# Patient Record
Sex: FEMALE | Race: WHITE | NOT HISPANIC OR LATINO
[De-identification: names, ages, dates, MRNs, and addresses within clinical notes are randomized per-mention and may not be internally consistent; named-entity substitution may affect disease eponyms.]

---

## 2017-01-11 ENCOUNTER — APPOINTMENT (OUTPATIENT)
Dept: CARDIOLOGY | Facility: CLINIC | Age: 67
End: 2017-01-11

## 2017-01-11 VITALS — BODY MASS INDEX: 47.12 KG/M2 | WEIGHT: 240 LBS | HEIGHT: 60 IN

## 2017-01-11 VITALS — HEART RATE: 68 BPM | SYSTOLIC BLOOD PRESSURE: 130 MMHG | RESPIRATION RATE: 18 BRPM | DIASTOLIC BLOOD PRESSURE: 80 MMHG

## 2017-06-09 ENCOUNTER — APPOINTMENT (OUTPATIENT)
Dept: CARDIOLOGY | Facility: CLINIC | Age: 67
End: 2017-06-09

## 2017-06-09 VITALS — BODY MASS INDEX: 49.28 KG/M2 | HEIGHT: 60 IN | WEIGHT: 251 LBS

## 2017-06-09 VITALS — SYSTOLIC BLOOD PRESSURE: 130 MMHG | HEART RATE: 68 BPM | RESPIRATION RATE: 18 BRPM | DIASTOLIC BLOOD PRESSURE: 80 MMHG

## 2017-10-06 ENCOUNTER — APPOINTMENT (OUTPATIENT)
Dept: CARDIOLOGY | Facility: CLINIC | Age: 67
End: 2017-10-06

## 2017-10-06 VITALS — BODY MASS INDEX: 48.29 KG/M2 | WEIGHT: 246 LBS | HEIGHT: 60 IN

## 2017-10-06 VITALS — DIASTOLIC BLOOD PRESSURE: 80 MMHG | RESPIRATION RATE: 18 BRPM | SYSTOLIC BLOOD PRESSURE: 120 MMHG | HEART RATE: 68 BPM

## 2018-03-06 ENCOUNTER — APPOINTMENT (OUTPATIENT)
Dept: CARDIOLOGY | Facility: CLINIC | Age: 68
End: 2018-03-06

## 2018-03-06 VITALS — WEIGHT: 237 LBS | HEIGHT: 60 IN | BODY MASS INDEX: 46.53 KG/M2

## 2018-03-06 VITALS — HEART RATE: 60 BPM | DIASTOLIC BLOOD PRESSURE: 70 MMHG | SYSTOLIC BLOOD PRESSURE: 130 MMHG | RESPIRATION RATE: 18 BRPM

## 2018-03-06 DIAGNOSIS — G47.33 OBSTRUCTIVE SLEEP APNEA (ADULT) (PEDIATRIC): ICD-10-CM

## 2018-03-18 ENCOUNTER — EMERGENCY (EMERGENCY)
Facility: HOSPITAL | Age: 68
LOS: 0 days | Discharge: HOME | End: 2018-03-18
Attending: EMERGENCY MEDICINE | Admitting: FAMILY MEDICINE

## 2018-03-18 VITALS
OXYGEN SATURATION: 98 % | TEMPERATURE: 98 F | HEART RATE: 65 BPM | SYSTOLIC BLOOD PRESSURE: 112 MMHG | RESPIRATION RATE: 18 BRPM | DIASTOLIC BLOOD PRESSURE: 67 MMHG

## 2018-03-18 DIAGNOSIS — Z87.891 PERSONAL HISTORY OF NICOTINE DEPENDENCE: ICD-10-CM

## 2018-03-18 DIAGNOSIS — Z79.84 LONG TERM (CURRENT) USE OF ORAL HYPOGLYCEMIC DRUGS: ICD-10-CM

## 2018-03-18 DIAGNOSIS — I10 ESSENTIAL (PRIMARY) HYPERTENSION: ICD-10-CM

## 2018-03-18 DIAGNOSIS — E11.9 TYPE 2 DIABETES MELLITUS WITHOUT COMPLICATIONS: ICD-10-CM

## 2018-03-18 DIAGNOSIS — E78.5 HYPERLIPIDEMIA, UNSPECIFIED: ICD-10-CM

## 2018-03-18 DIAGNOSIS — Z79.899 OTHER LONG TERM (CURRENT) DRUG THERAPY: ICD-10-CM

## 2018-03-18 DIAGNOSIS — L02.216 CUTANEOUS ABSCESS OF UMBILICUS: ICD-10-CM

## 2018-03-18 DIAGNOSIS — L03.316 CELLULITIS OF UMBILICUS: ICD-10-CM

## 2018-03-18 RX ORDER — CEPHALEXIN 500 MG
1 CAPSULE ORAL
Qty: 28 | Refills: 0
Start: 2018-03-18 | End: 2018-03-24

## 2018-03-18 NOTE — ED PROVIDER NOTE - MEDICAL DECISION MAKING DETAILS
. Patient to be discharged from ED. Verbal instructions given, including instructions to return to ED immediately for any new, worsening, or concerning symptoms. Patient endorsed understanding. Written discharge instructions additionally given, including follow-up plan.

## 2018-03-18 NOTE — ED PROVIDER NOTE - PHYSICAL EXAMINATION
Vital Signs: I have reviewed the initial vital signs.  Constitutional: NAD, well-nourished, appears stated age, no acute distress.  HEENT: Airway patent, moist MM EOMI.  CV: regular rate, regular rhythm, well-perfused extremities.  Lungs: BCTA, no focal areas of decreased breath sounds.   ABD: Umbilicus with erythema, some skin break around rim, yellow-grey watery discharge on probing the umbilicus with cotton swab. No bleeding, no fluctuance, area of spreading erythema, mild induration 6cmx 6cm to left inferior area from umbilicus. NTND, no guarding or rebound, no pulsatile mass, no hernias.   MSK: Neck supple, nontender, nl ROM, no stepoff. Chest nontender. Ext nl rom, no deformity.   INTEG: Skin warm, dry. rash as noted.  NEURO: A&O, normal strength 5/5 all 4 ext, nl sensation throughout.  PSYCH: Calm, cooperative, normal affect and interaction.

## 2018-03-18 NOTE — ED PROVIDER NOTE - OBJECTIVE STATEMENT
67 y F PMH HTN, HLD, DM with normal A1c on metformin, pw increasing redness, firmness, pain, x4 days, then + thin smelly discharge x 2 days from her umbilicus. Never had this before. tried witch hazel without relief, went to  where they told her to come to the ED for concern of the discharge, concern for infection.

## 2018-03-18 NOTE — ED PROVIDER NOTE - PLAN OF CARE
Likely cellulitis of umblicus, slowly progressive, no sign of abscess. Will give abx prescription. Given strict return precautions and care instructions.

## 2018-03-18 NOTE — ED PROVIDER NOTE - CARE PLAN
Principal Discharge DX:	Cellulitis of umbilicus  Assessment and plan of treatment:	Likely cellulitis of umblicus, slowly progressive, no sign of abscess. Will give abx prescription. Given strict return precautions and care instructions.

## 2018-03-18 NOTE — ED PROVIDER NOTE - NS ED ROS FT
Constitutional: (-) fever, (-) chills  Eyes/ENT: (-) blurry vision, (-) epistaxis, (-) sore throat  Cardiovascular: (-) chest pain, (-) syncope  Respiratory: (-) cough, (-) shortness of breath  Gastrointestinal: (-) abdominal pain, (-) vomiting, (-) diarrhea  Musculoskeletal: (-) neck pain, (-) back pain, (-) joint pain  Integumentary: (+) rash, (-) edema  Neurological: (-) headache, (-) altered mental status  Psychiatric: (-) hallucinations,   Allergic/Immunologic: (-) pruritus

## 2018-03-18 NOTE — ED ADULT NURSE NOTE - GASTROINTESTINAL WDL
Abdomen soft, nontender, nondistended, bowel sounds present in all 4 quadrants. Pt states " I have an ear infection in my left ear x 3 days. I have been taking extra strength tylenol but it is not helping". Denies trauma, drainage from ear

## 2018-03-18 NOTE — ED PROVIDER NOTE - PMH
Hyperlipidemia, unspecified hyperlipidemia type    Hypertension, unspecified type    Type 2 diabetes mellitus without complication, without long-term current use of insulin

## 2018-09-07 ENCOUNTER — APPOINTMENT (OUTPATIENT)
Dept: CARDIOLOGY | Facility: CLINIC | Age: 68
End: 2018-09-07

## 2018-09-07 VITALS — HEART RATE: 76 BPM | SYSTOLIC BLOOD PRESSURE: 110 MMHG | RESPIRATION RATE: 18 BRPM | DIASTOLIC BLOOD PRESSURE: 70 MMHG

## 2018-09-07 VITALS — HEIGHT: 60 IN | WEIGHT: 228 LBS | BODY MASS INDEX: 44.76 KG/M2

## 2018-09-07 PROBLEM — E78.5 HYPERLIPIDEMIA, UNSPECIFIED: Chronic | Status: ACTIVE | Noted: 2018-03-18

## 2018-09-07 PROBLEM — I10 ESSENTIAL (PRIMARY) HYPERTENSION: Chronic | Status: ACTIVE | Noted: 2018-03-18

## 2019-03-08 ENCOUNTER — APPOINTMENT (OUTPATIENT)
Dept: CARDIOLOGY | Facility: CLINIC | Age: 69
End: 2019-03-08

## 2019-03-08 VITALS — BODY MASS INDEX: 45.16 KG/M2 | WEIGHT: 230 LBS | HEIGHT: 60 IN

## 2019-03-08 VITALS — SYSTOLIC BLOOD PRESSURE: 110 MMHG | HEART RATE: 54 BPM | RESPIRATION RATE: 18 BRPM | DIASTOLIC BLOOD PRESSURE: 80 MMHG

## 2019-03-08 DIAGNOSIS — Z86.69 PERSONAL HISTORY OF OTHER DISEASES OF THE NERVOUS SYSTEM AND SENSE ORGANS: ICD-10-CM

## 2019-03-08 NOTE — PHYSICAL EXAM
[General Appearance - Well Developed] : well developed [Normal Appearance] : normal appearance [Well Groomed] : well groomed [General Appearance - Well Nourished] : well nourished [No Deformities] : no deformities [General Appearance - In No Acute Distress] : no acute distress [Normal Conjunctiva] : the conjunctiva exhibited no abnormalities [Eyelids - No Xanthelasma] : the eyelids demonstrated no xanthelasmas [Normal Oral Mucosa] : normal oral mucosa [No Oral Pallor] : no oral pallor [No Oral Cyanosis] : no oral cyanosis [Normal Jugular Venous A Waves Present] : normal jugular venous A waves present [Normal Jugular Venous V Waves Present] : normal jugular venous V waves present [No Jugular Venous Guaman A Waves] : no jugular venous guaman A waves [Respiration, Rhythm And Depth] : normal respiratory rhythm and effort [Exaggerated Use Of Accessory Muscles For Inspiration] : no accessory muscle use [Auscultation Breath Sounds / Voice Sounds] : lungs were clear to auscultation bilaterally [Heart Rate And Rhythm] : heart rate and rhythm were normal [Heart Sounds] : normal S1 and S2 [Murmurs] : no murmurs present [Bowel Sounds] : normal bowel sounds [Abdomen Soft] : soft [Abdomen Tenderness] : non-tender [Abdomen Mass (___ Cm)] : no abdominal mass palpated [Abnormal Walk] : normal gait [Nail Clubbing] : no clubbing of the fingernails [Cyanosis, Localized] : no localized cyanosis [Petechial Hemorrhages (___cm)] : no petechial hemorrhages [Skin Color & Pigmentation] : normal skin color and pigmentation [] : no rash [No Venous Stasis] : no venous stasis [Skin Lesions] : no skin lesions [No Skin Ulcers] : no skin ulcer [No Xanthoma] : no  xanthoma was observed [Oriented To Time, Place, And Person] : oriented to person, place, and time

## 2019-09-13 ENCOUNTER — APPOINTMENT (OUTPATIENT)
Dept: CARDIOLOGY | Facility: CLINIC | Age: 69
End: 2019-09-13
Payer: MEDICARE

## 2019-09-13 ENCOUNTER — TRANSCRIPTION ENCOUNTER (OUTPATIENT)
Age: 69
End: 2019-09-13

## 2019-09-13 VITALS — WEIGHT: 235 LBS | BODY MASS INDEX: 46.13 KG/M2 | HEIGHT: 60 IN

## 2019-09-13 VITALS — DIASTOLIC BLOOD PRESSURE: 80 MMHG | HEART RATE: 60 BPM | RESPIRATION RATE: 18 BRPM | SYSTOLIC BLOOD PRESSURE: 130 MMHG

## 2019-09-13 PROCEDURE — 93000 ELECTROCARDIOGRAM COMPLETE: CPT

## 2019-09-13 PROCEDURE — 99214 OFFICE O/P EST MOD 30 MIN: CPT

## 2019-09-13 NOTE — PHYSICAL EXAM
[General Appearance - Well Developed] : well developed [Normal Appearance] : normal appearance [Well Groomed] : well groomed [General Appearance - Well Nourished] : well nourished [No Deformities] : no deformities [General Appearance - In No Acute Distress] : no acute distress [Normal Conjunctiva] : the conjunctiva exhibited no abnormalities [Eyelids - No Xanthelasma] : the eyelids demonstrated no xanthelasmas [Normal Oral Mucosa] : normal oral mucosa [No Oral Pallor] : no oral pallor [No Oral Cyanosis] : no oral cyanosis [Normal Jugular Venous A Waves Present] : normal jugular venous A waves present [Normal Jugular Venous V Waves Present] : normal jugular venous V waves present [No Jugular Venous Guaman A Waves] : no jugular venous guaman A waves [Heart Rate And Rhythm] : heart rate and rhythm were normal [Heart Sounds] : normal S1 and S2 [Murmurs] : no murmurs present [Respiration, Rhythm And Depth] : normal respiratory rhythm and effort [Exaggerated Use Of Accessory Muscles For Inspiration] : no accessory muscle use [Auscultation Breath Sounds / Voice Sounds] : lungs were clear to auscultation bilaterally [Bowel Sounds] : normal bowel sounds [Abdomen Soft] : soft [Abdomen Tenderness] : non-tender [Abdomen Mass (___ Cm)] : no abdominal mass palpated [Abnormal Walk] : normal gait [Nail Clubbing] : no clubbing of the fingernails [Cyanosis, Localized] : no localized cyanosis [Petechial Hemorrhages (___cm)] : no petechial hemorrhages [Skin Color & Pigmentation] : normal skin color and pigmentation [] : no rash [No Venous Stasis] : no venous stasis [Skin Lesions] : no skin lesions [No Skin Ulcers] : no skin ulcer [No Xanthoma] : no  xanthoma was observed [Oriented To Time, Place, And Person] : oriented to person, place, and time

## 2019-09-19 ENCOUNTER — OTHER (OUTPATIENT)
Age: 69
End: 2019-09-19

## 2019-09-27 ENCOUNTER — APPOINTMENT (OUTPATIENT)
Dept: CARDIOLOGY | Facility: CLINIC | Age: 69
End: 2019-09-27
Payer: MEDICARE

## 2019-09-27 PROCEDURE — 93306 TTE W/DOPPLER COMPLETE: CPT

## 2020-06-30 ENCOUNTER — APPOINTMENT (OUTPATIENT)
Dept: CARDIOLOGY | Facility: CLINIC | Age: 70
End: 2020-06-30

## 2020-07-23 ENCOUNTER — APPOINTMENT (OUTPATIENT)
Dept: CARDIOLOGY | Facility: CLINIC | Age: 70
End: 2020-07-23

## 2020-09-23 ENCOUNTER — APPOINTMENT (OUTPATIENT)
Dept: CARDIOLOGY | Facility: CLINIC | Age: 70
End: 2020-09-23

## 2020-10-23 ENCOUNTER — APPOINTMENT (OUTPATIENT)
Dept: CARDIOLOGY | Facility: CLINIC | Age: 70
End: 2020-10-23
Payer: MEDICARE

## 2020-10-23 VITALS — TEMPERATURE: 97.2 F | WEIGHT: 234 LBS | HEIGHT: 60 IN | BODY MASS INDEX: 45.94 KG/M2

## 2020-10-23 VITALS — SYSTOLIC BLOOD PRESSURE: 130 MMHG | DIASTOLIC BLOOD PRESSURE: 80 MMHG | RESPIRATION RATE: 18 BRPM | HEART RATE: 60 BPM

## 2020-10-23 DIAGNOSIS — R73.03 PREDIABETES.: ICD-10-CM

## 2020-10-23 PROCEDURE — 99072 ADDL SUPL MATRL&STAF TM PHE: CPT

## 2020-10-23 PROCEDURE — 93000 ELECTROCARDIOGRAM COMPLETE: CPT

## 2020-10-23 PROCEDURE — 99214 OFFICE O/P EST MOD 30 MIN: CPT

## 2020-10-23 NOTE — PHYSICAL EXAM
[General Appearance - Well Developed] : well developed [Normal Appearance] : normal appearance [Well Groomed] : well groomed [General Appearance - Well Nourished] : well nourished [No Deformities] : no deformities [General Appearance - In No Acute Distress] : no acute distress [Normal Conjunctiva] : the conjunctiva exhibited no abnormalities [Eyelids - No Xanthelasma] : the eyelids demonstrated no xanthelasmas [Normal Oral Mucosa] : normal oral mucosa [No Oral Pallor] : no oral pallor [No Oral Cyanosis] : no oral cyanosis [Normal Jugular Venous A Waves Present] : normal jugular venous A waves present [Normal Jugular Venous V Waves Present] : normal jugular venous V waves present [No Jugular Venous Guaamn A Waves] : no jugular venous guaman A waves [Heart Rate And Rhythm] : heart rate and rhythm were normal [Heart Sounds] : normal S1 and S2 [Murmurs] : no murmurs present [Respiration, Rhythm And Depth] : normal respiratory rhythm and effort [Exaggerated Use Of Accessory Muscles For Inspiration] : no accessory muscle use [Auscultation Breath Sounds / Voice Sounds] : lungs were clear to auscultation bilaterally [Bowel Sounds] : normal bowel sounds [Abdomen Soft] : soft [Abdomen Tenderness] : non-tender [Abdomen Mass (___ Cm)] : no abdominal mass palpated [Abnormal Walk] : normal gait [Nail Clubbing] : no clubbing of the fingernails [Cyanosis, Localized] : no localized cyanosis [Petechial Hemorrhages (___cm)] : no petechial hemorrhages [FreeTextEntry1] : CALVES SOFT [Skin Color & Pigmentation] : normal skin color and pigmentation [] : no rash [No Venous Stasis] : no venous stasis [Skin Lesions] : no skin lesions [No Skin Ulcers] : no skin ulcer [No Xanthoma] : no  xanthoma was observed [Oriented To Time, Place, And Person] : oriented to person, place, and time

## 2020-11-24 ENCOUNTER — APPOINTMENT (OUTPATIENT)
Dept: CARDIOLOGY | Facility: CLINIC | Age: 70
End: 2020-11-24
Payer: MEDICARE

## 2020-11-24 LAB
BASOPHILS # BLD AUTO: 0.08 K/UL
BASOPHILS NFR BLD AUTO: 1 %
EOSINOPHIL # BLD AUTO: 0.26 K/UL
EOSINOPHIL NFR BLD AUTO: 3.2 %
HCT VFR BLD CALC: 31.6 %
HGB BLD-MCNC: 9.7 G/DL
IMM GRANULOCYTES NFR BLD AUTO: 0.2 %
LYMPHOCYTES # BLD AUTO: 1.77 K/UL
LYMPHOCYTES NFR BLD AUTO: 22 %
MAN DIFF?: NORMAL
MCHC RBC-ENTMCNC: 28 PG
MCHC RBC-ENTMCNC: 30.7 G/DL
MCV RBC AUTO: 91.3 FL
MONOCYTES # BLD AUTO: 0.59 K/UL
MONOCYTES NFR BLD AUTO: 7.3 %
NEUTROPHILS # BLD AUTO: 5.32 K/UL
NEUTROPHILS NFR BLD AUTO: 66.3 %
PLATELET # BLD AUTO: 273 K/UL
RBC # BLD: 3.46 M/UL
RBC # FLD: 14.9 %
WBC # FLD AUTO: 8.04 K/UL

## 2020-11-24 PROCEDURE — 93306 TTE W/DOPPLER COMPLETE: CPT

## 2020-11-25 LAB
ALBUMIN SERPL ELPH-MCNC: 4.3 G/DL
ALP BLD-CCNC: 70 U/L
ALT SERPL-CCNC: 13 U/L
ANION GAP SERPL CALC-SCNC: 10 MMOL/L
AST SERPL-CCNC: 20 U/L
BILIRUB SERPL-MCNC: 0.4 MG/DL
BUN SERPL-MCNC: 30 MG/DL
CALCIUM SERPL-MCNC: 9.6 MG/DL
CHLORIDE SERPL-SCNC: 105 MMOL/L
CHOLEST SERPL-MCNC: 167 MG/DL
CO2 SERPL-SCNC: 26 MMOL/L
CREAT SERPL-MCNC: 1.1 MG/DL
GLUCOSE SERPL-MCNC: 96 MG/DL
HDLC SERPL-MCNC: 50 MG/DL
LDLC SERPL CALC-MCNC: 96 MG/DL
NONHDLC SERPL-MCNC: 117 MG/DL
POTASSIUM SERPL-SCNC: 4.7 MMOL/L
PROT SERPL-MCNC: 6.1 G/DL
SODIUM SERPL-SCNC: 141 MMOL/L
TRIGL SERPL-MCNC: 129 MG/DL

## 2021-01-07 ENCOUNTER — APPOINTMENT (OUTPATIENT)
Dept: CARDIOLOGY | Facility: CLINIC | Age: 71
End: 2021-01-07
Payer: MEDICARE

## 2021-01-07 VITALS — HEART RATE: 64 BPM | RESPIRATION RATE: 18 BRPM | DIASTOLIC BLOOD PRESSURE: 80 MMHG | SYSTOLIC BLOOD PRESSURE: 140 MMHG

## 2021-01-07 VITALS — WEIGHT: 230 LBS | BODY MASS INDEX: 45.16 KG/M2 | TEMPERATURE: 95.4 F | HEIGHT: 60 IN

## 2021-01-07 PROCEDURE — 93000 ELECTROCARDIOGRAM COMPLETE: CPT

## 2021-01-07 PROCEDURE — 99072 ADDL SUPL MATRL&STAF TM PHE: CPT

## 2021-01-07 PROCEDURE — 99213 OFFICE O/P EST LOW 20 MIN: CPT

## 2021-01-07 NOTE — PHYSICAL EXAM
[General Appearance - Well Developed] : well developed [Normal Appearance] : normal appearance [Well Groomed] : well groomed [General Appearance - Well Nourished] : well nourished [No Deformities] : no deformities [General Appearance - In No Acute Distress] : no acute distress [Normal Conjunctiva] : the conjunctiva exhibited no abnormalities [Eyelids - No Xanthelasma] : the eyelids demonstrated no xanthelasmas [Normal Oral Mucosa] : normal oral mucosa [No Oral Pallor] : no oral pallor [No Oral Cyanosis] : no oral cyanosis [Normal Jugular Venous A Waves Present] : normal jugular venous A waves present [Normal Jugular Venous V Waves Present] : normal jugular venous V waves present [No Jugular Venous Guaman A Waves] : no jugular venous guaman A waves [Heart Rate And Rhythm] : heart rate and rhythm were normal [Heart Sounds] : normal S1 and S2 [Murmurs] : no murmurs present [Respiration, Rhythm And Depth] : normal respiratory rhythm and effort [Exaggerated Use Of Accessory Muscles For Inspiration] : no accessory muscle use [Auscultation Breath Sounds / Voice Sounds] : lungs were clear to auscultation bilaterally [Bowel Sounds] : normal bowel sounds [Abdomen Soft] : soft [Abdomen Tenderness] : non-tender [Abdomen Mass (___ Cm)] : no abdominal mass palpated [Abnormal Walk] : normal gait [Nail Clubbing] : no clubbing of the fingernails [Cyanosis, Localized] : no localized cyanosis [Petechial Hemorrhages (___cm)] : no petechial hemorrhages [FreeTextEntry1] : CALVES SOFT [Skin Color & Pigmentation] : normal skin color and pigmentation [] : no rash [No Venous Stasis] : no venous stasis [Skin Lesions] : no skin lesions [No Skin Ulcers] : no skin ulcer [No Xanthoma] : no  xanthoma was observed [Oriented To Time, Place, And Person] : oriented to person, place, and time

## 2021-01-21 ENCOUNTER — APPOINTMENT (OUTPATIENT)
Dept: CARDIOTHORACIC SURGERY | Facility: CLINIC | Age: 71
End: 2021-01-21
Payer: MEDICARE

## 2021-01-21 ENCOUNTER — APPOINTMENT (OUTPATIENT)
Dept: CARDIOLOGY | Facility: CLINIC | Age: 71
End: 2021-01-21
Payer: MEDICARE

## 2021-01-21 VITALS
HEIGHT: 59 IN | DIASTOLIC BLOOD PRESSURE: 95 MMHG | WEIGHT: 220 LBS | TEMPERATURE: 98.6 F | BODY MASS INDEX: 44.35 KG/M2 | RESPIRATION RATE: 18 BRPM | SYSTOLIC BLOOD PRESSURE: 156 MMHG | OXYGEN SATURATION: 98 % | HEART RATE: 73 BPM

## 2021-01-21 VITALS
BODY MASS INDEX: 44.35 KG/M2 | WEIGHT: 220 LBS | RESPIRATION RATE: 18 BRPM | TEMPERATURE: 98.6 F | OXYGEN SATURATION: 95 % | HEIGHT: 59 IN | HEART RATE: 73 BPM

## 2021-01-21 PROCEDURE — 99072 ADDL SUPL MATRL&STAF TM PHE: CPT

## 2021-01-21 PROCEDURE — 99214 OFFICE O/P EST MOD 30 MIN: CPT

## 2021-01-21 PROCEDURE — 99203 OFFICE O/P NEW LOW 30 MIN: CPT

## 2021-01-21 RX ORDER — LISINOPRIL AND HYDROCHLOROTHIAZIDE TABLETS 20; 12.5 MG/1; MG/1
20-12.5 TABLET ORAL DAILY
Refills: 0 | Status: DISCONTINUED | COMMUNITY
End: 2021-01-21

## 2021-01-21 NOTE — HISTORY OF PRESENT ILLNESS
[FreeTextEntry1] : Ms. ADRIAN SAMUELS 70 year F, former smoker,  arrives  today for evaluation of their Aortic Stenosis.  Patient PMH include HTN, HLD, Lymphedema, Sciatica, Spinal Stenosis,JUAN PABLO on CPAP at night, glaucoma.  Symptoms include SOB which has recently improved.  Recent Echocardiogram showed peak/mean gradient of 20.2/9.68 with ELIAS 0.9 and moderate MR.  NYHA class II. Here for consultation of her AS.\par \par Has lost a total of 60 lb prior to her 2 recent hip surgery July 2020 and October 2020\par \par Her healthcare teams is as follows:\par PMD: Dr. Gardiner \par Cardio: Dr. David\par Pulm: Dr. Mcelroy

## 2021-01-21 NOTE — PHYSICAL EXAM
[General Appearance - Alert] : alert [General Appearance - In No Acute Distress] : in no acute distress [Neck Appearance] : the appearance of the neck was normal [] : no respiratory distress [Respiration, Rhythm And Depth] : normal respiratory rhythm and effort [Exaggerated Use Of Accessory Muscles For Inspiration] : no accessory muscle use [Auscultation Breath Sounds / Voice Sounds] : lungs were clear to auscultation bilaterally [Bowel Sounds] : normal bowel sounds [Abnormal Walk] : normal gait [Skin Color & Pigmentation] : normal skin color and pigmentation [Skin Turgor] : normal skin turgor [Cranial Nerves] : cranial nerves 2-12 were intact [Oriented To Time, Place, And Person] : oriented to person, place, and time [Normal Rate] : normal [Rhythm Regular] : regular [Normal S1] : normal S1 [Normal S2] : normal S2 [I] : a grade 1 [FreeTextEntry1] : Left Leg Lymphedema

## 2021-02-02 LAB
ANION GAP SERPL CALC-SCNC: 14 MMOL/L
BASOPHILS # BLD AUTO: 0.07 K/UL
BASOPHILS NFR BLD AUTO: 0.8 %
BUN SERPL-MCNC: 31 MG/DL
CALCIUM SERPL-MCNC: 9.5 MG/DL
CHLORIDE SERPL-SCNC: 104 MMOL/L
CO2 SERPL-SCNC: 22 MMOL/L
CREAT SERPL-MCNC: 1.1 MG/DL
EOSINOPHIL # BLD AUTO: 0.23 K/UL
EOSINOPHIL NFR BLD AUTO: 2.5 %
GLUCOSE SERPL-MCNC: 118 MG/DL
HCT VFR BLD CALC: 33.1 %
HGB BLD-MCNC: 10.3 G/DL
IMM GRANULOCYTES NFR BLD AUTO: 0.3 %
LYMPHOCYTES # BLD AUTO: 1.51 K/UL
LYMPHOCYTES NFR BLD AUTO: 16.5 %
MAN DIFF?: NORMAL
MCHC RBC-ENTMCNC: 26.8 PG
MCHC RBC-ENTMCNC: 31.1 G/DL
MCV RBC AUTO: 86 FL
MONOCYTES # BLD AUTO: 0.57 K/UL
MONOCYTES NFR BLD AUTO: 6.2 %
NEUTROPHILS # BLD AUTO: 6.75 K/UL
NEUTROPHILS NFR BLD AUTO: 73.7 %
PLATELET # BLD AUTO: 230 K/UL
POTASSIUM SERPL-SCNC: 4.4 MMOL/L
RBC # BLD: 3.85 M/UL
RBC # FLD: 16.7 %
SODIUM SERPL-SCNC: 140 MMOL/L
WBC # FLD AUTO: 9.16 K/UL

## 2021-02-06 ENCOUNTER — OUTPATIENT (OUTPATIENT)
Dept: OUTPATIENT SERVICES | Facility: HOSPITAL | Age: 71
LOS: 1 days | Discharge: HOME | End: 2021-02-06

## 2021-02-06 ENCOUNTER — LABORATORY RESULT (OUTPATIENT)
Age: 71
End: 2021-02-06

## 2021-02-06 DIAGNOSIS — Z11.59 ENCOUNTER FOR SCREENING FOR OTHER VIRAL DISEASES: ICD-10-CM

## 2021-02-09 ENCOUNTER — INPATIENT (INPATIENT)
Facility: HOSPITAL | Age: 71
LOS: 19 days | Discharge: NURSING, REHAB OR LONG-TERM CARE | End: 2021-03-01
Attending: THORACIC SURGERY (CARDIOTHORACIC VASCULAR SURGERY) | Admitting: THORACIC SURGERY (CARDIOTHORACIC VASCULAR SURGERY)
Payer: MEDICARE

## 2021-02-09 VITALS — HEIGHT: 58.66 IN | WEIGHT: 213.85 LBS

## 2021-02-09 DIAGNOSIS — Z98.890 OTHER SPECIFIED POSTPROCEDURAL STATES: Chronic | ICD-10-CM

## 2021-02-09 LAB
ANION GAP SERPL CALC-SCNC: 9 MMOL/L — SIGNIFICANT CHANGE UP (ref 7–14)
BUN SERPL-MCNC: 30 MG/DL — HIGH (ref 10–20)
CALCIUM SERPL-MCNC: 9.3 MG/DL — SIGNIFICANT CHANGE UP (ref 8.5–10.1)
CHLORIDE SERPL-SCNC: 104 MMOL/L — SIGNIFICANT CHANGE UP (ref 98–110)
CO2 SERPL-SCNC: 24 MMOL/L — SIGNIFICANT CHANGE UP (ref 17–32)
CREAT SERPL-MCNC: 0.9 MG/DL — SIGNIFICANT CHANGE UP (ref 0.7–1.5)
GLUCOSE BLDC GLUCOMTR-MCNC: 134 MG/DL — HIGH (ref 70–99)
GLUCOSE BLDC GLUCOMTR-MCNC: 81 MG/DL — SIGNIFICANT CHANGE UP (ref 70–99)
GLUCOSE SERPL-MCNC: 96 MG/DL — SIGNIFICANT CHANGE UP (ref 70–99)
HCT VFR BLD CALC: 35.5 % — LOW (ref 37–47)
HGB BLD-MCNC: 11.2 G/DL — LOW (ref 12–16)
MCHC RBC-ENTMCNC: 26.9 PG — LOW (ref 27–31)
MCHC RBC-ENTMCNC: 31.5 G/DL — LOW (ref 32–37)
MCV RBC AUTO: 85.3 FL — SIGNIFICANT CHANGE UP (ref 81–99)
NRBC # BLD: 0 /100 WBCS — SIGNIFICANT CHANGE UP (ref 0–0)
PLATELET # BLD AUTO: 234 K/UL — SIGNIFICANT CHANGE UP (ref 130–400)
POTASSIUM SERPL-MCNC: 4.1 MMOL/L — SIGNIFICANT CHANGE UP (ref 3.5–5)
POTASSIUM SERPL-SCNC: 4.1 MMOL/L — SIGNIFICANT CHANGE UP (ref 3.5–5)
RBC # BLD: 4.16 M/UL — LOW (ref 4.2–5.4)
RBC # FLD: 16.9 % — HIGH (ref 11.5–14.5)
SODIUM SERPL-SCNC: 137 MMOL/L — SIGNIFICANT CHANGE UP (ref 135–146)
WBC # BLD: 10.17 K/UL — SIGNIFICANT CHANGE UP (ref 4.8–10.8)
WBC # FLD AUTO: 10.17 K/UL — SIGNIFICANT CHANGE UP (ref 4.8–10.8)

## 2021-02-09 PROCEDURE — 93320 DOPPLER ECHO COMPLETE: CPT | Mod: 26

## 2021-02-09 PROCEDURE — 93325 DOPPLER ECHO COLOR FLOW MAPG: CPT | Mod: 26

## 2021-02-09 PROCEDURE — 93312 ECHO TRANSESOPHAGEAL: CPT | Mod: 26,XU

## 2021-02-09 PROCEDURE — 93460 R&L HRT ART/VENTRICLE ANGIO: CPT | Mod: 26

## 2021-02-09 RX ORDER — ATORVASTATIN CALCIUM 80 MG/1
40 TABLET, FILM COATED ORAL AT BEDTIME
Refills: 0 | Status: DISCONTINUED | OUTPATIENT
Start: 2021-02-09 | End: 2021-02-12

## 2021-02-09 RX ORDER — DEXTROSE 50 % IN WATER 50 %
25 SYRINGE (ML) INTRAVENOUS ONCE
Refills: 0 | Status: DISCONTINUED | OUTPATIENT
Start: 2021-02-09 | End: 2021-02-11

## 2021-02-09 RX ORDER — SODIUM CHLORIDE 9 MG/ML
1000 INJECTION, SOLUTION INTRAVENOUS
Refills: 0 | Status: DISCONTINUED | OUTPATIENT
Start: 2021-02-09 | End: 2021-02-11

## 2021-02-09 RX ORDER — INSULIN LISPRO 100/ML
VIAL (ML) SUBCUTANEOUS
Refills: 0 | Status: DISCONTINUED | OUTPATIENT
Start: 2021-02-09 | End: 2021-02-11

## 2021-02-09 RX ORDER — SODIUM CHLORIDE 9 MG/ML
1000 INJECTION INTRAMUSCULAR; INTRAVENOUS; SUBCUTANEOUS
Refills: 0 | Status: DISCONTINUED | OUTPATIENT
Start: 2021-02-09 | End: 2021-02-13

## 2021-02-09 RX ORDER — GLUCAGON INJECTION, SOLUTION 0.5 MG/.1ML
1 INJECTION, SOLUTION SUBCUTANEOUS ONCE
Refills: 0 | Status: DISCONTINUED | OUTPATIENT
Start: 2021-02-09 | End: 2021-02-11

## 2021-02-09 RX ORDER — PANTOPRAZOLE SODIUM 20 MG/1
40 TABLET, DELAYED RELEASE ORAL
Refills: 0 | Status: DISCONTINUED | OUTPATIENT
Start: 2021-02-09 | End: 2021-02-12

## 2021-02-09 RX ORDER — ASPIRIN/CALCIUM CARB/MAGNESIUM 324 MG
81 TABLET ORAL DAILY
Refills: 0 | Status: DISCONTINUED | OUTPATIENT
Start: 2021-02-10 | End: 2021-02-12

## 2021-02-09 RX ORDER — METOPROLOL TARTRATE 50 MG
25 TABLET ORAL
Refills: 0 | Status: DISCONTINUED | OUTPATIENT
Start: 2021-02-09 | End: 2021-02-12

## 2021-02-09 RX ORDER — DEXTROSE 50 % IN WATER 50 %
12.5 SYRINGE (ML) INTRAVENOUS ONCE
Refills: 0 | Status: DISCONTINUED | OUTPATIENT
Start: 2021-02-09 | End: 2021-02-11

## 2021-02-09 RX ORDER — LISINOPRIL 2.5 MG/1
20 TABLET ORAL DAILY
Refills: 0 | Status: DISCONTINUED | OUTPATIENT
Start: 2021-02-09 | End: 2021-02-10

## 2021-02-09 RX ORDER — DEXTROSE 50 % IN WATER 50 %
15 SYRINGE (ML) INTRAVENOUS ONCE
Refills: 0 | Status: DISCONTINUED | OUTPATIENT
Start: 2021-02-09 | End: 2021-02-11

## 2021-02-09 RX ADMIN — ATORVASTATIN CALCIUM 40 MILLIGRAM(S): 80 TABLET, FILM COATED ORAL at 22:01

## 2021-02-09 RX ADMIN — Medication 25 MILLIGRAM(S): at 20:39

## 2021-02-09 NOTE — PROGRESS NOTE ADULT - SUBJECTIVE AND OBJECTIVE BOX
Surgeon: /Edgar    Consult requesting by: Dr. Maciel    HISTORY OF PRESENT ILLNESS:  71 yo F h/o HTN, DLD, has JACOME on TTE Mod AS by gradient, and mod to severe MR here for DERRICK and RHC/LHC  DERRICK revealed moderate to severe mitral insufficiency and cardiac catheterization revealed severe 2vCAD.      NYHA functional class    [ ] Class I (no limitation) [ ] Class II (slight limitation) [X ] Class III (marked limitation) [ ] Class IV (symptoms at rest)    PAST MEDICAL & SURGICAL HISTORY:  Glaucoma    Chronic sciatica    H/O sleep apnea  on CPAP    Aortic stenosis    Type 2 diabetes mellitus without complication, without long-term current use of insulin    Hyperlipidemia, unspecified hyperlipidemia type    Hypertension, unspecified type    History of carpal tunnel surgery    History of hip surgery  bilateral hip        MEDICATIONS  (STANDING):  aspirin enteric coated 81 milliGRAM(s) Oral daily  atorvastatin 40 milliGRAM(s) Oral at bedtime  dextrose 40% Gel 15 Gram(s) Oral once  dextrose 5%. 1000 milliLiter(s) (50 mL/Hr) IV Continuous <Continuous>  dextrose 5%. 1000 milliLiter(s) (100 mL/Hr) IV Continuous <Continuous>  dextrose 50% Injectable 25 Gram(s) IV Push once  dextrose 50% Injectable 12.5 Gram(s) IV Push once  dextrose 50% Injectable 25 Gram(s) IV Push once  glucagon  Injectable 1 milliGRAM(s) IntraMuscular once  heparin   Injectable 5000 Unit(s) SubCutaneous every 8 hours  insulin lispro (ADMELOG) corrective regimen sliding scale   SubCutaneous three times a day before meals  metoprolol tartrate 25 milliGRAM(s) Oral two times a day  pantoprazole    Tablet 40 milliGRAM(s) Oral before breakfast  sodium chloride 0.9%. 1000 milliLiter(s) (75 mL/Hr) IV Continuous <Continuous>    MEDICATIONS  (PRN):  acetaminophen   Tablet .. 650 milliGRAM(s) Oral every 8 hours PRN Temp greater or equal to 38C (100.4F), Mild Pain (1 - 3)    Antiplatelet therapy:                           Last dose/amt:    Allergies    No Known Allergies    Intolerances        SOCIAL HISTORY:  Smoker: [ ] Yes  [x ] No        PACK YEARS:                         WHEN QUIT?  ETOH use: [ ] Yes  [ x] No              FREQUENCY / QUANTITY:  Ilicit Drug use:  [ ] Yes  [x ] No  Occupation: Retired  Lives with: Alone  Assisted device use: Walker  5 meter walk test: 1____sec, 2____sec, 3___sec - unable to assess due to recent catheterization  FAMILY HISTORY:      Review of Systems  CONSTITUTIONAL:  Fevers[ ] chills[ ] sweats[ ] fatigue[x ] weight loss[ ] weight gain [ ]            NEGATIVE [ ]   NEURO:  parathesias[ ] seizures [ ]  syncope [ ]  confusion [ ]                                                       NEGATIVE[ X]   EYES: glasses[ ]  blurry vision[ ]  discharge[ ] pain[ ] glaucoma [ ]                                                 NEGATIVE[X ]   ENMT:  difficulty hearing [ ]  vertigo[ ]  dysphagia[ ] epistaxis[ ] recent dental work [ ]           NEGATIVE[ X]   CV:  chest pain[ x] palpitations[ ] JACOME [x ] diaphoresis [ ]                                                                  NEGATIVE[ ]   RESPIRATORY:  wheezing[ ] SOB[ x] cough [ ] sputum[ ] hemoptysis[ ]                                          NEGATIVE[ ]   GI:  nausea[ ]  vomiting [ ]  diarrhea[ ] constipation [ ] melena [ ]                                             NEGATIVE[ X]   : hematuria[ ]  dysuria[ ] urgency[ ] incontinence[ ]                                                                   NEGATIVE[ X]   MUSCULOSKELETAL:  arthritis[ x]  joint swelling [ ] muscle weakness [ ] Hx vein stripping [ ]   NEGATIVE[ ]   SKIN/BREAST:  rash[ ] itching [ ]  hair loss[ ] masses[ ]                                                                    NEGATIVE[ X]   PSYCH:  dementia [ ] depression [ ] anxiety[ ]                                                                                     NEGATIVE[X ]   HEME/LYMPH:  bruises easily[ ] enlarged lymph nodes[ ] tender lymph nodes[ ]                     NEGATIVE[ X]   ENDOCRINE:  cold intolerance[ ] heat intolerance[ ] polydipsia[ ]                                                NEGATIVE[ X]     PHYSICAL EXAM  Vital Signs Last 24 Hrs  T(C): 36.4 (10 Feb 2021 05:57), Max: 36.9 (09 Feb 2021 21:53)  T(F): 97.6 (10 Feb 2021 05:57), Max: 98.5 (09 Feb 2021 21:53)  HR: 65 (10 Feb 2021 05:57) (65 - 80)  BP: 174/83 (10 Feb 2021 05:57) (143/80 - 175/92)  RR: 16 (10 Feb 2021 05:57) (16 - 18)  SpO2: 97% (09 Feb 2021 20:29) (97% - 97%)    CONSTITUTIONAL:  WNL[ x]   Neuro: WNL[x ] Normal exam oriented to person/place & time with no focal motor or sensory  deficits. Other                     Eyes: WNL[ x]   Normal exam of conjunctiva & lids, pupils equally reactive. Other     ENT: WNL[x ]    Normal exam of nasal/oral mucosa with absence of cyanosis. Other  Neck: WNL[x ]  Normal exam of jugular veins, trachea & thyroid. Other  Chest: WNL[ x] Normal lung exam with good air movement absence of wheezes, rales or Ronchi                                                                             CV:  Auscultation: normal [x ] S3[ ] S4[ ] Irregular [ ] Rub[ ] Clicks[ ]    Murmurs none:[ ]systolic [x ]  diastolic [ ] holosystolic [ ]  Carotids: No Bruits[ ] Other                 Abdominal Aorta: normal [ ] nonpalpable[ ]Other                                                                                      GI: WNL[x ] Normal exam of abdomen, liver & spleen with no noted masses or tenderness. Other                                                                                                        Extremities: WNL[ x] Normal no evidence of cyanosis or deformity Edema: none[ ]trace[ ]1+[ ]2+[ ]3+[ ]4+[ ]  Lower Extremity Pulses: Right[ x] Left[x ]Varicosities[ ]   SKIN :WNL[ x] Normal exam to inspection & palpation. Other:                                                          LABS:                        10.4   8.32  )-----------( 198      ( 10 Feb 2021 05:50 )             34.3     02-10    142  |  108  |  22<H>  ----------------------------<  105<H>  4.3   |  24  |  1.0    Ca    8.8      10 Feb 2021 05:50      Cardiac Cath:  LEFT HEART CATHETERIZATION                                    Left main normal     LAD: Ostial 90% calcified lesion, Prox heavy calcified 60- 70% diffuse disease                       Diag: samll    Left Circumflex: large mild disease   OM: patent     Right Coronary Artery: large dominant minor irregularities  RPDA normal       DOMINANCE: Right    ACCESS: right femoral vein --> Per close               right radial artery --> D stat       INTERVENTION  none :    RIGHT HEART CATHETERIZATION  PA: mild pulm HTN  PCW: normal   CO/CI: normal       POST-OP DIAGNOSIS  Significant ostial/Prox LAD lesion       DERRICK: Report Pending      Recommendation: (Procedures/Evaluations)  CT HEAD Non-Contrast:[  ]  CT Chest without contrast [X ]  Echocadiography :[ ]  Carotid Duplex :[X ]  LUKAS/PVR: [ ]  PFT : Simple PFT [ X]  Full [ ]  Renal Consult [ ]  Pulmonary Consult: [ ]   Vascular Consult [ ]    Dental Consult [X ]   Hem-Onc Consult [ ]   GI Consult [ ]   Other Consultations :    STS Score:   Risk of Mortality - 7.583%    Impression:    CAD [ X]  Valvular  disease [X ]   Aortic Disease [ ]   JUSTEN: Yes[ ] No [x ]    Anemia: Yes [ ], No [x ]  Diabetes :Yes [ ], No [x ]  Acute MI: Yes [ ], No [x ]   Heart Failure: Yes [x ] , No [ ] HFpEF [ x], HFrEF [ ]        Assessment/ Plan:    70F here with severe double vessel CAD and Moderate to severe MR.  The patient will be admitted to [facility] for further evaluation and workup for possible MVR/CABG x 2.  The patient was seen and examined by myself ayesha Dr. Rojas and the case was discussed with the CTS team as well as the patient's cardiologist.  The following workup is needed:  1.  CT chest non-contrast  2. Carotid duplex  3. Simple PFT's  4. Dental clearance

## 2021-02-09 NOTE — H&P CARDIOLOGY - HISTORY OF PRESENT ILLNESS
69 yo F h/o HTN, DLD, has JACOME on TTE Mod AS by gradient, and mod to severe MR here for DERRICK and RHC/LHC

## 2021-02-09 NOTE — CHART NOTE - NSCHARTNOTEFT_GEN_A_CORE
PRE-OP DIAGNOSIS: HTN, DL, DM, Moderate MI, obesity ,ex smoker, dyspnea and chest discomfort     PROCEDURE: RHC/LHC with coronary angiography    Physician: Dr Maciel   Assistant: Justin    ANESTHESIA TYPE:  [  ]General Anesthesia  [  ] Sedation  [ x ] Local/Regional    ESTIMATED BLOOD LOSS:    10   mL    CONDITION  [  ] Critical  [  ] Serious  [  ]Fair  [ x ]Good      SPECIMENS REMOVED (IF APPLICABLE): N/A      IV CONTRAST:    50        mL      IMPLANTS (IF APPLICABLE)      FINDINGS    Left Heart Catheterization:  LVEDP: mild elevation         LEFT HEART CATHETERIZATION                                    Left main normal     LAD: Ostial 90% calcified lesion, Prox heavy calcified 60- 70% diffuse disease                       Diag: samll    Left Circumflex: large mild disease   OM: patent     Right Coronary Artery: large dominant minor irregularities  RPDA normal       DOMINANCE: Right    ACCESS: right femoral vein --> Per close               right radial artery --> D stat       INTERVENTION  none :    RIGHT HEART CATHETERIZATION  PA: mild pulm HTN  PCW: normal   CO/CI: normal       POST-OP DIAGNOSIS  Significant ostial/Prox LAD lesion         PLAN OF CARE    [ x] Admit for observation    ASA , B-blocker & Statin therapy  continue ACE  IV hydration   Monitor kidney function   will discuss PCI option .     Results of procedure/ plan of care discussed with patient/  in detail. PRE-OP DIAGNOSIS:  Mitral Regurgitation    POST-OP DIAGNOSIS:  Mitral Regurgitation, CAD    PROCEDURE: RHC/LHC, Coronary Angiography    Physician: Dandy Maciel  Assistant: Fellow    ANESTHESIA TYPE:  [  ] General Anesthesia  [  ] Sedation  [ x ] Local/Regional    ESTIMATED BLOOD LOSS: 10 mL    CONDITION:  [  ] Critical  [  ] Serious  [  ] Fair  [ x ] Good    IV CONTRAST: 50 mL    ACCESS:  7F Right FV --> Manual  6F Right RA --> D-Stat    FINDINGS:  Dominance: Right    90% ostial LAD stenosis    INTERVENTION: None    IMPLANTS (IF APPLICABLE): N/A    SPECIMENS REMOVED (IF APPLICABLE): N/A    PLAN OF CARE:  Medical therapy / risk factors modification.  Evaluate for CABG v. PCI +/- MVR v. MitraClip

## 2021-02-09 NOTE — CHART NOTE - NSCHARTNOTEFT_GEN_A_CORE
PRE-OP DIAGNOSIS: angina, HTN, DM DL, + FHx of CAD     PROCEDURE: [facility] with coronary angiography    Physician: Dr Palmer   Assistant: Justin    ANESTHESIA TYPE:  [  ]General Anesthesia  [  ] Sedation  [  x] Local/Regional    ESTIMATED BLOOD LOSS:    10   mL    CONDITION  [  ] Critical  [  ] Serious  [  ]Fair  [ x ]Good      SPECIMENS REMOVED (IF APPLICABLE): N/A      IV CONTRAST:       50      mL      IMPLANTS (IF APPLICABLE)      FINDINGS    Left Heart Catheterization:  LVEF%: 60  LVEDP: normal       LEFT HEART CATHETERIZATION                                    Left main short normal     LAD:   Prox mild disease. Mid 60-70% lesion , Distal tortuous, mild disease                      Diag: ostial 50% lesion     Left Circumflex: normal   OM: patent     Right Coronary Artery: tortuous , normal   RPDA patent       DOMINANCE: Right    ACCESS: right radial   CLOSURE: Dstat     INTERVENTION  Mid LAD: iFR = 0.92                FFR = 0.84                IVUS --> eccentric ~ 75% lesion MLA 3.8 mm%         POST-OP DIAGNOSIS  Mid LAD eccentric lesion non significant hemodynamically by both iFR and FFR         PLAN OF CARE  [ x] D/C Home today  [ x]  Continue ASA B-blocker & Statin therapy  IV hydration   follow up outpatient     Results of procedure/ plan of care discussed with patient/  in detail.

## 2021-02-09 NOTE — ASU PATIENT PROFILE, ADULT - PMH
Aortic stenosis    Chronic sciatica    Glaucoma    H/O sleep apnea  on CPAP  Hyperlipidemia, unspecified hyperlipidemia type    Hypertension, unspecified type    Type 2 diabetes mellitus without complication, without long-term current use of insulin

## 2021-02-09 NOTE — PATIENT PROFILE ADULT - DEAF OR HARD OF HEARING?
no

## 2021-02-09 NOTE — PACU DISCHARGE NOTE - COMMENTS
71 y/o s/p DERRICK under MAC. No anesthesia related complications.     HR: 83  BP: 117/52   RR:20   SpO2: 99%

## 2021-02-10 ENCOUNTER — TRANSCRIPTION ENCOUNTER (OUTPATIENT)
Age: 71
End: 2021-02-10

## 2021-02-10 LAB
A1C WITH ESTIMATED AVERAGE GLUCOSE RESULT: 6.1 % — HIGH (ref 4–5.6)
ANION GAP SERPL CALC-SCNC: 10 MMOL/L — SIGNIFICANT CHANGE UP (ref 7–14)
APPEARANCE UR: CLEAR — SIGNIFICANT CHANGE UP
BACTERIA # UR AUTO: ABNORMAL
BASOPHILS # BLD AUTO: 0.04 K/UL — SIGNIFICANT CHANGE UP (ref 0–0.2)
BASOPHILS NFR BLD AUTO: 0.5 % — SIGNIFICANT CHANGE UP (ref 0–1)
BILIRUB UR-MCNC: NEGATIVE — SIGNIFICANT CHANGE UP
BUN SERPL-MCNC: 22 MG/DL — HIGH (ref 10–20)
CALCIUM SERPL-MCNC: 8.8 MG/DL — SIGNIFICANT CHANGE UP (ref 8.5–10.1)
CHLORIDE SERPL-SCNC: 108 MMOL/L — SIGNIFICANT CHANGE UP (ref 98–110)
CO2 SERPL-SCNC: 24 MMOL/L — SIGNIFICANT CHANGE UP (ref 17–32)
COLOR SPEC: SIGNIFICANT CHANGE UP
CREAT SERPL-MCNC: 1 MG/DL — SIGNIFICANT CHANGE UP (ref 0.7–1.5)
DIFF PNL FLD: NEGATIVE — SIGNIFICANT CHANGE UP
EOSINOPHIL # BLD AUTO: 0.24 K/UL — SIGNIFICANT CHANGE UP (ref 0–0.7)
EOSINOPHIL NFR BLD AUTO: 2.9 % — SIGNIFICANT CHANGE UP (ref 0–8)
EPI CELLS # UR: 0 /HPF — SIGNIFICANT CHANGE UP (ref 0–5)
ESTIMATED AVERAGE GLUCOSE: 128 MG/DL — HIGH (ref 68–114)
GLUCOSE BLDC GLUCOMTR-MCNC: 101 MG/DL — HIGH (ref 70–99)
GLUCOSE BLDC GLUCOMTR-MCNC: 106 MG/DL — HIGH (ref 70–99)
GLUCOSE BLDC GLUCOMTR-MCNC: 83 MG/DL — SIGNIFICANT CHANGE UP (ref 70–99)
GLUCOSE BLDC GLUCOMTR-MCNC: 84 MG/DL — SIGNIFICANT CHANGE UP (ref 70–99)
GLUCOSE SERPL-MCNC: 105 MG/DL — HIGH (ref 70–99)
GLUCOSE UR QL: NEGATIVE — SIGNIFICANT CHANGE UP
HCT VFR BLD CALC: 34.3 % — LOW (ref 37–47)
HGB BLD-MCNC: 10.4 G/DL — LOW (ref 12–16)
HYALINE CASTS # UR AUTO: 0 /LPF — SIGNIFICANT CHANGE UP (ref 0–7)
IMM GRANULOCYTES NFR BLD AUTO: 0.2 % — SIGNIFICANT CHANGE UP (ref 0.1–0.3)
KETONES UR-MCNC: NEGATIVE — SIGNIFICANT CHANGE UP
LEUKOCYTE ESTERASE UR-ACNC: ABNORMAL
LYMPHOCYTES # BLD AUTO: 1.97 K/UL — SIGNIFICANT CHANGE UP (ref 1.2–3.4)
LYMPHOCYTES # BLD AUTO: 23.7 % — SIGNIFICANT CHANGE UP (ref 20.5–51.1)
MCHC RBC-ENTMCNC: 26.7 PG — LOW (ref 27–31)
MCHC RBC-ENTMCNC: 30.3 G/DL — LOW (ref 32–37)
MCV RBC AUTO: 88.2 FL — SIGNIFICANT CHANGE UP (ref 81–99)
MONOCYTES # BLD AUTO: 0.67 K/UL — HIGH (ref 0.1–0.6)
MONOCYTES NFR BLD AUTO: 8.1 % — SIGNIFICANT CHANGE UP (ref 1.7–9.3)
MRSA PCR RESULT.: NEGATIVE — SIGNIFICANT CHANGE UP
NEUTROPHILS # BLD AUTO: 5.38 K/UL — SIGNIFICANT CHANGE UP (ref 1.4–6.5)
NEUTROPHILS NFR BLD AUTO: 64.6 % — SIGNIFICANT CHANGE UP (ref 42.2–75.2)
NITRITE UR-MCNC: POSITIVE
NRBC # BLD: 0 /100 WBCS — SIGNIFICANT CHANGE UP (ref 0–0)
PH UR: 6.5 — SIGNIFICANT CHANGE UP (ref 5–8)
PLATELET # BLD AUTO: 198 K/UL — SIGNIFICANT CHANGE UP (ref 130–400)
POTASSIUM SERPL-MCNC: 4.3 MMOL/L — SIGNIFICANT CHANGE UP (ref 3.5–5)
POTASSIUM SERPL-SCNC: 4.3 MMOL/L — SIGNIFICANT CHANGE UP (ref 3.5–5)
PROT UR-MCNC: NEGATIVE — SIGNIFICANT CHANGE UP
RBC # BLD: 3.89 M/UL — LOW (ref 4.2–5.4)
RBC # FLD: 17.2 % — HIGH (ref 11.5–14.5)
RBC CASTS # UR COMP ASSIST: 1 /HPF — SIGNIFICANT CHANGE UP (ref 0–4)
SODIUM SERPL-SCNC: 142 MMOL/L — SIGNIFICANT CHANGE UP (ref 135–146)
SP GR SPEC: 1.01 — SIGNIFICANT CHANGE UP (ref 1.01–1.03)
UROBILINOGEN FLD QL: SIGNIFICANT CHANGE UP
WBC # BLD: 8.32 K/UL — SIGNIFICANT CHANGE UP (ref 4.8–10.8)
WBC # FLD AUTO: 8.32 K/UL — SIGNIFICANT CHANGE UP (ref 4.8–10.8)
WBC UR QL: 40 /HPF — HIGH (ref 0–5)

## 2021-02-10 PROCEDURE — 71046 X-RAY EXAM CHEST 2 VIEWS: CPT | Mod: 26

## 2021-02-10 PROCEDURE — 99232 SBSQ HOSP IP/OBS MODERATE 35: CPT

## 2021-02-10 PROCEDURE — 93880 EXTRACRANIAL BILAT STUDY: CPT | Mod: 26

## 2021-02-10 PROCEDURE — 71250 CT THORAX DX C-: CPT | Mod: 26

## 2021-02-10 RX ORDER — ACETAMINOPHEN 500 MG
650 TABLET ORAL EVERY 8 HOURS
Refills: 0 | Status: DISCONTINUED | OUTPATIENT
Start: 2021-02-10 | End: 2021-02-12

## 2021-02-10 RX ORDER — AMOXICILLIN 250 MG/5ML
500 SUSPENSION, RECONSTITUTED, ORAL (ML) ORAL EVERY 8 HOURS
Refills: 0 | Status: DISCONTINUED | OUTPATIENT
Start: 2021-02-10 | End: 2021-02-11

## 2021-02-10 RX ORDER — HEPARIN SODIUM 5000 [USP'U]/ML
5000 INJECTION INTRAVENOUS; SUBCUTANEOUS EVERY 8 HOURS
Refills: 0 | Status: DISCONTINUED | OUTPATIENT
Start: 2021-02-10 | End: 2021-02-11

## 2021-02-10 RX ADMIN — PANTOPRAZOLE SODIUM 40 MILLIGRAM(S): 20 TABLET, DELAYED RELEASE ORAL at 06:21

## 2021-02-10 RX ADMIN — LISINOPRIL 20 MILLIGRAM(S): 2.5 TABLET ORAL at 06:21

## 2021-02-10 RX ADMIN — Medication 81 MILLIGRAM(S): at 12:48

## 2021-02-10 RX ADMIN — Medication 650 MILLIGRAM(S): at 01:22

## 2021-02-10 RX ADMIN — Medication 500 MILLIGRAM(S): at 21:56

## 2021-02-10 RX ADMIN — ATORVASTATIN CALCIUM 40 MILLIGRAM(S): 80 TABLET, FILM COATED ORAL at 21:34

## 2021-02-10 RX ADMIN — Medication 25 MILLIGRAM(S): at 17:49

## 2021-02-10 RX ADMIN — Medication 25 MILLIGRAM(S): at 06:21

## 2021-02-10 RX ADMIN — HEPARIN SODIUM 5000 UNIT(S): 5000 INJECTION INTRAVENOUS; SUBCUTANEOUS at 21:34

## 2021-02-10 RX ADMIN — HEPARIN SODIUM 5000 UNIT(S): 5000 INJECTION INTRAVENOUS; SUBCUTANEOUS at 12:50

## 2021-02-10 NOTE — DISCHARGE NOTE NURSING/CASE MANAGEMENT/SOCIAL WORK - PATIENT PORTAL LINK FT
You can access the FollowMyHealth Patient Portal offered by [facility] by registering at the following website: [email or web address]. By joining [facility]’s FollowMyHealth portal, you will also be able to view your health information using other applications (apps) compatible with our system.

## 2021-02-10 NOTE — PROGRESS NOTE ADULT - SUBJECTIVE AND OBJECTIVE BOX
OPERATIVE PROCEDURE(s):                Pre-op MVR CABG               70yFemale  SURGEON(s): LEANNE Victoria  SUBJECTIVE ASSESSMENT:  patient resting comfortablely  Vital Signs Last 24 Hrs  T(F): 97.6 (10 Feb 2021 05:57), Max: 98.5 (09 Feb 2021 21:53)  HR: 65 (10 Feb 2021 05:57) (65 - 80)  BP: 174/83 (10 Feb 2021 05:57) (143/80 - 175/92)  RR: 16 (10 Feb 2021 05:57) (16 - 18)  SpO2: 97% (09 Feb 2021 20:29) (97% - 97%)    I&O's Detail    09 Feb 2021 07:01  -  10 Feb 2021 07:00  --------------------------------------------------------  IN:  Total IN: 0 mL    OUT:    Estimated Blood Loss (mL): 1 mL    Voided (mL): 600 mL  Total OUT: 601 mL    Net:   I&O's Detail    09 Feb 2021 07:01  -  10 Feb 2021 07:00  --------------------------------------------------------  Total NET: -601 mL    CAPILLARY BLOOD GLUCOSE    POCT Blood Glucose.: 101 mg/dL (10 Feb 2021 07:24)  POCT Blood Glucose.: 134 mg/dL (09 Feb 2021 21:36)  POCT Blood Glucose.: 81 mg/dL (09 Feb 2021 12:28)    Physical Exam:  General: NAD; A&Ox3  Cardiac: S1/S2, RRR, no significant  murmur appreciated, no rubs  Lungs: unlabored respirations, CTA b/l, no wheeze, no rales, no crackles  Abdomen: Soft/NT/ND; positive bowel sounds x 4  Extremities: No edema b/l lower extremities; good capillary refill;    + BM yesterday    LABS:                        10.4<L>  8.32  )-----------( 198      ( 10 Feb 2021 05:50 )             34.3<L>                        11.2<L>  10.17 )-----------( 234      ( 09 Feb 2021 09:40 )             35.5<L>    02-10    142  |  108  |  22<H>  ----------------------------<  105<H>  4.3   |  24  |  1.0  02-09    137  |  104  |  30<H>  ----------------------------<  96  4.1   |  24  |  0.9    Ca    8.8      10 Feb 2021 05:50    LEFT HEART CATHETERIZATION                                    Left main normal     LAD: Ostial 90% calcified lesion, Prox heavy calcified 60- 70% diffuse disease                       Diag: samll    Left Circumflex: large mild disease   OM: patent     Right Coronary Artery: large dominant minor irregularities  RPDA normal       MEDICATIONS  (STANDING):  aspirin enteric coated 81 milliGRAM(s) Oral daily  atorvastatin 40 milliGRAM(s) Oral at bedtime  dextrose 40% Gel 15 Gram(s) Oral once  dextrose 5%. 1000 milliLiter(s) (50 mL/Hr) IV Continuous <Continuous>  dextrose 5%. 1000 milliLiter(s) (100 mL/Hr) IV Continuous <Continuous>  dextrose 50% Injectable 25 Gram(s) IV Push once  dextrose 50% Injectable 12.5 Gram(s) IV Push once  dextrose 50% Injectable 25 Gram(s) IV Push once  glucagon  Injectable 1 milliGRAM(s) IntraMuscular once  heparin   Injectable 5000 Unit(s) SubCutaneous every 8 hours  insulin lispro (ADMELOG) corrective regimen sliding scale   SubCutaneous three times a day before meals  metoprolol tartrate 25 milliGRAM(s) Oral two times a day  pantoprazole    Tablet 40 milliGRAM(s) Oral before breakfast  sodium chloride 0.9%. 1000 milliLiter(s) (75 mL/Hr) IV Continuous <Continuous>    MEDICATIONS  (PRN):  acetaminophen   Tablet .. 650 milliGRAM(s) Oral every 8 hours PRN Temp greater or equal to 38C (100.4F), Mild Pain (1 - 3)    Allergies    No Known Allergies    Intolerances      Ambulation/Activity Status:    Assessment/Plan:  70y Female being pre-op'ed for MV and CABG on Friday  - Case and plan discussed with CTU Intensivist and CT Surgeon - Dr. Rojas/Edgar/Patricia  - Continue CTU supportive care    - Continue DVT/GI prophylaxis  - Incentive Spirometry 10 times an hour  - Continue to advance physical activity as tolerated   - CAD: Continue ASA, statin, BB  - COPD/Hypoxia: - incentive  -. DM/Glucose Control: hold metformin start sliding scale  - Nedds CT chest, carotid's, dental and simple spiromtry    Social Service Disposition:  Transfer to CTU

## 2021-02-10 NOTE — CONSULT NOTE ADULT - SUBJECTIVE AND OBJECTIVE BOX
Patient is a 70y old  Female who presents with no chief dental complaint, but presents to dental clinic for presurgical dental evaluation.     HPI:  71 yo F h/o HTN, DLD, has JACOME on TTE Mod AS by gradient, and mod to severe MR here for DERRICK and RHC/LHC   (2021 09:38)      PAST MEDICAL & SURGICAL HISTORY:  Glaucoma    Chronic sciatica    H/O sleep apnea  on CPAP    Aortic stenosis    Type 2 diabetes mellitus without complication, without long-term current use of insulin    Hyperlipidemia, unspecified hyperlipidemia type    Hypertension, unspecified type    History of carpal tunnel surgery    History of hip surgery  bilateral hip    MEDICATIONS  (STANDING):  aspirin enteric coated 81 milliGRAM(s) Oral daily  atorvastatin 40 milliGRAM(s) Oral at bedtime  dextrose 40% Gel 15 Gram(s) Oral once  dextrose 5%. 1000 milliLiter(s) (50 mL/Hr) IV Continuous <Continuous>  dextrose 5%. 1000 milliLiter(s) (100 mL/Hr) IV Continuous <Continuous>  dextrose 50% Injectable 25 Gram(s) IV Push once  dextrose 50% Injectable 12.5 Gram(s) IV Push once  dextrose 50% Injectable 25 Gram(s) IV Push once  glucagon  Injectable 1 milliGRAM(s) IntraMuscular once  heparin   Injectable 5000 Unit(s) SubCutaneous every 8 hours  insulin lispro (ADMELOG) corrective regimen sliding scale   SubCutaneous three times a day before meals  metoprolol tartrate 25 milliGRAM(s) Oral two times a day  pantoprazole    Tablet 40 milliGRAM(s) Oral before breakfast  sodium chloride 0.9%. 1000 milliLiter(s) (75 mL/Hr) IV Continuous <Continuous>    MEDICATIONS  (PRN):  acetaminophen   Tablet .. 650 milliGRAM(s) Oral every 8 hours PRN Temp greater or equal to 38C (100.4F), Mild Pain (1 - 3)      Allergies    No Known Allergies    Intolerances      *Last Dental Visit: before covid.     Vital Signs Last 24 Hrs  T(C): 36.4 (10 Feb 2021 05:57), Max: 36.9 (2021 21:53)  T(F): 97.6 (10 Feb 2021 05:57), Max: 98.5 (2021 21:53)  HR: 65 (10 Feb 2021 05:57) (65 - 80)  BP: 174/83 (10 Feb 2021 05:57) (143/80 - 175/92)  BP(mean): --  RR: 16 (10 Feb 2021 05:57) (16 - 18)  SpO2: 97% (2021 20:29) (97% - 97%)    LABS:                        10.4   8.32  )-----------( 198      ( 10 Feb 2021 05:50 )             34.3     02-10    142  |  108  |  22<H>  ----------------------------<  105<H>  4.3   |  24  |  1.0    Ca    8.8      10 Feb 2021 05:50      WBC Count: 8.32 K/uL [4.80 - 10.80] (02-10 @ 05:50)  Platelet Count - Automated: 198 K/uL [130 - 400] (02-10 @ 05:50)  WBC Count: 10.17 K/uL [4.80 - 10.80] ( @ 09:40)  Platelet Count - Automated: 234 K/uL [130 - 400] ( @ 09:40)    Urinalysis Basic - ( 10 Feb 2021 13:02 )    Color: Light Yellow / Appearance: Clear / S.013 / pH: x  Gluc: x / Ketone: Negative  / Bili: Negative / Urobili: <2 mg/dL   Blood: x / Protein: Negative / Nitrite: Positive   Leuk Esterase: Large / RBC: 1 /HPF / WBC 40 /HPF   Sq Epi: x / Non Sq Epi: 0 /HPF / Bacteria: Many          EOE:  TMJ ( - ) clicks                     ( - ) pops                     ( - ) crepitus             Mandible <<FROM>>             Facial bones and MOM <<grossly intact>>             ( - ) trismus             ( - ) lymphadenopathy             ( - ) swelling             ( - ) asymmetry             ( - ) palpation             ( - ) dyspnea             ( - ) dysphagia             ( - ) loss of consciousness    IOE: permanent dentition: grossly intact with multiple carious teeth           hard/soft palate:  ( - ) palatal torus, <<No pathology noted>>           tongue/FOM <<No pathology noted>>           labial/buccal mucosa <<No pathology noted>>           ( - ) percussion           ( - ) palpation           ( - ) swelling            ( - ) abscess           ( - ) sinus tract    Dentition present: Permanent dentition.   Mobility: No mobility.   Caries: Caries non restorable #14, 15 and 29.         *DENTAL RADIOGRAPHS: Full mouth Periapicals.     *ASSESSMENT: Patient presents to dental for pre surgical dental evaluation. Patient does not report pain. Evaluation of x rays show #14 caries #15 caries and both unrestorable. #29 perapical pathology.       *PLAN: extraction of #14, 15, and 29    PROCEDURE:   Verbal and written consent given.  Anesthesia: 4 carpules 2% lidocaine with 1:[id number] epinephrine and 1 carpule 4% septocaine with 1:322914 epinephrine.    Treatment: Premedication with 2000 mg of Amoxicillin done 30 minutes prior to beginning of procedure. All risks and benefits discussed as per OS sheet dated 00. consent obtained. Side site marked. Anesthetized using 4 carpules 2% lidocaine with 1:117736 epinephrine and 1 carpule 4% septocaine with 1:104345 epinephrine. Sectioned #29/28 bridge. Trough made around 29 and elevated tooth from socket. #15 elevated and extracted from socket. #14 elevated from socket in pieces. Trough made on DB root of #14 and elevated from site. Curettage and irrigation of all sites completed. Plain gut sutures placed in all sites. Hemostasis achieved. Patient dismissed.     RECOMMENDATIONS:  1) Antibiotics and pain medication as per medicine team. softer food diet. No straws or spitting.   2) Dental F/U with outpatient dentist for comprehensive dental care.   3) If any difficulty swallowing/breathing, fever occur, return to ER.       Gina De La Torre [id number]

## 2021-02-11 LAB
ALBUMIN SERPL ELPH-MCNC: 3.8 G/DL — SIGNIFICANT CHANGE UP (ref 3.5–5.2)
ALP SERPL-CCNC: 56 U/L — SIGNIFICANT CHANGE UP (ref 30–115)
ALT FLD-CCNC: 12 U/L — SIGNIFICANT CHANGE UP (ref 0–41)
ANION GAP SERPL CALC-SCNC: 8 MMOL/L — SIGNIFICANT CHANGE UP (ref 7–14)
APTT BLD: 31.7 SEC — SIGNIFICANT CHANGE UP (ref 27–39.2)
AST SERPL-CCNC: 19 U/L — SIGNIFICANT CHANGE UP (ref 0–41)
BILIRUB SERPL-MCNC: 0.4 MG/DL — SIGNIFICANT CHANGE UP (ref 0.2–1.2)
BLD GP AB SCN SERPL QL: SIGNIFICANT CHANGE UP
BUN SERPL-MCNC: 20 MG/DL — SIGNIFICANT CHANGE UP (ref 10–20)
CALCIUM SERPL-MCNC: 9.2 MG/DL — SIGNIFICANT CHANGE UP (ref 8.5–10.1)
CHLORIDE SERPL-SCNC: 107 MMOL/L — SIGNIFICANT CHANGE UP (ref 98–110)
CO2 SERPL-SCNC: 25 MMOL/L — SIGNIFICANT CHANGE UP (ref 17–32)
CREAT SERPL-MCNC: 0.9 MG/DL — SIGNIFICANT CHANGE UP (ref 0.7–1.5)
GLUCOSE BLDC GLUCOMTR-MCNC: 102 MG/DL — HIGH (ref 70–99)
GLUCOSE BLDC GLUCOMTR-MCNC: 107 MG/DL — HIGH (ref 70–99)
GLUCOSE BLDC GLUCOMTR-MCNC: 108 MG/DL — HIGH (ref 70–99)
GLUCOSE BLDC GLUCOMTR-MCNC: 163 MG/DL — HIGH (ref 70–99)
GLUCOSE SERPL-MCNC: 103 MG/DL — HIGH (ref 70–99)
HCT VFR BLD CALC: 33.7 % — LOW (ref 37–47)
HGB BLD-MCNC: 10.4 G/DL — LOW (ref 12–16)
INR BLD: 1.1 RATIO — SIGNIFICANT CHANGE UP (ref 0.65–1.3)
MCHC RBC-ENTMCNC: 26.7 PG — LOW (ref 27–31)
MCHC RBC-ENTMCNC: 30.9 G/DL — LOW (ref 32–37)
MCV RBC AUTO: 86.4 FL — SIGNIFICANT CHANGE UP (ref 81–99)
NRBC # BLD: 0 /100 WBCS — SIGNIFICANT CHANGE UP (ref 0–0)
PLATELET # BLD AUTO: 168 K/UL — SIGNIFICANT CHANGE UP (ref 130–400)
POTASSIUM SERPL-MCNC: 4.4 MMOL/L — SIGNIFICANT CHANGE UP (ref 3.5–5)
POTASSIUM SERPL-SCNC: 4.4 MMOL/L — SIGNIFICANT CHANGE UP (ref 3.5–5)
PROT SERPL-MCNC: 5.7 G/DL — LOW (ref 6–8)
PROTHROM AB SERPL-ACNC: 12.7 SEC — SIGNIFICANT CHANGE UP (ref 9.95–12.87)
RBC # BLD: 3.9 M/UL — LOW (ref 4.2–5.4)
RBC # FLD: 17 % — HIGH (ref 11.5–14.5)
SARS-COV-2 RNA SPEC QL NAA+PROBE: SIGNIFICANT CHANGE UP
SODIUM SERPL-SCNC: 140 MMOL/L — SIGNIFICANT CHANGE UP (ref 135–146)
WBC # BLD: 7.97 K/UL — SIGNIFICANT CHANGE UP (ref 4.8–10.8)
WBC # FLD AUTO: 7.97 K/UL — SIGNIFICANT CHANGE UP (ref 4.8–10.8)

## 2021-02-11 PROCEDURE — 99232 SBSQ HOSP IP/OBS MODERATE 35: CPT

## 2021-02-11 PROCEDURE — 93010 ELECTROCARDIOGRAM REPORT: CPT

## 2021-02-11 RX ORDER — CHLORHEXIDINE GLUCONATE 213 G/1000ML
1 SOLUTION TOPICAL ONCE
Refills: 0 | Status: COMPLETED | OUTPATIENT
Start: 2021-02-11 | End: 2021-02-11

## 2021-02-11 RX ORDER — CEFEPIME 1 G/1
1000 INJECTION, POWDER, FOR SOLUTION INTRAMUSCULAR; INTRAVENOUS ONCE
Refills: 0 | Status: COMPLETED | OUTPATIENT
Start: 2021-02-11 | End: 2021-02-11

## 2021-02-11 RX ORDER — NITROGLYCERIN 6.5 MG
33.33 CAPSULE, EXTENDED RELEASE ORAL
Qty: 50 | Refills: 0 | Status: DISCONTINUED | OUTPATIENT
Start: 2021-02-11 | End: 2021-02-12

## 2021-02-11 RX ORDER — CEFEPIME 1 G/1
1000 INJECTION, POWDER, FOR SOLUTION INTRAMUSCULAR; INTRAVENOUS EVERY 8 HOURS
Refills: 0 | Status: DISCONTINUED | OUTPATIENT
Start: 2021-02-11 | End: 2021-02-12

## 2021-02-11 RX ORDER — CHLORHEXIDINE GLUCONATE 213 G/1000ML
15 SOLUTION TOPICAL ONCE
Refills: 0 | Status: COMPLETED | OUTPATIENT
Start: 2021-02-11 | End: 2021-02-12

## 2021-02-11 RX ORDER — ALBUMIN HUMAN 25 %
3000 VIAL (ML) INTRAVENOUS ONCE
Refills: 0 | Status: DISCONTINUED | OUTPATIENT
Start: 2021-02-12 | End: 2021-02-12

## 2021-02-11 RX ORDER — CEFEPIME 1 G/1
INJECTION, POWDER, FOR SOLUTION INTRAMUSCULAR; INTRAVENOUS
Refills: 0 | Status: DISCONTINUED | OUTPATIENT
Start: 2021-02-11 | End: 2021-02-12

## 2021-02-11 RX ADMIN — Medication 650 MILLIGRAM(S): at 03:17

## 2021-02-11 RX ADMIN — CHLORHEXIDINE GLUCONATE 1 APPLICATION(S): 213 SOLUTION TOPICAL at 20:29

## 2021-02-11 RX ADMIN — CEFEPIME 100 MILLIGRAM(S): 1 INJECTION, POWDER, FOR SOLUTION INTRAMUSCULAR; INTRAVENOUS at 23:10

## 2021-02-11 RX ADMIN — CEFEPIME 100 MILLIGRAM(S): 1 INJECTION, POWDER, FOR SOLUTION INTRAMUSCULAR; INTRAVENOUS at 11:36

## 2021-02-11 RX ADMIN — Medication 81 MILLIGRAM(S): at 11:37

## 2021-02-11 RX ADMIN — ATORVASTATIN CALCIUM 40 MILLIGRAM(S): 80 TABLET, FILM COATED ORAL at 23:10

## 2021-02-11 RX ADMIN — Medication 1: at 11:36

## 2021-02-11 RX ADMIN — Medication 25 MILLIGRAM(S): at 16:36

## 2021-02-11 RX ADMIN — Medication 25 MILLIGRAM(S): at 05:25

## 2021-02-11 RX ADMIN — Medication 500 MILLIGRAM(S): at 05:25

## 2021-02-11 RX ADMIN — PANTOPRAZOLE SODIUM 40 MILLIGRAM(S): 20 TABLET, DELAYED RELEASE ORAL at 05:25

## 2021-02-11 RX ADMIN — Medication 650 MILLIGRAM(S): at 23:15

## 2021-02-11 RX ADMIN — HEPARIN SODIUM 5000 UNIT(S): 5000 INJECTION INTRAVENOUS; SUBCUTANEOUS at 05:25

## 2021-02-11 RX ADMIN — Medication 650 MILLIGRAM(S): at 11:59

## 2021-02-11 NOTE — PROGRESS NOTE ADULT - SUBJECTIVE AND OBJECTIVE BOX
PLANNED OPERATIVE PROCEDURE(s):                HD #                       SURGEON(s): LEANNE Victoria  SUBJECTIVE ASSESSMENT:70y Female patient seen and examined at bedside.    Vital Signs Last 24 Hrs  T(F): 97.8 (2021 05:18), Max: 97.8 (2021 05:18)  HR: 60 (2021 05:18) (60 - 69)  BP: 148/97 (2021 05:18) (143/82 - 148/97)  BP(mean): --  ABP: --  ABP(mean): --  RR: 18 (2021 05:18) (18 - 18)  SpO2: 97% (10 Feb 2021 19:47) (97% - 97%)  CVP(mm Hg): --  CVP(cm H2O): --  CO: --  CI: --  PA: --  SVR: --    I&O's Detail    10 Feb 2021 07:01  -  2021 07:00  --------------------------------------------------------  IN:    Oral Fluid: 800 mL  Total IN: 800 mL    OUT:    Voided (mL): 3250 mL  Total OUT: 3250 mL        Net: I&O's Detail    2021 07:01  -  10 Feb 2021 07:00  --------------------------------------------------------  Total NET: -601 mL      10 Feb 2021 07:01  -  2021 07:00  --------------------------------------------------------  Total NET: -2450 mL        CAPILLARY BLOOD GLUCOSE      POCT Blood Glucose.: 102 mg/dL (2021 07:34)  POCT Blood Glucose.: 106 mg/dL (10 Feb 2021 21:45)  POCT Blood Glucose.: 84 mg/dL (10 Feb 2021 17:41)  POCT Blood Glucose.: 83 mg/dL (10 Feb 2021 11:37)      Physical Exam:  General: NAD; A&Ox3  Cardiac: S1/S2, RRR, no murmur, no rubs  Lungs: unlabored respirations, CTA b/l, no wheeze, no rales, no crackles  Abdomen: Soft/NT/ND; positive bowel sounds x 4  Extremities: No edema b/l lower extremities; good capillary refill; no cyanosis; palpable 1+ pedal pulses b/l    Central Venous Catheter: Yes[]  No[] , If Yes indication:                    Day #  Spencer Catheter: Yes  [] , No  [] , If yes indication:                                 Day #  BOWEL MOVEMENT:  [] YES [] NO, If No, Timing since last BM Day #        LABS:                        10.4<L>  7.97  )-----------( 168      ( 2021 06:15 )             33.7<L>                        10.4<L>  8.32  )-----------( 198      ( 10 Feb 2021 05:50 )             34.3<L>    02-10    142  |  108  |  22<H>  ----------------------------<  105<H>  4.3   |  24  |  1.0  02-09    137  |  104  |  30<H>  ----------------------------<  96  4.1   |  24  |  0.9    Ca    8.8      10 Feb 2021 05:50      PT/INR - ( 2021 06:15 )   PT: ;   INR: 1.10 ratio         PTT - ( 2021 06:15 )  PTT:31.7 sec  Urinalysis Basic - ( 10 Feb 2021 13:02 )    Color: Light Yellow / Appearance: Clear / S.013 / pH: x  Gluc: x / Ketone: Negative  / Bili: Negative / Urobili: <2 mg/dL   Blood: x / Protein: Negative / Nitrite: Positive   Leuk Esterase: Large / RBC: 1 /HPF / WBC 40 /HPF   Sq Epi: x / Non Sq Epi: 0 /HPF / Bacteria: Many        A1C with Estimated Average Glucose Result: 6.1 % (02-10-21 @ 05:50)      RADIOLOGY & ADDITIONAL TESTS:  CXR:   EKG:  Allergies    No Known Allergies    Intolerances      MEDICATIONS  (STANDING):  amoxicillin 500 milliGRAM(s) Oral every 8 hours  aspirin enteric coated 81 milliGRAM(s) Oral daily  atorvastatin 40 milliGRAM(s) Oral at bedtime  dextrose 40% Gel 15 Gram(s) Oral once  dextrose 5%. 1000 milliLiter(s) (50 mL/Hr) IV Continuous <Continuous>  dextrose 5%. 1000 milliLiter(s) (100 mL/Hr) IV Continuous <Continuous>  dextrose 50% Injectable 25 Gram(s) IV Push once  dextrose 50% Injectable 12.5 Gram(s) IV Push once  dextrose 50% Injectable 25 Gram(s) IV Push once  glucagon  Injectable 1 milliGRAM(s) IntraMuscular once  heparin   Injectable 5000 Unit(s) SubCutaneous every 8 hours  insulin lispro (ADMELOG) corrective regimen sliding scale   SubCutaneous three times a day before meals  metoprolol tartrate 25 milliGRAM(s) Oral two times a day  pantoprazole    Tablet 40 milliGRAM(s) Oral before breakfast  sodium chloride 0.9%. 1000 milliLiter(s) (75 mL/Hr) IV Continuous <Continuous>    MEDICATIONS  (PRN):  acetaminophen   Tablet .. 650 milliGRAM(s) Oral every 8 hours PRN Temp greater or equal to 38C (100.4F), Mild Pain (1 - 3)      Pharmacologic DVT Prophylaxis: [] YES, []NO: Contraindication:   [] HEPARIN: Dose: XX mg  Q24H    [] LOVENOX: Dose: XX mg  Q24H                 SCD's: YESb/l    GI Prophylaxis: Protonix [], Pepcid []    Pre-op ACEi/ARB/CCB held 24 hours prior to planned procedure: [] Yes, [] NO: indication:  Pre-Op Beta-Blockers: []Yes, []No: contraindication:  Pre-Op Aspirin: []Yes,  []No: contraindication: [] Held for Pre-op cardiac valve surgery with no CAD  Pre-Op Statin: []Yes, []No: contraindication:      Ambulation/Activity Status:    Assessment/Plan:  70y Female Pre-op for   - Case and plan discussed with CTU Intensivist and CT Surgeon - Dr. Rojas/Edgar/Patricia  - Continue CTU supportive care and ongoing plan of care as per continuing CTU rounds.    - Continue DVT/GI prophylaxis  - Incentive Spirometry 10 times an hour  - Continue to advance physical activity as tolerated and continue PT/OT as directed  1. CAD: Continue ASA, statin, BB  2. HTN:   3. A. Fib:   4. COPD/Hypoxia:   5. DM/Glucose Control:     Social Service Disposition:     PLANNED OPERATIVE PROCEDURE(s):   MVR/CABG             HD #   3                    SURGEON(s): FIONA Rojas    PAST MEDICAL & SURGICAL HISTORY:  Glaucoma  Chronic sciatica  H/O sleep apnea on CPAP  Aortic stenosis  Type 2 diabetes mellitus without complication, without long-term current use of insulin  Hyperlipidemia, unspecified hyperlipidemia type  Hypertension, unspecified type  History of carpal tunnel surgery  History of hip surgery bilateral hip    SUBJECTIVE ASSESSMENT:70y Female patient seen and examined at bedside.     Vital Signs Last 24 Hrs  T(F): 97.8 (2021 05:18), Max: 97.8 (2021 05:18)  HR: 60 (2021 05:18) (60 - 69)  BP: 148/97 (2021 05:18) (143/82 - 148/97)  RUE BP:                        LUE BP:  RR: 18 (2021 05:18) (18 - 18)  SpO2: 97% (10 Feb 2021 19:47) (97% - 97%)    I&O's Detail    10 Feb 2021 07:01  -  2021 07:00  --------------------------------------------------------  IN:    Oral Fluid: 800 mL  Total IN: 800 mL    OUT:    Voided (mL): 3250 mL  Total OUT: 3250 mL    Net: I&O's Detail    2021 07:01  -  10 Feb 2021 07:00  --------------------------------------------------------  Total NET: -601 mL    10 Feb 2021 07:01  -  2021 07:00  --------------------------------------------------------  Total NET: -2450 mL      CAPILLARY BLOOD GLUCOSE  POCT Blood Glucose.: 102 mg/dL (2021 07:34)  POCT Blood Glucose.: 106 mg/dL (10 Feb 2021 21:45)  POCT Blood Glucose.: 84 mg/dL (10 Feb 2021 17:41)  POCT Blood Glucose.: 83 mg/dL (10 Feb 2021 11:37)      Physical Exam:  General: NAD; A&Ox3  Cardiac: S1/S2, RRR, no murmur, no rubs  Lungs: unlabored respirations, CTA b/l, no wheeze, no rales, no crackles  Abdomen: Soft/NT/ND; positive bowel sounds x 4  Extremities: No edema b/l lower extremities; good capillary refill; no cyanosis; palpable 1+ pedal pulses b/l        LABS:                   10.4<L>  7.97  )-----------( 168      ( 2021 06:15 )             33.7<L>                        10.4<L>  8.32  )-----------( 198      ( 10 Feb 2021 05:50 )             34.3<L>    02-10    142  |  108  |  22<H>  ----------------------------<  105<H>  4.3   |  24  |  1.0  02-09    137  |  104  |  30<H>  ----------------------------<  96  4.1   |  24  |  0.9    Ca    8.8      10 Feb 2021 05:50    PT/INR - ( 2021 06:15 )   PT: ;   INR: 1.10 ratio       PTT - ( 2021 06:15 )  PTT:31.7 sec    COVID: NotDetec (21 @ 11:40 am)    Urinalysis Basic - ( 10 Feb 2021 13:02 )  Color: Light Yellow / Appearance: Clear / S.013 / pH: x  Gluc: x / Ketone: Negative  / Bili: Negative / Urobili: <2 mg/dL   Blood: x / Protein: Negative / Nitrite: Positive   Leuk Esterase: Large / RBC: 1 /HPF / WBC 40 /HPF   Sq Epi: x / Non Sq Epi: 0 /HPF / Bacteria: Many    MRSA PCR Result.: Negative: By: Real-Time PCR (Polymerase Reaction Method) (02.10.21 @ 14:30)    Serum Pro-BNP: Pending    A1C with Estimated Average Glucose Result: 6.1 % (02-10-21 @ 05:50)      RADIOLOGY & ADDITIONAL TESTS:  CXR: < from: Xray Chest 2 Views PA/Lat (02.10.21 @ 13:35) >  Findings:  Support devices: None.  Cardiac/mediastinum/hilum: Cardiomegaly.  Lung parenchyma/Pleura: Right pleural effusion/opacity. No pneumothorax.  Skeleton/soft tissues: No acute osseous abnormality.  Impression:  Right pleural effusion/opacity.  < end of copied text >    EKG: Pending    LEFT HEART CATHETERIZATION                                  Left main normal   LAD: Ostial 90% calcified lesion, Prox heavy calcified 60- 70% diffuse disease                       Diag: samll  Left Circumflex: large mild disease   OM: patent   Right Coronary Artery: large dominant minor irregularities  RPDA normal     DERRICK: < from: DERRICK w/Probe Placement (21 @ 10:52) >  Summary:   1. Left ventricular ejection fraction, by visual estimation, is 60 to 65%.   2. Normal global left ventricular systolic function.   3. The right ventricular size is normal. RV systolic function is normal.   4. Myxomatous mitral valve.   5. Moderate thickening of the anterior and posterior mitral valve leaflets.   6. Moderate mitral valve regurgitation.   7. PISA quantification: MR rad 0.67 cm. MR  cm. ERO 0.23 cm2. Regurgitant volume 42 ml.   8. No evidence of mitral stenosis.   9. No evidence of aortic stenosis.  10.Trace Tricuspid regurgitation.  11. Mildly enlarged left atrium.  12. No left atrial appendage thrombus.  13. Normal right atrial size.  14. Color flow doppler and intravenous injection of agitated saline demonstrates the presence of an intact intra atrial septum.      PROCEDURE: After discussion of the risks and benefits of the DERRICK, an informed consent was obtained by the cardiologist. Intravenous sedation was performed by anesthesia. The DERRICK probe was passed by the cardiologist without difficulty. Images were obtained with the patient in a left lateral decubitus position. The patient's vital signs; including heart rate, blood pressure, and oxygen saturation; remained stable throughout the procedure. The patient tolerated the procedure well and without complications.    PHYSICIAN INTERPRETATION:  Left Ventricle: Global LV systolic function was normal. Left ventricular ejection fraction, by visual estimation, is 60 to 65%. Normal segmental left ventricular systolic function.  Right Ventricle: The right ventricular size is normal. RV systolic function is normal. The right ventricular size is normal. RV systolic function is normal.  Left Atrium: Mildly enlarged left atrium. No left atrial appendage thrombus is seen. Intact intra-atrial septum without shunt. Color flow doppler and intravenous injection of agitated saline demonstrates the presence of an intact intra atrial septum.  Right Atrium: Normal right atrial size.  Pericardium: There is no evidence of pericardial effusion.  Mitral Valve: The mitral valve is myxomatous. Moderate thickening of the anterior and posterior mitral valve leaflets. Mitral leaflet mobility is normal. No evidence of mitral stenosis. No evidence of mitral valve stenosis. Moderate mitral valve regurgitation is seen. PISA quantification: MR rad 0.67 cm. MR  cm. ERO 0.23 cm2. Regurgitant volume 42 ml.  Tricuspid Valve: The tricuspid valve is normal in structure. Trivial tricuspid regurgitation is visualized. Trace Tricuspid regurgitation.  Aortic Valve: The aortic valve is trileaflet. No evidence of aortic stenosis. No evidence of aortic valve regurgitation is seen.  Pulmonic Valve: Structurally normal pulmonic valve, with normal leaflet excursion.  Aorta: The aortic root and ascending aorta are structurally normal, with no evidence of dilitation. The ascending aorta was not well visualized. The aortic arch was not well visualized.  Venous: The pulmonary veins appear normal. A normal flow pattern is recorded from all four pulmonary veins.  Shunts: Agitated saline contrast was given intravenously to evaluate for intracardiac shunting.  SPECTRAL DOPPLER ANALYSIS:    [id number], Electronically signed on 2/10/2021 at 2:19:58 PM    < end of copied text >    CT chest: < from: CT Chest No Cont (02.10.21 @ 14:06) >  INTERPRETATION:  AIRWAYS, LUNGS AND PLEURA: The central tracheobronchial tree is patent.  No focal consolidation.  There is no pleural effusion. There isno pneumothorax.  MEDIASTINUM: There are no enlarged mediastinal, hilar or axillary lymph nodes. The visualized portion of the thyroid gland is unremarkable.  HEART AND VESSELS: The heart is normal in size. There are aortic and coronary artery calcifications. There are aortic valvular calcifications. The thoracic aorta has a normal caliber, containing moderate calcifications at the root and mild calcifications elsewhere. There is no pericardial effusion.  UPPER ABDOMEN: Lobular contour of the kidneys bilaterally, which may be related to underlying renal cortical scarring.  BONES AND SOFT TISSUES: There are degenerative changes of the spine.  The soft tissues are unremarkable.  TUBES AND LINES: None.  IMPRESSION: Normal size of the thoracic aorta containing moderate calcifications at the aortic root and mild calcifications elsewhere.  < end of copied text >    PFTs: PENDING    Carotid duplex: < from: VA Duplex Carotid, Bilat (02.10.21 @ 13:55) >  FINDINGS:  No elevated velocities or abnormal waveforms are encountered.  Peak systolic velocities are as follows:  RIGHT:  PROX CCA = 70.3  DIST CCA = 69  PROX ICA = 45  DIST ICA = 85.4  ECA = 69    LEFT:  PROX CCA = 74.1  DIST CCA = 84.2  PROX ICA = 68.5  DIST ICA = 68.5  ECA = 74.9    Antegrade flow is noted within both vertebral arteries.  IMPRESSION: No significant hemodynamic stenosis of either carotid artery.  < end of copied text >    Allergies  No Known Allergies  Intolerances      MEDICATIONS  (STANDING):  amoxicillin 500 milliGRAM(s) Oral every 8 hours  aspirin enteric coated 81 milliGRAM(s) Oral daily  atorvastatin 40 milliGRAM(s) Oral at bedtime  dextrose 40% Gel 15 Gram(s) Oral once  dextrose 5%. 1000 milliLiter(s) (50 mL/Hr) IV Continuous <Continuous>  dextrose 5%. 1000 milliLiter(s) (100 mL/Hr) IV Continuous <Continuous>  dextrose 50% Injectable 25 Gram(s) IV Push once  dextrose 50% Injectable 12.5 Gram(s) IV Push once  dextrose 50% Injectable 25 Gram(s) IV Push once  glucagon  Injectable 1 milliGRAM(s) IntraMuscular once  heparin   Injectable 5000 Unit(s) SubCutaneous every 8 hours  insulin lispro (ADMELOG) corrective regimen sliding scale   SubCutaneous three times a day before meals  metoprolol tartrate 25 milliGRAM(s) Oral two times a day  pantoprazole    Tablet 40 milliGRAM(s) Oral before breakfast  sodium chloride 0.9%. 1000 milliLiter(s) (75 mL/Hr) IV Continuous <Continuous>    MEDICATIONS  (PRN):  acetaminophen   Tablet .. 650 milliGRAM(s) Oral every 8 hours PRN Temp greater or equal to 38C (100.4F), Mild Pain (1 - 3)      Pre-op ACEi/ARB/CCB held 24 hours prior to planned procedure: [x] Yes, [] NO: indication:  Pre-Op Beta-Blockers: [x]Yes, []No: contraindication:  Pre-Op Aspirin: [x]Yes,  []No: contraindication: [] Held for Pre-op cardiac valve surgery with no CAD  Pre-Op Statin: [x]Yes, []No: contraindication:    Ambulation/Activity Status: 5meter walk test: T1:      s/T2:     s/T3:     s    Ambulates with walker.     Cardic Surgery Risk Factors  CVA and/or carotid/cerebrovascular disease. Yes  No  Explain if Yes  Aortoiliac disease Yes  No  Explain if Yes  Previous MI Yes  No  Explain if Yes  Previos Cardiac Surgery Yes  No  Explain if Yes  Hemodynamics-Unstable or Shock Yes  No  Explain if Yes  Diabetis Yes  No  Explain if Yes  Hepatic Failure Yes  No  Explain if Yes  Renal failure and/or dialysis Yes  No  Explain if Yes  Heart failure-type-present or past Yes  No  Explain if Yes  COPD Yes  No  Explain if Yes  Immune System Deficiency Yes  No  Explain if Yes  Malignant Ventricular Arrhythmia Yes  No  Explain if Yes    STS Score:     Assessment/Plan:  70y Female Pre-op for MVR/CABG             HD #   3    - Case and plan discussed with CTU Intensivist and CT Surgeon - Dr. Rojas  - Continue CTU supportive care and ongoing plan of care as per continuing CTU rounds.    - Continue DVT/GI prophylaxis  - Incentive Spirometry 10 times an hour  - Continue to advance physical activity as tolerated and continue PT/OT as directed  1. CAD: Continue ASA, statin, BB  2. UTI: asymptomatic; start cefepime pre-op   3. Pre-op MVR: Pending repeat COVID; dental cleared s/p extraction x3 on IV ABX for UTI as well.     Pt has AICD/PPM [ ] Yes  [x ] No             Brand Name:  Pre-op Beta Blocker ordered within 24 hrs of surgery (CABG ONLY)?  [x ] Yes  [ ] No  If not, Why?  Type & Cross  [ ] Yes  [ ] No  NPO after Midnight [x ] Yes  [ ] No  Pre-op ABX ordered, to be taped on chart:  [ x] Yes  [ ] No     Hibiclens/Peridex ordered [x ] Yes  [ ] No  Intraop on Hold: PRBCs, CXR, DERRICK [x ]   Consent obtained  [x ] Yes  [ ] No   PLANNED OPERATIVE PROCEDURE(s):   MVR/CABG             HD #   3                    SURGEON(s): FIONA Rojas    HISTORY OF PRESENT ILLNESS:  71 yo F h/o HTN, DLD, DM, chronic UTI has JACOME on TTE Mod AS by gradient, and mod to severe MR here for DERRICK and RHC/LHC  DERRICK revealed moderate to severe mitral insufficiency and cardiac catheterization revealed severe 2vCAD.    PAST MEDICAL & SURGICAL HISTORY:  Glaucoma  Chronic sciatica  H/O sleep apnea on CPAP  Aortic stenosis  Type 2 diabetes mellitus without complication, without long-term current use of insulin  Hyperlipidemia, unspecified hyperlipidemia type  Hypertension, unspecified type  History of carpal tunnel surgery  History of hip surgery bilateral hip    SUBJECTIVE ASSESSMENT:70y Female patient seen and examined at bedside.     Vital Signs Last 24 Hrs  T(F): 97.8 (2021 05:18), Max: 97.8 (2021 05:18)  HR: 60 (2021 05:18) (60 - 69)  BP: 148/97 (2021 05:18) (143/82 - 148/97)  RUE BP:                        LUE BP:  RR: 18 (2021 05:18) (18 - 18)  SpO2: 97% (10 Feb 2021 19:47) (97% - 97%)    I&O's Detail    10 Feb 2021 07:01  -  2021 07:00  --------------------------------------------------------  IN:    Oral Fluid: 800 mL  Total IN: 800 mL    OUT:    Voided (mL): 3250 mL  Total OUT: 3250 mL    Net: I&O's Detail    2021 07:01  -  10 Feb 2021 07:00  --------------------------------------------------------  Total NET: -601 mL    10 Feb 2021 07:01  -  2021 07:00  --------------------------------------------------------  Total NET: -2450 mL      CAPILLARY BLOOD GLUCOSE  POCT Blood Glucose.: 102 mg/dL (2021 07:34)  POCT Blood Glucose.: 106 mg/dL (10 Feb 2021 21:45)  POCT Blood Glucose.: 84 mg/dL (10 Feb 2021 17:41)  POCT Blood Glucose.: 83 mg/dL (10 Feb 2021 11:37)      Physical Exam:  General: NAD; A&Ox3  Cardiac: S1/S2, RRR, no murmur, no rubs  Lungs: unlabored respirations, CTA b/l, no wheeze, no rales, no crackles  Abdomen: Soft/NT/ND; positive bowel sounds x 4  Extremities: No edema b/l lower extremities; good capillary refill; no cyanosis; palpable 1+ pedal pulses b/l        LABS:                   10.4<L>  7.97  )-----------( 168      ( 2021 06:15 )             33.7<L>                        10.4<L>  8.32  )-----------( 198      ( 10 Feb 2021 05:50 )             34.3<L>    02-10    142  |  108  |  22<H>  ----------------------------<  105<H>  4.3   |  24  |  1.0  02-09    137  |  104  |  30<H>  ----------------------------<  96  4.1   |  24  |  0.9    Ca    8.8      10 Feb 2021 05:50    PT/INR - ( 2021 06:15 )   PT: ;   INR: 1.10 ratio       PTT - ( 2021 06:15 )  PTT:31.7 sec    COVID: Ta (21 @ 11:40 am)    Urinalysis Basic - ( 10 Feb 2021 13:02 )  Color: Light Yellow / Appearance: Clear / S.013 / pH: x  Gluc: x / Ketone: Negative  / Bili: Negative / Urobili: <2 mg/dL   Blood: x / Protein: Negative / Nitrite: Positive   Leuk Esterase: Large / RBC: 1 /HPF / WBC 40 /HPF   Sq Epi: x / Non Sq Epi: 0 /HPF / Bacteria: Many    MRSA PCR Result.: Negative: By: Real-Time PCR (Polymerase Reaction Method) (02.10.21 @ 14:30)    Serum Pro-BNP: Pending    A1C with Estimated Average Glucose Result: 6.1 % (02-10-21 @ 05:50)      RADIOLOGY & ADDITIONAL TESTS:  CXR: < from: Xray Chest 2 Views PA/Lat (02.10.21 @ 13:35) >  Findings:  Support devices: None.  Cardiac/mediastinum/hilum: Cardiomegaly.  Lung parenchyma/Pleura: Right pleural effusion/opacity. No pneumothorax.  Skeleton/soft tissues: No acute osseous abnormality.  Impression:  Right pleural effusion/opacity.  < end of copied text >    EKG: Pending    LEFT HEART CATHETERIZATION                                  Left main normal   LAD: Ostial 90% calcified lesion, Prox heavy calcified 60- 70% diffuse disease                       Diag: samll  Left Circumflex: large mild disease   OM: patent   Right Coronary Artery: large dominant minor irregularities  RPDA normal     DERRICK: < from: DERRICK w/Probe Placement (21 @ 10:52) >  Summary:   1. Left ventricular ejection fraction, by visual estimation, is 60 to 65%.   2. Normal global left ventricular systolic function.   3. The right ventricular size is normal. RV systolic function is normal.   4. Myxomatous mitral valve.   5. Moderate thickening of the anterior and posterior mitral valve leaflets.   6. Moderate mitral valve regurgitation.   7. PISA quantification: MR rad 0.67 cm. MR  cm. ERO 0.23 cm2. Regurgitant volume 42 ml.   8. No evidence of mitral stenosis.   9. No evidence of aortic stenosis.  10.Trace Tricuspid regurgitation.  11. Mildly enlarged left atrium.  12. No left atrial appendage thrombus.  13. Normal right atrial size.  14. Color flow doppler and intravenous injection of agitated saline demonstrates the presence of an intact intra atrial septum.      PROCEDURE: After discussion of the risks and benefits of the DERRICK, an informed consent was obtained by the cardiologist. Intravenous sedation was performed by anesthesia. The DERRICK probe was passed by the cardiologist without difficulty. Images were obtained with the patient in a left lateral decubitus position. The patient's vital signs; including heart rate, blood pressure, and oxygen saturation; remained stable throughout the procedure. The patient tolerated the procedure well and without complications.    PHYSICIAN INTERPRETATION:  Left Ventricle: Global LV systolic function was normal. Left ventricular ejection fraction, by visual estimation, is 60 to 65%. Normal segmental left ventricular systolic function.  Right Ventricle: The right ventricular size is normal. RV systolic function is normal. The right ventricular size is normal. RV systolic function is normal.  Left Atrium: Mildly enlarged left atrium. No left atrial appendage thrombus is seen. Intact intra-atrial septum without shunt. Color flow doppler and intravenous injection of agitated saline demonstrates the presence of an intact intra atrial septum.  Right Atrium: Normal right atrial size.  Pericardium: There is no evidence of pericardial effusion.  Mitral Valve: The mitral valve is myxomatous. Moderate thickening of the anterior and posterior mitral valve leaflets. Mitral leaflet mobility is normal. No evidence of mitral stenosis. No evidence of mitral valve stenosis. Moderate mitral valve regurgitation is seen. PISA quantification: MR rad 0.67 cm. MR  cm. ERO 0.23 cm2. Regurgitant volume 42 ml.  Tricuspid Valve: The tricuspid valve is normal in structure. Trivial tricuspid regurgitation is visualized. Trace Tricuspid regurgitation.  Aortic Valve: The aortic valve is trileaflet. No evidence of aortic stenosis. No evidence of aortic valve regurgitation is seen.  Pulmonic Valve: Structurally normal pulmonic valve, with normal leaflet excursion.  Aorta: The aortic root and ascending aorta are structurally normal, with no evidence of dilitation. The ascending aorta was not well visualized. The aortic arch was not well visualized.  Venous: The pulmonary veins appear normal. A normal flow pattern is recorded from all four pulmonary veins.  Shunts: Agitated saline contrast was given intravenously to evaluate for intracardiac shunting.  SPECTRAL DOPPLER ANALYSIS:    [id number] Wilman Julien, Electronically signed on 2/10/2021 at 2:19:58 PM    < end of copied text >    CT chest: < from: CT Chest No Cont (02.10.21 @ 14:06) >  INTERPRETATION:  AIRWAYS, LUNGS AND PLEURA: The central tracheobronchial tree is patent.  No focal consolidation.  There is no pleural effusion. There isno pneumothorax.  MEDIASTINUM: There are no enlarged mediastinal, hilar or axillary lymph nodes. The visualized portion of the thyroid gland is unremarkable.  HEART AND VESSELS: The heart is normal in size. There are aortic and coronary artery calcifications. There are aortic valvular calcifications. The thoracic aorta has a normal caliber, containing moderate calcifications at the root and mild calcifications elsewhere. There is no pericardial effusion.  UPPER ABDOMEN: Lobular contour of the kidneys bilaterally, which may be related to underlying renal cortical scarring.  BONES AND SOFT TISSUES: There are degenerative changes of the spine.  The soft tissues are unremarkable.  TUBES AND LINES: None.  IMPRESSION: Normal size of the thoracic aorta containing moderate calcifications at the aortic root and mild calcifications elsewhere.  < end of copied text >    PFTs: FEV1 67%    Carotid duplex: < from: [facility] Duplex Carotid, Bilat (02.10.21 @ 13:55) >  FINDINGS:  No elevated velocities or abnormal waveforms are encountered.  Peak systolic velocities are as follows:  RIGHT:  PROX CCA = 70.3  DIST CCA = 69  PROX ICA = 45  DIST ICA = 85.4  ECA = 69    LEFT:  PROX CCA = 74.1  DIST CCA = 84.2  PROX ICA = 68.5  DIST ICA = 68.5  ECA = 74.9    Antegrade flow is noted within both vertebral arteries.  IMPRESSION: No significant hemodynamic stenosis of either carotid artery.  < end of copied text >    Allergies  No Known Allergies  Intolerances      MEDICATIONS  (STANDING):  amoxicillin 500 milliGRAM(s) Oral every 8 hours  aspirin enteric coated 81 milliGRAM(s) Oral daily  atorvastatin 40 milliGRAM(s) Oral at bedtime  dextrose 40% Gel 15 Gram(s) Oral once  dextrose 5%. 1000 milliLiter(s) (50 mL/Hr) IV Continuous <Continuous>  dextrose 5%. 1000 milliLiter(s) (100 mL/Hr) IV Continuous <Continuous>  dextrose 50% Injectable 25 Gram(s) IV Push once  dextrose 50% Injectable 12.5 Gram(s) IV Push once  dextrose 50% Injectable 25 Gram(s) IV Push once  glucagon  Injectable 1 milliGRAM(s) IntraMuscular once  heparin   Injectable 5000 Unit(s) SubCutaneous every 8 hours  insulin lispro (ADMELOG) corrective regimen sliding scale   SubCutaneous three times a day before meals  metoprolol tartrate 25 milliGRAM(s) Oral two times a day  pantoprazole    Tablet 40 milliGRAM(s) Oral before breakfast  sodium chloride 0.9%. 1000 milliLiter(s) (75 mL/Hr) IV Continuous <Continuous>    MEDICATIONS  (PRN):  acetaminophen   Tablet .. 650 milliGRAM(s) Oral every 8 hours PRN Temp greater or equal to 38C (100.4F), Mild Pain (1 - 3)    Home Medications:  aspirin 325 mg oral tablet: orally once a day (2021 09:55)  lisinopril-hydrochlorothiazide 20 mg-12.5 mg oral tablet: 1 tab(s) orally once a day (2021 09:55)  meloxicam: orally once a day (2021 09:55)  metFORMIN 500 mg oral tablet: orally once a day (2021 09:55)  metoprolol tartrate 25 mg oral tablet: 1 tab(s) orally 2 times a day (2021 09:55)  oxybutynin 10 mg/24 hr oral tablet, extended release: 1 tab(s) orally once a day (2021 09:55)  pantoprazole 40 mg oral delayed release tablet: 1 tab(s) orally once a day (2021 09:55)    Pre-op ACEi/ARB/CCB held 24 hours prior to planned procedure: [x] Yes, [] NO: indication:  Pre-Op Beta-Blockers: [x]Yes, []No: contraindication:  Pre-Op Aspirin: [x]Yes,  []No: contraindication: [] Held for Pre-op cardiac valve surgery with no CAD  Pre-Op Statin: [x]Yes, []No: contraindication:    Ambulation/Activity Status: 5meter walk test: T1:      s/T2:     s/T3:     s    Ambulates with walker.     Cardiac Surgery Risk Factors  CVA and/or carotid/cerebrovascular disease. Yes  No  Explain if Yes  Aortoiliac disease Yes  No  Explain if Yes  Previous MI Yes  No  Explain if Yes  Previous Cardiac Surgery Yes  No  Explain if Yes  Hemodynamics-Unstable or Shock Yes  No  Explain if Yes  Diabetes Yes on oral agents  Hepatic Failure Yes  No  Explain if Yes  Renal failure and/or dialysis Yes  No  Explain if Yes  Heart failure-type-present or past Yes  No  Explain if Yes  COPD Yes: Moderate COPD by FEV1 67%  Immune System Deficiency Yes  No  Explain if Yes  Malignant Ventricular Arrhythmia Yes  No  Explain if Yes    STS Score:     Assessment/Plan:  70y Female Pre-op for MVR/CABG             HD #   3    - Case and plan discussed with CTU Intensivist and CT Surgeon - Dr. Rojas  - Continue CTU supportive care and ongoing plan of care as per continuing CTU rounds.    - Continue DVT/GI prophylaxis  - Incentive Spirometry 10 times an hour  - Continue to advance physical activity as tolerated and continue PT/OT as directed  1. CAD: Continue ASA, statin, BB  2. Chronic UTI: asymptomatic w/no leukocytosis; start cefepime pre-op   3. Pre-op MVR: Pending repeat COVID; dental cleared s/p extraction x3 on IV ABX for UTI as well.     Pt has AICD/PPM [ ] Yes  [x ] No             Brand Name:  Pre-op Beta Blocker ordered within 24 hrs of surgery (CABG ONLY)?  [x ] Yes  [ ] No  If not, Why?  Type & Cross  [ ] Yes  [ ] No  NPO after Midnight [x ] Yes  [ ] No  Pre-op ABX ordered, to be taped on chart:  [ x] Yes  [ ] No     Hibiclens/Peridex ordered [x ] Yes  [ ] No  Intraop on Hold: PRBCs, CXR, DERRICK [x ]   Consent obtained  [x ] Yes  [ ] No   PLANNED OPERATIVE PROCEDURE(s):   MVR/CABG             HD #   3                    SURGEON(s): FIONA Rojas    HISTORY OF PRESENT ILLNESS:  71 yo F h/o HTN, DLD, DM, chronic UTI has JACOME on TTE Mod AS by gradient, and mod to severe MR here for DERRICK and RHC/LHC  DERRICK revealed moderate to severe mitral insufficiency and cardiac catheterization revealed severe 2vCAD.    PAST MEDICAL & SURGICAL HISTORY:  Glaucoma  Chronic sciatica  H/O sleep apnea on CPAP  Aortic stenosis  Type 2 diabetes mellitus without complication, without long-term current use of insulin  Hyperlipidemia, unspecified hyperlipidemia type  Hypertension, unspecified type  History of carpal tunnel surgery  History of hip surgery bilateral hip    SUBJECTIVE ASSESSMENT:70y Female patient seen and examined at bedside.     Vital Signs Last 24 Hrs  T(F): 97.8 (2021 05:18), Max: 97.8 (2021 05:18)  HR: 60 (2021 05:18) (60 - 69)  BP: 148/97 (2021 05:18) (143/82 - 148/97)  RUE BP:   153/57                     LUE BP: 144/70  RR: 18 (2021 05:18) (18 - 18)  SpO2: 97% (10 Feb 2021 19:47) (97% - 97%)    I&O's Detail    10 Feb 2021 07:01  -  2021 07:00  --------------------------------------------------------  IN:    Oral Fluid: 800 mL  Total IN: 800 mL    OUT:    Voided (mL): 3250 mL  Total OUT: 3250 mL    Net: I&O's Detail    2021 07:01  -  10 Feb 2021 07:00  --------------------------------------------------------  Total NET: -601 mL    10 Feb 2021 07:01  -  2021 07:00  --------------------------------------------------------  Total NET: -2450 mL      CAPILLARY BLOOD GLUCOSE  POCT Blood Glucose.: 102 mg/dL (2021 07:34)  POCT Blood Glucose.: 106 mg/dL (10 Feb 2021 21:45)  POCT Blood Glucose.: 84 mg/dL (10 Feb 2021 17:41)  POCT Blood Glucose.: 83 mg/dL (10 Feb 2021 11:37)      Physical Exam:  General: NAD; A&Ox3  Cardiac: S1/S2, RRR, no murmur, no rubs  Lungs: unlabored respirations, CTA b/l, no wheeze, no rales, no crackles  Abdomen: Soft/NT/ND; positive bowel sounds x 4  Extremities: No edema b/l lower extremities; good capillary refill; no cyanosis; palpable 1+ pedal pulses b/l        LABS:                   10.4<L>  7.97  )-----------( 168      ( 2021 06:15 )             33.7<L>                        10.4<L>  8.32  )-----------( 198      ( 10 Feb 2021 05:50 )             34.3<L>    02-10    142  |  108  |  22<H>  ----------------------------<  105<H>  4.3   |  24  |  1.0  -    137  |  104  |  30<H>  ----------------------------<  96  4.1   |  24  |  0.9    Ca    8.8      10 Feb 2021 05:50    PT/INR - ( 2021 06:15 )   PT: ;   INR: 1.10 ratio       PTT - ( 2021 06:15 )  PTT:31.7 sec    COVID: Ta (21 @ 11:40 am)    Urinalysis Basic - ( 10 Feb 2021 13:02 )  Color: Light Yellow / Appearance: Clear / S.013 / pH: x  Gluc: x / Ketone: Negative  / Bili: Negative / Urobili: <2 mg/dL   Blood: x / Protein: Negative / Nitrite: Positive   Leuk Esterase: Large / RBC: 1 /HPF / WBC 40 /HPF   Sq Epi: x / Non Sq Epi: 0 /HPF / Bacteria: Many    MRSA PCR Result.: Negative: By: Real-Time PCR (Polymerase Reaction Method) (02.10.21 @ 14:30)    Serum Pro-BNP: Pending    A1C with Estimated Average Glucose Result: 6.1 % (02-10-21 @ 05:50)      RADIOLOGY & ADDITIONAL TESTS:  CXR: < from: Xray Chest 2 Views PA/Lat (02.10.21 @ 13:35) >  Findings:  Support devices: None.  Cardiac/mediastinum/hilum: Cardiomegaly.  Lung parenchyma/Pleura: Right pleural effusion/opacity. No pneumothorax.  Skeleton/soft tissues: No acute osseous abnormality.  Impression:  Right pleural effusion/opacity.  < end of copied text >    EKG: < from: 12 Lead ECG (21 @ 11:11) >  Ventricular Rate 65 BPM  Atrial Rate 65 BPM  P-R Interval 156 ms  QRS Duration 92 ms  Q-T Interval 444 ms  QTC Calculation(Bazett) 461 ms  P Axis 32 degrees  R Axis -5 degrees  T Axis 22 degrees  Diagnosis Line Normal sinus rhythm  Moderate voltage criteria for LVH, may be normal variant ( R in aVL , [place]  product )  Borderline ECG  Confirmed by Panchito Miranda ([id number]) on 2021 12:55:55 PM    < end of copied text >      LEFT HEART CATHETERIZATION                                  Left main normal   LAD: Ostial 90% calcified lesion, Prox heavy calcified 60- 70% diffuse disease                       Diag: samll  Left Circumflex: large mild disease   OM: patent   Right Coronary Artery: large dominant minor irregularities  RPDA normal     DERRICK: < from: DERRICK w/Probe Placement (21 @ 10:52) >  Summary:   1. Left ventricular ejection fraction, by visual estimation, is 60 to 65%.   2. Normal global left ventricular systolic function.   3. The right ventricular size is normal. RV systolic function is normal.   4. Myxomatous mitral valve.   5. Moderate thickening of the anterior and posterior mitral valve leaflets.   6. Moderate mitral valve regurgitation.   7. PISA quantification: MR rad 0.67 cm. MR  cm. ERO 0.23 cm2. Regurgitant volume 42 ml.   8. No evidence of mitral stenosis.   9. No evidence of aortic stenosis.  10.Trace Tricuspid regurgitation.  11. Mildly enlarged left atrium.  12. No left atrial appendage thrombus.  13. Normal right atrial size.  14. Color flow doppler and intravenous injection of agitated saline demonstrates the presence of an intact intra atrial septum.      PROCEDURE: After discussion of the risks and benefits of the DERRICK, an informed consent was obtained by the cardiologist. Intravenous sedation was performed by anesthesia. The DERRICK probe was passed by the cardiologist without difficulty. Images were obtained with the patient in a left lateral decubitus position. The patient's vital signs; including heart rate, blood pressure, and oxygen saturation; remained stable throughout the procedure. The patient tolerated the procedure well and without complications.    PHYSICIAN INTERPRETATION:  Left Ventricle: Global LV systolic function was normal. Left ventricular ejection fraction, by visual estimation, is 60 to 65%. Normal segmental left ventricular systolic function.  Right Ventricle: The right ventricular size is normal. RV systolic function is normal. The right ventricular size is normal. RV systolic function is normal.  Left Atrium: Mildly enlarged left atrium. No left atrial appendage thrombus is seen. Intact intra-atrial septum without shunt. Color flow doppler and intravenous injection of agitated saline demonstrates the presence of an intact intra atrial septum.  Right Atrium: Normal right atrial size.  Pericardium: There is no evidence of pericardial effusion.  Mitral Valve: The mitral valve is myxomatous. Moderate thickening of the anterior and posterior mitral valve leaflets. Mitral leaflet mobility is normal. No evidence of mitral stenosis. No evidence of mitral valve stenosis. Moderate mitral valve regurgitation is seen. PISA quantification: MR rad 0.67 cm. MR  cm. ERO 0.23 cm2. Regurgitant volume 42 ml.  Tricuspid Valve: The tricuspid valve is normal in structure. Trivial tricuspid regurgitation is visualized. Trace Tricuspid regurgitation.  Aortic Valve: The aortic valve is trileaflet. No evidence of aortic stenosis. No evidence of aortic valve regurgitation is seen.  Pulmonic Valve: Structurally normal pulmonic valve, with normal leaflet excursion.  Aorta: The aortic root and ascending aorta are structurally normal, with no evidence of dilitation. The ascending aorta was not well visualized. The aortic arch was not well visualized.  Venous: The pulmonary veins appear normal. A normal flow pattern is recorded from all four pulmonary veins.  Shunts: Agitated saline contrast was given intravenously to evaluate for intracardiac shunting.  SPECTRAL DOPPLER ANALYSIS:    [id number] Wilman Victoria, Electronically signed on 2/10/2021 at 2:19:58 PM    < end of copied text >    CT chest: < from: CT Chest No Cont (02.10.21 @ 14:06) >  INTERPRETATION:  AIRWAYS, LUNGS AND PLEURA: The central tracheobronchial tree is patent.  No focal consolidation.  There is no pleural effusion. There isno pneumothorax.  MEDIASTINUM: There are no enlarged mediastinal, hilar or axillary lymph nodes. The visualized portion of the thyroid gland is unremarkable.  HEART AND VESSELS: The heart is normal in size. There are aortic and coronary artery calcifications. There are aortic valvular calcifications. The thoracic aorta has a normal caliber, containing moderate calcifications at the root and mild calcifications elsewhere. There is no pericardial effusion.  UPPER ABDOMEN: Lobular contour of the kidneys bilaterally, which may be related to underlying renal cortical scarring.  BONES AND SOFT TISSUES: There are degenerative changes of the spine.  The soft tissues are unremarkable.  TUBES AND LINES: None.  IMPRESSION: Normal size of the thoracic aorta containing moderate calcifications at the aortic root and mild calcifications elsewhere.  < end of copied text >    PFTs: FEV1 67%    Carotid duplex: < from: VA Duplex Carotid, Bilat (02.10.21 @ 13:55) >  FINDINGS:  No elevated velocities or abnormal waveforms are encountered.  Peak systolic velocities are as follows:  RIGHT:  PROX CCA = 70.3  DIST CCA = 69  PROX ICA = 45  DIST ICA = 85.4  ECA = 69    LEFT:  PROX CCA = 74.1  DIST CCA = 84.2  PROX ICA = 68.5  DIST ICA = 68.5  ECA = 74.9    Antegrade flow is noted within both vertebral arteries.  IMPRESSION: No significant hemodynamic stenosis of either carotid artery.  < end of copied text >    Allergies  No Known Allergies  Intolerances      MEDICATIONS  (STANDING):  amoxicillin 500 milliGRAM(s) Oral every 8 hours  aspirin enteric coated 81 milliGRAM(s) Oral daily  atorvastatin 40 milliGRAM(s) Oral at bedtime  dextrose 40% Gel 15 Gram(s) Oral once  dextrose 5%. 1000 milliLiter(s) (50 mL/Hr) IV Continuous <Continuous>  dextrose 5%. 1000 milliLiter(s) (100 mL/Hr) IV Continuous <Continuous>  dextrose 50% Injectable 25 Gram(s) IV Push once  dextrose 50% Injectable 12.5 Gram(s) IV Push once  dextrose 50% Injectable 25 Gram(s) IV Push once  glucagon  Injectable 1 milliGRAM(s) IntraMuscular once  heparin   Injectable 5000 Unit(s) SubCutaneous every 8 hours  insulin lispro (ADMELOG) corrective regimen sliding scale   SubCutaneous three times a day before meals  metoprolol tartrate 25 milliGRAM(s) Oral two times a day  pantoprazole    Tablet 40 milliGRAM(s) Oral before breakfast  sodium chloride 0.9%. 1000 milliLiter(s) (75 mL/Hr) IV Continuous <Continuous>    MEDICATIONS  (PRN):  acetaminophen   Tablet .. 650 milliGRAM(s) Oral every 8 hours PRN Temp greater or equal to 38C (100.4F), Mild Pain (1 - 3)    Home Medications:  aspirin 325 mg oral tablet: orally once a day (2021 09:55)  lisinopril-hydrochlorothiazide 20 mg-12.5 mg oral tablet: 1 tab(s) orally once a day (2021 09:55)  meloxicam: orally once a day (2021 09:55)  metFORMIN 500 mg oral tablet: orally once a day (2021 09:55)  metoprolol tartrate 25 mg oral tablet: 1 tab(s) orally 2 times a day (2021 09:55)  oxybutynin 10 mg/24 hr oral tablet, extended release: 1 tab(s) orally once a day (2021 09:55)  pantoprazole 40 mg oral delayed release tablet: 1 tab(s) orally once a day (2021 09:55)    Pre-op ACEi/ARB/CCB held 24 hours prior to planned procedure: [x] Yes, [] NO: indication:  Pre-Op Beta-Blockers: [x]Yes, []No: contraindication:  Pre-Op Aspirin: [x]Yes,  []No: contraindication: [] Held for Pre-op cardiac valve surgery with no CAD  Pre-Op Statin: [x]Yes, []No: contraindication:    Ambulation/Activity Status: 5meter walk test: T1:      s/T2:     s/T3:     s    Ambulates with walker.     Cardiac Surgery Risk Factors  CVA and/or carotid/cerebrovascular disease. Yes  No  Explain if Yes  Aortoiliac disease Yes  No  Explain if Yes  Previous MI Yes  No  Explain if Yes  Previous Cardiac Surgery Yes  No  Explain if Yes  Hemodynamics-Unstable or Shock Yes  No  Explain if Yes  Diabetes Yes on oral agents  Hepatic Failure Yes  No  Explain if Yes  Renal failure and/or dialysis Yes  No  Explain if Yes  Heart failure-type-present or past Yes  No  Explain if Yes  COPD Yes: Moderate COPD by FEV1 67%  Immune System Deficiency Yes  No  Explain if Yes  Malignant Ventricular Arrhythmia Yes  No  Explain if Yes    STS Score:     Assessment/Plan:  70y Female Pre-op for MVR/CABG             HD #   3    - Case and plan discussed with CTU Intensivist and CT Surgeon - Dr. Rojas  - Continue CTU supportive care and ongoing plan of care as per continuing CTU rounds.    - Continue DVT/GI prophylaxis  - Incentive Spirometry 10 times an hour  - Continue to advance physical activity as tolerated and continue PT/OT as directed  1. CAD: Continue ASA, statin, BB  2. Chronic UTI: asymptomatic w/no leukocytosis; start cefepime pre-op   3. Pre-op MVR: Pending repeat COVID; dental cleared s/p extraction x3 on IV ABX for UTI as well.     Pt has AICD/PPM [ ] Yes  [x ] No             Brand Name:  Pre-op Beta Blocker ordered within 24 hrs of surgery (CABG ONLY)?  [x ] Yes  [ ] No  If not, Why?  Type & Cross  [ ] Yes  [ ] No  NPO after Midnight [x ] Yes  [ ] No  Pre-op ABX ordered, to be taped on chart:  [ x] Yes  [ ] No     Hibiclens/Peridex ordered [x ] Yes  [ ] No  Intraop on Hold: PRBCs, CXR, DERRICK [x ]   Consent obtained  [x ] Yes  [ ] No   PLANNED OPERATIVE PROCEDURE(s):   MVR/CABG             HD #   3                    SURGEON(s): FIONA Rojas    HISTORY OF PRESENT ILLNESS:  69 yo F h/o HTN, DLD, DM, chronic UTI has JACOME on TTE Mod AS by gradient, and mod to severe MR here for DERRICK and RHC/LHC  DERRICK revealed moderate to severe mitral insufficiency and cardiac catheterization revealed severe 2vCAD.    PAST MEDICAL & SURGICAL HISTORY:  Glaucoma  Chronic sciatica  H/O sleep apnea on CPAP  Aortic stenosis  Type 2 diabetes mellitus without complication, without long-term current use of insulin  Hyperlipidemia, unspecified hyperlipidemia type  Hypertension, unspecified type  History of carpal tunnel surgery  History of hip surgery bilateral hip    SUBJECTIVE ASSESSMENT:70y Female patient seen and examined at bedside.     Vital Signs Last 24 Hrs  T(F): 97.8 (2021 05:18), Max: 97.8 (2021 05:18)  HR: 60 (2021 05:18) (60 - 69)  BP: 148/97 (2021 05:18) (143/82 - 148/97)  RUE BP:   153/57                     LUE BP: 144/70  RR: 18 (2021 05:18) (18 - 18)  SpO2: 97% (10 Feb 2021 19:47) (97% - 97%)    I&O's Detail    10 Feb 2021 07:01  -  2021 07:00  --------------------------------------------------------  IN:    Oral Fluid: 800 mL  Total IN: 800 mL    OUT:    Voided (mL): 3250 mL  Total OUT: 3250 mL    Net: I&O's Detail    2021 07:01  -  10 Feb 2021 07:00  --------------------------------------------------------  Total NET: -601 mL    10 Feb 2021 07:01  -  2021 07:00  --------------------------------------------------------  Total NET: -2450 mL      CAPILLARY BLOOD GLUCOSE  POCT Blood Glucose.: 102 mg/dL (2021 07:34)  POCT Blood Glucose.: 106 mg/dL (10 Feb 2021 21:45)  POCT Blood Glucose.: 84 mg/dL (10 Feb 2021 17:41)  POCT Blood Glucose.: 83 mg/dL (10 Feb 2021 11:37)      Physical Exam:  General: NAD; A&Ox3  Cardiac: S1/S2, RRR, no murmur, no rubs  Lungs: unlabored respirations, CTA b/l, no wheeze, no rales, no crackles  Abdomen: Soft/NT/ND; positive bowel sounds x 4  Extremities: No edema b/l lower extremities; good capillary refill; no cyanosis; palpable 1+ pedal pulses b/l        LABS:                   10.4<L>  7.97  )-----------( 168      ( 2021 06:15 )             33.7<L>                        10.4<L>  8.32  )-----------( 198      ( 10 Feb 2021 05:50 )             34.3<L>    02-10    142  |  108  |  22<H>  ----------------------------<  105<H>  4.3   |  24  |  1.0      137  |  104  |  30<H>  ----------------------------<  96  4.1   |  24  |  0.9    Ca    8.8      10 Feb 2021 05:50    PT/INR - ( 2021 06:15 )   PT: ;   INR: 1.10 ratio       PTT - ( 2021 06:15 )  PTT:31.7 sec    COVID: [employer] (21 @ 11:40 am)  COVID-19 PCR: NotDetec: (21 @ 11:30)    Urinalysis Basic - ( 10 Feb 2021 13:02 )  Color: Light Yellow / Appearance: Clear / S.013 / pH: x  Gluc: x / Ketone: Negative  / Bili: Negative / Urobili: <2 mg/dL   Blood: x / Protein: Negative / Nitrite: Positive   Leuk Esterase: Large / RBC: 1 /HPF / WBC 40 /HPF   Sq Epi: x / Non Sq Epi: 0 /HPF / Bacteria: Many    MRSA PCR Result.: Negative: By: Real-Time PCR (Polymerase Reaction Method) (02.10.21 @ 14:30)    Serum Pro-BNP: Pending    A1C with Estimated Average Glucose Result: 6.1 % (02-10-21 @ 05:50)      RADIOLOGY & ADDITIONAL TESTS:  CXR: < from: Xray Chest 2 Views PA/Lat (02.10.21 @ 13:35) >  Findings:  Support devices: None.  Cardiac/mediastinum/hilum: Cardiomegaly.  Lung parenchyma/Pleura: Right pleural effusion/opacity. No pneumothorax.  Skeleton/soft tissues: No acute osseous abnormality.  Impression:  Right pleural effusion/opacity.  < end of copied text >    EKG: < from: 12 Lead ECG (21 @ 11:11) >  Ventricular Rate 65 BPM  Atrial Rate 65 BPM  P-R Interval 156 ms  QRS Duration 92 ms  Q-T Interval 444 ms  QTC Calculation(Bazett) 461 ms  P Axis 32 degrees  R Axis -5 degrees  T Axis 22 degrees  Diagnosis Line Normal sinus rhythm  Moderate voltage criteria for LVH, may be normal variant ( R in aVL , [place]  product )  Borderline ECG  Confirmed by Panchito Miranda ([id number]) on 2021 12:55:55 PM    < end of copied text >      LEFT HEART CATHETERIZATION                                  Left main normal   LAD: Ostial 90% calcified lesion, Prox heavy calcified 60- 70% diffuse disease                       Diag: samll  Left Circumflex: large mild disease   OM: patent   Right Coronary Artery: large dominant minor irregularities  RPDA normal     DERRICK: < from: DERRICK w/Probe Placement (21 @ 10:52) >  Summary:   1. Left ventricular ejection fraction, by visual estimation, is 60 to 65%.   2. Normal global left ventricular systolic function.   3. The right ventricular size is normal. RV systolic function is normal.   4. Myxomatous mitral valve.   5. Moderate thickening of the anterior and posterior mitral valve leaflets.   6. Moderate mitral valve regurgitation.   7. PISA quantification: MR rad 0.67 cm. MR  cm. ERO 0.23 cm2. Regurgitant volume 42 ml.   8. No evidence of mitral stenosis.   9. No evidence of aortic stenosis.  10.Trace Tricuspid regurgitation.  11. Mildly enlarged left atrium.  12. No left atrial appendage thrombus.  13. Normal right atrial size.  14. Color flow doppler and intravenous injection of agitated saline demonstrates the presence of an intact intra atrial septum.      PROCEDURE: After discussion of the risks and benefits of the DERRICK, an informed consent was obtained by the cardiologist. Intravenous sedation was performed by anesthesia. The DERRICK probe was passed by the cardiologist without difficulty. Images were obtained with the patient in a left lateral decubitus position. The patient's vital signs; including heart rate, blood pressure, and oxygen saturation; remained stable throughout the procedure. The patient tolerated the procedure well and without complications.    PHYSICIAN INTERPRETATION:  Left Ventricle: Global LV systolic function was normal. Left ventricular ejection fraction, by visual estimation, is 60 to 65%. Normal segmental left ventricular systolic function.  Right Ventricle: The right ventricular size is normal. RV systolic function is normal. The right ventricular size is normal. RV systolic function is normal.  Left Atrium: Mildly enlarged left atrium. No left atrial appendage thrombus is seen. Intact intra-atrial septum without shunt. Color flow doppler and intravenous injection of agitated saline demonstrates the presence of an intact intra atrial septum.  Right Atrium: Normal right atrial size.  Pericardium: There is no evidence of pericardial effusion.  Mitral Valve: The mitral valve is myxomatous. Moderate thickening of the anterior and posterior mitral valve leaflets. Mitral leaflet mobility is normal. No evidence of mitral stenosis. No evidence of mitral valve stenosis. Moderate mitral valve regurgitation is seen. PISA quantification: MR rad 0.67 cm. MR  cm. ERO 0.23 cm2. Regurgitant volume 42 ml.  Tricuspid Valve: The tricuspid valve is normal in structure. Trivial tricuspid regurgitation is visualized. Trace Tricuspid regurgitation.  Aortic Valve: The aortic valve is trileaflet. No evidence of aortic stenosis. No evidence of aortic valve regurgitation is seen.  Pulmonic Valve: Structurally normal pulmonic valve, with normal leaflet excursion.  Aorta: The aortic root and ascending aorta are structurally normal, with no evidence of dilitation. The ascending aorta was not well visualized. The aortic arch was not well visualized.  Venous: The pulmonary veins appear normal. A normal flow pattern is recorded from all four pulmonary veins.  Shunts: Agitated saline contrast was given intravenously to evaluate for intracardiac shunting.  SPECTRAL DOPPLER ANALYSIS:    [id number] [facility] Julien, Electronically signed on 2/10/2021 at 2:19:58 PM    < end of copied text >    CT chest: < from: CT Chest No Cont (02.10.21 @ 14:06) >  INTERPRETATION:  AIRWAYS, LUNGS AND PLEURA: The central tracheobronchial tree is patent.  No focal consolidation.  There is no pleural effusion. There isno pneumothorax.  MEDIASTINUM: There are no enlarged mediastinal, hilar or axillary lymph nodes. The visualized portion of the thyroid gland is unremarkable.  HEART AND VESSELS: The heart is normal in size. There are aortic and coronary artery calcifications. There are aortic valvular calcifications. The thoracic aorta has a normal caliber, containing moderate calcifications at the root and mild calcifications elsewhere. There is no pericardial effusion.  UPPER ABDOMEN: Lobular contour of the kidneys bilaterally, which may be related to underlying renal cortical scarring.  BONES AND SOFT TISSUES: There are degenerative changes of the spine.  The soft tissues are unremarkable.  TUBES AND LINES: None.  IMPRESSION: Normal size of the thoracic aorta containing moderate calcifications at the aortic root and mild calcifications elsewhere.  < end of copied text >    PFTs: FEV1 67%    Carotid duplex: < from: VA Duplex Carotid, Bilat (02.10.21 @ 13:55) >  FINDINGS:  No elevated velocities or abnormal waveforms are encountered.  Peak systolic velocities are as follows:  RIGHT:  PROX CCA = 70.3  DIST CCA = 69  PROX ICA = 45  DIST ICA = 85.4  ECA = 69    LEFT:  PROX CCA = 74.1  DIST CCA = 84.2  PROX ICA = 68.5  DIST ICA = 68.5  ECA = 74.9    Antegrade flow is noted within both vertebral arteries.  IMPRESSION: No significant hemodynamic stenosis of either carotid artery.  < end of copied text >    Allergies  No Known Allergies  Intolerances      MEDICATIONS  (STANDING):  amoxicillin 500 milliGRAM(s) Oral every 8 hours  aspirin enteric coated 81 milliGRAM(s) Oral daily  atorvastatin 40 milliGRAM(s) Oral at bedtime  dextrose 40% Gel 15 Gram(s) Oral once  dextrose 5%. 1000 milliLiter(s) (50 mL/Hr) IV Continuous <Continuous>  dextrose 5%. 1000 milliLiter(s) (100 mL/Hr) IV Continuous <Continuous>  dextrose 50% Injectable 25 Gram(s) IV Push once  dextrose 50% Injectable 12.5 Gram(s) IV Push once  dextrose 50% Injectable 25 Gram(s) IV Push once  glucagon  Injectable 1 milliGRAM(s) IntraMuscular once  heparin   Injectable 5000 Unit(s) SubCutaneous every 8 hours  insulin lispro (ADMELOG) corrective regimen sliding scale   SubCutaneous three times a day before meals  metoprolol tartrate 25 milliGRAM(s) Oral two times a day  pantoprazole    Tablet 40 milliGRAM(s) Oral before breakfast  sodium chloride 0.9%. 1000 milliLiter(s) (75 mL/Hr) IV Continuous <Continuous>    MEDICATIONS  (PRN):  acetaminophen   Tablet .. 650 milliGRAM(s) Oral every 8 hours PRN Temp greater or equal to 38C (100.4F), Mild Pain (1 - 3)    Home Medications:  aspirin 325 mg oral tablet: orally once a day (2021 09:55)  lisinopril-hydrochlorothiazide 20 mg-12.5 mg oral tablet: 1 tab(s) orally once a day (2021 09:55)  meloxicam: orally once a day (2021 09:55)  metFORMIN 500 mg oral tablet: orally once a day (2021 09:55)  metoprolol tartrate 25 mg oral tablet: 1 tab(s) orally 2 times a day (2021 09:55)  oxybutynin 10 mg/24 hr oral tablet, extended release: 1 tab(s) orally once a day (2021 09:55)  pantoprazole 40 mg oral delayed release tablet: 1 tab(s) orally once a day (2021 09:55)    Pre-op ACEi/ARB/CCB held 24 hours prior to planned procedure: [x] Yes, [] NO: indication:  Pre-Op Beta-Blockers: [x]Yes, []No: contraindication:  Pre-Op Aspirin: [x]Yes,  []No: contraindication: [] Held for Pre-op cardiac valve surgery with no CAD  Pre-Op Statin: [x]Yes, []No: contraindication:    Ambulation/Activity Status: 5meter walk test: T1:      s/T2:     s/T3:     s    Ambulates with walker.     Cardiac Surgery Risk Factors  CVA and/or carotid/cerebrovascular disease. Yes  No  Explain if Yes  Aortoiliac disease Yes  No  Explain if Yes  Previous MI Yes  No  Explain if Yes  Previous Cardiac Surgery Yes  No  Explain if Yes  Hemodynamics-Unstable or Shock Yes  No  Explain if Yes  Diabetes Yes on oral agents  Hepatic Failure Yes  No  Explain if Yes  Renal failure and/or dialysis Yes  No  Explain if Yes  Heart failure-type-present or past Yes  No  Explain if Yes  COPD Yes: Moderate COPD by FEV1 67%  Immune System Deficiency Yes  No  Explain if Yes  Malignant Ventricular Arrhythmia Yes  No  Explain if Yes    STS Score:     Assessment/Plan:  70y Female Pre-op for MVR/CABG             HD #   3    - Case and plan discussed with CTU Intensivist and CT Surgeon - Dr. Rojas  - Continue CTU supportive care and ongoing plan of care as per continuing CTU rounds.    - Continue DVT/GI prophylaxis  - Incentive Spirometry 10 times an hour  - Continue to advance physical activity as tolerated and continue PT/OT as directed  1. CAD: Continue ASA, statin, BB  2. Chronic UTI: asymptomatic w/no leukocytosis; start cefepime pre-op   3. Pre-op MVR: Pending repeat COVID; dental cleared s/p extraction x3 on IV ABX for UTI as well.     Pt has AICD/PPM [ ] Yes  [x ] No             Brand Name:  Pre-op Beta Blocker ordered within 24 hrs of surgery (CABG ONLY)?  [x ] Yes  [ ] No  If not, Why?  Type & Cross  [ ] Yes  [ ] No  NPO after Midnight [x ] Yes  [ ] No  Pre-op ABX ordered, to be taped on chart:  [ x] Yes  [ ] No     Hibiclens/Peridex ordered [x ] Yes  [ ] No  Intraop on Hold: PRBCs, CXR, DERRICK [x ]   Consent obtained  [x ] Yes  [ ] No

## 2021-02-11 NOTE — PRE-ANESTHESIA EVALUATION ADULT - NSANTHPMHFT_GEN_ALL_CORE
70 F going for Procedure for MV + CABG due to Moderate to Severe MR and CAD x 2  97 Kg; 149 cm; BMI: 43.7  Hx of CAD, HTN, HLD, JACOME, NIDDM, Former Smoker, GERD, Mild Aortic Stenosis, Sleep apnea, Morbid Obesity  sHx: B/L Hip Surgery, Carpal Tunnel, DERRICK (2/9/12)    On Metformin, ASA, Metoprolol Tartrate, Lisinopril-HCTZ, Meloxicam; Pantoprazole, Oxybutynin    MP2, Normal Dentition    WBC: 7.97; hgb: 10.4; hct: 33.7;  Plt 168  Na: 140; K: 4.4; Cr: 0.9; Glu: 103; A1c: 6.1%  Pt: 12.7; INR: 1.10 Ptt: 31.7  ABO: A+; negative abs    EKG 2/11: NSR 65; Qtc: 461.     Bilateral Heart Cath: 2/9/21:Severe 2 Vessel Dz  LAD: Ostial 90% Calcified Lesion; Proximal: Heavy Calcified 60-70% disease dz  Diagonal: Small Left Circumflex: large Mild Dz OM: Patent;  RCA: Large Dominant minor irregularities; RPDA: Normal  PAP: mild Pulm HTN PCW: normal CO/CI: Normal    DERRICK 2/11.   1. Left ventricular ejection fraction, by visual estimation, is 60 to 65%.   2. Normal global left ventricular systolic function.   3. The right ventricular size is normal. RV systolic function is normal.   4. Myxomatous mitral valve.   5. Moderate thickening of the anterior and posterior mitral valve leaflets.   6. Moderate mitral valve regurgitation.   7. PISA quantification: MR rad 0.67 cm. MR  cm. ERO 0.23 cm2. Regurgitant volume 42 ml.   8. No evidence of mitral stenosis.   9. No evidence of aortic stenosis.  10. Trace Tricuspid regurgitation.  11. Mildly enlarged left atrium.  12. No left atrial appendage thrombus.  13. Normal right atrial size.  14. Color flow doppler and intravenous injection of agitated saline demonstrates the presence of an intact intra atrial septum. 70 F going for Procedure for MV + CABG due to Moderate to Severe MR and CAD x 2  97 Kg; 149 cm; BMI: 43.7  Hx of CAD, HTN, HLD, JACOME, NIDDM, Former Smoker, GERD, Mild Aortic Stenosis, Sleep apnea, Morbid Obesity  sHx: B/L Hip Surgery, Carpal Tunnel, DERRICK (2/9/12)    On Metformin, ASA, Metoprolol Tartrate, Lisinopril-HCTZ, Meloxicam; Pantoprazole, Oxybutynin    MP2, Normal Dentition    WBC: 7.97; hgb: 10.4; hct: 33.7;  Plt 168  Na: 140; K: 4.4; Cr: 0.9; Glu: 103; A1c: 6.1%  Pt: 12.7; INR: 1.10 Ptt: 31.7  ABO: A+; negative abs    EKG 2/11: NSR 65; Qtc: 461.     Bilateral Heart Cath: 2/9/21:Severe 2 Vessel Dz  LAD: Ostial 90% Calcified Lesion; Proximal: Heavy Calcified 60-70% disease dz  Diagonal: Small Left Circumflex: large Mild Dz OM: Patent;  RCA: Large Dominant minor irregularities; RPDA: Normal  PAP: mild Pulm HTN PCW: normal CO/CI: Normal    DERRICK 2/9.   1. Left ventricular ejection fraction, by visual estimation, is 60 to 65%.   2. Normal global left ventricular systolic function.   3. The right ventricular size is normal. RV systolic function is normal.   4. Myxomatous mitral valve.   5. Moderate thickening of the anterior and posterior mitral valve leaflets.   6. Moderate mitral valve regurgitation.   7. PISA quantification: MR rad 0.67 cm. MR  cm. ERO 0.23 cm2. Regurgitant volume 42 ml.   8. No evidence of mitral stenosis.   9. No evidence of aortic stenosis.  10. Trace Tricuspid regurgitation.  11. Mildly enlarged left atrium.  12. No left atrial appendage thrombus.  13. Normal right atrial size.  14. Color flow doppler and intravenous injection of agitated saline demonstrates the presence of an intact intra atrial septum.

## 2021-02-12 ENCOUNTER — TRANSCRIPTION ENCOUNTER (OUTPATIENT)
Age: 71
End: 2021-02-12

## 2021-02-12 LAB
ABO RH CONFIRMATION: SIGNIFICANT CHANGE UP
ALBUMIN SERPL ELPH-MCNC: 3.3 G/DL — LOW (ref 3.5–5.2)
ALP SERPL-CCNC: 41 U/L — SIGNIFICANT CHANGE UP (ref 30–115)
ALT FLD-CCNC: 10 U/L — SIGNIFICANT CHANGE UP (ref 0–41)
ANION GAP SERPL CALC-SCNC: 11 MMOL/L — SIGNIFICANT CHANGE UP (ref 7–14)
APTT BLD: 18.4 SEC — CRITICAL LOW (ref 27–39.2)
AST SERPL-CCNC: 27 U/L — SIGNIFICANT CHANGE UP (ref 0–41)
BASE EXCESS BLDA CALC-SCNC: -2.8 MMOL/L — LOW (ref -2–2)
BASE EXCESS BLDA CALC-SCNC: -3.5 MMOL/L — LOW (ref -2–2)
BILIRUB SERPL-MCNC: 0.5 MG/DL — SIGNIFICANT CHANGE UP (ref 0.2–1.2)
BUN SERPL-MCNC: 20 MG/DL — SIGNIFICANT CHANGE UP (ref 10–20)
CALCIUM SERPL-MCNC: 8.2 MG/DL — LOW (ref 8.5–10.1)
CHLORIDE SERPL-SCNC: 111 MMOL/L — HIGH (ref 98–110)
CO2 SERPL-SCNC: 21 MMOL/L — SIGNIFICANT CHANGE UP (ref 17–32)
CREAT SERPL-MCNC: 1 MG/DL — SIGNIFICANT CHANGE UP (ref 0.7–1.5)
GAS PNL BLDA: SIGNIFICANT CHANGE UP
GLUCOSE BLDC GLUCOMTR-MCNC: 103 MG/DL — HIGH (ref 70–99)
GLUCOSE BLDC GLUCOMTR-MCNC: 104 MG/DL — HIGH (ref 70–99)
GLUCOSE BLDC GLUCOMTR-MCNC: 123 MG/DL — HIGH (ref 70–99)
GLUCOSE BLDC GLUCOMTR-MCNC: 132 MG/DL — HIGH (ref 70–99)
GLUCOSE BLDC GLUCOMTR-MCNC: 138 MG/DL — HIGH (ref 70–99)
GLUCOSE BLDC GLUCOMTR-MCNC: 139 MG/DL — HIGH (ref 70–99)
GLUCOSE BLDC GLUCOMTR-MCNC: 141 MG/DL — HIGH (ref 70–99)
GLUCOSE BLDC GLUCOMTR-MCNC: 146 MG/DL — HIGH (ref 70–99)
GLUCOSE BLDC GLUCOMTR-MCNC: 91 MG/DL — SIGNIFICANT CHANGE UP (ref 70–99)
GLUCOSE BLDC GLUCOMTR-MCNC: 93 MG/DL — SIGNIFICANT CHANGE UP (ref 70–99)
GLUCOSE BLDC GLUCOMTR-MCNC: 95 MG/DL — SIGNIFICANT CHANGE UP (ref 70–99)
GLUCOSE SERPL-MCNC: 87 MG/DL — SIGNIFICANT CHANGE UP (ref 70–99)
HCO3 BLDA-SCNC: 22 MMOL/L — LOW (ref 23–27)
HCO3 BLDA-SCNC: 22 MMOL/L — LOW (ref 23–27)
HCT VFR BLD CALC: 22.6 % — LOW (ref 37–47)
HCT VFR BLD CALC: 30.9 % — LOW (ref 37–47)
HGB BLD-MCNC: 10.1 G/DL — LOW (ref 12–16)
HGB BLD-MCNC: 7.2 G/DL — LOW (ref 12–16)
HOROWITZ INDEX BLDA+IHG-RTO: 40 — SIGNIFICANT CHANGE UP
HOROWITZ INDEX BLDA+IHG-RTO: 40 — SIGNIFICANT CHANGE UP
INR BLD: 1.19 RATIO — SIGNIFICANT CHANGE UP (ref 0.65–1.3)
MAGNESIUM SERPL-MCNC: 2.9 MG/DL — HIGH (ref 1.8–2.4)
MCHC RBC-ENTMCNC: 27.2 PG — SIGNIFICANT CHANGE UP (ref 27–31)
MCHC RBC-ENTMCNC: 28.3 PG — SIGNIFICANT CHANGE UP (ref 27–31)
MCHC RBC-ENTMCNC: 31.9 G/DL — LOW (ref 32–37)
MCHC RBC-ENTMCNC: 32.7 G/DL — SIGNIFICANT CHANGE UP (ref 32–37)
MCV RBC AUTO: 85.3 FL — SIGNIFICANT CHANGE UP (ref 81–99)
MCV RBC AUTO: 86.6 FL — SIGNIFICANT CHANGE UP (ref 81–99)
NRBC # BLD: 0 /100 WBCS — SIGNIFICANT CHANGE UP (ref 0–0)
NRBC # BLD: 0 /100 WBCS — SIGNIFICANT CHANGE UP (ref 0–0)
PCO2 BLDA: 39 MMHG — SIGNIFICANT CHANGE UP (ref 38–42)
PCO2 BLDA: 40 MMHG — SIGNIFICANT CHANGE UP (ref 38–42)
PH BLDA: 7.36 — LOW (ref 7.38–7.42)
PH BLDA: 7.36 — LOW (ref 7.38–7.42)
PLATELET # BLD AUTO: 109 K/UL — LOW (ref 130–400)
PLATELET # BLD AUTO: 112 K/UL — LOW (ref 130–400)
PO2 BLDA: 130 MMHG — HIGH (ref 78–95)
PO2 BLDA: 138 MMHG — HIGH (ref 78–95)
POTASSIUM SERPL-MCNC: 4.2 MMOL/L — SIGNIFICANT CHANGE UP (ref 3.5–5)
POTASSIUM SERPL-SCNC: 4.2 MMOL/L — SIGNIFICANT CHANGE UP (ref 3.5–5)
PROT SERPL-MCNC: 4.4 G/DL — LOW (ref 6–8)
PROTHROM AB SERPL-ACNC: 13.7 SEC — HIGH (ref 9.95–12.87)
RBC # BLD: 2.65 M/UL — LOW (ref 4.2–5.4)
RBC # BLD: 3.57 M/UL — LOW (ref 4.2–5.4)
RBC # FLD: 16.5 % — HIGH (ref 11.5–14.5)
RBC # FLD: 17 % — HIGH (ref 11.5–14.5)
SAO2 % BLDA: 99 % — HIGH (ref 94–98)
SAO2 % BLDA: 99 % — HIGH (ref 94–98)
SODIUM SERPL-SCNC: 143 MMOL/L — SIGNIFICANT CHANGE UP (ref 135–146)
T3 SERPL-MCNC: 95 NG/DL — SIGNIFICANT CHANGE UP (ref 80–200)
T4 AB SER-ACNC: 5.9 UG/DL — SIGNIFICANT CHANGE UP (ref 4.6–12)
TSH SERPL-MCNC: 3.22 UIU/ML — SIGNIFICANT CHANGE UP (ref 0.27–4.2)
WBC # BLD: 16.81 K/UL — HIGH (ref 4.8–10.8)
WBC # BLD: 7.12 K/UL — SIGNIFICANT CHANGE UP (ref 4.8–10.8)
WBC # FLD AUTO: 16.81 K/UL — HIGH (ref 4.8–10.8)
WBC # FLD AUTO: 7.12 K/UL — SIGNIFICANT CHANGE UP (ref 4.8–10.8)

## 2021-02-12 PROCEDURE — 33426 REPAIR OF MITRAL VALVE: CPT

## 2021-02-12 PROCEDURE — 33533 CABG ARTERIAL SINGLE: CPT

## 2021-02-12 PROCEDURE — 33533 CABG ARTERIAL SINGLE: CPT | Mod: 80

## 2021-02-12 PROCEDURE — 71045 X-RAY EXAM CHEST 1 VIEW: CPT | Mod: 26

## 2021-02-12 PROCEDURE — 93010 ELECTROCARDIOGRAM REPORT: CPT

## 2021-02-12 PROCEDURE — 33426 REPAIR OF MITRAL VALVE: CPT | Mod: 80

## 2021-02-12 RX ORDER — DEXTROSE 50 % IN WATER 50 %
50 SYRINGE (ML) INTRAVENOUS
Refills: 0 | Status: DISCONTINUED | OUTPATIENT
Start: 2021-02-12 | End: 2021-02-14

## 2021-02-12 RX ORDER — CEFAZOLIN SODIUM 1 G
2000 VIAL (EA) INJECTION EVERY 8 HOURS
Refills: 0 | Status: COMPLETED | OUTPATIENT
Start: 2021-02-12 | End: 2021-02-13

## 2021-02-12 RX ORDER — ALBUTEROL 90 UG/1
2 AEROSOL, METERED ORAL EVERY 6 HOURS
Refills: 0 | Status: DISCONTINUED | OUTPATIENT
Start: 2021-02-12 | End: 2021-02-13

## 2021-02-12 RX ORDER — MEPERIDINE HYDROCHLORIDE 50 MG/ML
25 INJECTION INTRAMUSCULAR; INTRAVENOUS; SUBCUTANEOUS ONCE
Refills: 0 | Status: DISCONTINUED | OUTPATIENT
Start: 2021-02-12 | End: 2021-02-20

## 2021-02-12 RX ORDER — DEXMEDETOMIDINE HYDROCHLORIDE IN 0.9% SODIUM CHLORIDE 4 UG/ML
0.1 INJECTION INTRAVENOUS
Qty: 200 | Refills: 0 | Status: DISCONTINUED | OUTPATIENT
Start: 2021-02-12 | End: 2021-02-13

## 2021-02-12 RX ORDER — ALBUMIN HUMAN 25 %
500 VIAL (ML) INTRAVENOUS ONCE
Refills: 0 | Status: COMPLETED | OUTPATIENT
Start: 2021-02-12 | End: 2021-02-12

## 2021-02-12 RX ORDER — PROPOFOL 10 MG/ML
30 INJECTION, EMULSION INTRAVENOUS
Qty: 1000 | Refills: 0 | Status: DISCONTINUED | OUTPATIENT
Start: 2021-02-12 | End: 2021-02-13

## 2021-02-12 RX ORDER — HYDRALAZINE HCL 50 MG
10 TABLET ORAL ONCE
Refills: 0 | Status: COMPLETED | OUTPATIENT
Start: 2021-02-12 | End: 2021-02-12

## 2021-02-12 RX ORDER — NOREPINEPHRINE BITARTRATE/D5W 8 MG/250ML
0.05 PLASTIC BAG, INJECTION (ML) INTRAVENOUS
Qty: 8 | Refills: 0 | Status: DISCONTINUED | OUTPATIENT
Start: 2021-02-12 | End: 2021-02-13

## 2021-02-12 RX ORDER — NITROGLYCERIN 6.5 MG
30 CAPSULE, EXTENDED RELEASE ORAL
Qty: 50 | Refills: 0 | Status: DISCONTINUED | OUTPATIENT
Start: 2021-02-12 | End: 2021-02-13

## 2021-02-12 RX ORDER — IPRATROPIUM BROMIDE 0.2 MG/ML
2 SOLUTION, NON-ORAL INHALATION EVERY 6 HOURS
Refills: 0 | Status: DISCONTINUED | OUTPATIENT
Start: 2021-02-12 | End: 2021-02-13

## 2021-02-12 RX ORDER — ASPIRIN/CALCIUM CARB/MAGNESIUM 324 MG
300 TABLET ORAL ONCE
Refills: 0 | Status: COMPLETED | OUTPATIENT
Start: 2021-02-12 | End: 2021-02-13

## 2021-02-12 RX ORDER — KETOROLAC TROMETHAMINE 30 MG/ML
30 SYRINGE (ML) INJECTION ONCE
Refills: 0 | Status: DISCONTINUED | OUTPATIENT
Start: 2021-02-12 | End: 2021-02-12

## 2021-02-12 RX ORDER — SODIUM CHLORIDE 9 MG/ML
1000 INJECTION INTRAMUSCULAR; INTRAVENOUS; SUBCUTANEOUS
Refills: 0 | Status: DISCONTINUED | OUTPATIENT
Start: 2021-02-12 | End: 2021-02-24

## 2021-02-12 RX ORDER — AMIODARONE HYDROCHLORIDE 400 MG/1
0.5 TABLET ORAL
Qty: 900 | Refills: 0 | Status: DISCONTINUED | OUTPATIENT
Start: 2021-02-12 | End: 2021-02-13

## 2021-02-12 RX ORDER — CHLORHEXIDINE GLUCONATE 213 G/1000ML
15 SOLUTION TOPICAL EVERY 12 HOURS
Refills: 0 | Status: DISCONTINUED | OUTPATIENT
Start: 2021-02-12 | End: 2021-02-12

## 2021-02-12 RX ORDER — INSULIN HUMAN 100 [IU]/ML
10 INJECTION, SOLUTION SUBCUTANEOUS
Qty: 100 | Refills: 0 | Status: DISCONTINUED | OUTPATIENT
Start: 2021-02-12 | End: 2021-02-13

## 2021-02-12 RX ORDER — CHLORHEXIDINE GLUCONATE 213 G/1000ML
5 SOLUTION TOPICAL
Refills: 0 | Status: DISCONTINUED | OUTPATIENT
Start: 2021-02-12 | End: 2021-02-13

## 2021-02-12 RX ORDER — OXYCODONE HYDROCHLORIDE 5 MG/1
5 TABLET ORAL EVERY 6 HOURS
Refills: 0 | Status: DISCONTINUED | OUTPATIENT
Start: 2021-02-12 | End: 2021-02-14

## 2021-02-12 RX ORDER — VASOPRESSIN 20 [USP'U]/ML
0.04 INJECTION INTRAVENOUS
Qty: 50 | Refills: 0 | Status: DISCONTINUED | OUTPATIENT
Start: 2021-02-12 | End: 2021-02-13

## 2021-02-12 RX ORDER — ALBUMIN HUMAN 25 %
500 VIAL (ML) INTRAVENOUS ONCE
Refills: 0 | Status: DISCONTINUED | OUTPATIENT
Start: 2021-02-12 | End: 2021-02-27

## 2021-02-12 RX ORDER — ONDANSETRON 8 MG/1
4 TABLET, FILM COATED ORAL ONCE
Refills: 0 | Status: COMPLETED | OUTPATIENT
Start: 2021-02-12 | End: 2021-02-12

## 2021-02-12 RX ORDER — POLYETHYLENE GLYCOL 3350 17 G/17G
17 POWDER, FOR SOLUTION ORAL DAILY
Refills: 0 | Status: DISCONTINUED | OUTPATIENT
Start: 2021-02-12 | End: 2021-03-01

## 2021-02-12 RX ORDER — FAMOTIDINE 10 MG/ML
20 INJECTION INTRAVENOUS EVERY 12 HOURS
Refills: 0 | Status: DISCONTINUED | OUTPATIENT
Start: 2021-02-12 | End: 2021-02-13

## 2021-02-12 RX ORDER — MAGNESIUM SULFATE 500 MG/ML
1 VIAL (ML) INJECTION ONCE
Refills: 0 | Status: COMPLETED | OUTPATIENT
Start: 2021-02-12 | End: 2021-02-12

## 2021-02-12 RX ORDER — DEXTROSE 50 % IN WATER 50 %
25 SYRINGE (ML) INTRAVENOUS
Refills: 0 | Status: DISCONTINUED | OUTPATIENT
Start: 2021-02-12 | End: 2021-02-14

## 2021-02-12 RX ORDER — OXYCODONE HYDROCHLORIDE 5 MG/1
10 TABLET ORAL EVERY 4 HOURS
Refills: 0 | Status: DISCONTINUED | OUTPATIENT
Start: 2021-02-12 | End: 2021-02-13

## 2021-02-12 RX ADMIN — AMIODARONE HYDROCHLORIDE 16.7 MG/MIN: 400 TABLET ORAL at 21:42

## 2021-02-12 RX ADMIN — Medication 9 MICROGRAM(S)/MIN: at 13:22

## 2021-02-12 RX ADMIN — Medication 100 MILLIGRAM(S): at 21:15

## 2021-02-12 RX ADMIN — DEXMEDETOMIDINE HYDROCHLORIDE IN 0.9% SODIUM CHLORIDE 2.43 MICROGRAM(S)/KG/HR: 4 INJECTION INTRAVENOUS at 13:22

## 2021-02-12 RX ADMIN — Medication 100 MILLIGRAM(S): at 15:00

## 2021-02-12 RX ADMIN — CHLORHEXIDINE GLUCONATE 15 MILLILITER(S): 213 SOLUTION TOPICAL at 16:18

## 2021-02-12 RX ADMIN — CHLORHEXIDINE GLUCONATE 15 MILLILITER(S): 213 SOLUTION TOPICAL at 06:27

## 2021-02-12 RX ADMIN — AMIODARONE HYDROCHLORIDE 33.3 MG/MIN: 400 TABLET ORAL at 16:00

## 2021-02-12 RX ADMIN — Medication 30 MILLIGRAM(S): at 22:00

## 2021-02-12 RX ADMIN — ONDANSETRON 4 MILLIGRAM(S): 8 TABLET, FILM COATED ORAL at 01:15

## 2021-02-12 RX ADMIN — SODIUM CHLORIDE 20 MILLILITER(S): 9 INJECTION INTRAMUSCULAR; INTRAVENOUS; SUBCUTANEOUS at 13:22

## 2021-02-12 RX ADMIN — CEFEPIME 100 MILLIGRAM(S): 1 INJECTION, POWDER, FOR SOLUTION INTRAMUSCULAR; INTRAVENOUS at 06:27

## 2021-02-12 RX ADMIN — Medication 250 MILLILITER(S): at 19:10

## 2021-02-12 RX ADMIN — FAMOTIDINE 20 MILLIGRAM(S): 10 INJECTION INTRAVENOUS at 16:19

## 2021-02-12 RX ADMIN — Medication 100 GRAM(S): at 20:21

## 2021-02-12 RX ADMIN — CHLORHEXIDINE GLUCONATE 5 MILLILITER(S): 213 SOLUTION TOPICAL at 16:19

## 2021-02-12 RX ADMIN — Medication 250 MILLILITER(S): at 14:30

## 2021-02-12 RX ADMIN — Medication 250 MILLILITER(S): at 13:50

## 2021-02-12 RX ADMIN — Medication 10 MILLIGRAM(S): at 01:49

## 2021-02-12 RX ADMIN — PROPOFOL 17.5 MICROGRAM(S)/KG/MIN: 10 INJECTION, EMULSION INTRAVENOUS at 15:00

## 2021-02-12 RX ADMIN — ONDANSETRON 4 MILLIGRAM(S): 8 TABLET, FILM COATED ORAL at 21:00

## 2021-02-12 NOTE — DISCHARGE NOTE PROVIDER - DETAILS OF MALNUTRITION DIAGNOSIS/DIAGNOSES
This patient has been assessed with a concern for Malnutrition and was treated during this hospitalization for the following Nutrition diagnosis/diagnoses:     -  02/16/2021: Morbid obesity (BMI > 40)   This patient has been assessed with a concern for Malnutrition and was treated during this hospitalization for the following Nutrition diagnosis/diagnoses:     -  02/16/2021: Morbid obesity (BMI > 40)    This patient has been assessed with a concern for Malnutrition and was treated during this hospitalization for the following Nutrition diagnosis/diagnoses:     -  02/16/2021: Morbid obesity (BMI > 40)

## 2021-02-12 NOTE — PACU DISCHARGE NOTE - COMMENTS
CTU  P art 117/73  P fem 136/66  PA pressure 35/18  CVP 12  SpO2 98%  T 36.8  CO/CI 5.5/2.9  HR 74  Vent settings:  500/12/50/5

## 2021-02-12 NOTE — PROGRESS NOTE ADULT - SUBJECTIVE AND OBJECTIVE BOX
OPERATIVE PROCEDURE(s):                POD #                       70yFemale  SURGEON(s): LEANNE Victoria  SUBJECTIVE ASSESSMENT:  Patient has no complaints at this time.    Vital Signs Last 24 Hrs  T(F): 98 (2021 21:35), Max: 98 (2021 21:35)  HR: 80 (2021 03:30) (61 - 83)  BP: 160/72 (2021 03:30) (130/64 - 222/101)  BP(mean): 104 (2021 03:30) (92 - 145)  ABP: --  ABP(mean): --  RR: 20 (2021 03:30) (15 - 33)  SpO2: 99% (2021 03:30) (97% - 100%)  CVP(mm Hg): --  CVP(cm H2O): --  CO: --  CI: --  PA: --  SVR: --    I&O's Detail    10 Feb 2021 07:01  -  2021 07:00  --------------------------------------------------------  IN:    Oral Fluid: 800 mL  Total IN: 800 mL    OUT:    Voided (mL): 3250 mL  Total OUT: 3250 mL        Net:   I&O's Detail    2021 07:01  -  10 Feb 2021 07:00  --------------------------------------------------------  Total NET: -601 mL      10 Feb 2021 07:01  -  2021 07:00  --------------------------------------------------------  Total NET: -2450 mL        CAPILLARY BLOOD GLUCOSE      POCT Blood Glucose.: 108 mg/dL (2021 22:43)  POCT Blood Glucose.: 107 mg/dL (2021 16:38)  POCT Blood Glucose.: 163 mg/dL (2021 11:06)  POCT Blood Glucose.: 102 mg/dL (2021 07:34)    Physical Exam:  General: NAD; A&Ox3/Patient is intubated and sedated  Cardiac: S1/S2, RRR, no murmur, no rubs  Lungs: unlabored respirations, CTA b/l, no wheeze, no rales, no crackles  Abdomen: Soft/NT/ND; positive bowel sounds x 4  Sternum: Intact, no click, incision healing well with no drainage  Incisions: Incisions clean/dry/intact  Extremities: No edema b/l lower extremities; good capillary refill; no cyanosis; palpable 1+ pedal pulses b/l    Central Venous Catheter: Yes[]  No[] , If Yes indication:           Day #  Spencer Catheter: Yes  [] , No  [] , If yes indication:                      Day #  NGT: Yes [] No [] ,    If Yes Placement:                                     Day #  EPICARDIAL WIRES:  [] YES [] NO                                              Day #  BOWEL MOVEMENT:  [] YES [] NO, If No, Timing since last BM:      Day #  CHEST TUBE(Left/Right):  [] YES [] NO, If yes -  AIR LEAKS:  [] YES [] NO        LABS:                        10.4<L>  7.97  )-----------( 168      ( 2021 06:15 )             33.7<L>                        10.4<L>  8.32  )-----------( 198      ( 10 Feb 2021 05:50 )             34.3<L>    0211    140  |  107  |  20  ----------------------------<  103<H>  4.4   |  25  |  0.9  10    142  |  108  |  22<H>  ----------------------------<  105<H>  4.3   |  24  |  1.0    Ca    9.2      2021 06:15    TPro  5.7<L> [6.0 - 8.0]  /  Alb  3.8 [3.5 - 5.2]  /  TBili  0.4 [0.2 - 1.2]  /  DBili  x   /  AST  19 [0 - 41]  /  ALT  12 [0 - 41]  /  AlkPhos  56 [30 - 115]  02-11    PT/INR - ( 2021 06:15 )   PT: ;   INR: 1.10 ratio         PTT - ( 2021 06:15 )  PTT:31.7 sec  Urinalysis Basic - ( 10 Feb 2021 13:02 )    Color: Light Yellow / Appearance: Clear / S.013 / pH: x  Gluc: x / Ketone: Negative  / Bili: Negative / Urobili: <2 mg/dL   Blood: x / Protein: Negative / Nitrite: Positive   Leuk Esterase: Large / RBC: 1 /HPF / WBC 40 /HPF   Sq Epi: x / Non Sq Epi: 0 /HPF / Bacteria: Many        RADIOLOGY & ADDITIONAL TESTS:  CXR:   EK Lead ECG:   Ventricular Rate 65 BPM    Atrial Rate 65 BPM    P-R Interval 156 ms    QRS Duration 92 ms    Q-T Interval 444 ms    QTC Calculation(Bazett) 461 ms    P Axis 32 degrees    R Axis -5 degrees    T Axis 22 degrees    Diagnosis Line Normal sinus rhythm  Moderate voltage criteria for LVH, may be normal variant ( R in aVL , Tra  product )  Borderline ECG    Confirmed by Panchito Miranda ([id number]) on 2021 12:55:55 PM (21 @ 11:11)    MEDICATIONS  (STANDING):  albumin human  5% IVPB 3000 milliLiter(s) IV Intermittent once  aspirin enteric coated 81 milliGRAM(s) Oral daily  atorvastatin 40 milliGRAM(s) Oral at bedtime  cefepime   IVPB      cefepime   IVPB 1000 milliGRAM(s) IV Intermittent every 8 hours  chlorhexidine 0.12% Liquid 15 milliLiter(s) Swish and Spit once  metoprolol tartrate 25 milliGRAM(s) Oral two times a day  nitroglycerin  Infusion 33.333 MICROgram(s)/Min (10 mL/Hr) IV Continuous <Continuous>  ondansetron Injectable 4 milliGRAM(s) IV Push once  pantoprazole    Tablet 40 milliGRAM(s) Oral before breakfast  sodium chloride 0.9%. 1000 milliLiter(s) (75 mL/Hr) IV Continuous <Continuous>    MEDICATIONS  (PRN):  acetaminophen   Tablet .. 650 milliGRAM(s) Oral every 8 hours PRN Temp greater or equal to 38C (100.4F), Mild Pain (1 - 3)    HEPARIN:  [] YES [] NO  Dose: XX UNITS/HR UNITS Q8H  LOVENOX:[] YES [] NO  Dose: XX mg Q24H  COUMADIN: []  YES [] NO  Dose: XX mg  Q24H  SCD's: YES b/l  GI Prophylaxis: Protonix [], Pepcid [], None [], (Contra-indication:.....)    Post-Op Aspirin: Yes [],  No [], If No, then contra-indication:  Post-Op Statin: Yes [], No[], If No, then contra-indication:  Post-Op Beta-Blockers: Yes [], No [], If No, then contra-indication:    Allergies:  No Known Allergies      Ambulation/Activity Status: Ambulates several times daily without assistance.    Assessment/Plan:  70y Female status-post .....  - Case and plan discussed with CTU Intensivist and CT Surgeon - Dr. Rojas/Edgar  - Continue CTU supportive care    - Continue DVT/GI prophylaxis  - Incentive Spirometry 10 times an hour  - Continue to advance physical activity as tolerated and continue PT/OT as directed  1. CAD: Continue ASA, statin, BB  2. HTN:   3. A. Fib:   4. COPD/Hypoxia:   5. DM/Glucose Control:     Social Service Disposition:

## 2021-02-12 NOTE — DISCHARGE NOTE PROVIDER - CARE PROVIDER_API CALL
Ted Rojas (MD)  Thoracic and Cardiac Surgery  [address], [address]  [place] [address]  Phone: [phone]  Fax: [phone]  Follow Up Time:     Jesse Avila  FAMILY MEDICINE  [facility] [place] [address]  Phone: [phone]  Fax: [phone]  Follow Up Time:     Hossein David)  Cardiovascular Disease; Internal Medicine  [address] [place] [address], [address]  [place] [address]  Phone: [phone]  Fax: [phone]  Follow Up Time:     Dao Zepeda (DO)  Critical Care Medicine; Internal Medicine; Pulmonary Disease  [facility] [place] [address]  Phone: [phone]  Fax: [phone]  Follow Up Time:    Ted Rojas (MD)  Thoracic and Cardiac Surgery  [address] [place] [address], [address]  [place] [address]  Phone: [phone]  Fax: [phone]  Scheduled Appointment: 03/04/2021 01:15 PM    Jesse Avila  FAMILY MEDICINE  [address] [place] [address]  Phone: [phone]  Fax: [phone]  Follow Up Time: 2 weeks    Hossein David)  Cardiovascular Disease; Internal Medicine  [address] [place] [address], [address]  [place] [address]  Phone: [phone]  Fax: [phone]  Follow Up Time: 2 weeks    Dao Zepeda (DO)  Critical Care Medicine; Internal Medicine; Pulmonary Disease  [address] [place] [address]  Phone: [phone]  Fax: [phone]  Follow Up Time: 1 month

## 2021-02-12 NOTE — DISCHARGE NOTE PROVIDER - NSDCFUSCHEDAPPT_GEN_ALL_CORE_FT
The patient is a 41y Female complaining of chest pain. ADRIAN SAMUELS ; 05/10/2021 ; [facility] [address]

## 2021-02-12 NOTE — DISCHARGE NOTE PROVIDER - PROVIDER TOKENS
PROVIDER:[TOKEN:[[id number]]],PROVIDER:[TOKEN:[[id number]]],PROVIDER:[TOKEN:[[id number]]],PROVIDER:[TOKEN:[[id number]]] PROVIDER:[TOKEN:[[id number]],SCHEDULEDAPPT:[03/04/2021],SCHEDULEDAPPTTIME:[01:15 PM]],PROVIDER:[TOKEN:[[id number]],FOLLOWUP:[2 weeks]],PROVIDER:[TOKEN:[[id number]],FOLLOWUP:[2 weeks]],PROVIDER:[TOKEN:[[id number]],FOLLOWUP:[1 month]]

## 2021-02-12 NOTE — DISCHARGE NOTE PROVIDER - NSDCACTIVITY_GEN_ALL_CORE
Bathing allowed/Sex allowed/Do not drive or operate machinery/Showering allowed/Do not make important decisions/Stairs allowed/Walking - Indoors allowed/No heavy lifting/straining/Walking - Outdoors allowed Bathing allowed/Do not drive or operate machinery/Showering allowed/Do not make important decisions/Stairs allowed/Walking - Indoors allowed/No heavy lifting/straining/Walking - Outdoors allowed

## 2021-02-12 NOTE — BRIEF OPERATIVE NOTE - COMMENTS
I, Dr. Gomez was present as the first assistant for the critical part of the case, including the entire duration of cardiopulmonary bypass and cross clamp, including the construction of the distal and proximal  anastamoses and repair of the mitral valve

## 2021-02-12 NOTE — BRIEF OPERATIVE NOTE - NSICDXBRIEFPROCEDURE_GEN_ALL_CORE_FT
PROCEDURES:  CABG, with DERRICK 12-Feb-2021 13:07:19 MOFFETT to LAD Flash Moody  Repair, mitral valve, with DERRICK 12-Feb-2021 13:07:04  Flash Moody

## 2021-02-12 NOTE — BRIEF OPERATIVE NOTE - NSICDXBRIEFPOSTOP_GEN_ALL_CORE_FT
POST-OP DIAGNOSIS:  Mitral insufficiency 12-Feb-2021 13:08:23  Flash Moody  CAD in native artery 12-Feb-2021 13:08:11  Flash Moody

## 2021-02-12 NOTE — DISCHARGE NOTE PROVIDER - NSDCFUADDINST_GEN_ALL_CORE_FT
Activities/Restrictions  1.	Do not – drive, lift, pull or push anything over 10 pounds for 8 weeks.  2.	Shower every night and carefully wash wound, pat dry.  Cover is wound is draining with dry sterile dressing. No baths or swimming for two months.   3.	Apply support stockings /ace wraps to legs as soon as you get out of bed in the morning, remove in evening.   4.	Do progressive walking exercises every day, gradually increasing to 30 to 40 minutes/day, five days a week and incentive spirometer 10 times every hour while awake  5.	DO NOT DRIVE OR CONSUME ALCOHOL WHILE TAKING PAIN MEDICATION.  Contact your Physician promptly if:  1.	 Signs of wound infection, such as increasing redness, swelling, pain or drainage from incision.  2.	Progressive shortness of breath or increasing difficulty with breathing when lying down.  3.	Excessive nausea, vomiting, diarrhea or coughing.  4.	Increase swelling of legs that does not resolve with leg elevation.  5.	Chest pain that spreads to arms, jaw or back or sudden development of numbness, weakness, difficulty speaking or facial droop – Call 911.  6.	Diabetics who are unable to keep finger stick glucose under 150 for three consecutive readings.  Instructions:  1.	 Keep a daily log for Temperature, pulse, blood pressure, and weight twice a day and Glucose if diabetic with each meal. Call office for Temp > 101, pulse greater than 110 or less than 55, BP first # greater than 160 or less than 100, 3 pound weight gain in 1 day or 5 pounds in 3 days  2.	Hold pillow to chest and grab elbows when you need to cough.

## 2021-02-12 NOTE — DISCHARGE NOTE PROVIDER - CARE PROVIDERS DIRECT ADDRESSES
[email or web address]

## 2021-02-12 NOTE — PHYSICAL THERAPY INITIAL EVALUATION ADULT - GENERAL OBSERVATIONS, REHAB EVAL
Pt rec from OR, currently fully lined and intubated. PT to f/u when pt is medically cleared and appropriate for b/s PT.

## 2021-02-12 NOTE — BRIEF OPERATIVE NOTE - NSICDXBRIEFPREOP_GEN_ALL_CORE_FT
PRE-OP DIAGNOSIS:  Mitral valve insufficiency 12-Feb-2021 13:08:00  Flash Moody  CAD in native artery 12-Feb-2021 13:07:34  Flash Moody

## 2021-02-12 NOTE — DISCHARGE NOTE PROVIDER - NSDCMRMEDTOKEN_GEN_ALL_CORE_FT
aspirin 325 mg oral tablet: orally once a day  lisinopril-hydrochlorothiazide 20 mg-12.5 mg oral tablet: 1 tab(s) orally once a day  meloxicam: orally once a day  metFORMIN 500 mg oral tablet: orally once a day  metoprolol tartrate 25 mg oral tablet: 1 tab(s) orally 2 times a day  oxybutynin 10 mg/24 hr oral tablet, extended release: 1 tab(s) orally once a day  pantoprazole 40 mg oral delayed release tablet: 1 tab(s) orally once a day   acetaZOLAMIDE 250 mg oral tablet: 1 tab(s) orally once a day  amiodarone 200 mg oral tablet: 200 milligram(s) orally once a day  amLODIPine 5 mg oral tablet: 1 tab(s) orally once a day  aspirin 325 mg oral tablet: 1 tab(s) orally once a day  atorvastatin 40 mg oral tablet: 1 tab(s) orally once a day (at bedtime)  bumetanide 1 mg oral tablet: 1 tab(s) orally once a day  escitalopram 20 mg oral tablet: 1 tab(s) orally once a day  guaiFENesin 600 mg oral tablet, extended release: 1 tab(s) orally every 12 hours  heparin: 5000 unit(s) subcutaneous every 8 hours  ipratropium 500 mcg/2.5 mL inhalation solution: 2.5 milliliter(s) inhaled every 6 hours  lisinopril 5 mg oral tablet: 1 tab(s) orally once a day  metFORMIN 500 mg oral tablet: 1 tab(s) orally 2 times a day (with meals)  metoprolol tartrate 50 mg oral tablet: 1 tab(s) orally every 12 hours  oxybutynin 10 mg/24 hr oral tablet, extended release: 1 tab(s) orally once a day  pantoprazole 40 mg oral delayed release tablet: 1 tab(s) orally once a day (before a meal)  polyethylene glycol 3350 oral powder for reconstitution: 17 gram(s) orally once a day  potassium chloride 20 mEq oral tablet, extended release: 1 tab(s) orally once a day

## 2021-02-12 NOTE — DISCHARGE NOTE PROVIDER - HOSPITAL COURSE
Ms. ADRIAN SAMUELS 70 year F, former smoker, with PMH include HTN, HLD, Lymphedema, Sciatica, Spinal Stenosis,JUAN PABLO on CPAP at night, glaucoma. Symptoms include SOB which has recently improved. Recent Echocardiogram showed peak/mean gradient of 20.2/9.68 with ELIAS 0.9 and moderate MR. NYHA class II. Her cardiac w/u revealed single vessel CAD and moderate to MR. On, 02/12/21, she underwent myocardial revascularization and Mitral valve repair. Post-operatively,        Ms. ADRIAN SAMUELS 70 year F, former smoker, with PMH include HTN, HLD, Lymphedema, Sciatica, Spinal Stenosis,JUAN PABLO on CPAP at night, glaucoma. Symptoms include SOB which has recently improved. Recent Echocardiogram showed peak/mean gradient of 20.2/9.68 with ELIAS 0.9 and moderate MR. NYHA class II. Her cardiac w/u revealed single vessel CAD and moderate to MR. On, 02/12/21, she underwent myocardial revascularization and Mitral valve repair. Post-operatively, hospital course was significant for post-op hemothorax progressing to hypoxic respiratory failure requiring ventilator support. Patient was extubated and underwent synchronized cardioversion for atrial fibrillation and maintained in NSR with amiodarone. Patient remained hemodynamically there-after. Patient became physically deconditioned during her prolonged hospital course and was discharged to [facility] for further physical therapy. Ms. ADRIAN SAMUELS 70 year F, former smoker, with PMH include HTN, HLD, Lymphedema, Sciatica, Spinal Stenosis,JUAN PABLO on CPAP at night, glaucoma. Symptoms include SOB which has recently improved. Recent Echocardiogram showed peak/mean gradient of 20.2/9.68 with ELIAS 0.9 and moderate MR. NYHA class II. Her cardiac w/u revealed single vessel CAD and moderate to MR. On, 02/12/21, she underwent myocardial revascularization and Mitral valve repair. Post-operatively, hospital course was significant for post-op hemothorax progressing to hypoxic respiratory failure requiring ventilator support. Patient was extubated and underwent synchronized cardioversion for atrial fibrillation and maintained in NSR with amiodarone. Patient continued to have leukocytosis with elevated amylase/lipase now downtrending and remained stable off antibiotics as per ID. Patient remained hemodynamically stable there-after. Patient became physically deconditioned during her prolonged hospital course and was discharged to [facility] for further physical therapy.

## 2021-02-12 NOTE — DISCHARGE NOTE PROVIDER - NSDCCPCAREPLAN_GEN_ALL_CORE_FT
PRINCIPAL DISCHARGE DIAGNOSIS  Diagnosis: S/P mitral valve repair  Assessment and Plan of Treatment:

## 2021-02-13 LAB
ALBUMIN SERPL ELPH-MCNC: 4.1 G/DL — SIGNIFICANT CHANGE UP (ref 3.5–5.2)
ALP SERPL-CCNC: 32 U/L — SIGNIFICANT CHANGE UP (ref 30–115)
ALT FLD-CCNC: 8 U/L — SIGNIFICANT CHANGE UP (ref 0–41)
ANION GAP SERPL CALC-SCNC: 13 MMOL/L — SIGNIFICANT CHANGE UP (ref 7–14)
APTT BLD: 28.2 SEC — SIGNIFICANT CHANGE UP (ref 27–39.2)
AST SERPL-CCNC: 28 U/L — SIGNIFICANT CHANGE UP (ref 0–41)
BASE EXCESS BLDV CALC-SCNC: -4 MMOL/L — LOW (ref -2–2)
BASOPHILS # BLD AUTO: 0.01 K/UL — SIGNIFICANT CHANGE UP (ref 0–0.2)
BASOPHILS NFR BLD AUTO: 0.1 % — SIGNIFICANT CHANGE UP (ref 0–1)
BILIRUB SERPL-MCNC: 0.8 MG/DL — SIGNIFICANT CHANGE UP (ref 0.2–1.2)
BUN SERPL-MCNC: 23 MG/DL — HIGH (ref 10–20)
CA-I SERPL-SCNC: 1.24 MMOL/L — SIGNIFICANT CHANGE UP (ref 1.12–1.3)
CALCIUM SERPL-MCNC: 8.4 MG/DL — LOW (ref 8.5–10.1)
CHLORIDE SERPL-SCNC: 111 MMOL/L — HIGH (ref 98–110)
CO2 SERPL-SCNC: 21 MMOL/L — SIGNIFICANT CHANGE UP (ref 17–32)
CREAT SERPL-MCNC: 1 MG/DL — SIGNIFICANT CHANGE UP (ref 0.7–1.5)
EOSINOPHIL # BLD AUTO: 0 K/UL — SIGNIFICANT CHANGE UP (ref 0–0.7)
EOSINOPHIL NFR BLD AUTO: 0 % — SIGNIFICANT CHANGE UP (ref 0–8)
GAS PNL BLDA: SIGNIFICANT CHANGE UP
GAS PNL BLDV: 141 MMOL/L — SIGNIFICANT CHANGE UP (ref 136–145)
GAS PNL BLDV: SIGNIFICANT CHANGE UP
GAS PNL BLDV: SIGNIFICANT CHANGE UP
GLUCOSE BLDC GLUCOMTR-MCNC: 100 MG/DL — HIGH (ref 70–99)
GLUCOSE BLDC GLUCOMTR-MCNC: 100 MG/DL — HIGH (ref 70–99)
GLUCOSE BLDC GLUCOMTR-MCNC: 103 MG/DL — HIGH (ref 70–99)
GLUCOSE BLDC GLUCOMTR-MCNC: 115 MG/DL — HIGH (ref 70–99)
GLUCOSE BLDC GLUCOMTR-MCNC: 115 MG/DL — HIGH (ref 70–99)
GLUCOSE BLDC GLUCOMTR-MCNC: 116 MG/DL — HIGH (ref 70–99)
GLUCOSE BLDC GLUCOMTR-MCNC: 122 MG/DL — HIGH (ref 70–99)
GLUCOSE BLDC GLUCOMTR-MCNC: 129 MG/DL — HIGH (ref 70–99)
GLUCOSE BLDC GLUCOMTR-MCNC: 134 MG/DL — HIGH (ref 70–99)
GLUCOSE BLDC GLUCOMTR-MCNC: 141 MG/DL — HIGH (ref 70–99)
GLUCOSE BLDC GLUCOMTR-MCNC: 88 MG/DL — SIGNIFICANT CHANGE UP (ref 70–99)
GLUCOSE SERPL-MCNC: 94 MG/DL — SIGNIFICANT CHANGE UP (ref 70–99)
HCO3 BLDV-SCNC: 22 MMOL/L — SIGNIFICANT CHANGE UP (ref 22–29)
HCT VFR BLD CALC: 27.1 % — LOW (ref 37–47)
HCT VFR BLDA CALC: 28.1 % — LOW (ref 34–44)
HGB BLD CALC-MCNC: 9.2 G/DL — LOW (ref 14–18)
HGB BLD-MCNC: 8.6 G/DL — LOW (ref 12–16)
HOROWITZ INDEX BLDV+IHG-RTO: 36 — SIGNIFICANT CHANGE UP
IMM GRANULOCYTES NFR BLD AUTO: 0.3 % — SIGNIFICANT CHANGE UP (ref 0.1–0.3)
INR BLD: 1.13 RATIO — SIGNIFICANT CHANGE UP (ref 0.65–1.3)
LACTATE BLDV-MCNC: 1.4 MMOL/L — SIGNIFICANT CHANGE UP (ref 0.5–1.6)
LYMPHOCYTES # BLD AUTO: 0.63 K/UL — LOW (ref 1.2–3.4)
LYMPHOCYTES # BLD AUTO: 5.2 % — LOW (ref 20.5–51.1)
MAGNESIUM SERPL-MCNC: 2.7 MG/DL — HIGH (ref 1.8–2.4)
MCHC RBC-ENTMCNC: 27.9 PG — SIGNIFICANT CHANGE UP (ref 27–31)
MCHC RBC-ENTMCNC: 31.7 G/DL — LOW (ref 32–37)
MCV RBC AUTO: 88 FL — SIGNIFICANT CHANGE UP (ref 81–99)
MONOCYTES # BLD AUTO: 0.81 K/UL — HIGH (ref 0.1–0.6)
MONOCYTES NFR BLD AUTO: 6.7 % — SIGNIFICANT CHANGE UP (ref 1.7–9.3)
NEUTROPHILS # BLD AUTO: 10.6 K/UL — HIGH (ref 1.4–6.5)
NEUTROPHILS NFR BLD AUTO: 87.7 % — HIGH (ref 42.2–75.2)
NRBC # BLD: 0 /100 WBCS — SIGNIFICANT CHANGE UP (ref 0–0)
PCO2 BLDV: 45 MMHG — SIGNIFICANT CHANGE UP (ref 41–51)
PH BLDV: 7.3 — SIGNIFICANT CHANGE UP (ref 7.26–7.43)
PLATELET # BLD AUTO: 98 K/UL — LOW (ref 130–400)
PO2 BLDV: 37 MMHG — SIGNIFICANT CHANGE UP (ref 20–40)
POTASSIUM BLDV-SCNC: 4 MMOL/L — SIGNIFICANT CHANGE UP (ref 3.3–5.6)
POTASSIUM SERPL-MCNC: 4.3 MMOL/L — SIGNIFICANT CHANGE UP (ref 3.5–5)
POTASSIUM SERPL-SCNC: 4.3 MMOL/L — SIGNIFICANT CHANGE UP (ref 3.5–5)
PROT SERPL-MCNC: 5.3 G/DL — LOW (ref 6–8)
PROTHROM AB SERPL-ACNC: 13 SEC — HIGH (ref 9.95–12.87)
RBC # BLD: 3.08 M/UL — LOW (ref 4.2–5.4)
RBC # FLD: 16.7 % — HIGH (ref 11.5–14.5)
SAO2 % BLDV: 66 % — SIGNIFICANT CHANGE UP
SODIUM SERPL-SCNC: 145 MMOL/L — SIGNIFICANT CHANGE UP (ref 135–146)
WBC # BLD: 12.09 K/UL — HIGH (ref 4.8–10.8)
WBC # FLD AUTO: 12.09 K/UL — HIGH (ref 4.8–10.8)

## 2021-02-13 PROCEDURE — 99233 SBSQ HOSP IP/OBS HIGH 50: CPT

## 2021-02-13 PROCEDURE — 71045 X-RAY EXAM CHEST 1 VIEW: CPT | Mod: 26

## 2021-02-13 PROCEDURE — 71045 X-RAY EXAM CHEST 1 VIEW: CPT | Mod: 26,77

## 2021-02-13 PROCEDURE — 93010 ELECTROCARDIOGRAM REPORT: CPT

## 2021-02-13 RX ORDER — AMLODIPINE BESYLATE 2.5 MG/1
5 TABLET ORAL DAILY
Refills: 0 | Status: DISCONTINUED | OUTPATIENT
Start: 2021-02-13 | End: 2021-02-14

## 2021-02-13 RX ORDER — NICARDIPINE HYDROCHLORIDE 30 MG/1
5 CAPSULE, EXTENDED RELEASE ORAL
Qty: 40 | Refills: 0 | Status: DISCONTINUED | OUTPATIENT
Start: 2021-02-13 | End: 2021-02-13

## 2021-02-13 RX ORDER — FAMOTIDINE 10 MG/ML
20 INJECTION INTRAVENOUS
Refills: 0 | Status: DISCONTINUED | OUTPATIENT
Start: 2021-02-13 | End: 2021-02-15

## 2021-02-13 RX ORDER — HEPARIN SODIUM 5000 [USP'U]/ML
5000 INJECTION INTRAVENOUS; SUBCUTANEOUS EVERY 8 HOURS
Refills: 0 | Status: DISCONTINUED | OUTPATIENT
Start: 2021-02-13 | End: 2021-02-15

## 2021-02-13 RX ORDER — PROCHLORPERAZINE MALEATE 5 MG
10 TABLET ORAL ONCE
Refills: 0 | Status: COMPLETED | OUTPATIENT
Start: 2021-02-13 | End: 2021-02-13

## 2021-02-13 RX ORDER — METOPROLOL TARTRATE 50 MG
2.5 TABLET ORAL ONCE
Refills: 0 | Status: DISCONTINUED | OUTPATIENT
Start: 2021-02-13 | End: 2021-02-15

## 2021-02-13 RX ORDER — AMIODARONE HYDROCHLORIDE 400 MG/1
0.5 TABLET ORAL
Qty: 900 | Refills: 0 | Status: DISCONTINUED | OUTPATIENT
Start: 2021-02-13 | End: 2021-02-15

## 2021-02-13 RX ORDER — ASPIRIN/CALCIUM CARB/MAGNESIUM 324 MG
325 TABLET ORAL DAILY
Refills: 0 | Status: DISCONTINUED | OUTPATIENT
Start: 2021-02-13 | End: 2021-02-17

## 2021-02-13 RX ORDER — ATORVASTATIN CALCIUM 80 MG/1
40 TABLET, FILM COATED ORAL AT BEDTIME
Refills: 0 | Status: DISCONTINUED | OUTPATIENT
Start: 2021-02-13 | End: 2021-02-15

## 2021-02-13 RX ORDER — AMLODIPINE BESYLATE 2.5 MG/1
2.5 TABLET ORAL ONCE
Refills: 0 | Status: COMPLETED | OUTPATIENT
Start: 2021-02-13 | End: 2021-02-13

## 2021-02-13 RX ORDER — METOPROLOL TARTRATE 50 MG
12.5 TABLET ORAL
Refills: 0 | Status: DISCONTINUED | OUTPATIENT
Start: 2021-02-13 | End: 2021-02-14

## 2021-02-13 RX ORDER — NICARDIPINE HYDROCHLORIDE 30 MG/1
5 CAPSULE, EXTENDED RELEASE ORAL
Qty: 40 | Refills: 0 | Status: DISCONTINUED | OUTPATIENT
Start: 2021-02-13 | End: 2021-02-15

## 2021-02-13 RX ORDER — FUROSEMIDE 40 MG
20 TABLET ORAL ONCE
Refills: 0 | Status: COMPLETED | OUTPATIENT
Start: 2021-02-13 | End: 2021-02-13

## 2021-02-13 RX ORDER — SIMETHICONE 80 MG/1
80 TABLET, CHEWABLE ORAL THREE TIMES A DAY
Refills: 0 | Status: DISCONTINUED | OUTPATIENT
Start: 2021-02-13 | End: 2021-03-01

## 2021-02-13 RX ORDER — INSULIN HUMAN 100 [IU]/ML
1 INJECTION, SOLUTION SUBCUTANEOUS
Qty: 100 | Refills: 0 | Status: DISCONTINUED | OUTPATIENT
Start: 2021-02-13 | End: 2021-02-14

## 2021-02-13 RX ORDER — OXYBUTYNIN CHLORIDE 5 MG
10 TABLET ORAL DAILY
Refills: 0 | Status: DISCONTINUED | OUTPATIENT
Start: 2021-02-13 | End: 2021-02-15

## 2021-02-13 RX ADMIN — HEPARIN SODIUM 5000 UNIT(S): 5000 INJECTION INTRAVENOUS; SUBCUTANEOUS at 15:05

## 2021-02-13 RX ADMIN — OXYCODONE HYDROCHLORIDE 10 MILLIGRAM(S): 5 TABLET ORAL at 12:45

## 2021-02-13 RX ADMIN — AMLODIPINE BESYLATE 2.5 MILLIGRAM(S): 2.5 TABLET ORAL at 18:10

## 2021-02-13 RX ADMIN — Medication 12.5 MILLIGRAM(S): at 12:43

## 2021-02-13 RX ADMIN — NICARDIPINE HYDROCHLORIDE 25 MG/HR: 30 CAPSULE, EXTENDED RELEASE ORAL at 05:31

## 2021-02-13 RX ADMIN — FAMOTIDINE 20 MILLIGRAM(S): 10 INJECTION INTRAVENOUS at 17:35

## 2021-02-13 RX ADMIN — SIMETHICONE 80 MILLIGRAM(S): 80 TABLET, CHEWABLE ORAL at 18:39

## 2021-02-13 RX ADMIN — SIMETHICONE 80 MILLIGRAM(S): 80 TABLET, CHEWABLE ORAL at 21:06

## 2021-02-13 RX ADMIN — ATORVASTATIN CALCIUM 40 MILLIGRAM(S): 80 TABLET, FILM COATED ORAL at 21:06

## 2021-02-13 RX ADMIN — CHLORHEXIDINE GLUCONATE 5 MILLILITER(S): 213 SOLUTION TOPICAL at 05:27

## 2021-02-13 RX ADMIN — SODIUM CHLORIDE 20 MILLILITER(S): 9 INJECTION INTRAMUSCULAR; INTRAVENOUS; SUBCUTANEOUS at 18:39

## 2021-02-13 RX ADMIN — FAMOTIDINE 20 MILLIGRAM(S): 10 INJECTION INTRAVENOUS at 05:27

## 2021-02-13 RX ADMIN — POLYETHYLENE GLYCOL 3350 17 GRAM(S): 17 POWDER, FOR SOLUTION ORAL at 12:43

## 2021-02-13 RX ADMIN — Medication 100 MILLIGRAM(S): at 05:27

## 2021-02-13 RX ADMIN — Medication 10 MILLIGRAM(S): at 12:43

## 2021-02-13 RX ADMIN — HEPARIN SODIUM 5000 UNIT(S): 5000 INJECTION INTRAVENOUS; SUBCUTANEOUS at 21:06

## 2021-02-13 RX ADMIN — Medication 325 MILLIGRAM(S): at 12:43

## 2021-02-13 RX ADMIN — Medication 12.5 MILLIGRAM(S): at 18:57

## 2021-02-13 RX ADMIN — Medication 20 MILLIGRAM(S): at 20:30

## 2021-02-13 RX ADMIN — Medication 10 MILLIGRAM(S): at 16:20

## 2021-02-13 RX ADMIN — AMLODIPINE BESYLATE 5 MILLIGRAM(S): 2.5 TABLET ORAL at 10:57

## 2021-02-13 NOTE — PHYSICAL THERAPY INITIAL EVALUATION ADULT - GAIT TRAINING, PT EVAL
Pt will ambulate 200 ft using RW with supervision by d/c and negotiate 10 steps with 1 HR via step by step with supervision by d/c.

## 2021-02-13 NOTE — PHYSICAL THERAPY INITIAL EVALUATION ADULT - ADDITIONAL COMMENTS
Pt reports she was only ambulation household distances due to pain in b/l LE and SOB during amb prior to admission.

## 2021-02-13 NOTE — PROGRESS NOTE ADULT - SUBJECTIVE AND OBJECTIVE BOX
OPERATIVE PROCEDURE(s):                POD #                       70yFemale  SURGEON(s): LEANNE Victoria  SUBJECTIVE ASSESSMENT:   Vital Signs Last 24 Hrs  T(F): 97.9 (2021 06:00), Max: 98.6 (2021 18:00)  HR: 69 (2021 07:00) (53 - 78)  BP: 93/48 (2021 06:00) (93/48 - 115/58)  BP(mean): 67 (2021 06:00) (67 - 80)  ABP: 108/55 (2021 07:00) (11/55 - 135/63)  ABP(mean): 72 (2021 07:00)  RR: 30 (2021 07:00) (12 - 31)  SpO2: 96% (2021 07:00) (94% - 100%)  CVP(mm Hg): 21 (2021 07:00)  CVP(cm H2O): --  CO: 5.1 (2021 07:00)  CI: 2.6 (2021 07:00)  PA: 55/23 (2021 07:00)  SVR: 799 (2021 07:00)  Mode: standby  FiO2: 40    I&O's Detail    2021 07:01  -  2021 07:00  --------------------------------------------------------  IN:    Albumin 5%  - 500 mL: 1500 mL    Amiodarone: 166.5 mL    Amiodarone: 33.3 mL    Dexmedetomidine: 53.4 mL    Insulin: 24 mL    IV PiggyBack: 250 mL    NiCARdipine: 137.5 mL    Nitroglycerin: 358 mL    Oral Fluid: 60 mL    Propofol: 20 mL    sodium chloride 0.9%: 380 mL  Total IN: 2982.7 mL    OUT:    Chest Tube (mL): 280 mL    Chest Tube (mL): 204 mL    Indwelling Catheter - Urethral (mL): 889 mL    Nasogastric/Oral tube (mL): 0 mL  Total OUT: 1373 mL        Net:   I&O's Detail    2021 07:01  -  2021 07:00  --------------------------------------------------------  Total NET: -1670 mL      2021 07:01  -  2021 07:00  --------------------------------------------------------  Total NET: 1609.7 mL        CAPILLARY BLOOD GLUCOSE  141 (2021 07:00)  134 (2021 06:00)  115 (2021 04:00)  103 (2021 02:00)  100 (2021 00:00)  103 (2021 23:00)  104 (2021 22:00)  129 (2021 21:00)  132 (2021 20:00)      POCT Blood Glucose.: 141 mg/dL (2021 06:51)  POCT Blood Glucose.: 116 mg/dL (2021 06:49)  POCT Blood Glucose.: 134 mg/dL (2021 06:03)  POCT Blood Glucose.: 115 mg/dL (2021 03:56)  POCT Blood Glucose.: 103 mg/dL (2021 02:29)  POCT Blood Glucose.: 100 mg/dL (2021 00:11)  POCT Blood Glucose.: 103 mg/dL (2021 23:06)  POCT Blood Glucose.: 104 mg/dL (2021 22:07)  POCT Blood Glucose.: 132 mg/dL (2021 20:04)  POCT Blood Glucose.: 139 mg/dL (2021 18:58)  POCT Blood Glucose.: 146 mg/dL (2021 18:17)  POCT Blood Glucose.: 138 mg/dL (2021 17:19)  POCT Blood Glucose.: 123 mg/dL (2021 16:15)  POCT Blood Glucose.: 91 mg/dL (2021 15:08)  POCT Blood Glucose.: 95 mg/dL (2021 14:25)  POCT Blood Glucose.: 93 mg/dL (2021 13:17)    Physical Exam:  General: NAD; A&Ox3/Patient is intubated and sedated  Cardiac: S1/S2, RRR, no murmur, no rubs  Lungs: unlabored respirations, CTA b/l, no wheeze, no rales, no crackles  Abdomen: Soft/NT/ND; positive bowel sounds x 4  Sternum: Intact, no click, incision healing well with no drainage  Incisions: Incisions clean/dry/intact  Extremities: No edema b/l lower extremities; good capillary refill; no cyanosis; palpable 1+ pedal pulses b/l    Central Venous Catheter: Yes[]  No[] , If Yes indication:           Day #  Spencer Catheter: Yes  [] , No  [] , If yes indication:                      Day #  NGT: Yes [] No [] ,    If Yes Placement:                                     Day #  EPICARDIAL WIRES:  [] YES [] NO                                              Day #  BOWEL MOVEMENT:  [] YES [] NO, If No, Timing since last BM:      Day #  CHEST TUBE(Left/Right):  [] YES [] NO, If yes -  AIR LEAKS:  [] YES [] NO        LABS:                        8.6<L>  12.09<H> )-----------( 98<L>    ( 2021 02:34 )             27.1<L>                        10.1<L>  16.81<H> )-----------( 109<L>    ( 2021 13:54 )             30.9<L>    02    145  |  111<H>  |  23<H>  ----------------------------<  94  4.3   |  21  |  1.0  02    143  |  111<H>  |  20  ----------------------------<  87  4.2   |  21  |  1.0    Ca    8.4<L>      2021 02:34  Mg     2.7         TPro  5.3<L> [6.0 - 8.0]  /  Alb  4.1 [3.5 - 5.2]  /  TBili  0.8 [0.2 - 1.2]  /  DBili  x   /  AST  28 [0 - 41]  /  ALT  8 [0 - 41]  /  AlkPhos  32 [30 - 115]  02-13    PT/INR - ( 2021 02:34 )   PT: ;   INR: 1.13 ratio         PT/INR - ( 2021 13:54 )   PT: ;   INR: 1.19 ratio         PTT - ( 2021 02:34 )  PTT:28.2 sec, PTT - ( 2021 13:54 )  PTT:18.4 sec  Urinalysis Basic - ( 10 Feb 2021 13:02 )    Color: Light Yellow / Appearance: Clear / S.013 / pH: x  Gluc: x / Ketone: Negative  / Bili: Negative / Urobili: <2 mg/dL   Blood: x / Protein: Negative / Nitrite: Positive   Leuk Esterase: Large / RBC: 1 /HPF / WBC 40 /HPF   Sq Epi: x / Non Sq Epi: 0 /HPF / Bacteria: Many      ABG - ( 2021 02:46 )  pH: 7.31  /  pCO2: 41    /  pO2: 74    / HCO3: 21    / Base Excess: -5.2  /  SaO2: 95    /  LA: 1.2              RADIOLOGY & ADDITIONAL TESTS:  CXR:  EKG:  MEDICATIONS  (STANDING):  albumin human  5% IVPB 500 milliLiter(s) IV Intermittent once  ALBUTerol    90 MICROgram(s) HFA Inhaler 2 Puff(s) Inhalation every 6 hours  aMIOdarone Infusion 0.5 mG/Min (16.7 mL/Hr) IV Continuous <Continuous>  chlorhexidine 0.12% Liquid 5 milliLiter(s) Oral Mucosa two times a day  dexMEDEtomidine Infusion 0.1 MICROgram(s)/kG/Hr (2.43 mL/Hr) IV Continuous <Continuous>  dextrose 50% Injectable 50 milliLiter(s) IV Push every 15 minutes  dextrose 50% Injectable 25 milliLiter(s) IV Push every 15 minutes  famotidine Injectable 20 milliGRAM(s) IV Push every 12 hours  insulin regular Infusion 10 Unit(s)/Hr (10 mL/Hr) IV Continuous <Continuous>  ipratropium 17 MICROgram(s) HFA Inhaler 2 Puff(s) Inhalation every 6 hours  meperidine     Injectable 25 milliGRAM(s) IV Push once  niCARdipine Infusion 5 mG/Hr (25 mL/Hr) IV Continuous <Continuous>  nitroglycerin  Infusion 30 MICROgram(s)/Min (9 mL/Hr) IV Continuous <Continuous>  norepinephrine Infusion 0.05 MICROgram(s)/kG/Min (9.09 mL/Hr) IV Continuous <Continuous>  polyethylene glycol 3350 17 Gram(s) Oral daily  propofol Infusion 30 MICROgram(s)/kG/Min (17.5 mL/Hr) IV Continuous <Continuous>  sodium chloride 0.9%. 1000 milliLiter(s) (75 mL/Hr) IV Continuous <Continuous>  sodium chloride 0.9%. 1000 milliLiter(s) (20 mL/Hr) IV Continuous <Continuous>  vasopressin Infusion 0.04 Unit(s)/Min (2.4 mL/Hr) IV Continuous <Continuous>    MEDICATIONS  (PRN):  oxyCODONE    IR 5 milliGRAM(s) Oral every 6 hours PRN Moderate Pain (4 - 6)  oxyCODONE    IR 10 milliGRAM(s) Oral every 4 hours PRN Severe Pain (7 - 10)    HEPARIN:  [] YES [] NO  Dose: XX UNITS/HR UNITS Q8H  LOVENOX:[] YES [] NO  Dose: XX mg Q24H  COUMADIN: []  YES [] NO  Dose: XX mg  Q24H  SCD's: YES b/l  GI Prophylaxis: Protonix [], Pepcid [], None [], (Contra-indication:.....)    Post-Op Beta-Blockers: Yes [], No[], If No, then contraindication:  Post-Op Aspirin: Yes [],  No [], If No, then contraindication:  Post-Op Statin: Yes [], No[], If No, then contraindication:  Allergies    No Known Allergies    Intolerances      Ambulation/Activity Status:    Assessment/Plan:  70y Female status-post .....  - Case and plan discussed with CTU Intensivist and CT Surgeon - Dr. Rojas/Edgar/Patricia  - Continue CTU supportive care    - Continue DVT/GI prophylaxis  - Incentive Spirometry 10 times an hour  - Continue to advance physical activity as tolerated and continue PT/OT as directed  1. CAD: Continue ASA, statin, BB  2. HTN:   3. A. Fib:   4. COPD/Hypoxia:   5. DM/Glucose Control:     Social Service Disposition:     OPERATIVE PROCEDURE(s):      C1L MV Repair          POD #   1                    70yFemale  SURGEON(s): Dr. Rojas  SUBJECTIVE ASSESSMENT: Patient reports no complaints at this time.       Vital Signs Last 24 Hrs  T(F): 97.9 (2021 06:00), Max: 98.6 (2021 18:00)  HR: 69 (2021 07:00) (53 - 78)  BP: 93/48 (2021 06:00) (93/48 - 115/58)  BP(mean): 67 (2021 06:00) (67 - 80)  ABP: 108/55 (2021 07:00) (11/55 - 135/63)  ABP(mean): 72 (2021 07:00)  RR: 30 (2021 07:00) (12 - 31)  SpO2: 96% (2021 07:00) (94% - 100%)  CVP(mm Hg): 21 (2021 07:00)  CVP(cm H2O): --  CO: 5.1 (2021 07:00)  CI: 2.6 (2021 07:00)  PA: 55/23 (2021 07:00)  SVR: 799 (2021 07:00)  Mode: standby  FiO2: 40    I&O's Detail    2021 07:01  -  2021 07:00  --------------------------------------------------------  IN:    Albumin 5%  - 500 mL: 1500 mL    Amiodarone: 166.5 mL    Amiodarone: 33.3 mL    Dexmedetomidine: 53.4 mL    Insulin: 24 mL    IV PiggyBack: 250 mL    NiCARdipine: 137.5 mL    Nitroglycerin: 358 mL    Oral Fluid: 60 mL    Propofol: 20 mL    sodium chloride 0.9%: 380 mL  Total IN: 2982.7 mL    OUT:    Chest Tube (mL): 280 mL    Chest Tube (mL): 204 mL    Indwelling Catheter - Urethral (mL): 889 mL    Nasogastric/Oral tube (mL): 0 mL  Total OUT: 1373 mL        Net:   I&O's Detail    2021 07:01  -  2021 07:00  --------------------------------------------------------  Total NET: -1670 mL      2021 07:01  -  2021 07:00  --------------------------------------------------------  Total NET: 1609.7 mL        CAPILLARY BLOOD GLUCOSE  141 (2021 07:00)  134 (2021 06:00)  115 (2021 04:00)  103 (2021 02:00)  100 (2021 00:00)  103 (2021 23:00)  104 (2021 22:00)  129 (2021 21:00)  132 (2021 20:00)      POCT Blood Glucose.: 141 mg/dL (2021 06:51)  POCT Blood Glucose.: 116 mg/dL (2021 06:49)  POCT Blood Glucose.: 134 mg/dL (2021 06:03)  POCT Blood Glucose.: 115 mg/dL (2021 03:56)  POCT Blood Glucose.: 103 mg/dL (2021 02:29)  POCT Blood Glucose.: 100 mg/dL (2021 00:11)  POCT Blood Glucose.: 103 mg/dL (2021 23:06)  POCT Blood Glucose.: 104 mg/dL (2021 22:07)  POCT Blood Glucose.: 132 mg/dL (2021 20:04)  POCT Blood Glucose.: 139 mg/dL (2021 18:58)  POCT Blood Glucose.: 146 mg/dL (2021 18:17)  POCT Blood Glucose.: 138 mg/dL (2021 17:19)  POCT Blood Glucose.: 123 mg/dL (2021 16:15)  POCT Blood Glucose.: 91 mg/dL (2021 15:08)  POCT Blood Glucose.: 95 mg/dL (2021 14:25)  POCT Blood Glucose.: 93 mg/dL (2021 13:17)    A1C with Estimated Average Glucose Result: 6.1:  (02.10.21 @ 05:50)     Physical Exam:  General: NAD; A&Ox3  Cardiac: + rubs, S1/S2, RRR, no murmur  Lungs: + left lower lobe crackles, right lung CTA, unlabored respirations, no wheeze, no rales  Abdomen: Soft/NT/ND; positive bowel sounds x 4  Sternum: Intact, no click, incision healing well with no drainage  Incisions: Incisions clean/dry/intact  Extremities: Trace edema b/l lower extremities; good capillary refill; no cyanosis; palpable 1+ pedal pulses b/l    Central Venous Catheter: Yes[x]  No[] , If Yes indication:           Day #  1  Spencer Catheter: Yes  [x] , No  [] , If yes indication:                      Day #  1  NGT: Yes [] No [x] ,    If Yes Placement:                                     Day #  1  EPICARDIAL WIRES:  [x] YES [] NO                                              Day #  1  BOWEL MOVEMENT:  [] YES [] NO, If No, Timing since last BM:      Day #  1  CHEST TUBE(Left/Right):  [x] YES [] NO, If yes -  AIR LEAKS:  [] YES [] NO        LABS:                        8.6<L>  12.09<H> )-----------( 98<L>    ( 2021 02:34 )             27.1<L>                        10.1<L>  16.81<H> )-----------( 109<L>    ( 2021 13:54 )             30.9<L>    02-13    145  |  111<H>  |  23<H>  ----------------------------<  94  4.3   |  21  |  1.0  -12    143  |  111<H>  |  20  ----------------------------<  87  4.2   |  21  |  1.0    Ca    8.4<L>      2021 02:34  Mg     2.7     13    TPro  5.3<L> [6.0 - 8.0]  /  Alb  4.1 [3.5 - 5.2]  /  TBili  0.8 [0.2 - 1.2]  /  DBili  x   /  AST  28 [0 - 41]  /  ALT  8 [0 - 41]  /  AlkPhos  32 [30 - 115]  02-13    PT/INR - ( 2021 02:34 )   PT: ;   INR: 1.13 ratio         PT/INR - ( 2021 13:54 )   PT: ;   INR: 1.19 ratio         PTT - ( 2021 02:34 )  PTT:28.2 sec, PTT - ( 2021 13:54 )  PTT:18.4 sec  Urinalysis Basic - ( 10 Feb 2021 13:02 )    Color: Light Yellow / Appearance: Clear / S.013 / pH: x  Gluc: x / Ketone: Negative  / Bili: Negative / Urobili: <2 mg/dL   Blood: x / Protein: Negative / Nitrite: Positive   Leuk Esterase: Large / RBC: 1 /HPF / WBC 40 /HPF   Sq Epi: x / Non Sq Epi: 0 /HPF / Bacteria: Many      ABG - ( 2021 02:46 )  pH: 7.31  /  pCO2: 41    /  pO2: 74    / HCO3: 21    / Base Excess: -5.2  /  SaO2: 95    /  LA: 1.2        RADIOLOGY & ADDITIONAL TESTS:  CXR:  < from: Xray Chest 1 View- PORTABLE-Routine (21 @ 04:52) >  Impression:    Left basilar opacity/pleural effusion and cardiomegaly, stable.    < end of copied text >    EKG:  e< from: 12 Lead ECG (21 @ 08:24) >  Ventricular Rate 70 BPM    Atrial Rate 70 BPM    P-R Interval 152 ms    QRS Duration 88 ms    Q-T Interval 442 ms    QTC Calculation(Bazett) 477 ms    P Axis 26 degrees    R Axis -7 degrees    T Axis 10 degrees    Diagnosis Line Atrial-paced rhythm  Minimal voltage criteria for LVH, may be normal variant  Inferior infarct , age undetermined  Abnormal ECG        MEDICATIONS  (STANDING):  albumin human  5% IVPB 500 milliLiter(s) IV Intermittent once  ALBUTerol    90 MICROgram(s) HFA Inhaler 2 Puff(s) Inhalation every 6 hours  aMIOdarone Infusion 0.5 mG/Min (16.7 mL/Hr) IV Continuous <Continuous>  chlorhexidine 0.12% Liquid 5 milliLiter(s) Oral Mucosa two times a day  dexMEDEtomidine Infusion 0.1 MICROgram(s)/kG/Hr (2.43 mL/Hr) IV Continuous <Continuous>  dextrose 50% Injectable 50 milliLiter(s) IV Push every 15 minutes  dextrose 50% Injectable 25 milliLiter(s) IV Push every 15 minutes  famotidine Injectable 20 milliGRAM(s) IV Push every 12 hours  insulin regular Infusion 10 Unit(s)/Hr (10 mL/Hr) IV Continuous <Continuous>  ipratropium 17 MICROgram(s) HFA Inhaler 2 Puff(s) Inhalation every 6 hours  meperidine     Injectable 25 milliGRAM(s) IV Push once  niCARdipine Infusion 5 mG/Hr (25 mL/Hr) IV Continuous <Continuous>  nitroglycerin  Infusion 30 MICROgram(s)/Min (9 mL/Hr) IV Continuous <Continuous>  norepinephrine Infusion 0.05 MICROgram(s)/kG/Min (9.09 mL/Hr) IV Continuous <Continuous>  polyethylene glycol 3350 17 Gram(s) Oral daily  propofol Infusion 30 MICROgram(s)/kG/Min (17.5 mL/Hr) IV Continuous <Continuous>  sodium chloride 0.9%. 1000 milliLiter(s) (75 mL/Hr) IV Continuous <Continuous>  sodium chloride 0.9%. 1000 milliLiter(s) (20 mL/Hr) IV Continuous <Continuous>  vasopressin Infusion 0.04 Unit(s)/Min (2.4 mL/Hr) IV Continuous <Continuous>    MEDICATIONS  (PRN):  oxyCODONE    IR 5 milliGRAM(s) Oral every 6 hours PRN Moderate Pain (4 - 6)  oxyCODONE    IR 10 milliGRAM(s) Oral every 4 hours PRN Severe Pain (7 - 10)    HEPARIN:  [x] YES [] NO  Dose: 5,000 UNITS/HR UNITS Q8H  LOVENOX:[] YES [x] NO  Dose: XX mg Q24H  COUMADIN: []  YES [x] NO  Dose: XX mg  Q24H  SCD's: YES b/l  GI Prophylaxis: Protonix [], Pepcid [x], None [], (Contra-indication:.....)    Post-Op Beta-Blockers: Yes [x], No[], If No, then contraindication:  Post-Op Aspirin: Yes [x],  No [], If No, then contraindication:  Post-Op Statin: Yes [x], No[], If No, then contraindication:    Allergies  No Known Allergies  Intolerances      Ambulation/Activity Status: ambulating as tolerated       Assessment/Plan:  70y Female status-post     C1L MV Repair          POD #   1   - Case and plan discussed with CTU Intensivist and CT Surgeon - Dr. Rojas  - Continue CTU supportive care    - Continue DVT/GI prophylaxis  - Incentive Spirometry 10 times an hour  - Continue to advance physical activity as tolerated and continue PT/OT as directed  1. CAD: Continue ASA, statin, BB  2. HTN: Continue amlodipine  3. Overactive bladder: Continue oxybutynin  4. DM/Glucose Control: Discontinued insulin       OPERATIVE PROCEDURE(s):      C1L MV Repair          POD #   1                    70yFemale  SURGEON(s): Dr. Rojas  SUBJECTIVE ASSESSMENT: 69 yo female with PMH of HTN and DLD. Patient reports no complaints at this time.       Vital Signs Last 24 Hrs  T(F): 97.9 (2021 06:00), Max: 98.6 (2021 18:00)  HR: 69 (2021 07:00) (53 - 78)  BP: 93/48 (2021 06:00) (93/48 - 115/58)  BP(mean): 67 (2021 06:00) (67 - 80)  ABP: 108/55 (2021 07:00) (11/55 - 135/63)  ABP(mean): 72 (2021 07:00)  RR: 30 (2021 07:00) (12 - 31)  SpO2: 96% (2021 07:00) (94% - 100%)  CVP(mm Hg): 21 (2021 07:00)  CVP(cm H2O): --  CO: 5.1 (2021 07:00)  CI: 2.6 (2021 07:00)  PA: 55/23 (2021 07:00)  SVR: 799 (2021 07:00)  Mode: standby  FiO2: 40    I&O's Detail    2021 07:01  -  2021 07:00  --------------------------------------------------------  IN:    Albumin 5%  - 500 mL: 1500 mL    Amiodarone: 166.5 mL    Amiodarone: 33.3 mL    Dexmedetomidine: 53.4 mL    Insulin: 24 mL    IV PiggyBack: 250 mL    NiCARdipine: 137.5 mL    Nitroglycerin: 358 mL    Oral Fluid: 60 mL    Propofol: 20 mL    sodium chloride 0.9%: 380 mL  Total IN: 2982.7 mL    OUT:    Chest Tube (mL): 280 mL    Chest Tube (mL): 204 mL    Indwelling Catheter - Urethral (mL): 889 mL    Nasogastric/Oral tube (mL): 0 mL  Total OUT: 1373 mL        Net:   I&O's Detail    2021 07:01  -  2021 07:00  --------------------------------------------------------  Total NET: -1670 mL      2021 07:01  -  2021 07:00  --------------------------------------------------------  Total NET: 1609.7 mL        CAPILLARY BLOOD GLUCOSE  141 (2021 07:00)  134 (2021 06:00)  115 (2021 04:00)  103 (2021 02:00)  100 (2021 00:00)  103 (2021 23:00)  104 (2021 22:00)  129 (2021 21:00)  132 (2021 20:00)      POCT Blood Glucose.: 141 mg/dL (2021 06:51)  POCT Blood Glucose.: 116 mg/dL (2021 06:49)  POCT Blood Glucose.: 134 mg/dL (2021 06:03)  POCT Blood Glucose.: 115 mg/dL (2021 03:56)  POCT Blood Glucose.: 103 mg/dL (2021 02:29)  POCT Blood Glucose.: 100 mg/dL (2021 00:11)  POCT Blood Glucose.: 103 mg/dL (2021 23:06)  POCT Blood Glucose.: 104 mg/dL (2021 22:07)  POCT Blood Glucose.: 132 mg/dL (2021 20:04)  POCT Blood Glucose.: 139 mg/dL (2021 18:58)  POCT Blood Glucose.: 146 mg/dL (2021 18:17)  POCT Blood Glucose.: 138 mg/dL (2021 17:19)  POCT Blood Glucose.: 123 mg/dL (2021 16:15)  POCT Blood Glucose.: 91 mg/dL (2021 15:08)  POCT Blood Glucose.: 95 mg/dL (2021 14:25)  POCT Blood Glucose.: 93 mg/dL (2021 13:17)    A1C with Estimated Average Glucose Result: 6.1:  (02.10.21 @ 05:50)     Physical Exam:  General: NAD; A&Ox3  Cardiac: + rubs, S1/S2, RRR, no murmur  Lungs: + left lower lobe crackles, right lung CTA, unlabored respirations, no wheeze, no rales  Abdomen: Soft/NT/ND; positive bowel sounds x 4  Sternum: Intact, no click, incision healing well with no drainage  Incisions: Incisions clean/dry/intact  Extremities: Trace edema b/l lower extremities; good capillary refill; no cyanosis; palpable 1+ pedal pulses b/l    Central Venous Catheter: Yes[x]  No[] , If Yes indication:           Day #  1  Spencer Catheter: Yes  [x] , No  [] , If yes indication:                      Day #  1  NGT: Yes [] No [x] ,    If Yes Placement:                                     Day #  1  EPICARDIAL WIRES:  [x] YES [] NO                                              Day #  1  BOWEL MOVEMENT:  [] YES [] NO, If No, Timing since last BM:      Day #  1  CHEST TUBE(Left/Right):  [x] YES [] NO, If yes -  AIR LEAKS:  [] YES [] NO        LABS:                        8.6<L>  12.09<H> )-----------( 98<L>    ( 2021 02:34 )             27.1<L>                        10.1<L>  16.81<H> )-----------( 109<L>    ( 2021 13:54 )             30.9<L>    02-13    145  |  111<H>  |  23<H>  ----------------------------<  94  4.3   |  21  |  1.0  12    143  |  111<H>  |  20  ----------------------------<  87  4.2   |  21  |  1.0    Ca    8.4<L>      2021 02:34  Mg     2.7         TPro  5.3<L> [6.0 - 8.0]  /  Alb  4.1 [3.5 - 5.2]  /  TBili  0.8 [0.2 - 1.2]  /  DBili  x   /  AST  28 [0 - 41]  /  ALT  8 [0 - 41]  /  AlkPhos  32 [30 - 115]  02-13    PT/INR - ( 2021 02:34 )   PT: ;   INR: 1.13 ratio         PT/INR - ( 2021 13:54 )   PT: ;   INR: 1.19 ratio         PTT - ( 2021 02:34 )  PTT:28.2 sec, PTT - ( 2021 13:54 )  PTT:18.4 sec  Urinalysis Basic - ( 10 Feb 2021 13:02 )    Color: Light Yellow / Appearance: Clear / S.013 / pH: x  Gluc: x / Ketone: Negative  / Bili: Negative / Urobili: <2 mg/dL   Blood: x / Protein: Negative / Nitrite: Positive   Leuk Esterase: Large / RBC: 1 /HPF / WBC 40 /HPF   Sq Epi: x / Non Sq Epi: 0 /HPF / Bacteria: Many      ABG - ( 2021 02:46 )  pH: 7.31  /  pCO2: 41    /  pO2: 74    / HCO3: 21    / Base Excess: -5.2  /  SaO2: 95    /  LA: 1.2        RADIOLOGY & ADDITIONAL TESTS:  CXR:  < from: Xray Chest 1 View- PORTABLE-Routine (21 @ 04:52) >  Impression:    Left basilar opacity/pleural effusion and cardiomegaly, stable.    < end of copied text >    EKG:  e< from: 12 Lead ECG (21 @ 08:24) >  Ventricular Rate 70 BPM    Atrial Rate 70 BPM    P-R Interval 152 ms    QRS Duration 88 ms    Q-T Interval 442 ms    QTC Calculation(Bazett) 477 ms    P Axis 26 degrees    R Axis -7 degrees    T Axis 10 degrees    Diagnosis Line Atrial-paced rhythm  Minimal voltage criteria for LVH, may be normal variant  Inferior infarct , age undetermined  Abnormal ECG        MEDICATIONS  (STANDING):  albumin human  5% IVPB 500 milliLiter(s) IV Intermittent once  ALBUTerol    90 MICROgram(s) HFA Inhaler 2 Puff(s) Inhalation every 6 hours  aMIOdarone Infusion 0.5 mG/Min (16.7 mL/Hr) IV Continuous <Continuous>  chlorhexidine 0.12% Liquid 5 milliLiter(s) Oral Mucosa two times a day  dexMEDEtomidine Infusion 0.1 MICROgram(s)/kG/Hr (2.43 mL/Hr) IV Continuous <Continuous>  dextrose 50% Injectable 50 milliLiter(s) IV Push every 15 minutes  dextrose 50% Injectable 25 milliLiter(s) IV Push every 15 minutes  famotidine Injectable 20 milliGRAM(s) IV Push every 12 hours  insulin regular Infusion 10 Unit(s)/Hr (10 mL/Hr) IV Continuous <Continuous>  ipratropium 17 MICROgram(s) HFA Inhaler 2 Puff(s) Inhalation every 6 hours  meperidine     Injectable 25 milliGRAM(s) IV Push once  niCARdipine Infusion 5 mG/Hr (25 mL/Hr) IV Continuous <Continuous>  nitroglycerin  Infusion 30 MICROgram(s)/Min (9 mL/Hr) IV Continuous <Continuous>  norepinephrine Infusion 0.05 MICROgram(s)/kG/Min (9.09 mL/Hr) IV Continuous <Continuous>  polyethylene glycol 3350 17 Gram(s) Oral daily  propofol Infusion 30 MICROgram(s)/kG/Min (17.5 mL/Hr) IV Continuous <Continuous>  sodium chloride 0.9%. 1000 milliLiter(s) (75 mL/Hr) IV Continuous <Continuous>  sodium chloride 0.9%. 1000 milliLiter(s) (20 mL/Hr) IV Continuous <Continuous>  vasopressin Infusion 0.04 Unit(s)/Min (2.4 mL/Hr) IV Continuous <Continuous>    MEDICATIONS  (PRN):  oxyCODONE    IR 5 milliGRAM(s) Oral every 6 hours PRN Moderate Pain (4 - 6)  oxyCODONE    IR 10 milliGRAM(s) Oral every 4 hours PRN Severe Pain (7 - 10)    HEPARIN:  [x] YES [] NO  Dose: 5,000 UNITS/HR UNITS Q8H  LOVENOX:[] YES [x] NO  Dose: XX mg Q24H  COUMADIN: []  YES [x] NO  Dose: XX mg  Q24H  SCD's: YES b/l  GI Prophylaxis: Protonix [], Pepcid [x], None [], (Contra-indication:.....)    Post-Op Beta-Blockers: Yes [x], No[], If No, then contraindication:  Post-Op Aspirin: Yes [x],  No [], If No, then contraindication:  Post-Op Statin: Yes [x], No[], If No, then contraindication:    Allergies  No Known Allergies  Intolerances      Ambulation/Activity Status: ambulating as tolerated       Assessment/Plan:  70y Female status-post     C1L MV Repair          POD #   1   - Case and plan discussed with CTU Intensivist and CT Surgeon - Dr. Rojas  - Continue CTU supportive care    - Continue DVT/GI prophylaxis  - Incentive Spirometry 10 times an hour  - Continue to advance physical activity as tolerated and continue PT/OT as directed  1. CAD: Continue ASA, statin, BB  2. HTN: Continue amlodipine  3. Overactive bladder: Continue oxybutynin  4. DM/Glucose Control: Discontinued insulin

## 2021-02-13 NOTE — PHYSICAL THERAPY INITIAL EVALUATION ADULT - GAIT DEVIATIONS NOTED, PT EVAL
Unsteady at times with intermittent shuffling gait and veering to left and right, VC's to keep eyes open, mild SOB./decreased ernesto/decreased step length/decreased stride length/decreased weight-shifting ability

## 2021-02-13 NOTE — PHYSICAL THERAPY INITIAL EVALUATION ADULT - GENERAL OBSERVATIONS, REHAB EVAL
Pt remains fully lined, including +swan angie. PT to f/u when pt is de-lined and medically cleared for b/s PT.

## 2021-02-13 NOTE — PHYSICAL THERAPY INITIAL EVALUATION ADULT - GENERAL OBSERVATIONS, REHAB EVAL
1012 - 1040 Pt rec in bed in chair mode with +chest tubes x 2, +NC 4 L/m, +IV's, +A line, +tele, in NAD with RN Gutierrez in room and present t/o session. 1012 - 1040 Pt rec in bed in chair mode with +chest tubes x 2, +external pacer, +NC 4 L/m, +IV's, +A line, +tele, in NAD with RN Brenda in room and present t/o session.

## 2021-02-13 NOTE — PROGRESS NOTE ADULT - SUBJECTIVE AND OBJECTIVE BOX
CTU Attending Progress Daily Note     13 Feb 2021 11:13  Admited 02-09-21, Hospital Day 4d  POD# - 1    HPI:  69 yo F h/o HTN, DLD, has JACOME on TTE Mod AS by gradient, and mod to severe MR here for DERRICK and RHC/LHC   (09 Feb 2021 09:38)    Home Medications:  aspirin 325 mg oral tablet: orally once a day (09 Feb 2021 09:55)  lisinopril-hydrochlorothiazide 20 mg-12.5 mg oral tablet: 1 tab(s) orally once a day (09 Feb 2021 09:55)  meloxicam: orally once a day (09 Feb 2021 09:55)  metFORMIN 500 mg oral tablet: orally once a day (09 Feb 2021 09:55)  metoprolol tartrate 25 mg oral tablet: 1 tab(s) orally 2 times a day (09 Feb 2021 09:55)  oxybutynin 10 mg/24 hr oral tablet, extended release: 1 tab(s) orally once a day (09 Feb 2021 09:55)  pantoprazole 40 mg oral delayed release tablet: 1 tab(s) orally once a day (09 Feb 2021 09:55)    FAMILY HISTORY:    PAST MEDICAL & SURGICAL HISTORY:  Glaucoma    Chronic sciatica    H/O sleep apnea  on CPAP    Aortic stenosis    Type 2 diabetes mellitus without complication, without long-term current use of insulin    Hyperlipidemia, unspecified hyperlipidemia type    Hypertension, unspecified type    History of carpal tunnel surgery    History of hip surgery  bilateral hip      Interval event for past 24 hr:  ABRILSIMEONDY  70y had no event.   Current Complains:  CHARYADRIAN CODY has no new complains  Allergies    No Known Allergies    Intolerances      OBJECTIVE:    T(C): 36.6 (02-13-21 @ 06:00), Max: 37 (02-12-21 @ 18:00)  T(F): 97.9 (02-13-21 @ 06:00), Max: 98.6 (02-12-21 @ 18:00)  HR: 69 (02-13-21 @ 07:00) (53 - 78)  BP: 93/48 (02-13-21 @ 06:00) (93/48 - 115/58)  ABP: 108/55 (02-13-21 @ 07:00) (11/55 - 135/63)  ABP(mean): 72 (02-13-21 @ 07:00) (61 - 90)  RR: 30 (02-13-21 @ 07:00) (12 - 32)  SpO2: 96% (02-13-21 @ 07:00) (94% - 100%)  CVP(mm Hg): 21 (02-13-21 @ 07:00) (7 - 21)  CI: 2.6 (02-13-21 @ 07:00) (2.3 - 4.4)  PA: 55/23 (02-13-21 @ 07:00) (25/11 - 55/23)  PA(direct): --  PCWP: --  SVR: 799 (02-13-21 @ 07:00) (419 - 0449)  PVR: --    02-12-21 @ 07:01  -  02-13-21 @ 07:00  --------------------------------------------------------  IN:    Albumin 5%  - 500 mL: 1500 mL    Amiodarone: 166.5 mL    Amiodarone: 33.3 mL    Dexmedetomidine: 53.4 mL    Insulin: 24 mL    IV PiggyBack: 250 mL    NiCARdipine: 137.5 mL    Nitroglycerin: 358 mL    Oral Fluid: 60 mL    Propofol: 20 mL    sodium chloride 0.9%: 380 mL  Total IN: 2982.7 mL    OUT:    Chest Tube (mL): 280 mL    Chest Tube (mL): 204 mL    Indwelling Catheter - Urethral (mL): 889 mL    Nasogastric/Oral tube (mL): 0 mL  Total OUT: 1373 mL    Total NET: 1609.7 mL        On 4 l/min NC       CAPILLARY BLOOD GLUCOSE  141 (13 Feb 2021 07:00)  134 (13 Feb 2021 06:00)  115 (13 Feb 2021 04:00)  103 (13 Feb 2021 02:00)  100 (13 Feb 2021 00:00)  103 (12 Feb 2021 23:00)  104 (12 Feb 2021 22:00)  129 (12 Feb 2021 21:00)  132 (12 Feb 2021 20:00)      POCT Blood Glucose.: 129 mg/dL (13 Feb 2021 10:42)  POCT Blood Glucose.: 141 mg/dL (13 Feb 2021 06:51)  POCT Blood Glucose.: 116 mg/dL (13 Feb 2021 06:49)  POCT Blood Glucose.: 134 mg/dL (13 Feb 2021 06:03)  POCT Blood Glucose.: 115 mg/dL (13 Feb 2021 03:56)  POCT Blood Glucose.: 103 mg/dL (13 Feb 2021 02:29)  POCT Blood Glucose.: 100 mg/dL (13 Feb 2021 00:11)  POCT Blood Glucose.: 103 mg/dL (12 Feb 2021 23:06)  POCT Blood Glucose.: 104 mg/dL (12 Feb 2021 22:07)  POCT Blood Glucose.: 132 mg/dL (12 Feb 2021 20:04)  POCT Blood Glucose.: 139 mg/dL (12 Feb 2021 18:58)  POCT Blood Glucose.: 146 mg/dL (12 Feb 2021 18:17)  POCT Blood Glucose.: 138 mg/dL (12 Feb 2021 17:19)  POCT Blood Glucose.: 123 mg/dL (12 Feb 2021 16:15)  POCT Blood Glucose.: 91 mg/dL (12 Feb 2021 15:08)  POCT Blood Glucose.: 95 mg/dL (12 Feb 2021 14:25)  POCT Blood Glucose.: 93 mg/dL (12 Feb 2021 13:17)    LABS:  ABG - ( 13 Feb 2021 02:46 )  pH, Arterial: 7.31  pH, Blood: x     /  pCO2: 41    /  pO2: 74    / HCO3: 21    / Base Excess: -5.2  /  SaO2: 95              Blood Gas Arterial, Lactate: 1.2 mmoL/L (02-13-21 @ 02:46)  Blood Gas Arterial, Lactate: 0.7 mmoL/L (02-12-21 @ 20:21)  Blood Gas Arterial, Lactate: 0.7 mmoL/L (02-12-21 @ 18:28)  Blood Gas Arterial, Lactate: 0.7 mmoL/L (02-12-21 @ 18:13)  Blood Gas Arterial, Lactate: 1.8 mmoL/L <H> (02-12-21 @ 13:29)                          8.6    12.09 )-----------( 98       ( 13 Feb 2021 02:34 )             27.1     Hemoglobin: 8.6 g/dL (02-13 @ 02:34)  Hemoglobin: 10.1 g/dL (02-12 @ 13:54)  Hemoglobin: 7.2 g/dL (02-12 @ 10:00)  Hemoglobin: 10.4 g/dL (02-11 @ 06:15)    02-13    145  |  111<H>  |  23<H>  ----------------------------<  94  4.3   |  21  |  1.0    Ca    8.4<L>      13 Feb 2021 02:34  Mg     2.7     02-13    TPro  5.3<L>  /  Alb  4.1  /  TBili  0.8  /  DBili  x   /  AST  28  /  ALT  8   /  AlkPhos  32  02-13    Creatinine, Serum: 1.0 mg/dL (02-13 @ 02:34)  Creatinine, Serum: 1.0 mg/dL (02-12 @ 13:54)  Creatinine, Serum: 0.9 mg/dL (02-11 @ 06:15)  Creatinine, Serum: 1.0 mg/dL (02-10 @ 05:50)    PT/INR - ( 13 Feb 2021 02:34 )   PT: 13.00 sec;   INR: 1.13 ratio     PTT - ( 13 Feb 2021 02:34 )  PTT:28.2 sec      HOSPITAL MEDICATIONS:  MEDICATIONS  (STANDING):  albumin human  5% IVPB 500 milliLiter(s) IV Intermittent once  amLODIPine   Tablet 5 milliGRAM(s) Oral daily  aspirin enteric coated 325 milliGRAM(s) Oral daily  atorvastatin 40 milliGRAM(s) Oral at bedtime  dextrose 50% Injectable 50 milliLiter(s) IV Push every 15 minutes  dextrose 50% Injectable 25 milliLiter(s) IV Push every 15 minutes  famotidine    Tablet 20 milliGRAM(s) Oral two times a day  heparin   Injectable 5000 Unit(s) SubCutaneous every 8 hours  meperidine     Injectable 25 milliGRAM(s) IV Push once  oxybutynin 10 milliGRAM(s) Oral daily  polyethylene glycol 3350 17 Gram(s) Oral daily  sodium chloride 0.9%. 1000 milliLiter(s) (20 mL/Hr) IV Continuous <Continuous>    MEDICATIONS  (PRN):  oxyCODONE    IR 5 milliGRAM(s) Oral every 6 hours PRN Moderate Pain (4 - 6)  oxyCODONE    IR 10 milliGRAM(s) Oral every 4 hours PRN Severe Pain (7 - 10)      REVIEW OF SYSTEMS:  CONSTITUTIONAL: [X] all negative; [ ] weakness, [ ] fevers, [ ] chills  EYES/ENT: [X] all negative; [ ] visual changes, [ ] vertigo, [ ] throat pain, [ ] eye pain  NECK: [X] all negative; [ ] pain, [ ] stiffness  RESPIRATORY: [ ] all negative, [x ] cough, [ ] wheezing, [ ] hemoptysis, [x ] shortness of breath, [ x ] chest pain  CARDIOVASCULAR: [X] all negative; [ ] anginal chest pain, [ ] palpitations, [ ] orthopnea  GASTROINTESTINAL: [X] all negative; [ ]abdominal pain, [ ] nausea, [ ] vomiting, [ ] hematemesis, [ ] diarrhea, [ ] constipation, [ ] melena, [ ] hematochezia.  GENITOURINARY: [X] all negative; [ ] dysuria, [ ] frequency, [ ] hematuria  NEUROLOGICAL: [X] all negative; [ ] numbness, [ ] weakness, [ ] paresthesias  MUSCULOSKELETAL: [X] all negative, [ ] joint pain, [ ] joint swelling, [ ] joint redness, [ ] bone pain  SKIN: [X] all negative; [ ] itching, [ ] burning, [ ] rashes, [ ] lesions   All other review of systems is negative unless indicated above.    [  ] Unable to assess ROS because     PHYSICAL EXAM:          CONSTITUTIONAL: Well-developed; well-nourished; in no acute distress.   	SKIN: warm, dry, no rashes or lesions  	HEENT: Atraumatic. Normocephalic. PERRL. Moist membranes, no conjunctival injection, sclera clear  	NECK: Supple; non tender.  No JVD. No lymphadenopathy.  	CARD: Normal S1, S2. Rate and Rhythm are regular. No murmurs. pericardial rub   	RESP: Good air entry bilaterally, no wheezes, no rales no rhonchi.  	ABD: Soft, not tender, not distended, no CVA tenderness, no rebound no guarding, bowel sounds present  	EXT: Normal ROM.  No clubbing, no cyanosis, no pedal edema, no calf pain b/l, Peripheral pulses intact.  	LYMPH: No acute cervical adenopathy.  	NEURO: Alert, awake, motor 5/5 R, 5/5 L, sensation intact bilat, CN 2-12 intact,          PSYCH: Cooperative, appropriate. Alert & oriented x 3    RADIOLOGY:  X Reviewed and interpreted by me  CxR from 02-13-21 shows mild congestion, no pneumothorax, no effusion, no cardiomegaly, large gastric bubble   S-G Catheter in place, Chest Tubes in place,     ECG:  X Reviewed and interpreted by me NSR 70, QTc - 477

## 2021-02-14 LAB
ALBUMIN SERPL ELPH-MCNC: 3.4 G/DL — LOW (ref 3.5–5.2)
ALBUMIN SERPL ELPH-MCNC: 4 G/DL — SIGNIFICANT CHANGE UP (ref 3.5–5.2)
ALP SERPL-CCNC: 42 U/L — SIGNIFICANT CHANGE UP (ref 30–115)
ALP SERPL-CCNC: 45 U/L — SIGNIFICANT CHANGE UP (ref 30–115)
ALT FLD-CCNC: 15 U/L — SIGNIFICANT CHANGE UP (ref 0–41)
ALT FLD-CCNC: 7 U/L — SIGNIFICANT CHANGE UP (ref 0–41)
ANION GAP SERPL CALC-SCNC: 10 MMOL/L — SIGNIFICANT CHANGE UP (ref 7–14)
ANION GAP SERPL CALC-SCNC: 13 MMOL/L — SIGNIFICANT CHANGE UP (ref 7–14)
ANION GAP SERPL CALC-SCNC: 14 MMOL/L — SIGNIFICANT CHANGE UP (ref 7–14)
APTT BLD: 24.2 SEC — LOW (ref 27–39.2)
APTT BLD: 28.2 SEC — SIGNIFICANT CHANGE UP (ref 27–39.2)
AST SERPL-CCNC: 31 U/L — SIGNIFICANT CHANGE UP (ref 0–41)
AST SERPL-CCNC: 32 U/L — SIGNIFICANT CHANGE UP (ref 0–41)
BASOPHILS # BLD AUTO: 0.03 K/UL — SIGNIFICANT CHANGE UP (ref 0–0.2)
BASOPHILS NFR BLD AUTO: 0.2 % — SIGNIFICANT CHANGE UP (ref 0–1)
BILIRUB SERPL-MCNC: 0.9 MG/DL — SIGNIFICANT CHANGE UP (ref 0.2–1.2)
BILIRUB SERPL-MCNC: 1 MG/DL — SIGNIFICANT CHANGE UP (ref 0.2–1.2)
BUN SERPL-MCNC: 25 MG/DL — HIGH (ref 10–20)
BUN SERPL-MCNC: 28 MG/DL — HIGH (ref 10–20)
BUN SERPL-MCNC: 32 MG/DL — HIGH (ref 10–20)
CALCIUM SERPL-MCNC: 7.9 MG/DL — LOW (ref 8.5–10.1)
CALCIUM SERPL-MCNC: 8.5 MG/DL — SIGNIFICANT CHANGE UP (ref 8.5–10.1)
CALCIUM SERPL-MCNC: 8.8 MG/DL — SIGNIFICANT CHANGE UP (ref 8.5–10.1)
CHLORIDE SERPL-SCNC: 102 MMOL/L — SIGNIFICANT CHANGE UP (ref 98–110)
CHLORIDE SERPL-SCNC: 104 MMOL/L — SIGNIFICANT CHANGE UP (ref 98–110)
CHLORIDE SERPL-SCNC: 106 MMOL/L — SIGNIFICANT CHANGE UP (ref 98–110)
CO2 SERPL-SCNC: 19 MMOL/L — SIGNIFICANT CHANGE UP (ref 17–32)
CO2 SERPL-SCNC: 20 MMOL/L — SIGNIFICANT CHANGE UP (ref 17–32)
CO2 SERPL-SCNC: 22 MMOL/L — SIGNIFICANT CHANGE UP (ref 17–32)
CREAT SERPL-MCNC: 1 MG/DL — SIGNIFICANT CHANGE UP (ref 0.7–1.5)
CREAT SERPL-MCNC: 1 MG/DL — SIGNIFICANT CHANGE UP (ref 0.7–1.5)
CREAT SERPL-MCNC: 1.4 MG/DL — SIGNIFICANT CHANGE UP (ref 0.7–1.5)
EOSINOPHIL # BLD AUTO: 0.03 K/UL — SIGNIFICANT CHANGE UP (ref 0–0.7)
EOSINOPHIL NFR BLD AUTO: 0.2 % — SIGNIFICANT CHANGE UP (ref 0–8)
GAS PNL BLDA: SIGNIFICANT CHANGE UP
GLUCOSE BLDC GLUCOMTR-MCNC: 115 MG/DL — HIGH (ref 70–99)
GLUCOSE BLDC GLUCOMTR-MCNC: 121 MG/DL — HIGH (ref 70–99)
GLUCOSE BLDC GLUCOMTR-MCNC: 220 MG/DL — HIGH (ref 70–99)
GLUCOSE SERPL-MCNC: 114 MG/DL — HIGH (ref 70–99)
GLUCOSE SERPL-MCNC: 173 MG/DL — HIGH (ref 70–99)
GLUCOSE SERPL-MCNC: 197 MG/DL — HIGH (ref 70–99)
HCT VFR BLD CALC: 23.1 % — LOW (ref 37–47)
HCT VFR BLD CALC: 30.1 % — LOW (ref 37–47)
HCT VFR BLD CALC: 30.2 % — LOW (ref 37–47)
HGB BLD-MCNC: 7.5 G/DL — LOW (ref 12–16)
HGB BLD-MCNC: 9.4 G/DL — LOW (ref 12–16)
HGB BLD-MCNC: 9.6 G/DL — LOW (ref 12–16)
IMM GRANULOCYTES NFR BLD AUTO: 0.3 % — SIGNIFICANT CHANGE UP (ref 0.1–0.3)
INR BLD: 1.11 RATIO — SIGNIFICANT CHANGE UP (ref 0.65–1.3)
INR BLD: 1.33 RATIO — HIGH (ref 0.65–1.3)
LYMPHOCYTES # BLD AUTO: 1.16 K/UL — LOW (ref 1.2–3.4)
LYMPHOCYTES # BLD AUTO: 8.1 % — LOW (ref 20.5–51.1)
MAGNESIUM SERPL-MCNC: 2.2 MG/DL — SIGNIFICANT CHANGE UP (ref 1.8–2.4)
MAGNESIUM SERPL-MCNC: 2.2 MG/DL — SIGNIFICANT CHANGE UP (ref 1.8–2.4)
MCHC RBC-ENTMCNC: 27.6 PG — SIGNIFICANT CHANGE UP (ref 27–31)
MCHC RBC-ENTMCNC: 27.9 PG — SIGNIFICANT CHANGE UP (ref 27–31)
MCHC RBC-ENTMCNC: 29 PG — SIGNIFICANT CHANGE UP (ref 27–31)
MCHC RBC-ENTMCNC: 31.2 G/DL — LOW (ref 32–37)
MCHC RBC-ENTMCNC: 31.8 G/DL — LOW (ref 32–37)
MCHC RBC-ENTMCNC: 32.5 G/DL — SIGNIFICANT CHANGE UP (ref 32–37)
MCV RBC AUTO: 87.8 FL — SIGNIFICANT CHANGE UP (ref 81–99)
MCV RBC AUTO: 88.3 FL — SIGNIFICANT CHANGE UP (ref 81–99)
MCV RBC AUTO: 89.2 FL — SIGNIFICANT CHANGE UP (ref 81–99)
MONOCYTES # BLD AUTO: 1.37 K/UL — HIGH (ref 0.1–0.6)
MONOCYTES NFR BLD AUTO: 9.5 % — HIGH (ref 1.7–9.3)
NEUTROPHILS # BLD AUTO: 11.71 K/UL — HIGH (ref 1.4–6.5)
NEUTROPHILS NFR BLD AUTO: 81.7 % — HIGH (ref 42.2–75.2)
NRBC # BLD: 0 /100 WBCS — SIGNIFICANT CHANGE UP (ref 0–0)
PLATELET # BLD AUTO: 111 K/UL — LOW (ref 130–400)
PLATELET # BLD AUTO: 126 K/UL — LOW (ref 130–400)
PLATELET # BLD AUTO: 146 K/UL — SIGNIFICANT CHANGE UP (ref 130–400)
POTASSIUM SERPL-MCNC: 3.5 MMOL/L — SIGNIFICANT CHANGE UP (ref 3.5–5)
POTASSIUM SERPL-MCNC: 4.1 MMOL/L — SIGNIFICANT CHANGE UP (ref 3.5–5)
POTASSIUM SERPL-MCNC: 5.3 MMOL/L — HIGH (ref 3.5–5)
POTASSIUM SERPL-SCNC: 3.5 MMOL/L — SIGNIFICANT CHANGE UP (ref 3.5–5)
POTASSIUM SERPL-SCNC: 4.1 MMOL/L — SIGNIFICANT CHANGE UP (ref 3.5–5)
POTASSIUM SERPL-SCNC: 5.3 MMOL/L — HIGH (ref 3.5–5)
PROT SERPL-MCNC: 4.6 G/DL — LOW (ref 6–8)
PROT SERPL-MCNC: 5.4 G/DL — LOW (ref 6–8)
PROTHROM AB SERPL-ACNC: 12.8 SEC — SIGNIFICANT CHANGE UP (ref 9.95–12.87)
PROTHROM AB SERPL-ACNC: 15.3 SEC — HIGH (ref 9.95–12.87)
RBC # BLD: 2.59 M/UL — LOW (ref 4.2–5.4)
RBC # BLD: 3.41 M/UL — LOW (ref 4.2–5.4)
RBC # BLD: 3.44 M/UL — LOW (ref 4.2–5.4)
RBC # FLD: 16.9 % — HIGH (ref 11.5–14.5)
RBC # FLD: 17.2 % — HIGH (ref 11.5–14.5)
RBC # FLD: 17.2 % — HIGH (ref 11.5–14.5)
SODIUM SERPL-SCNC: 134 MMOL/L — LOW (ref 135–146)
SODIUM SERPL-SCNC: 137 MMOL/L — SIGNIFICANT CHANGE UP (ref 135–146)
SODIUM SERPL-SCNC: 139 MMOL/L — SIGNIFICANT CHANGE UP (ref 135–146)
WBC # BLD: 14.35 K/UL — HIGH (ref 4.8–10.8)
WBC # BLD: 14.96 K/UL — HIGH (ref 4.8–10.8)
WBC # BLD: 17.87 K/UL — HIGH (ref 4.8–10.8)
WBC # FLD AUTO: 14.35 K/UL — HIGH (ref 4.8–10.8)
WBC # FLD AUTO: 14.96 K/UL — HIGH (ref 4.8–10.8)
WBC # FLD AUTO: 17.87 K/UL — HIGH (ref 4.8–10.8)

## 2021-02-14 PROCEDURE — 99233 SBSQ HOSP IP/OBS HIGH 50: CPT

## 2021-02-14 PROCEDURE — 71045 X-RAY EXAM CHEST 1 VIEW: CPT | Mod: 26,76

## 2021-02-14 PROCEDURE — 93010 ELECTROCARDIOGRAM REPORT: CPT

## 2021-02-14 PROCEDURE — 93306 TTE W/DOPPLER COMPLETE: CPT | Mod: 26

## 2021-02-14 PROCEDURE — 36620 INSERTION CATHETER ARTERY: CPT

## 2021-02-14 PROCEDURE — 71250 CT THORAX DX C-: CPT | Mod: 26

## 2021-02-14 RX ORDER — CEFEPIME 1 G/1
1000 INJECTION, POWDER, FOR SOLUTION INTRAMUSCULAR; INTRAVENOUS ONCE
Refills: 0 | Status: COMPLETED | OUTPATIENT
Start: 2021-02-14 | End: 2021-02-14

## 2021-02-14 RX ORDER — MAGNESIUM SULFATE 500 MG/ML
1 VIAL (ML) INJECTION EVERY 12 HOURS
Refills: 0 | Status: DISCONTINUED | OUTPATIENT
Start: 2021-02-14 | End: 2021-03-01

## 2021-02-14 RX ORDER — DEXTROSE 50 % IN WATER 50 %
25 SYRINGE (ML) INTRAVENOUS ONCE
Refills: 0 | Status: DISCONTINUED | OUTPATIENT
Start: 2021-02-14 | End: 2021-03-01

## 2021-02-14 RX ORDER — SODIUM CHLORIDE 9 MG/ML
1000 INJECTION, SOLUTION INTRAVENOUS
Refills: 0 | Status: DISCONTINUED | OUTPATIENT
Start: 2021-02-14 | End: 2021-03-01

## 2021-02-14 RX ORDER — AMLODIPINE BESYLATE 2.5 MG/1
10 TABLET ORAL DAILY
Refills: 0 | Status: DISCONTINUED | OUTPATIENT
Start: 2021-02-14 | End: 2021-02-15

## 2021-02-14 RX ORDER — SODIUM BICARBONATE 1 MEQ/ML
50 SYRINGE (ML) INTRAVENOUS ONCE
Refills: 0 | Status: COMPLETED | OUTPATIENT
Start: 2021-02-14 | End: 2021-02-14

## 2021-02-14 RX ORDER — GLUCAGON INJECTION, SOLUTION 0.5 MG/.1ML
1 INJECTION, SOLUTION SUBCUTANEOUS ONCE
Refills: 0 | Status: DISCONTINUED | OUTPATIENT
Start: 2021-02-14 | End: 2021-03-01

## 2021-02-14 RX ORDER — DEXTROSE 50 % IN WATER 50 %
12.5 SYRINGE (ML) INTRAVENOUS ONCE
Refills: 0 | Status: DISCONTINUED | OUTPATIENT
Start: 2021-02-14 | End: 2021-03-01

## 2021-02-14 RX ORDER — MAGNESIUM SULFATE 500 MG/ML
1 VIAL (ML) INJECTION EVERY 12 HOURS
Refills: 0 | Status: DISCONTINUED | OUTPATIENT
Start: 2021-02-14 | End: 2021-02-14

## 2021-02-14 RX ORDER — POTASSIUM CHLORIDE 20 MEQ
20 PACKET (EA) ORAL
Refills: 0 | Status: COMPLETED | OUTPATIENT
Start: 2021-02-14 | End: 2021-02-14

## 2021-02-14 RX ORDER — FUROSEMIDE 40 MG
40 TABLET ORAL ONCE
Refills: 0 | Status: COMPLETED | OUTPATIENT
Start: 2021-02-14 | End: 2021-02-14

## 2021-02-14 RX ORDER — POTASSIUM CHLORIDE 20 MEQ
20 PACKET (EA) ORAL ONCE
Refills: 0 | Status: COMPLETED | OUTPATIENT
Start: 2021-02-14 | End: 2021-02-14

## 2021-02-14 RX ORDER — VECURONIUM BROMIDE 20 MG/1
10 INJECTION, POWDER, FOR SOLUTION INTRAVENOUS ONCE
Refills: 0 | Status: COMPLETED | OUTPATIENT
Start: 2021-02-14 | End: 2021-02-14

## 2021-02-14 RX ORDER — METOPROLOL TARTRATE 50 MG
25 TABLET ORAL
Refills: 0 | Status: DISCONTINUED | OUTPATIENT
Start: 2021-02-14 | End: 2021-02-15

## 2021-02-14 RX ORDER — VASOPRESSIN 20 [USP'U]/ML
0.04 INJECTION INTRAVENOUS
Qty: 50 | Refills: 0 | Status: DISCONTINUED | OUTPATIENT
Start: 2021-02-14 | End: 2021-02-16

## 2021-02-14 RX ORDER — INSULIN LISPRO 100/ML
VIAL (ML) SUBCUTANEOUS
Refills: 0 | Status: DISCONTINUED | OUTPATIENT
Start: 2021-02-14 | End: 2021-02-15

## 2021-02-14 RX ORDER — CEFEPIME 1 G/1
1000 INJECTION, POWDER, FOR SOLUTION INTRAMUSCULAR; INTRAVENOUS EVERY 8 HOURS
Refills: 0 | Status: DISCONTINUED | OUTPATIENT
Start: 2021-02-14 | End: 2021-02-17

## 2021-02-14 RX ORDER — IPRATROPIUM BROMIDE 0.2 MG/ML
500 SOLUTION, NON-ORAL INHALATION EVERY 6 HOURS
Refills: 0 | Status: DISCONTINUED | OUTPATIENT
Start: 2021-02-14 | End: 2021-03-01

## 2021-02-14 RX ORDER — DEXTROSE 50 % IN WATER 50 %
15 SYRINGE (ML) INTRAVENOUS ONCE
Refills: 0 | Status: DISCONTINUED | OUTPATIENT
Start: 2021-02-14 | End: 2021-03-01

## 2021-02-14 RX ORDER — CEFEPIME 1 G/1
INJECTION, POWDER, FOR SOLUTION INTRAMUSCULAR; INTRAVENOUS
Refills: 0 | Status: DISCONTINUED | OUTPATIENT
Start: 2021-02-16 | End: 2021-02-17

## 2021-02-14 RX ORDER — MILRINONE LACTATE 1 MG/ML
0.38 INJECTION, SOLUTION INTRAVENOUS
Qty: 20 | Refills: 0 | Status: DISCONTINUED | OUTPATIENT
Start: 2021-02-14 | End: 2021-02-15

## 2021-02-14 RX ORDER — VANCOMYCIN HCL 1 G
1000 VIAL (EA) INTRAVENOUS EVERY 12 HOURS
Refills: 0 | Status: DISCONTINUED | OUTPATIENT
Start: 2021-02-14 | End: 2021-02-18

## 2021-02-14 RX ORDER — AMIODARONE HYDROCHLORIDE 400 MG/1
150 TABLET ORAL ONCE
Refills: 0 | Status: COMPLETED | OUTPATIENT
Start: 2021-02-14 | End: 2021-02-14

## 2021-02-14 RX ORDER — NOREPINEPHRINE BITARTRATE/D5W 8 MG/250ML
0.05 PLASTIC BAG, INJECTION (ML) INTRAVENOUS
Qty: 8 | Refills: 0 | Status: DISCONTINUED | OUTPATIENT
Start: 2021-02-14 | End: 2021-02-16

## 2021-02-14 RX ADMIN — OXYCODONE HYDROCHLORIDE 5 MILLIGRAM(S): 5 TABLET ORAL at 13:55

## 2021-02-14 RX ADMIN — POLYETHYLENE GLYCOL 3350 17 GRAM(S): 17 POWDER, FOR SOLUTION ORAL at 12:03

## 2021-02-14 RX ADMIN — Medication 40 MILLIGRAM(S): at 15:00

## 2021-02-14 RX ADMIN — Medication 100 MILLIEQUIVALENT(S): at 09:11

## 2021-02-14 RX ADMIN — Medication 325 MILLIGRAM(S): at 12:03

## 2021-02-14 RX ADMIN — Medication 2: at 12:02

## 2021-02-14 RX ADMIN — SIMETHICONE 80 MILLIGRAM(S): 80 TABLET, CHEWABLE ORAL at 05:51

## 2021-02-14 RX ADMIN — AMIODARONE HYDROCHLORIDE 600 MILLIGRAM(S): 400 TABLET ORAL at 05:53

## 2021-02-14 RX ADMIN — SIMETHICONE 80 MILLIGRAM(S): 80 TABLET, CHEWABLE ORAL at 13:55

## 2021-02-14 RX ADMIN — AMIODARONE HYDROCHLORIDE 16.7 MG/MIN: 400 TABLET ORAL at 05:54

## 2021-02-14 RX ADMIN — HEPARIN SODIUM 5000 UNIT(S): 5000 INJECTION INTRAVENOUS; SUBCUTANEOUS at 12:03

## 2021-02-14 RX ADMIN — Medication 50 MILLIEQUIVALENT(S): at 17:00

## 2021-02-14 RX ADMIN — Medication 100 MILLIEQUIVALENT(S): at 05:00

## 2021-02-14 RX ADMIN — Medication 100 MILLIEQUIVALENT(S): at 17:00

## 2021-02-14 RX ADMIN — Medication 25 MILLIGRAM(S): at 06:48

## 2021-02-14 RX ADMIN — Medication 50 MILLIEQUIVALENT(S): at 19:59

## 2021-02-14 RX ADMIN — CEFEPIME 100 MILLIGRAM(S): 1 INJECTION, POWDER, FOR SOLUTION INTRAMUSCULAR; INTRAVENOUS at 22:55

## 2021-02-14 RX ADMIN — Medication 10 MILLIGRAM(S): at 12:03

## 2021-02-14 RX ADMIN — Medication 100 MILLIEQUIVALENT(S): at 05:52

## 2021-02-14 RX ADMIN — FAMOTIDINE 20 MILLIGRAM(S): 10 INJECTION INTRAVENOUS at 05:51

## 2021-02-14 RX ADMIN — NICARDIPINE HYDROCHLORIDE 25 MG/HR: 30 CAPSULE, EXTENDED RELEASE ORAL at 05:54

## 2021-02-14 RX ADMIN — HEPARIN SODIUM 5000 UNIT(S): 5000 INJECTION INTRAVENOUS; SUBCUTANEOUS at 05:51

## 2021-02-14 RX ADMIN — AMLODIPINE BESYLATE 10 MILLIGRAM(S): 2.5 TABLET ORAL at 06:44

## 2021-02-14 RX ADMIN — VECURONIUM BROMIDE 10 MILLIGRAM(S): 20 INJECTION, POWDER, FOR SOLUTION INTRAVENOUS at 17:15

## 2021-02-14 RX ADMIN — Medication 125 MILLIGRAM(S): at 15:15

## 2021-02-14 RX ADMIN — Medication 40 MILLIGRAM(S): at 09:10

## 2021-02-14 NOTE — CHART NOTE - NSCHARTNOTEFT_GEN_A_CORE
pt is s/p cabg/mv repair POD#2, developed shortness of breath, stat cxr showed right lung white out, pt was intubated and bronched with no improvement, sent for ct chest showed new loculated hemothorax on right side, 2 chest tubes placed, prox anterior chest tube initially drained 250 cc dark blood, distal anterior chest tube placed, minimal blood drainage. both chest tubed connected to pleural vac. Pt is hypotensive requiring multiple pressors, hct dropped to 23, 2 units prbc transfused. pt currently stable in ctu. pt is s/p cabg/mv repair POD#2, developed shortness of breath, stat cxr showed right lung white out, pt was intubated and bronched with no improvement, sent for ct chest showed new loculated hemothorax on right side, 2 chest tubes placed, posterior chest tube initially drained 250 cc dark blood, anterior chest tube placed, minimal blood drainage. both chest tubed connected to pleural vac. Pt is hypotensive requiring multiple pressors, hct dropped to 23, 2 units prbc transfused. pt currently stable in ctu.

## 2021-02-14 NOTE — PROGRESS NOTE ADULT - SUBJECTIVE AND OBJECTIVE BOX
OPERATIVE PROCEDURE(s):     C1L MV Repair          POD # 2                       70yFemale  SURGEON(s): Dr. Rojas  SUBJECTIVE ASSESSMENT: pt seen and examined. no acute complaints at this time  Vital Signs Last 24 Hrs  T(F): 98.6 (13 Feb 2021 20:00), Max: 98.7 (13 Feb 2021 18:00)  HR: 63 (14 Feb 2021 06:00) (54 - 131)  BP: 119/60 (13 Feb 2021 22:00) (112/61 - 145/63)  BP(mean): 82 (13 Feb 2021 22:00) (77 - 91)  ABP: 134/52 (14 Feb 2021 06:00) (117/57 - 153/65)  ABP(mean): 78 (14 Feb 2021 06:00)  RR: 16 (14 Feb 2021 06:00) (15 - 38)  SpO2: 100% (14 Feb 2021 06:00) (96% - 100%)  CVP(mm Hg): 2 (13 Feb 2021 10:00)  PA: 50/24 (13 Feb 2021 09:00)      I&O's Detail    13 Feb 2021 07:01  -  14 Feb 2021 07:00  --------------------------------------------------------  IN:    Amiodarone: 133.3 mL    Insulin: 6 mL    IV PiggyBack: 300 mL    NiCARdipine: 155 mL    NiCARdipine: 60 mL    Nitroglycerin: 60 mL    Oral Fluid: 1800 mL    sodium chloride 0.9%: 320 mL  Total IN: 2834.3 mL    OUT:    Chest Tube (mL): 70 mL    Chest Tube (mL): 90 mL    Indwelling Catheter - Urethral (mL): 1750 mL  Total OUT: 1910 mL        Net:   I&O's Detail    12 Feb 2021 07:01  -  13 Feb 2021 07:00  --------------------------------------------------------  Total NET: 1609.7 mL      13 Feb 2021 07:01  -  14 Feb 2021 07:00  --------------------------------------------------------  Total NET: 924.3 mL        CAPILLARY BLOOD GLUCOSE  141 (13 Feb 2021 07:00)  134 (13 Feb 2021 06:00)  115 (13 Feb 2021 04:00)  103 (13 Feb 2021 02:00)      POCT Blood Glucose.: 115 mg/dL (14 Feb 2021 06:13)  POCT Blood Glucose.: 121 mg/dL (14 Feb 2021 01:06)  POCT Blood Glucose.: 100 mg/dL (13 Feb 2021 22:06)  POCT Blood Glucose.: 122 mg/dL (13 Feb 2021 18:44)  POCT Blood Glucose.: 88 mg/dL (13 Feb 2021 16:16)  POCT Blood Glucose.: 115 mg/dL (13 Feb 2021 14:06)  POCT Blood Glucose.: 129 mg/dL (13 Feb 2021 10:42)    Physical Exam:  General: NAD; A&Ox3  Cardiac: + rubs, S1/S2, RRR, no murmur  Lungs: + left lower lobe crackles, right lung CTA, unlabored respirations, no wheeze, no rales  Abdomen: Soft/NT/ND; positive bowel sounds x 4  Sternum: Intact, no click, incision healing well with no drainage  Incisions: Incisions clean/dry/intact  Extremities: Trace edema b/l lower extremities; good capillary refill; no cyanosis; palpable 1+ pedal pulses b/l      Central Venous Catheter: Yes[x]  No[] , If Yes indication:     hd unstable      Day #2  Spencer Catheter: Yes  [x] , No  [] , If yes indication:    strict i/o                  Day #2  NGT: Yes [] No [x] ,    If Yes Placement:                                     Day #  EPICARDIAL WIRES:  [x] YES [] NO                                              Day #2  BOWEL MOVEMENT:  [] YES [x] NO, If No, Timing since last BM:      Day #  CHEST TUBE(Left/Right):  [] YES [x] NO, If yes -  AIR LEAKS:  [] YES [] NO        LABS:                        9.6<L>  14.35<H> )-----------( 111<L>    ( 14 Feb 2021 01:14 )             30.2<L>                        8.6<L>  12.09<H> )-----------( 98<L>    ( 13 Feb 2021 02:34 )             27.1<L>    02-14    134<L>  |  102  |  25<H>  ----------------------------<  114<H>  3.5   |  22  |  1.0  02-13    145  |  111<H>  |  23<H>  ----------------------------<  94  4.3   |  21  |  1.0    Ca    8.5      14 Feb 2021 01:14  Mg     2.2     02-14    TPro  5.4<L> [6.0 - 8.0]  /  Alb  4.0 [3.5 - 5.2]  /  TBili  0.9 [0.2 - 1.2]  /  DBili  x   /  AST  31 [0 - 41]  /  ALT  7 [0 - 41]  /  AlkPhos  45 [30 - 115]  02-14    PT/INR - ( 14 Feb 2021 01:14 )   PT: ;   INR: 1.11 ratio         PT/INR - ( 13 Feb 2021 02:34 )   PT: ;   INR: 1.13 ratio         PTT - ( 14 Feb 2021 01:14 )  PTT:28.2 sec, PTT - ( 13 Feb 2021 02:34 )  PTT:28.2 sec    ABG - ( 14 Feb 2021 03:46 )  pH: 7.37  /  pCO2: 41    /  pO2: 89    / HCO3: 24    / Base Excess: -1.6  /  SaO2: 97    /  LA: 0.7              RADIOLOGY & ADDITIONAL TESTS:  CXR: < from: Xray Chest 1 View- PORTABLE-Urgent (Xray Chest 1 View- PORTABLE-Urgent .) (02.13.21 @ 15:57) >  Impression:    Left lower lobe opacity/pleural effusion unchanged. No air leak.    < end of copied text >    EKG:  MEDICATIONS  (STANDING):  albumin human  5% IVPB 500 milliLiter(s) IV Intermittent once  aMIOdarone Infusion 0.5 mG/Min (16.7 mL/Hr) IV Continuous <Continuous>  amLODIPine   Tablet 10 milliGRAM(s) Oral daily  aspirin enteric coated 325 milliGRAM(s) Oral daily  atorvastatin 40 milliGRAM(s) Oral at bedtime  dextrose 50% Injectable 50 milliLiter(s) IV Push every 15 minutes  dextrose 50% Injectable 25 milliLiter(s) IV Push every 15 minutes  famotidine    Tablet 20 milliGRAM(s) Oral two times a day  heparin   Injectable 5000 Unit(s) SubCutaneous every 8 hours  insulin regular Infusion 1 Unit(s)/Hr (1 mL/Hr) IV Continuous <Continuous>  meperidine     Injectable 25 milliGRAM(s) IV Push once  metoprolol tartrate 25 milliGRAM(s) Oral two times a day  metoprolol tartrate Injectable 2.5 milliGRAM(s) IV Push once  niCARdipine Infusion 5 mG/Hr (25 mL/Hr) IV Continuous <Continuous>  oxybutynin 10 milliGRAM(s) Oral daily  polyethylene glycol 3350 17 Gram(s) Oral daily  simethicone 80 milliGRAM(s) Chew three times a day  sodium chloride 0.9%. 1000 milliLiter(s) (20 mL/Hr) IV Continuous <Continuous>    MEDICATIONS  (PRN):  oxyCODONE    IR 10 milliGRAM(s) Oral every 4 hours PRN Severe Pain (7 - 10)  oxyCODONE    IR 5 milliGRAM(s) Oral every 6 hours PRN Moderate Pain (4 - 6)      HEPARIN:  [x] YES [] NO  Dose: 5,000 UNITS/HR UNITS Q8H  SCD's: YES b/l  GI Prophylaxis: Protonix [], Pepcid [x], None [], (Contra-indication:.....)    Post-Op Beta-Blockers: Yes [x], No[], If No, then contraindication:  Post-Op Aspirin: Yes [x],  No [], If No, then contraindication:  Post-Op Statin: Yes [x], No[], If No, then contraindication:  Allergies    No Known Allergies    Intolerances      Ambulation/Activity Status:    Assessment/Plan:  70y Female status-post .....  - Case and plan discussed with CTU Intensivist and CT Surgeon - Dr. Rojas/Edgar/Patricia  - Continue CTU supportive care    - Continue DVT/GI prophylaxis  - Incentive Spirometry 10 times an hour  - Continue to advance physical activity as tolerated and continue PT/OT as directed  1. CAD: Continue ASA, statin, BB  2. HTN:   3. A. Fib:   4. COPD/Hypoxia:   5. DM/Glucose Control:     Social Service Disposition:     OPERATIVE PROCEDURE(s):     C1L MV Repair          POD # 2                       70yFemale  SURGEON(s): Dr. Rojas  SUBJECTIVE ASSESSMENT: pt seen and examined. no acute complaints at this time  Vital Signs Last 24 Hrs  T(F): 98.6 (13 Feb 2021 20:00), Max: 98.7 (13 Feb 2021 18:00)  HR: 63 (14 Feb 2021 06:00) (54 - 131)  BP: 119/60 (13 Feb 2021 22:00) (112/61 - 145/63)  BP(mean): 82 (13 Feb 2021 22:00) (77 - 91)  ABP: 134/52 (14 Feb 2021 06:00) (117/57 - 153/65)  ABP(mean): 78 (14 Feb 2021 06:00)  RR: 16 (14 Feb 2021 06:00) (15 - 38)  SpO2: 100% (14 Feb 2021 06:00) (96% - 100%)  CVP(mm Hg): 2 (13 Feb 2021 10:00)  PA: 50/24 (13 Feb 2021 09:00)      I&O's Detail    13 Feb 2021 07:01  -  14 Feb 2021 07:00  --------------------------------------------------------  IN:    Amiodarone: 133.3 mL    Insulin: 6 mL    IV PiggyBack: 300 mL    NiCARdipine: 155 mL    NiCARdipine: 60 mL    Nitroglycerin: 60 mL    Oral Fluid: 1800 mL    sodium chloride 0.9%: 320 mL  Total IN: 2834.3 mL    OUT:    Chest Tube (mL): 70 mL    Chest Tube (mL): 90 mL    Indwelling Catheter - Urethral (mL): 1750 mL  Total OUT: 1910 mL        Net:   I&O's Detail    12 Feb 2021 07:01  -  13 Feb 2021 07:00  --------------------------------------------------------  Total NET: 1609.7 mL      13 Feb 2021 07:01  -  14 Feb 2021 07:00  --------------------------------------------------------  Total NET: 924.3 mL        CAPILLARY BLOOD GLUCOSE  141 (13 Feb 2021 07:00)  134 (13 Feb 2021 06:00)  115 (13 Feb 2021 04:00)  103 (13 Feb 2021 02:00)      POCT Blood Glucose.: 115 mg/dL (14 Feb 2021 06:13)  POCT Blood Glucose.: 121 mg/dL (14 Feb 2021 01:06)  POCT Blood Glucose.: 100 mg/dL (13 Feb 2021 22:06)  POCT Blood Glucose.: 122 mg/dL (13 Feb 2021 18:44)  POCT Blood Glucose.: 88 mg/dL (13 Feb 2021 16:16)  POCT Blood Glucose.: 115 mg/dL (13 Feb 2021 14:06)  POCT Blood Glucose.: 129 mg/dL (13 Feb 2021 10:42)    Physical Exam:  General: NAD; A&Ox3  Cardiac: + rubs, S1/S2, RRR, no murmur  Lungs: + left lower lobe crackles, right lung CTA, unlabored respirations, no wheeze, no rales  Abdomen: Soft/NT/ND; positive bowel sounds x 4  Sternum: Intact, no click, incision healing well with no drainage  Incisions: Incisions clean/dry/intact  Extremities: Trace edema b/l lower extremities; good capillary refill; no cyanosis; palpable 1+ pedal pulses b/l      Central Venous Catheter: Yes[x]  No[] , If Yes indication:     hd unstable      Day #2  Spencer Catheter: Yes  [x] , No  [] , If yes indication:    strict i/o                  Day #2  NGT: Yes [] No [x] ,    If Yes Placement:                                     Day #  EPICARDIAL WIRES:  [x] YES [] NO                                              Day #2  BOWEL MOVEMENT:  [] YES [x] NO, If No, Timing since last BM:      Day #  CHEST TUBE(Left/Right):  [] YES [x] NO, If yes -  AIR LEAKS:  [] YES [] NO        LABS:                        9.6<L>  14.35<H> )-----------( 111<L>    ( 14 Feb 2021 01:14 )             30.2<L>                        8.6<L>  12.09<H> )-----------( 98<L>    ( 13 Feb 2021 02:34 )             27.1<L>    02-14    134<L>  |  102  |  25<H>  ----------------------------<  114<H>  3.5   |  22  |  1.0  02-13    145  |  111<H>  |  23<H>  ----------------------------<  94  4.3   |  21  |  1.0    Ca    8.5      14 Feb 2021 01:14  Mg     2.2     02-14    TPro  5.4<L> [6.0 - 8.0]  /  Alb  4.0 [3.5 - 5.2]  /  TBili  0.9 [0.2 - 1.2]  /  DBili  x   /  AST  31 [0 - 41]  /  ALT  7 [0 - 41]  /  AlkPhos  45 [30 - 115]  02-14    PT/INR - ( 14 Feb 2021 01:14 )   PT: ;   INR: 1.11 ratio         PT/INR - ( 13 Feb 2021 02:34 )   PT: ;   INR: 1.13 ratio         PTT - ( 14 Feb 2021 01:14 )  PTT:28.2 sec, PTT - ( 13 Feb 2021 02:34 )  PTT:28.2 sec    ABG - ( 14 Feb 2021 03:46 )  pH: 7.37  /  pCO2: 41    /  pO2: 89    / HCO3: 24    / Base Excess: -1.6  /  SaO2: 97    /  LA: 0.7              RADIOLOGY & ADDITIONAL TESTS:  CXR: < from: Xray Chest 1 View- PORTABLE-Urgent (Xray Chest 1 View- PORTABLE-Urgent .) (02.13.21 @ 15:57) >  Impression:    Left lower lobe opacity/pleural effusion unchanged. No air leak.    < end of copied text >    EKG:  MEDICATIONS  (STANDING):  albumin human  5% IVPB 500 milliLiter(s) IV Intermittent once  aMIOdarone Infusion 0.5 mG/Min (16.7 mL/Hr) IV Continuous <Continuous>  amLODIPine   Tablet 10 milliGRAM(s) Oral daily  aspirin enteric coated 325 milliGRAM(s) Oral daily  atorvastatin 40 milliGRAM(s) Oral at bedtime  dextrose 50% Injectable 50 milliLiter(s) IV Push every 15 minutes  dextrose 50% Injectable 25 milliLiter(s) IV Push every 15 minutes  famotidine    Tablet 20 milliGRAM(s) Oral two times a day  heparin   Injectable 5000 Unit(s) SubCutaneous every 8 hours  insulin regular Infusion 1 Unit(s)/Hr (1 mL/Hr) IV Continuous <Continuous>  meperidine     Injectable 25 milliGRAM(s) IV Push once  metoprolol tartrate 25 milliGRAM(s) Oral two times a day  metoprolol tartrate Injectable 2.5 milliGRAM(s) IV Push once  niCARdipine Infusion 5 mG/Hr (25 mL/Hr) IV Continuous <Continuous>  oxybutynin 10 milliGRAM(s) Oral daily  polyethylene glycol 3350 17 Gram(s) Oral daily  simethicone 80 milliGRAM(s) Chew three times a day  sodium chloride 0.9%. 1000 milliLiter(s) (20 mL/Hr) IV Continuous <Continuous>    MEDICATIONS  (PRN):  oxyCODONE    IR 10 milliGRAM(s) Oral every 4 hours PRN Severe Pain (7 - 10)  oxyCODONE    IR 5 milliGRAM(s) Oral every 6 hours PRN Moderate Pain (4 - 6)      HEPARIN:  [x] YES [] NO  Dose: 5,000 UNITS/HR UNITS Q8H  SCD's: YES b/l  GI Prophylaxis: Protonix [], Pepcid [x], None [], (Contra-indication:.....)    Post-Op Beta-Blockers: Yes [x], No[], If No, then contraindication:  Post-Op Aspirin: Yes [x],  No [], If No, then contraindication:  Post-Op Statin: Yes [x], No[], If No, then contraindication:  Allergies    No Known Allergies    Intolerances      Ambulation/Activity Status: ambulate     Assessment/Plan:  70y Female status-post     C1L MV Repair          POD #   2  - Case and plan discussed with CTU Intensivist and CT Surgeon - Dr. Rojas  - Continue CTU supportive care    - Continue DVT/GI prophylaxis  - Incentive Spirometry 10 times an hour  - Continue to advance physical activity as tolerated and continue PT/OT as directed  1. CAD: Continue ASA, statin, BB  2. HTN: Continue amlodipine, increase lopressor   3. Overactive bladder: Continue oxybutynin  4. DM/Glucose Control: insulin sliding scale

## 2021-02-14 NOTE — PROCEDURE NOTE - NSPOSTCAREGUIDE_GEN_A_CORE
Verbal/written post procedure instructions were given to patient/caregiver/Instructed patient/caregiver to follow-up with primary care physician/Keep the cast/splint/dressing clean and dry/Care for catheter as per unit/ICU protocols

## 2021-02-14 NOTE — PROCEDURE NOTE - NSPROCDETAILS_GEN_ALL_CORE
location identified, draped/prepped, sterile technique used, needle inserted/introduced/positive blood return obtained via catheter/connected to a pressurized flush line/sutured in place/hemostasis with direct pressure, dressing applied/Seldinger technique/all materials/supplies accounted for at end of procedure

## 2021-02-14 NOTE — PROGRESS NOTE ADULT - SUBJECTIVE AND OBJECTIVE BOX
ADRIAN SAMUELS  MRN#: [id number]  Subjective:  Patient was seen and evalauted on AM rounds     OBJECTIVE:  ICU Vital Signs Last 24 Hrs  T(C): 36.7 (2021 07:41), Max: 37.1 (2021 18:00)  T(F): 98 (2021 07:41), Max: 98.7 (2021 18:00)  HR: 51 (2021 09:00) (51 - 131)  BP: 120/56 (2021 07:41) (112/61 - 145/63)  BP(mean): 81 (2021 07:41) (77 - 91)  ABP: 130/52 (2021 09:00) (117/57 - 153/65)  ABP(mean): 76 (2021 09:00) (73 - 94)  RR: 28 (2021 09:00) (15 - 30)  SpO2: 100% (2021 09:00) (97% - 100%)       @ 07: @ 07:00  --------------------------------------------------------  IN: 2886 mL / OUT: 1910 mL / NET: 976 mL     @ 07: @ 10:32  --------------------------------------------------------  IN: 370 mL / OUT: 195 mL / NET: 175 mL      CAPILLARY BLOOD GLUCOSE  141 (2021 07:00)      POCT Blood Glucose.: 115 mg/dL (2021 06:13)      PHYSICAL EXAM:Daily     Daily Weight in k.7 (2021 05:00)  General: WN/WD NAD    HEENT:     + NCAT  + EOMI  - Conjuctival edema   - Icterus   - Thrush   - ETT  - NGT/OGT    Neck:         + FROM    - JVD     - Nodes     - Masses    + Mid-line trachea   - Tracheostomy    Chest:         - Sternal click  - Sternal drainage  + Pacing wires  + Chest tubes  - SubQ emphysema    Lungs:          + CTA   - Rhonchi    - Rales    - Wheezing     - Decreased BS   - Dullness R L    Cardiac:       + S1 + S2    + RRR   - Irregular   - S3  - S4    - Murmurs   - Rub   - Hamman’s sign     Abdomen:    + BS     + Soft    + Non-tender     - Distended    - Organomegaly  - PEG    Extremities:   - Cyanosis U/L   - Clubbing  U/L  + LE Edema   + Capillary refill    + Pulses     Neuro:        + Awake   +  Alert   - Confused   - Lethargic   - Sedated   - Generalized Weakness    Skin:        - Rashes    - Erythema   + Normal incisions   + IV sites intact  - Sacral decubitus    HOSPITAL MEDICATIONS:  MEDICATIONS  (STANDING):  albumin human  5% IVPB 500 milliLiter(s) IV Intermittent once  aMIOdarone Infusion 0.5 mG/Min (16.7 mL/Hr) IV Continuous <Continuous>  amLODIPine   Tablet 10 milliGRAM(s) Oral daily  aspirin enteric coated 325 milliGRAM(s) Oral daily  atorvastatin 40 milliGRAM(s) Oral at bedtime  dextrose 40% Gel 15 Gram(s) Oral once  dextrose 5%. 1000 milliLiter(s) (50 mL/Hr) IV Continuous <Continuous>  dextrose 5%. 1000 milliLiter(s) (100 mL/Hr) IV Continuous <Continuous>  dextrose 50% Injectable 25 Gram(s) IV Push once  dextrose 50% Injectable 12.5 Gram(s) IV Push once  dextrose 50% Injectable 25 Gram(s) IV Push once  famotidine    Tablet 20 milliGRAM(s) Oral two times a day  glucagon  Injectable 1 milliGRAM(s) IntraMuscular once  heparin   Injectable 5000 Unit(s) SubCutaneous every 8 hours  insulin lispro (ADMELOG) corrective regimen sliding scale   SubCutaneous three times a day before meals  magnesium sulfate  IVPB 1 Gram(s) IV Intermittent every 12 hours  meperidine     Injectable 25 milliGRAM(s) IV Push once  metoprolol tartrate 25 milliGRAM(s) Oral two times a day  metoprolol tartrate Injectable 2.5 milliGRAM(s) IV Push once  niCARdipine Infusion 5 mG/Hr (25 mL/Hr) IV Continuous <Continuous>  oxybutynin 10 milliGRAM(s) Oral daily  polyethylene glycol 3350 17 Gram(s) Oral daily  simethicone 80 milliGRAM(s) Chew three times a day  sodium chloride 0.9%. 1000 milliLiter(s) (20 mL/Hr) IV Continuous <Continuous>    MEDICATIONS  (PRN):  oxyCODONE    IR 10 milliGRAM(s) Oral every 4 hours PRN Severe Pain (7 - 10)  oxyCODONE    IR 5 milliGRAM(s) Oral every 6 hours PRN Moderate Pain (4 - 6)      LABS:                        9.6    14.35 )-----------( 111      ( 2021 01:14 )             30.2    02-14    134<L>  |  102  |  25<H>  ----------------------------<  114<H>  3.5   |  22  |  1.0    Ca    8.5      2021 01:14  Mg     2.2     02-14    TPro  5.4<L>  /  Alb  4.0  /  TBili  0.9  /  DBili  x   /  AST  31  /  ALT  7   /  AlkPhos  45  02-14    PT/INR - ( 2021 01:14 )   PT: 12.80 sec;   INR: 1.11 ratio         PTT - ( 2021 01:14 )  PTT:28.2 sec LIVER FUNCTIONS - ( 2021 01:14 )  Alb: 4.0 g/dL / Pro: 5.4 g/dL / ALK PHOS: 45 U/L / ALT: 7 U/L / AST: 31 U/L / GGT: x               RADIOLOGY:  X Reviewed and interpreted by me: L-Lower Lung Field Opacity    CARDIOPULMONARY DYSFUNCTION  - Respiratory status required supplemental oxygen & the following of continuous pulse oximetry for support & to prevent decompensation  - Continued early mobilization as tolerated  - Addressed analgesic regimen to optimize function    PREVENTION-PROPHYLAXIS  - ASA thromboembolism prophylaxis  - Zocor was also started   - Heparin continued for VTE prophylaxis in addition to Venodyne boots  - Pepcid maintained for GI bleeding prophylaxis  - Lopressor initiated for atrial fibrillation prophylaxis  - Metabolic stability & infection prophylaxis required review and adjustment of regular Insulin sliding scale and gylcemic regimen while following serial glucose levels to help achieve & maintain euglycemia  - Reviewed & addressed surgical site infection prophylaxis regimen

## 2021-02-15 LAB
ALBUMIN SERPL ELPH-MCNC: 3.2 G/DL — LOW (ref 3.5–5.2)
ALBUMIN SERPL ELPH-MCNC: 3.5 G/DL — SIGNIFICANT CHANGE UP (ref 3.5–5.2)
ALBUMIN SERPL ELPH-MCNC: 3.6 G/DL — SIGNIFICANT CHANGE UP (ref 3.5–5.2)
ALP SERPL-CCNC: 43 U/L — SIGNIFICANT CHANGE UP (ref 30–115)
ALP SERPL-CCNC: 44 U/L — SIGNIFICANT CHANGE UP (ref 30–115)
ALP SERPL-CCNC: 47 U/L — SIGNIFICANT CHANGE UP (ref 30–115)
ALT FLD-CCNC: 221 U/L — HIGH (ref 0–41)
ALT FLD-CCNC: 225 U/L — HIGH (ref 0–41)
ALT FLD-CCNC: 244 U/L — HIGH (ref 0–41)
ANION GAP SERPL CALC-SCNC: 11 MMOL/L — SIGNIFICANT CHANGE UP (ref 7–14)
ANION GAP SERPL CALC-SCNC: 11 MMOL/L — SIGNIFICANT CHANGE UP (ref 7–14)
ANION GAP SERPL CALC-SCNC: 12 MMOL/L — SIGNIFICANT CHANGE UP (ref 7–14)
AST SERPL-CCNC: 212 U/L — HIGH (ref 0–41)
AST SERPL-CCNC: 270 U/L — HIGH (ref 0–41)
AST SERPL-CCNC: 372 U/L — HIGH (ref 0–41)
BASOPHILS # BLD AUTO: 0.01 K/UL — SIGNIFICANT CHANGE UP (ref 0–0.2)
BASOPHILS NFR BLD AUTO: 0.1 % — SIGNIFICANT CHANGE UP (ref 0–1)
BILIRUB SERPL-MCNC: 0.6 MG/DL — SIGNIFICANT CHANGE UP (ref 0.2–1.2)
BILIRUB SERPL-MCNC: 0.7 MG/DL — SIGNIFICANT CHANGE UP (ref 0.2–1.2)
BILIRUB SERPL-MCNC: 1 MG/DL — SIGNIFICANT CHANGE UP (ref 0.2–1.2)
BLD GP AB SCN SERPL QL: SIGNIFICANT CHANGE UP
BUN SERPL-MCNC: 36 MG/DL — HIGH (ref 10–20)
BUN SERPL-MCNC: 46 MG/DL — HIGH (ref 10–20)
BUN SERPL-MCNC: 48 MG/DL — HIGH (ref 10–20)
CALCIUM SERPL-MCNC: 7.9 MG/DL — LOW (ref 8.5–10.1)
CALCIUM SERPL-MCNC: 8 MG/DL — LOW (ref 8.5–10.1)
CALCIUM SERPL-MCNC: 8.2 MG/DL — LOW (ref 8.5–10.1)
CHLORIDE SERPL-SCNC: 103 MMOL/L — SIGNIFICANT CHANGE UP (ref 98–110)
CHLORIDE SERPL-SCNC: 105 MMOL/L — SIGNIFICANT CHANGE UP (ref 98–110)
CHLORIDE SERPL-SCNC: 105 MMOL/L — SIGNIFICANT CHANGE UP (ref 98–110)
CO2 SERPL-SCNC: 21 MMOL/L — SIGNIFICANT CHANGE UP (ref 17–32)
CO2 SERPL-SCNC: 21 MMOL/L — SIGNIFICANT CHANGE UP (ref 17–32)
CO2 SERPL-SCNC: 22 MMOL/L — SIGNIFICANT CHANGE UP (ref 17–32)
CREAT SERPL-MCNC: 1.3 MG/DL — SIGNIFICANT CHANGE UP (ref 0.7–1.5)
CREAT SERPL-MCNC: 1.4 MG/DL — SIGNIFICANT CHANGE UP (ref 0.7–1.5)
CREAT SERPL-MCNC: 1.5 MG/DL — SIGNIFICANT CHANGE UP (ref 0.7–1.5)
EOSINOPHIL # BLD AUTO: 0 K/UL — SIGNIFICANT CHANGE UP (ref 0–0.7)
EOSINOPHIL NFR BLD AUTO: 0 % — SIGNIFICANT CHANGE UP (ref 0–8)
GAS PNL BLDA: SIGNIFICANT CHANGE UP
GLUCOSE BLDC GLUCOMTR-MCNC: 114 MG/DL — HIGH (ref 70–99)
GLUCOSE BLDC GLUCOMTR-MCNC: 135 MG/DL — HIGH (ref 70–99)
GLUCOSE BLDC GLUCOMTR-MCNC: 143 MG/DL — HIGH (ref 70–99)
GLUCOSE BLDC GLUCOMTR-MCNC: 145 MG/DL — HIGH (ref 70–99)
GLUCOSE BLDC GLUCOMTR-MCNC: 161 MG/DL — HIGH (ref 70–99)
GLUCOSE BLDC GLUCOMTR-MCNC: 199 MG/DL — HIGH (ref 70–99)
GLUCOSE BLDC GLUCOMTR-MCNC: 213 MG/DL — HIGH (ref 70–99)
GLUCOSE BLDC GLUCOMTR-MCNC: 214 MG/DL — HIGH (ref 70–99)
GLUCOSE BLDC GLUCOMTR-MCNC: 220 MG/DL — HIGH (ref 70–99)
GLUCOSE BLDC GLUCOMTR-MCNC: 235 MG/DL — HIGH (ref 70–99)
GLUCOSE BLDC GLUCOMTR-MCNC: 235 MG/DL — HIGH (ref 70–99)
GLUCOSE BLDC GLUCOMTR-MCNC: 77 MG/DL — SIGNIFICANT CHANGE UP (ref 70–99)
GLUCOSE BLDC GLUCOMTR-MCNC: 95 MG/DL — SIGNIFICANT CHANGE UP (ref 70–99)
GLUCOSE BLDC GLUCOMTR-MCNC: 96 MG/DL — SIGNIFICANT CHANGE UP (ref 70–99)
GLUCOSE BLDC GLUCOMTR-MCNC: 97 MG/DL — SIGNIFICANT CHANGE UP (ref 70–99)
GLUCOSE SERPL-MCNC: 104 MG/DL — HIGH (ref 70–99)
GLUCOSE SERPL-MCNC: 126 MG/DL — HIGH (ref 70–99)
GLUCOSE SERPL-MCNC: 195 MG/DL — HIGH (ref 70–99)
HCT VFR BLD CALC: 25.1 % — LOW (ref 37–47)
HCT VFR BLD CALC: 26.6 % — LOW (ref 37–47)
HCT VFR BLD CALC: 28.7 % — LOW (ref 37–47)
HGB BLD-MCNC: 8.4 G/DL — LOW (ref 12–16)
HGB BLD-MCNC: 9.1 G/DL — LOW (ref 12–16)
HGB BLD-MCNC: 9.8 G/DL — LOW (ref 12–16)
IMM GRANULOCYTES NFR BLD AUTO: 0.4 % — HIGH (ref 0.1–0.3)
LYMPHOCYTES # BLD AUTO: 0.88 K/UL — LOW (ref 1.2–3.4)
LYMPHOCYTES # BLD AUTO: 5.3 % — LOW (ref 20.5–51.1)
MAGNESIUM SERPL-MCNC: 2.1 MG/DL — SIGNIFICANT CHANGE UP (ref 1.8–2.4)
MAGNESIUM SERPL-MCNC: 2.3 MG/DL — SIGNIFICANT CHANGE UP (ref 1.8–2.4)
MCHC RBC-ENTMCNC: 28 PG — SIGNIFICANT CHANGE UP (ref 27–31)
MCHC RBC-ENTMCNC: 28.4 PG — SIGNIFICANT CHANGE UP (ref 27–31)
MCHC RBC-ENTMCNC: 29 PG — SIGNIFICANT CHANGE UP (ref 27–31)
MCHC RBC-ENTMCNC: 33.5 G/DL — SIGNIFICANT CHANGE UP (ref 32–37)
MCHC RBC-ENTMCNC: 34.1 G/DL — SIGNIFICANT CHANGE UP (ref 32–37)
MCHC RBC-ENTMCNC: 34.2 G/DL — SIGNIFICANT CHANGE UP (ref 32–37)
MCV RBC AUTO: 83.1 FL — SIGNIFICANT CHANGE UP (ref 81–99)
MCV RBC AUTO: 83.7 FL — SIGNIFICANT CHANGE UP (ref 81–99)
MCV RBC AUTO: 84.9 FL — SIGNIFICANT CHANGE UP (ref 81–99)
MONOCYTES # BLD AUTO: 0.7 K/UL — HIGH (ref 0.1–0.6)
MONOCYTES NFR BLD AUTO: 4.2 % — SIGNIFICANT CHANGE UP (ref 1.7–9.3)
NEUTROPHILS # BLD AUTO: 14.89 K/UL — HIGH (ref 1.4–6.5)
NEUTROPHILS NFR BLD AUTO: 90 % — HIGH (ref 42.2–75.2)
NRBC # BLD: 0 /100 WBCS — SIGNIFICANT CHANGE UP (ref 0–0)
PLATELET # BLD AUTO: 109 K/UL — LOW (ref 130–400)
PLATELET # BLD AUTO: 125 K/UL — LOW (ref 130–400)
PLATELET # BLD AUTO: 98 K/UL — LOW (ref 130–400)
POTASSIUM SERPL-MCNC: 3.6 MMOL/L — SIGNIFICANT CHANGE UP (ref 3.5–5)
POTASSIUM SERPL-MCNC: 4.3 MMOL/L — SIGNIFICANT CHANGE UP (ref 3.5–5)
POTASSIUM SERPL-MCNC: 4.8 MMOL/L — SIGNIFICANT CHANGE UP (ref 3.5–5)
POTASSIUM SERPL-SCNC: 3.6 MMOL/L — SIGNIFICANT CHANGE UP (ref 3.5–5)
POTASSIUM SERPL-SCNC: 4.3 MMOL/L — SIGNIFICANT CHANGE UP (ref 3.5–5)
POTASSIUM SERPL-SCNC: 4.8 MMOL/L — SIGNIFICANT CHANGE UP (ref 3.5–5)
PROT SERPL-MCNC: 4.6 G/DL — LOW (ref 6–8)
PROT SERPL-MCNC: 4.7 G/DL — LOW (ref 6–8)
PROT SERPL-MCNC: 5 G/DL — LOW (ref 6–8)
RBC # BLD: 3 M/UL — LOW (ref 4.2–5.4)
RBC # BLD: 3.2 M/UL — LOW (ref 4.2–5.4)
RBC # BLD: 3.38 M/UL — LOW (ref 4.2–5.4)
RBC # FLD: 15.7 % — HIGH (ref 11.5–14.5)
RBC # FLD: 15.8 % — HIGH (ref 11.5–14.5)
RBC # FLD: 15.9 % — HIGH (ref 11.5–14.5)
SODIUM SERPL-SCNC: 137 MMOL/L — SIGNIFICANT CHANGE UP (ref 135–146)
WBC # BLD: 12.52 K/UL — HIGH (ref 4.8–10.8)
WBC # BLD: 14.92 K/UL — HIGH (ref 4.8–10.8)
WBC # BLD: 16.55 K/UL — HIGH (ref 4.8–10.8)
WBC # FLD AUTO: 12.52 K/UL — HIGH (ref 4.8–10.8)
WBC # FLD AUTO: 14.92 K/UL — HIGH (ref 4.8–10.8)
WBC # FLD AUTO: 16.55 K/UL — HIGH (ref 4.8–10.8)

## 2021-02-15 PROCEDURE — 93306 TTE W/DOPPLER COMPLETE: CPT | Mod: 26

## 2021-02-15 PROCEDURE — 99291 CRITICAL CARE FIRST HOUR: CPT

## 2021-02-15 PROCEDURE — 93010 ELECTROCARDIOGRAM REPORT: CPT

## 2021-02-15 PROCEDURE — 71045 X-RAY EXAM CHEST 1 VIEW: CPT | Mod: 26

## 2021-02-15 RX ORDER — INSULIN HUMAN 100 [IU]/ML
2 INJECTION, SOLUTION SUBCUTANEOUS
Qty: 100 | Refills: 0 | Status: DISCONTINUED | OUTPATIENT
Start: 2021-02-15 | End: 2021-02-18

## 2021-02-15 RX ORDER — DEXMEDETOMIDINE HYDROCHLORIDE IN 0.9% SODIUM CHLORIDE 4 UG/ML
1 INJECTION INTRAVENOUS
Qty: 200 | Refills: 0 | Status: DISCONTINUED | OUTPATIENT
Start: 2021-02-15 | End: 2021-02-16

## 2021-02-15 RX ORDER — CHLORHEXIDINE GLUCONATE 213 G/1000ML
1 SOLUTION TOPICAL
Refills: 0 | Status: DISCONTINUED | OUTPATIENT
Start: 2021-02-15 | End: 2021-03-01

## 2021-02-15 RX ORDER — BUMETANIDE 0.25 MG/ML
1 INJECTION INTRAMUSCULAR; INTRAVENOUS
Qty: 20 | Refills: 0 | Status: DISCONTINUED | OUTPATIENT
Start: 2021-02-15 | End: 2021-02-16

## 2021-02-15 RX ORDER — PANTOPRAZOLE SODIUM 20 MG/1
40 TABLET, DELAYED RELEASE ORAL DAILY
Refills: 0 | Status: DISCONTINUED | OUTPATIENT
Start: 2021-02-15 | End: 2021-02-18

## 2021-02-15 RX ORDER — BUMETANIDE 0.25 MG/ML
2 INJECTION INTRAMUSCULAR; INTRAVENOUS ONCE
Refills: 0 | Status: COMPLETED | OUTPATIENT
Start: 2021-02-15 | End: 2021-02-15

## 2021-02-15 RX ORDER — PROPOFOL 10 MG/ML
10 INJECTION, EMULSION INTRAVENOUS
Qty: 1000 | Refills: 0 | Status: DISCONTINUED | OUTPATIENT
Start: 2021-02-15 | End: 2021-02-16

## 2021-02-15 RX ORDER — POTASSIUM CHLORIDE 20 MEQ
20 PACKET (EA) ORAL ONCE
Refills: 0 | Status: COMPLETED | OUTPATIENT
Start: 2021-02-15 | End: 2021-02-15

## 2021-02-15 RX ADMIN — Medication 100 GRAM(S): at 06:30

## 2021-02-15 RX ADMIN — BUMETANIDE 5 MG/HR: 0.25 INJECTION INTRAMUSCULAR; INTRAVENOUS at 13:05

## 2021-02-15 RX ADMIN — CEFEPIME 100 MILLIGRAM(S): 1 INJECTION, POWDER, FOR SOLUTION INTRAMUSCULAR; INTRAVENOUS at 13:01

## 2021-02-15 RX ADMIN — Medication 100 GRAM(S): at 17:03

## 2021-02-15 RX ADMIN — Medication 250 MILLIGRAM(S): at 17:01

## 2021-02-15 RX ADMIN — BUMETANIDE 2 MILLIGRAM(S): 0.25 INJECTION INTRAMUSCULAR; INTRAVENOUS at 11:11

## 2021-02-15 RX ADMIN — CEFEPIME 100 MILLIGRAM(S): 1 INJECTION, POWDER, FOR SOLUTION INTRAMUSCULAR; INTRAVENOUS at 06:00

## 2021-02-15 RX ADMIN — Medication 100 MILLIEQUIVALENT(S): at 14:45

## 2021-02-15 RX ADMIN — PANTOPRAZOLE SODIUM 40 MILLIGRAM(S): 20 TABLET, DELAYED RELEASE ORAL at 12:17

## 2021-02-15 RX ADMIN — Medication 500 MICROGRAM(S): at 21:20

## 2021-02-15 RX ADMIN — Medication 300 MILLIGRAM(S): at 12:16

## 2021-02-15 RX ADMIN — Medication 250 MILLIGRAM(S): at 06:29

## 2021-02-15 RX ADMIN — Medication 100 MILLIEQUIVALENT(S): at 15:53

## 2021-02-15 RX ADMIN — CEFEPIME 100 MILLIGRAM(S): 1 INJECTION, POWDER, FOR SOLUTION INTRAMUSCULAR; INTRAVENOUS at 21:57

## 2021-02-15 RX ADMIN — HEPARIN SODIUM 5000 UNIT(S): 5000 INJECTION INTRAVENOUS; SUBCUTANEOUS at 06:30

## 2021-02-15 RX ADMIN — BUMETANIDE 5 MG/HR: 0.25 INJECTION INTRAMUSCULAR; INTRAVENOUS at 13:01

## 2021-02-15 NOTE — PROGRESS NOTE ADULT - SUBJECTIVE AND OBJECTIVE BOX
OPERATIVE PROCEDURE(s):   C1L MV Repair          POD # 3                   [id number]  SURGEON(s): Dr. Rojas  SUBJECTIVE ASSESSMENT: pt seen and examined. pt was reintubated yesterday due to hypoxic secondary to right lung hemothorax  Vital Signs Last 24 Hrs  T(F): 97 (15 Feb 2021 04:00), Max: 98 (14 Feb 2021 07:41)  HR: 93 (15 Feb 2021 07:00) (51 - 94)  BP: 115/57 (15 Feb 2021 07:00) (92/44 - 146/67)  BP(mean): 81 (15 Feb 2021 07:00) (63 - 96)  ABP: 100/49 (15 Feb 2021 07:00) (-2/-4 - 152/54)  ABP(mean): 66 (15 Feb 2021 07:00)  RR: 22 (15 Feb 2021 07:00) (0 - 49)  SpO2: 100% (15 Feb 2021 07:00) (83% - 100%)  CVP(mm Hg): -15 (14 Feb 2021 22:00)  CO: 4.7 (15 Feb 2021 07:00)  CI: 2.5 (15 Feb 2021 07:00)  PA: 39/38 (14 Feb 2021 22:00)  SVR: 2007 (14 Feb 2021 22:00)  Mode: AC/ CMV (Assist Control/ Continuous Mandatory Ventilation)  RR (machine): 18  TV (machine): 450  FiO2: 60  PEEP: 10  MAP: 16    I&O's Detail    14 Feb 2021 07:01  -  15 Feb 2021 07:00  --------------------------------------------------------  IN:    Amiodarone: 183.4 mL    IV PiggyBack: 350 mL    Milrinone: 98.1 mL    NiCARdipine: 105 mL    Norepinephrine: 198 mL    Oral Fluid: 600 mL    PRBCs (Packed Red Blood Cells): 550 mL    sodium chloride 0.9%: 480 mL    Vasopressin: 38.4 mL  Total IN: 2602.9 mL    OUT:    Chest Tube (mL): 350 mL    Chest Tube (mL): 130 mL    Indwelling Catheter - Urethral (mL): 430 mL    Voided (mL): 435 mL  Total OUT: 1345 mL        Net:   I&O's Detail    13 Feb 2021 07:01  -  14 Feb 2021 07:00  --------------------------------------------------------  Total NET: 976 mL      14 Feb 2021 07:01  -  15 Feb 2021 07:00  --------------------------------------------------------  Total NET: 1257.9 mL        CAPILLARY BLOOD GLUCOSE      POCT Blood Glucose.: 214 mg/dL (15 Feb 2021 03:20)  POCT Blood Glucose.: 213 mg/dL (15 Feb 2021 01:23)  POCT Blood Glucose.: 220 mg/dL (14 Feb 2021 11:17)    Physical Exam:  General: pt is intubated, sedated, following commands off sedation  Cardiac: + rubs, S1/S2, RRR, no murmur  Lungs: decreased bs bilaterally right > left   Abdomen: Soft/NT/ND; positive bowel sounds x 4  Sternum: Intact, no click, incision healing well with no drainage  Incisions: Incisions clean/dry/intact  Extremities: Trace edema b/l lower extremities; good capillary refill; no cyanosis; palpable 1+ pedal pulses b/l      Central Venous Catheter: Yes[x]  No[] , If Yes indication:     hd unstable      Day #3  Spencer Catheter: Yes  [x] , No  [] , If yes indication:    strict i/o                  Day #3  NGT: Yes [] No [x] ,    If Yes Placement:                                     Day #  EPICARDIAL WIRES:  [x] YES [] NO                                              Day #3  BOWEL MOVEMENT:  [] YES [x] NO, If No, Timing since last BM:      Day #  CHEST TUBE(Left/Right):  [x] YES [] NO, If yes -  AIR LEAKS:  [] YES [] NO        LABS:                        9.8<L>  16.55<H> )-----------( 98<L>    ( 15 Feb 2021 01:34 )             28.7<L>                        7.5<L>  14.96<H> )-----------( 126<L>    ( 14 Feb 2021 19:58 )             23.1<L>    02-15    137  |  105  |  36<H>  ----------------------------<  195<H>  4.8   |  21  |  1.3  02-14    137  |  104  |  32<H>  ----------------------------<  197<H>  5.3<H>   |  19  |  1.4    Ca    7.9<L>      15 Feb 2021 01:34  Mg     2.1     02-15    TPro  4.6<L> [6.0 - 8.0]  /  Alb  3.2<L> [3.5 - 5.2]  /  TBili  1.0 [0.2 - 1.2]  /  DBili  x   /  AST  372<H> [0 - 41]  /  ALT  221<H> [0 - 41]  /  AlkPhos  44 [30 - 115]  02-15    PT/INR - ( 14 Feb 2021 19:58 )   PT: ;   INR: 1.33 ratio         PT/INR - ( 14 Feb 2021 01:14 )   PT: ;   INR: 1.11 ratio         PTT - ( 14 Feb 2021 19:58 )  PTT:24.2 sec, PTT - ( 14 Feb 2021 01:14 )  PTT:28.2 sec    ABG - ( 15 Feb 2021 06:02 )  pH: 7.38  /  pCO2: 36    /  pO2: 108   / HCO3: 21    / Base Excess: -3.4  /  SaO2: 98    /  LA: 1.6              RADIOLOGY & ADDITIONAL TESTS:  CXR: < from: Xray Chest 1 View- PORTABLE-Routine (02.14.21 @ 05:06) >  Impression:    Status post a median sternotomy.    Left lower lung field opacity, unchanged.    Right IJ line.    < end of copied text >    EKG: `< from: 12 Lead ECG (02.14.21 @ 08:48) >  Ventricular Rate 55 BPM    Atrial Rate 55 BPM    P-R Interval 142 ms    QRS Duration 94 ms    Q-T Interval 446 ms    QTC Calculation(Bazett) 426 ms    P Axis 18 degrees    R Axis -13 degrees    T Axis 14 degrees    Diagnosis Line Sinus bradycardia with Premature atrial complexes  Minimal voltage criteria for LVH, may be normal variant  Inferior infarct , age undetermined  Abnormal ECG    < end of copied text >    MEDICATIONS  (STANDING):  albumin human  5% IVPB 500 milliLiter(s) IV Intermittent once  aMIOdarone Infusion 0.5 mG/Min (16.7 mL/Hr) IV Continuous <Continuous>  amLODIPine   Tablet 10 milliGRAM(s) Oral daily  aspirin enteric coated 325 milliGRAM(s) Oral daily  atorvastatin 40 milliGRAM(s) Oral at bedtime  cefepime   IVPB      cefepime   IVPB 1000 milliGRAM(s) IV Intermittent once  cefepime   IVPB 1000 milliGRAM(s) IV Intermittent every 8 hours  dextrose 40% Gel 15 Gram(s) Oral once  dextrose 5%. 1000 milliLiter(s) (50 mL/Hr) IV Continuous <Continuous>  dextrose 5%. 1000 milliLiter(s) (100 mL/Hr) IV Continuous <Continuous>  dextrose 50% Injectable 25 Gram(s) IV Push once  dextrose 50% Injectable 12.5 Gram(s) IV Push once  dextrose 50% Injectable 25 Gram(s) IV Push once  famotidine    Tablet 20 milliGRAM(s) Oral two times a day  glucagon  Injectable 1 milliGRAM(s) IntraMuscular once  guaiFENesin  milliGRAM(s) Oral every 12 hours  heparin   Injectable 5000 Unit(s) SubCutaneous every 8 hours  insulin lispro (ADMELOG) corrective regimen sliding scale   SubCutaneous three times a day before meals  insulin regular Infusion 2 Unit(s)/Hr (2 mL/Hr) IV Continuous <Continuous>  ipratropium    for Nebulization 500 MICROGram(s) Nebulizer every 6 hours  magnesium sulfate  IVPB 1 Gram(s) IV Intermittent every 12 hours  meperidine     Injectable 25 milliGRAM(s) IV Push once  metoprolol tartrate 25 milliGRAM(s) Oral two times a day  metoprolol tartrate Injectable 2.5 milliGRAM(s) IV Push once  milrinone Infusion 0.375 MICROgram(s)/kG/Min (10.9 mL/Hr) IV Continuous <Continuous>  niCARdipine Infusion 5 mG/Hr (25 mL/Hr) IV Continuous <Continuous>  norepinephrine Infusion 0.05 MICROgram(s)/kG/Min (9.09 mL/Hr) IV Continuous <Continuous>  oxybutynin 10 milliGRAM(s) Oral daily  polyethylene glycol 3350 17 Gram(s) Oral daily  simethicone 80 milliGRAM(s) Chew three times a day  sodium chloride 0.9%. 1000 milliLiter(s) (20 mL/Hr) IV Continuous <Continuous>  vancomycin  IVPB 1000 milliGRAM(s) IV Intermittent every 12 hours  vasopressin Infusion 0.04 Unit(s)/Min (2.4 mL/Hr) IV Continuous <Continuous>  veCURonium  Injectable 10 milliGRAM(s) IV Push once    MEDICATIONS  (PRN):  oxyCODONE    IR 10 milliGRAM(s) Oral every 4 hours PRN Severe Pain (7 - 10)  oxyCODONE    IR 5 milliGRAM(s) Oral every 6 hours PRN Moderate Pain (4 - 6)    HEPARIN:  [x] YES [] NO  Dose: 5000 UNITS Q8H  SCD's: YES b/l  GI Prophylaxis: Protonix [], Pepcid [x], None [], (Contra-indication:.....)    Post-Op Beta-Blockers: Yes [], No[x], If No, then contraindication: hypotensive on pressors   Post-Op Aspirin: Yes [x],  No [], If No, then contraindication:  Post-Op Statin: Yes [], No[x], If No, then contraindication: elevated lfts  Allergies    No Known Allergies    Intolerances      Ambulation/Activity Status: bedrest     Assessment/Plan:  70y Female status-post     C1L MV Repair          POD #   3- pt reintubated yesterday due to hypoxia secondary to loculated hemothorax s/p 2 right chest tubes placed.   - Case and plan discussed with CTU PA and CT Surgeon - Dr. Rojas  - Continue CTU supportive care    - Continue DVT/GI prophylaxis  - Incentive Spirometry 10 times an hour when extubated  - advance physical activity as tolerated and continue PT/OT as directed once off vent  1. cont asa, hold bb for hypotension now requiring pressors, hold statin for elevated lfts, DC milrinone, bumex 2mg iv x 1, repeat echo  2. hypotension: cont to monitor, wean pressors as tolerated  3. hypoxia secondary to respiratory failure- wean vent as tolerated   4. DM/Glucose Control: insulin gtt   5. leukocytosis- cont prophylactic abx, pt currently afebrile, cont to monitor   6. anemia secondary to acute surgical blood loss- pt was transfused 2 units prbc, will continue to monitor cbc        45 minutes of critical care time spent providing medical care for patient's acute illness/conditions that impairs at least one vital organ system and/or poses a high risk of imminent or life threatening deterioration in the patient's condition. It includes time spent evaluating and treating the patient's acute illness as well as time spent reviewing labs, radiology, discussing goals of care with patient and/or patient's family, and discussing the case with a multidisciplinary team in an effort to prevent further life threatening deterioration or end organ damage. This time is independent of any procedures performed.

## 2021-02-15 NOTE — PROGRESS NOTE ADULT - SUBJECTIVE AND OBJECTIVE BOX
OPERATIVE PROCEDURE(s):                POD #                       70yFemale  SURGEON(s): Dr. Rojas  SUBJECTIVE ASSESSMENT:   Vital Signs Last 24 Hrs  T(F): 97 (15 Feb 2021 04:00), Max: 98 (14 Feb 2021 07:41)  HR: 93 (15 Feb 2021 07:00) (51 - 94)  BP: 115/57 (15 Feb 2021 07:00) (92/44 - 146/67)  BP(mean): 81 (15 Feb 2021 07:00) (63 - 96)  ABP: 100/49 (15 Feb 2021 07:00) (-2/-4 - 152/54)  ABP(mean): 66 (15 Feb 2021 07:00)  RR: 22 (15 Feb 2021 07:00) (0 - 49)  SpO2: 100% (15 Feb 2021 07:00) (83% - 100%)  CVP(mm Hg): -15 (14 Feb 2021 22:00)  CVP(cm H2O): --  CO: 4.7 (15 Feb 2021 07:00)  CI: 2.5 (15 Feb 2021 07:00)  PA: 39/38 (14 Feb 2021 22:00)  SVR: 2007 (14 Feb 2021 22:00)  Mode: AC/ CMV (Assist Control/ Continuous Mandatory Ventilation)  RR (machine): 18  TV (machine): 450  FiO2: 60  PEEP: 10  MAP: 16    I&O's Detail    14 Feb 2021 07:01  -  15 Feb 2021 07:00  --------------------------------------------------------  IN:    Amiodarone: 183.4 mL    IV PiggyBack: 350 mL    Milrinone: 98.1 mL    NiCARdipine: 105 mL    Norepinephrine: 198 mL    Oral Fluid: 600 mL    PRBCs (Packed Red Blood Cells): 550 mL    sodium chloride 0.9%: 480 mL    Vasopressin: 38.4 mL  Total IN: 2602.9 mL    OUT:    Chest Tube (mL): 350 mL    Chest Tube (mL): 130 mL    Indwelling Catheter - Urethral (mL): 430 mL    Voided (mL): 435 mL  Total OUT: 1345 mL        Net:   I&O's Detail    13 Feb 2021 07:01  -  14 Feb 2021 07:00  --------------------------------------------------------  Total NET: 976 mL      14 Feb 2021 07:01  -  15 Feb 2021 07:00  --------------------------------------------------------  Total NET: 1257.9 mL        CAPILLARY BLOOD GLUCOSE      POCT Blood Glucose.: 214 mg/dL (15 Feb 2021 03:20)  POCT Blood Glucose.: 213 mg/dL (15 Feb 2021 01:23)  POCT Blood Glucose.: 220 mg/dL (14 Feb 2021 11:17)    Physical Exam:  General: NAD; A&Ox3/Patient is intubated and sedated  Cardiac: S1/S2, RRR, no murmur, no rubs  Lungs: unlabored respirations, CTA b/l, no wheeze, no rales, no crackles  Abdomen: Soft/NT/ND; positive bowel sounds x 4  Sternum: Intact, no click, incision healing well with no drainage  Incisions: Incisions clean/dry/intact  Extremities: No edema b/l lower extremities; good capillary refill; no cyanosis; palpable 1+ pedal pulses b/l    Central Venous Catheter: Yes[]  No[] , If Yes indication:           Day #  Spencer Catheter: Yes  [] , No  [] , If yes indication:                      Day #  NGT: Yes [] No [] ,    If Yes Placement:                                     Day #  EPICARDIAL WIRES:  [] YES [] NO                                              Day #  BOWEL MOVEMENT:  [] YES [] NO, If No, Timing since last BM:      Day #  CHEST TUBE(Left/Right):  [] YES [] NO, If yes -  AIR LEAKS:  [] YES [] NO        LABS:                        9.8<L>  16.55<H> )-----------( 98<L>    ( 15 Feb 2021 01:34 )             28.7<L>                        7.5<L>  14.96<H> )-----------( 126<L>    ( 14 Feb 2021 19:58 )             23.1<L>    02-15    137  |  105  |  36<H>  ----------------------------<  195<H>  4.8   |  21  |  1.3  02-14    137  |  104  |  32<H>  ----------------------------<  197<H>  5.3<H>   |  19  |  1.4    Ca    7.9<L>      15 Feb 2021 01:34  Mg     2.1     02-15    TPro  4.6<L> [6.0 - 8.0]  /  Alb  3.2<L> [3.5 - 5.2]  /  TBili  1.0 [0.2 - 1.2]  /  DBili  x   /  AST  372<H> [0 - 41]  /  ALT  221<H> [0 - 41]  /  AlkPhos  44 [30 - 115]  02-15    PT/INR - ( 14 Feb 2021 19:58 )   PT: ;   INR: 1.33 ratio         PT/INR - ( 14 Feb 2021 01:14 )   PT: ;   INR: 1.11 ratio         PTT - ( 14 Feb 2021 19:58 )  PTT:24.2 sec, PTT - ( 14 Feb 2021 01:14 )  PTT:28.2 sec    ABG - ( 15 Feb 2021 06:02 )  pH: 7.38  /  pCO2: 36    /  pO2: 108   / HCO3: 21    / Base Excess: -3.4  /  SaO2: 98    /  LA: 1.6              RADIOLOGY & ADDITIONAL TESTS:  CXR:  EKG:  MEDICATIONS  (STANDING):  albumin human  5% IVPB 500 milliLiter(s) IV Intermittent once  aMIOdarone Infusion 0.5 mG/Min (16.7 mL/Hr) IV Continuous <Continuous>  amLODIPine   Tablet 10 milliGRAM(s) Oral daily  aspirin enteric coated 325 milliGRAM(s) Oral daily  atorvastatin 40 milliGRAM(s) Oral at bedtime  cefepime   IVPB      cefepime   IVPB 1000 milliGRAM(s) IV Intermittent once  cefepime   IVPB 1000 milliGRAM(s) IV Intermittent every 8 hours  dextrose 40% Gel 15 Gram(s) Oral once  dextrose 5%. 1000 milliLiter(s) (50 mL/Hr) IV Continuous <Continuous>  dextrose 5%. 1000 milliLiter(s) (100 mL/Hr) IV Continuous <Continuous>  dextrose 50% Injectable 25 Gram(s) IV Push once  dextrose 50% Injectable 12.5 Gram(s) IV Push once  dextrose 50% Injectable 25 Gram(s) IV Push once  famotidine    Tablet 20 milliGRAM(s) Oral two times a day  glucagon  Injectable 1 milliGRAM(s) IntraMuscular once  guaiFENesin  milliGRAM(s) Oral every 12 hours  heparin   Injectable 5000 Unit(s) SubCutaneous every 8 hours  insulin lispro (ADMELOG) corrective regimen sliding scale   SubCutaneous three times a day before meals  insulin regular Infusion 2 Unit(s)/Hr (2 mL/Hr) IV Continuous <Continuous>  ipratropium    for Nebulization 500 MICROGram(s) Nebulizer every 6 hours  magnesium sulfate  IVPB 1 Gram(s) IV Intermittent every 12 hours  meperidine     Injectable 25 milliGRAM(s) IV Push once  metoprolol tartrate 25 milliGRAM(s) Oral two times a day  metoprolol tartrate Injectable 2.5 milliGRAM(s) IV Push once  milrinone Infusion 0.375 MICROgram(s)/kG/Min (10.9 mL/Hr) IV Continuous <Continuous>  niCARdipine Infusion 5 mG/Hr (25 mL/Hr) IV Continuous <Continuous>  norepinephrine Infusion 0.05 MICROgram(s)/kG/Min (9.09 mL/Hr) IV Continuous <Continuous>  oxybutynin 10 milliGRAM(s) Oral daily  polyethylene glycol 3350 17 Gram(s) Oral daily  simethicone 80 milliGRAM(s) Chew three times a day  sodium chloride 0.9%. 1000 milliLiter(s) (20 mL/Hr) IV Continuous <Continuous>  vancomycin  IVPB 1000 milliGRAM(s) IV Intermittent every 12 hours  vasopressin Infusion 0.04 Unit(s)/Min (2.4 mL/Hr) IV Continuous <Continuous>  veCURonium  Injectable 10 milliGRAM(s) IV Push once    MEDICATIONS  (PRN):  oxyCODONE    IR 10 milliGRAM(s) Oral every 4 hours PRN Severe Pain (7 - 10)  oxyCODONE    IR 5 milliGRAM(s) Oral every 6 hours PRN Moderate Pain (4 - 6)    HEPARIN:  [] YES [] NO  Dose: XX UNITS/HR UNITS Q8H  LOVENOX:[] YES [] NO  Dose: XX mg Q24H  COUMADIN: []  YES [] NO  Dose: XX mg  Q24H  SCD's: YES b/l  GI Prophylaxis: Protonix [], Pepcid [], None [], (Contra-indication:.....)    Post-Op Beta-Blockers: Yes [], No[], If No, then contraindication:  Post-Op Aspirin: Yes [],  No [], If No, then contraindication:  Post-Op Statin: Yes [], No[], If No, then contraindication:  Allergies    No Known Allergies    Intolerances      Ambulation/Activity Status:    Assessment/Plan:  70y Female status-post .....  - Case and plan discussed with CTU Intensivist and CT Surgeon - Dr. Rojas/Edgar/Patricia  - Continue CTU supportive care    - Continue DVT/GI prophylaxis  - Incentive Spirometry 10 times an hour  - Continue to advance physical activity as tolerated and continue PT/OT as directed  1. CAD: Continue ASA, statin, BB  2. HTN:   3. A. Fib:   4. COPD/Hypoxia:   5. DM/Glucose Control:     Social Service Disposition:     OPERATIVE PROCEDURE(s):   C1L MV Repair          POD # 3                   70yFemale  SURGEON(s): Dr. Rojas  SUBJECTIVE ASSESSMENT: pt seen and examined. pt was reintubated yesterday  Vital Signs Last 24 Hrs  T(F): 97 (15 Feb 2021 04:00), Max: 98 (14 Feb 2021 07:41)  HR: 93 (15 Feb 2021 07:00) (51 - 94)  BP: 115/57 (15 Feb 2021 07:00) (92/44 - 146/67)  BP(mean): 81 (15 Feb 2021 07:00) (63 - 96)  ABP: 100/49 (15 Feb 2021 07:00) (-2/-4 - 152/54)  ABP(mean): 66 (15 Feb 2021 07:00)  RR: 22 (15 Feb 2021 07:00) (0 - 49)  SpO2: 100% (15 Feb 2021 07:00) (83% - 100%)  CVP(mm Hg): -15 (14 Feb 2021 22:00)  CO: 4.7 (15 Feb 2021 07:00)  CI: 2.5 (15 Feb 2021 07:00)  PA: 39/38 (14 Feb 2021 22:00)  SVR: 2007 (14 Feb 2021 22:00)  Mode: AC/ CMV (Assist Control/ Continuous Mandatory Ventilation)  RR (machine): 18  TV (machine): 450  FiO2: 60  PEEP: 10  MAP: 16    I&O's Detail    14 Feb 2021 07:01  -  15 Feb 2021 07:00  --------------------------------------------------------  IN:    Amiodarone: 183.4 mL    IV PiggyBack: 350 mL    Milrinone: 98.1 mL    NiCARdipine: 105 mL    Norepinephrine: 198 mL    Oral Fluid: 600 mL    PRBCs (Packed Red Blood Cells): 550 mL    sodium chloride 0.9%: 480 mL    Vasopressin: 38.4 mL  Total IN: 2602.9 mL    OUT:    Chest Tube (mL): 350 mL    Chest Tube (mL): 130 mL    Indwelling Catheter - Urethral (mL): 430 mL    Voided (mL): 435 mL  Total OUT: 1345 mL        Net:   I&O's Detail    13 Feb 2021 07:01  -  14 Feb 2021 07:00  --------------------------------------------------------  Total NET: 976 mL      14 Feb 2021 07:01  -  15 Feb 2021 07:00  --------------------------------------------------------  Total NET: 1257.9 mL        CAPILLARY BLOOD GLUCOSE      POCT Blood Glucose.: 214 mg/dL (15 Feb 2021 03:20)  POCT Blood Glucose.: 213 mg/dL (15 Feb 2021 01:23)  POCT Blood Glucose.: 220 mg/dL (14 Feb 2021 11:17)    Physical Exam:  General: pt is intubated, sedated   Cardiac: + rubs, S1/S2, RRR, no murmur  Lungs:   Abdomen: Soft/NT/ND; positive bowel sounds x 4  Sternum: Intact, no click, incision healing well with no drainage  Incisions: Incisions clean/dry/intact  Extremities: Trace edema b/l lower extremities; good capillary refill; no cyanosis; palpable 1+ pedal pulses b/l      Central Venous Catheter: Yes[x]  No[] , If Yes indication:     hd unstable      Day #3  Spencer Catheter: Yes  [x] , No  [] , If yes indication:    strict i/o                  Day #3  NGT: Yes [] No [x] ,    If Yes Placement:                                     Day #  EPICARDIAL WIRES:  [x] YES [] NO                                              Day #3  BOWEL MOVEMENT:  [] YES [x] NO, If No, Timing since last BM:      Day #  CHEST TUBE(Left/Right):  [x] YES [] NO, If yes -  AIR LEAKS:  [] YES [] NO        LABS:                        9.8<L>  16.55<H> )-----------( 98<L>    ( 15 Feb 2021 01:34 )             28.7<L>                        7.5<L>  14.96<H> )-----------( 126<L>    ( 14 Feb 2021 19:58 )             23.1<L>    02-15    137  |  105  |  36<H>  ----------------------------<  195<H>  4.8   |  21  |  1.3  02-14    137  |  104  |  32<H>  ----------------------------<  197<H>  5.3<H>   |  19  |  1.4    Ca    7.9<L>      15 Feb 2021 01:34  Mg     2.1     02-15    TPro  4.6<L> [6.0 - 8.0]  /  Alb  3.2<L> [3.5 - 5.2]  /  TBili  1.0 [0.2 - 1.2]  /  DBili  x   /  AST  372<H> [0 - 41]  /  ALT  221<H> [0 - 41]  /  AlkPhos  44 [30 - 115]  02-15    PT/INR - ( 14 Feb 2021 19:58 )   PT: ;   INR: 1.33 ratio         PT/INR - ( 14 Feb 2021 01:14 )   PT: ;   INR: 1.11 ratio         PTT - ( 14 Feb 2021 19:58 )  PTT:24.2 sec, PTT - ( 14 Feb 2021 01:14 )  PTT:28.2 sec    ABG - ( 15 Feb 2021 06:02 )  pH: 7.38  /  pCO2: 36    /  pO2: 108   / HCO3: 21    / Base Excess: -3.4  /  SaO2: 98    /  LA: 1.6              RADIOLOGY & ADDITIONAL TESTS:  CXR: < from: Xray Chest 1 View- PORTABLE-Routine (02.14.21 @ 05:06) >  Impression:    Status post a median sternotomy.    Left lower lung field opacity, unchanged.    Right IJ line.    < end of copied text >    EKG: `< from: 12 Lead ECG (02.14.21 @ 08:48) >  Ventricular Rate 55 BPM    Atrial Rate 55 BPM    P-R Interval 142 ms    QRS Duration 94 ms    Q-T Interval 446 ms    QTC Calculation(Bazett) 426 ms    P Axis 18 degrees    R Axis -13 degrees    T Axis 14 degrees    Diagnosis Line Sinus bradycardia with Premature atrial complexes  Minimal voltage criteria for LVH, may be normal variant  Inferior infarct , age undetermined  Abnormal ECG    < end of copied text >    MEDICATIONS  (STANDING):  albumin human  5% IVPB 500 milliLiter(s) IV Intermittent once  aMIOdarone Infusion 0.5 mG/Min (16.7 mL/Hr) IV Continuous <Continuous>  amLODIPine   Tablet 10 milliGRAM(s) Oral daily  aspirin enteric coated 325 milliGRAM(s) Oral daily  atorvastatin 40 milliGRAM(s) Oral at bedtime  cefepime   IVPB      cefepime   IVPB 1000 milliGRAM(s) IV Intermittent once  cefepime   IVPB 1000 milliGRAM(s) IV Intermittent every 8 hours  dextrose 40% Gel 15 Gram(s) Oral once  dextrose 5%. 1000 milliLiter(s) (50 mL/Hr) IV Continuous <Continuous>  dextrose 5%. 1000 milliLiter(s) (100 mL/Hr) IV Continuous <Continuous>  dextrose 50% Injectable 25 Gram(s) IV Push once  dextrose 50% Injectable 12.5 Gram(s) IV Push once  dextrose 50% Injectable 25 Gram(s) IV Push once  famotidine    Tablet 20 milliGRAM(s) Oral two times a day  glucagon  Injectable 1 milliGRAM(s) IntraMuscular once  guaiFENesin  milliGRAM(s) Oral every 12 hours  heparin   Injectable 5000 Unit(s) SubCutaneous every 8 hours  insulin lispro (ADMELOG) corrective regimen sliding scale   SubCutaneous three times a day before meals  insulin regular Infusion 2 Unit(s)/Hr (2 mL/Hr) IV Continuous <Continuous>  ipratropium    for Nebulization 500 MICROGram(s) Nebulizer every 6 hours  magnesium sulfate  IVPB 1 Gram(s) IV Intermittent every 12 hours  meperidine     Injectable 25 milliGRAM(s) IV Push once  metoprolol tartrate 25 milliGRAM(s) Oral two times a day  metoprolol tartrate Injectable 2.5 milliGRAM(s) IV Push once  milrinone Infusion 0.375 MICROgram(s)/kG/Min (10.9 mL/Hr) IV Continuous <Continuous>  niCARdipine Infusion 5 mG/Hr (25 mL/Hr) IV Continuous <Continuous>  norepinephrine Infusion 0.05 MICROgram(s)/kG/Min (9.09 mL/Hr) IV Continuous <Continuous>  oxybutynin 10 milliGRAM(s) Oral daily  polyethylene glycol 3350 17 Gram(s) Oral daily  simethicone 80 milliGRAM(s) Chew three times a day  sodium chloride 0.9%. 1000 milliLiter(s) (20 mL/Hr) IV Continuous <Continuous>  vancomycin  IVPB 1000 milliGRAM(s) IV Intermittent every 12 hours  vasopressin Infusion 0.04 Unit(s)/Min (2.4 mL/Hr) IV Continuous <Continuous>  veCURonium  Injectable 10 milliGRAM(s) IV Push once    MEDICATIONS  (PRN):  oxyCODONE    IR 10 milliGRAM(s) Oral every 4 hours PRN Severe Pain (7 - 10)  oxyCODONE    IR 5 milliGRAM(s) Oral every 6 hours PRN Moderate Pain (4 - 6)    HEPARIN:  [x] YES [] NO  Dose: 5000 UNITS Q8H  SCD's: YES b/l  GI Prophylaxis: Protonix [], Pepcid [x], None [], (Contra-indication:.....)    Post-Op Beta-Blockers: Yes [], No[], If No, then contraindication:  Post-Op Aspirin: Yes [],  No [], If No, then contraindication:  Post-Op Statin: Yes [], No[], If No, then contraindication:  Allergies    No Known Allergies    Intolerances      Ambulation/Activity Status:    Assessment/Plan:  70y Female status-post .....  - Case and plan discussed with CTU Intensivist and CT Surgeon - Dr. Rojas/Edgar/Patricia  - Continue CTU supportive care    - Continue DVT/GI prophylaxis  - Incentive Spirometry 10 times an hour  - Continue to advance physical activity as tolerated and continue PT/OT as directed  1. CAD: Continue ASA, statin, BB  2. HTN:   3. A. Fib:   4. COPD/Hypoxia:   5. DM/Glucose Control:     Social Service Disposition:     OPERATIVE PROCEDURE(s):   C1L MV Repair          POD # 3                   70yFemale  SURGEON(s): Dr. Rojas  SUBJECTIVE ASSESSMENT: pt seen and examined. pt was reintubated yesterday due to hypoxic secondary to right lung hemothorax  Vital Signs Last 24 Hrs  T(F): 97 (15 Feb 2021 04:00), Max: 98 (14 Feb 2021 07:41)  HR: 93 (15 Feb 2021 07:00) (51 - 94)  BP: 115/57 (15 Feb 2021 07:00) (92/44 - 146/67)  BP(mean): 81 (15 Feb 2021 07:00) (63 - 96)  ABP: 100/49 (15 Feb 2021 07:00) (-2/-4 - 152/54)  ABP(mean): 66 (15 Feb 2021 07:00)  RR: 22 (15 Feb 2021 07:00) (0 - 49)  SpO2: 100% (15 Feb 2021 07:00) (83% - 100%)  CVP(mm Hg): -15 (14 Feb 2021 22:00)  CO: 4.7 (15 Feb 2021 07:00)  CI: 2.5 (15 Feb 2021 07:00)  PA: 39/38 (14 Feb 2021 22:00)  SVR: 2007 (14 Feb 2021 22:00)  Mode: AC/ CMV (Assist Control/ Continuous Mandatory Ventilation)  RR (machine): 18  TV (machine): 450  FiO2: 60  PEEP: 10  MAP: 16    I&O's Detail    14 Feb 2021 07:01  -  15 Feb 2021 07:00  --------------------------------------------------------  IN:    Amiodarone: 183.4 mL    IV PiggyBack: 350 mL    Milrinone: 98.1 mL    NiCARdipine: 105 mL    Norepinephrine: 198 mL    Oral Fluid: 600 mL    PRBCs (Packed Red Blood Cells): 550 mL    sodium chloride 0.9%: 480 mL    Vasopressin: 38.4 mL  Total IN: 2602.9 mL    OUT:    Chest Tube (mL): 350 mL    Chest Tube (mL): 130 mL    Indwelling Catheter - Urethral (mL): 430 mL    Voided (mL): 435 mL  Total OUT: 1345 mL        Net:   I&O's Detail    13 Feb 2021 07:01  -  14 Feb 2021 07:00  --------------------------------------------------------  Total NET: 976 mL      14 Feb 2021 07:01  -  15 Feb 2021 07:00  --------------------------------------------------------  Total NET: 1257.9 mL        CAPILLARY BLOOD GLUCOSE      POCT Blood Glucose.: 214 mg/dL (15 Feb 2021 03:20)  POCT Blood Glucose.: 213 mg/dL (15 Feb 2021 01:23)  POCT Blood Glucose.: 220 mg/dL (14 Feb 2021 11:17)    Physical Exam:  General: pt is intubated, sedated   Cardiac: + rubs, S1/S2, RRR, no murmur  Lungs:   Abdomen: Soft/NT/ND; positive bowel sounds x 4  Sternum: Intact, no click, incision healing well with no drainage  Incisions: Incisions clean/dry/intact  Extremities: Trace edema b/l lower extremities; good capillary refill; no cyanosis; palpable 1+ pedal pulses b/l      Central Venous Catheter: Yes[x]  No[] , If Yes indication:     hd unstable      Day #3  Spencer Catheter: Yes  [x] , No  [] , If yes indication:    strict i/o                  Day #3  NGT: Yes [] No [x] ,    If Yes Placement:                                     Day #  EPICARDIAL WIRES:  [x] YES [] NO                                              Day #3  BOWEL MOVEMENT:  [] YES [x] NO, If No, Timing since last BM:      Day #  CHEST TUBE(Left/Right):  [x] YES [] NO, If yes -  AIR LEAKS:  [] YES [] NO        LABS:                        9.8<L>  16.55<H> )-----------( 98<L>    ( 15 Feb 2021 01:34 )             28.7<L>                        7.5<L>  14.96<H> )-----------( 126<L>    ( 14 Feb 2021 19:58 )             23.1<L>    02-15    137  |  105  |  36<H>  ----------------------------<  195<H>  4.8   |  21  |  1.3  02-14    137  |  104  |  32<H>  ----------------------------<  197<H>  5.3<H>   |  19  |  1.4    Ca    7.9<L>      15 Feb 2021 01:34  Mg     2.1     02-15    TPro  4.6<L> [6.0 - 8.0]  /  Alb  3.2<L> [3.5 - 5.2]  /  TBili  1.0 [0.2 - 1.2]  /  DBili  x   /  AST  372<H> [0 - 41]  /  ALT  221<H> [0 - 41]  /  AlkPhos  44 [30 - 115]  02-15    PT/INR - ( 14 Feb 2021 19:58 )   PT: ;   INR: 1.33 ratio         PT/INR - ( 14 Feb 2021 01:14 )   PT: ;   INR: 1.11 ratio         PTT - ( 14 Feb 2021 19:58 )  PTT:24.2 sec, PTT - ( 14 Feb 2021 01:14 )  PTT:28.2 sec    ABG - ( 15 Feb 2021 06:02 )  pH: 7.38  /  pCO2: 36    /  pO2: 108   / HCO3: 21    / Base Excess: -3.4  /  SaO2: 98    /  LA: 1.6              RADIOLOGY & ADDITIONAL TESTS:  CXR: < from: Xray Chest 1 View- PORTABLE-Routine (02.14.21 @ 05:06) >  Impression:    Status post a median sternotomy.    Left lower lung field opacity, unchanged.    Right IJ line.    < end of copied text >    EKG: `< from: 12 Lead ECG (02.14.21 @ 08:48) >  Ventricular Rate 55 BPM    Atrial Rate 55 BPM    P-R Interval 142 ms    QRS Duration 94 ms    Q-T Interval 446 ms    QTC Calculation(Bazett) 426 ms    P Axis 18 degrees    R Axis -13 degrees    T Axis 14 degrees    Diagnosis Line Sinus bradycardia with Premature atrial complexes  Minimal voltage criteria for LVH, may be normal variant  Inferior infarct , age undetermined  Abnormal ECG    < end of copied text >    MEDICATIONS  (STANDING):  albumin human  5% IVPB 500 milliLiter(s) IV Intermittent once  aMIOdarone Infusion 0.5 mG/Min (16.7 mL/Hr) IV Continuous <Continuous>  amLODIPine   Tablet 10 milliGRAM(s) Oral daily  aspirin enteric coated 325 milliGRAM(s) Oral daily  atorvastatin 40 milliGRAM(s) Oral at bedtime  cefepime   IVPB      cefepime   IVPB 1000 milliGRAM(s) IV Intermittent once  cefepime   IVPB 1000 milliGRAM(s) IV Intermittent every 8 hours  dextrose 40% Gel 15 Gram(s) Oral once  dextrose 5%. 1000 milliLiter(s) (50 mL/Hr) IV Continuous <Continuous>  dextrose 5%. 1000 milliLiter(s) (100 mL/Hr) IV Continuous <Continuous>  dextrose 50% Injectable 25 Gram(s) IV Push once  dextrose 50% Injectable 12.5 Gram(s) IV Push once  dextrose 50% Injectable 25 Gram(s) IV Push once  famotidine    Tablet 20 milliGRAM(s) Oral two times a day  glucagon  Injectable 1 milliGRAM(s) IntraMuscular once  guaiFENesin  milliGRAM(s) Oral every 12 hours  heparin   Injectable 5000 Unit(s) SubCutaneous every 8 hours  insulin lispro (ADMELOG) corrective regimen sliding scale   SubCutaneous three times a day before meals  insulin regular Infusion 2 Unit(s)/Hr (2 mL/Hr) IV Continuous <Continuous>  ipratropium    for Nebulization 500 MICROGram(s) Nebulizer every 6 hours  magnesium sulfate  IVPB 1 Gram(s) IV Intermittent every 12 hours  meperidine     Injectable 25 milliGRAM(s) IV Push once  metoprolol tartrate 25 milliGRAM(s) Oral two times a day  metoprolol tartrate Injectable 2.5 milliGRAM(s) IV Push once  milrinone Infusion 0.375 MICROgram(s)/kG/Min (10.9 mL/Hr) IV Continuous <Continuous>  niCARdipine Infusion 5 mG/Hr (25 mL/Hr) IV Continuous <Continuous>  norepinephrine Infusion 0.05 MICROgram(s)/kG/Min (9.09 mL/Hr) IV Continuous <Continuous>  oxybutynin 10 milliGRAM(s) Oral daily  polyethylene glycol 3350 17 Gram(s) Oral daily  simethicone 80 milliGRAM(s) Chew three times a day  sodium chloride 0.9%. 1000 milliLiter(s) (20 mL/Hr) IV Continuous <Continuous>  vancomycin  IVPB 1000 milliGRAM(s) IV Intermittent every 12 hours  vasopressin Infusion 0.04 Unit(s)/Min (2.4 mL/Hr) IV Continuous <Continuous>  veCURonium  Injectable 10 milliGRAM(s) IV Push once    MEDICATIONS  (PRN):  oxyCODONE    IR 10 milliGRAM(s) Oral every 4 hours PRN Severe Pain (7 - 10)  oxyCODONE    IR 5 milliGRAM(s) Oral every 6 hours PRN Moderate Pain (4 - 6)    HEPARIN:  [x] YES [] NO  Dose: 5000 UNITS Q8H  SCD's: YES b/l  GI Prophylaxis: Protonix [], Pepcid [x], None [], (Contra-indication:.....)    Post-Op Beta-Blockers: Yes [], No[x], If No, then contraindication: hypotensive on pressors   Post-Op Aspirin: Yes [x],  No [], If No, then contraindication:  Post-Op Statin: Yes [], No[x], If No, then contraindication: elevated lfts  Allergies    No Known Allergies    Intolerances      Ambulation/Activity Status: bedrest     Assessment/Plan:  70y Female status-post     C1L MV Repair          POD #   3- pt reintubated yesterday due to hypoxia secondary to loculated hemothorax s/p 2 right chest tubes placed.   - Case and plan discussed with CTU Intensivist and CT Surgeon - Dr. Rojas  - Continue CTU supportive care    - Continue DVT/GI prophylaxis  - Incentive Spirometry 10 times an hour  - Continue to advance physical activity as tolerated and continue PT/OT as directed  1. cont asa, hold bb for hypotension now requiring pressors, hold statin for elevated lfts, cont milrinone  2. hypotension: cont to monitor, wean pressors as tolerated  3. hypoxia secondary to respiratory failure- wean vent as tolerated   4. DM/Glucose Control: insulin gtt   5. leukocytosis- cont prophylactic abx, pt currently afebrile, cont to monitor   6. anemia secondary to acute surgical blood loss- pt was transfused 2 units prbc, will continue to monitor cbc       OPERATIVE PROCEDURE(s):   C1L MV Repair          POD # 3                   [id number]  SURGEON(s): Dr. Rojas  SUBJECTIVE ASSESSMENT: pt seen and examined. pt was reintubated yesterday due to hypoxic secondary to right lung hemothorax  Vital Signs Last 24 Hrs  T(F): 97 (15 Feb 2021 04:00), Max: 98 (14 Feb 2021 07:41)  HR: 93 (15 Feb 2021 07:00) (51 - 94)  BP: 115/57 (15 Feb 2021 07:00) (92/44 - 146/67)  BP(mean): 81 (15 Feb 2021 07:00) (63 - 96)  ABP: 100/49 (15 Feb 2021 07:00) (-2/-4 - 152/54)  ABP(mean): 66 (15 Feb 2021 07:00)  RR: 22 (15 Feb 2021 07:00) (0 - 49)  SpO2: 100% (15 Feb 2021 07:00) (83% - 100%)  CVP(mm Hg): -15 (14 Feb 2021 22:00)  CO: 4.7 (15 Feb 2021 07:00)  CI: 2.5 (15 Feb 2021 07:00)  PA: 39/38 (14 Feb 2021 22:00)  SVR: 2007 (14 Feb 2021 22:00)  Mode: AC/ CMV (Assist Control/ Continuous Mandatory Ventilation)  RR (machine): 18  TV (machine): 450  FiO2: 60  PEEP: 10  MAP: 16    I&O's Detail    14 Feb 2021 07:01  -  15 Feb 2021 07:00  --------------------------------------------------------  IN:    Amiodarone: 183.4 mL    IV PiggyBack: 350 mL    Milrinone: 98.1 mL    NiCARdipine: 105 mL    Norepinephrine: 198 mL    Oral Fluid: 600 mL    PRBCs (Packed Red Blood Cells): 550 mL    sodium chloride 0.9%: 480 mL    Vasopressin: 38.4 mL  Total IN: 2602.9 mL    OUT:    Chest Tube (mL): 350 mL    Chest Tube (mL): 130 mL    Indwelling Catheter - Urethral (mL): 430 mL    Voided (mL): 435 mL  Total OUT: 1345 mL        Net:   I&O's Detail    13 Feb 2021 07:01  -  14 Feb 2021 07:00  --------------------------------------------------------  Total NET: 976 mL      14 Feb 2021 07:01  -  15 Feb 2021 07:00  --------------------------------------------------------  Total NET: 1257.9 mL        CAPILLARY BLOOD GLUCOSE      POCT Blood Glucose.: 214 mg/dL (15 Feb 2021 03:20)  POCT Blood Glucose.: 213 mg/dL (15 Feb 2021 01:23)  POCT Blood Glucose.: 220 mg/dL (14 Feb 2021 11:17)    Physical Exam:  General: pt is intubated, sedated, following commands off sedation  Cardiac: + rubs, S1/S2, RRR, no murmur  Lungs: decreased bs bilaterally right > left   Abdomen: Soft/NT/ND; positive bowel sounds x 4  Sternum: Intact, no click, incision healing well with no drainage  Incisions: Incisions clean/dry/intact  Extremities: Trace edema b/l lower extremities; good capillary refill; no cyanosis; palpable 1+ pedal pulses b/l      Central Venous Catheter: Yes[x]  No[] , If Yes indication:     hd unstable      Day #3  Spencer Catheter: Yes  [x] , No  [] , If yes indication:    strict i/o                  Day #3  NGT: Yes [] No [x] ,    If Yes Placement:                                     Day #  EPICARDIAL WIRES:  [x] YES [] NO                                              Day #3  BOWEL MOVEMENT:  [] YES [x] NO, If No, Timing since last BM:      Day #  CHEST TUBE(Left/Right):  [x] YES [] NO, If yes -  AIR LEAKS:  [] YES [] NO        LABS:                        9.8<L>  16.55<H> )-----------( 98<L>    ( 15 Feb 2021 01:34 )             28.7<L>                        7.5<L>  14.96<H> )-----------( 126<L>    ( 14 Feb 2021 19:58 )             23.1<L>    02-15    137  |  105  |  36<H>  ----------------------------<  195<H>  4.8   |  21  |  1.3  02-14    137  |  104  |  32<H>  ----------------------------<  197<H>  5.3<H>   |  19  |  1.4    Ca    7.9<L>      15 Feb 2021 01:34  Mg     2.1     02-15    TPro  4.6<L> [6.0 - 8.0]  /  Alb  3.2<L> [3.5 - 5.2]  /  TBili  1.0 [0.2 - 1.2]  /  DBili  x   /  AST  372<H> [0 - 41]  /  ALT  221<H> [0 - 41]  /  AlkPhos  44 [30 - 115]  02-15    PT/INR - ( 14 Feb 2021 19:58 )   PT: ;   INR: 1.33 ratio         PT/INR - ( 14 Feb 2021 01:14 )   PT: ;   INR: 1.11 ratio         PTT - ( 14 Feb 2021 19:58 )  PTT:24.2 sec, PTT - ( 14 Feb 2021 01:14 )  PTT:28.2 sec    ABG - ( 15 Feb 2021 06:02 )  pH: 7.38  /  pCO2: 36    /  pO2: 108   / HCO3: 21    / Base Excess: -3.4  /  SaO2: 98    /  LA: 1.6              RADIOLOGY & ADDITIONAL TESTS:  CXR: < from: Xray Chest 1 View- PORTABLE-Routine (02.14.21 @ 05:06) >  Impression:    Status post a median sternotomy.    Left lower lung field opacity, unchanged.    Right IJ line.    < end of copied text >    EKG: `< from: 12 Lead ECG (02.14.21 @ 08:48) >  Ventricular Rate 55 BPM    Atrial Rate 55 BPM    P-R Interval 142 ms    QRS Duration 94 ms    Q-T Interval 446 ms    QTC Calculation(Bazett) 426 ms    P Axis 18 degrees    R Axis -13 degrees    T Axis 14 degrees    Diagnosis Line Sinus bradycardia with Premature atrial complexes  Minimal voltage criteria for LVH, may be normal variant  Inferior infarct , age undetermined  Abnormal ECG    < end of copied text >    MEDICATIONS  (STANDING):  albumin human  5% IVPB 500 milliLiter(s) IV Intermittent once  aMIOdarone Infusion 0.5 mG/Min (16.7 mL/Hr) IV Continuous <Continuous>  amLODIPine   Tablet 10 milliGRAM(s) Oral daily  aspirin enteric coated 325 milliGRAM(s) Oral daily  atorvastatin 40 milliGRAM(s) Oral at bedtime  cefepime   IVPB      cefepime   IVPB 1000 milliGRAM(s) IV Intermittent once  cefepime   IVPB 1000 milliGRAM(s) IV Intermittent every 8 hours  dextrose 40% Gel 15 Gram(s) Oral once  dextrose 5%. 1000 milliLiter(s) (50 mL/Hr) IV Continuous <Continuous>  dextrose 5%. 1000 milliLiter(s) (100 mL/Hr) IV Continuous <Continuous>  dextrose 50% Injectable 25 Gram(s) IV Push once  dextrose 50% Injectable 12.5 Gram(s) IV Push once  dextrose 50% Injectable 25 Gram(s) IV Push once  famotidine    Tablet 20 milliGRAM(s) Oral two times a day  glucagon  Injectable 1 milliGRAM(s) IntraMuscular once  guaiFENesin  milliGRAM(s) Oral every 12 hours  heparin   Injectable 5000 Unit(s) SubCutaneous every 8 hours  insulin lispro (ADMELOG) corrective regimen sliding scale   SubCutaneous three times a day before meals  insulin regular Infusion 2 Unit(s)/Hr (2 mL/Hr) IV Continuous <Continuous>  ipratropium    for Nebulization 500 MICROGram(s) Nebulizer every 6 hours  magnesium sulfate  IVPB 1 Gram(s) IV Intermittent every 12 hours  meperidine     Injectable 25 milliGRAM(s) IV Push once  metoprolol tartrate 25 milliGRAM(s) Oral two times a day  metoprolol tartrate Injectable 2.5 milliGRAM(s) IV Push once  milrinone Infusion 0.375 MICROgram(s)/kG/Min (10.9 mL/Hr) IV Continuous <Continuous>  niCARdipine Infusion 5 mG/Hr (25 mL/Hr) IV Continuous <Continuous>  norepinephrine Infusion 0.05 MICROgram(s)/kG/Min (9.09 mL/Hr) IV Continuous <Continuous>  oxybutynin 10 milliGRAM(s) Oral daily  polyethylene glycol 3350 17 Gram(s) Oral daily  simethicone 80 milliGRAM(s) Chew three times a day  sodium chloride 0.9%. 1000 milliLiter(s) (20 mL/Hr) IV Continuous <Continuous>  vancomycin  IVPB 1000 milliGRAM(s) IV Intermittent every 12 hours  vasopressin Infusion 0.04 Unit(s)/Min (2.4 mL/Hr) IV Continuous <Continuous>  veCURonium  Injectable 10 milliGRAM(s) IV Push once    MEDICATIONS  (PRN):  oxyCODONE    IR 10 milliGRAM(s) Oral every 4 hours PRN Severe Pain (7 - 10)  oxyCODONE    IR 5 milliGRAM(s) Oral every 6 hours PRN Moderate Pain (4 - 6)    HEPARIN:  [x] YES [] NO  Dose: 5000 UNITS Q8H  SCD's: YES b/l  GI Prophylaxis: Protonix [], Pepcid [x], None [], (Contra-indication:.....)    Post-Op Beta-Blockers: Yes [], No[x], If No, then contraindication: hypotensive on pressors   Post-Op Aspirin: Yes [x],  No [], If No, then contraindication:  Post-Op Statin: Yes [], No[x], If No, then contraindication: elevated lfts  Allergies    No Known Allergies    Intolerances      Ambulation/Activity Status: bedrest     Assessment/Plan:  70y Female status-post     C1L MV Repair          POD #   3- pt reintubated yesterday due to hypoxia secondary to loculated hemothorax s/p 2 right chest tubes placed.   - Case and plan discussed with CTU Intensivist and CT Surgeon - Dr. Rojas  - Continue CTU supportive care    - Continue DVT/GI prophylaxis  - Incentive Spirometry 10 times an hour  - Continue to advance physical activity as tolerated and continue PT/OT as directed  1. cont asa, hold bb for hypotension now requiring pressors, hold statin for elevated lfts, cont milrinone, bumex 2mg iv x 1, repeat echo  2. hypotension: cont to monitor, wean pressors as tolerated  3. hypoxia secondary to respiratory failure- wean vent as tolerated   4. DM/Glucose Control: insulin gtt   5. leukocytosis- cont prophylactic abx, pt currently afebrile, cont to monitor   6. anemia secondary to acute surgical blood loss- pt was transfused 2 units prbc, will continue to monitor cbc

## 2021-02-16 LAB
ALBUMIN SERPL ELPH-MCNC: 3.4 G/DL — LOW (ref 3.5–5.2)
ALBUMIN SERPL ELPH-MCNC: 3.6 G/DL — SIGNIFICANT CHANGE UP (ref 3.5–5.2)
ALBUMIN SERPL ELPH-MCNC: 3.6 G/DL — SIGNIFICANT CHANGE UP (ref 3.5–5.2)
ALP SERPL-CCNC: 45 U/L — SIGNIFICANT CHANGE UP (ref 30–115)
ALP SERPL-CCNC: 48 U/L — SIGNIFICANT CHANGE UP (ref 30–115)
ALP SERPL-CCNC: 48 U/L — SIGNIFICANT CHANGE UP (ref 30–115)
ALT FLD-CCNC: 183 U/L — HIGH (ref 0–41)
ALT FLD-CCNC: 185 U/L — HIGH (ref 0–41)
ALT FLD-CCNC: 207 U/L — HIGH (ref 0–41)
ANION GAP SERPL CALC-SCNC: 12 MMOL/L — SIGNIFICANT CHANGE UP (ref 7–14)
ANION GAP SERPL CALC-SCNC: 17 MMOL/L — HIGH (ref 7–14)
APTT BLD: 21.2 SEC — CRITICAL LOW (ref 27–39.2)
AST SERPL-CCNC: 125 U/L — HIGH (ref 0–41)
AST SERPL-CCNC: 140 U/L — HIGH (ref 0–41)
AST SERPL-CCNC: 175 U/L — HIGH (ref 0–41)
BASE EXCESS BLDA CALC-SCNC: 0.2 MMOL/L — SIGNIFICANT CHANGE UP (ref -2–2)
BASOPHILS # BLD AUTO: 0.01 K/UL — SIGNIFICANT CHANGE UP (ref 0–0.2)
BASOPHILS NFR BLD AUTO: 0.1 % — SIGNIFICANT CHANGE UP (ref 0–1)
BILIRUB SERPL-MCNC: 0.6 MG/DL — SIGNIFICANT CHANGE UP (ref 0.2–1.2)
BILIRUB SERPL-MCNC: 0.8 MG/DL — SIGNIFICANT CHANGE UP (ref 0.2–1.2)
BILIRUB SERPL-MCNC: 0.9 MG/DL — SIGNIFICANT CHANGE UP (ref 0.2–1.2)
BUN SERPL-MCNC: 50 MG/DL — HIGH (ref 10–20)
BUN SERPL-MCNC: 53 MG/DL — HIGH (ref 10–20)
CALCIUM SERPL-MCNC: 8 MG/DL — LOW (ref 8.5–10.1)
CALCIUM SERPL-MCNC: 8.3 MG/DL — LOW (ref 8.5–10.1)
CALCIUM SERPL-MCNC: 8.3 MG/DL — LOW (ref 8.5–10.1)
CALCIUM SERPL-MCNC: 8.6 MG/DL — SIGNIFICANT CHANGE UP (ref 8.5–10.1)
CHLORIDE SERPL-SCNC: 103 MMOL/L — SIGNIFICANT CHANGE UP (ref 98–110)
CHLORIDE SERPL-SCNC: 104 MMOL/L — SIGNIFICANT CHANGE UP (ref 98–110)
CHLORIDE SERPL-SCNC: 104 MMOL/L — SIGNIFICANT CHANGE UP (ref 98–110)
CHLORIDE SERPL-SCNC: 105 MMOL/L — SIGNIFICANT CHANGE UP (ref 98–110)
CO2 SERPL-SCNC: 20 MMOL/L — SIGNIFICANT CHANGE UP (ref 17–32)
CO2 SERPL-SCNC: 23 MMOL/L — SIGNIFICANT CHANGE UP (ref 17–32)
CO2 SERPL-SCNC: 24 MMOL/L — SIGNIFICANT CHANGE UP (ref 17–32)
CO2 SERPL-SCNC: 26 MMOL/L — SIGNIFICANT CHANGE UP (ref 17–32)
CREAT SERPL-MCNC: 1.2 MG/DL — SIGNIFICANT CHANGE UP (ref 0.7–1.5)
CREAT SERPL-MCNC: 1.3 MG/DL — SIGNIFICANT CHANGE UP (ref 0.7–1.5)
CREAT SERPL-MCNC: 1.4 MG/DL — SIGNIFICANT CHANGE UP (ref 0.7–1.5)
CREAT SERPL-MCNC: 1.4 MG/DL — SIGNIFICANT CHANGE UP (ref 0.7–1.5)
EOSINOPHIL # BLD AUTO: 0 K/UL — SIGNIFICANT CHANGE UP (ref 0–0.7)
EOSINOPHIL NFR BLD AUTO: 0 % — SIGNIFICANT CHANGE UP (ref 0–8)
GAS PNL BLDA: SIGNIFICANT CHANGE UP
GAS PNL BLDA: SIGNIFICANT CHANGE UP
GLUCOSE BLDC GLUCOMTR-MCNC: 105 MG/DL — HIGH (ref 70–99)
GLUCOSE BLDC GLUCOMTR-MCNC: 121 MG/DL — HIGH (ref 70–99)
GLUCOSE BLDC GLUCOMTR-MCNC: 124 MG/DL — HIGH (ref 70–99)
GLUCOSE BLDC GLUCOMTR-MCNC: 144 MG/DL — HIGH (ref 70–99)
GLUCOSE BLDC GLUCOMTR-MCNC: 151 MG/DL — HIGH (ref 70–99)
GLUCOSE BLDC GLUCOMTR-MCNC: 157 MG/DL — HIGH (ref 70–99)
GLUCOSE BLDC GLUCOMTR-MCNC: 166 MG/DL — HIGH (ref 70–99)
GLUCOSE BLDC GLUCOMTR-MCNC: 173 MG/DL — HIGH (ref 70–99)
GLUCOSE BLDC GLUCOMTR-MCNC: 197 MG/DL — HIGH (ref 70–99)
GLUCOSE BLDC GLUCOMTR-MCNC: 95 MG/DL — SIGNIFICANT CHANGE UP (ref 70–99)
GLUCOSE SERPL-MCNC: 101 MG/DL — HIGH (ref 70–99)
GLUCOSE SERPL-MCNC: 103 MG/DL — HIGH (ref 70–99)
GLUCOSE SERPL-MCNC: 113 MG/DL — HIGH (ref 70–99)
GLUCOSE SERPL-MCNC: 119 MG/DL — HIGH (ref 70–99)
HCO3 BLDA-SCNC: 24 MMOL/L — SIGNIFICANT CHANGE UP (ref 23–27)
HCT VFR BLD CALC: 23.7 % — LOW (ref 37–47)
HCT VFR BLD CALC: 23.9 % — LOW (ref 37–47)
HCT VFR BLD CALC: 25.4 % — LOW (ref 37–47)
HGB BLD-MCNC: 7.9 G/DL — LOW (ref 12–16)
HGB BLD-MCNC: 8.1 G/DL — LOW (ref 12–16)
HGB BLD-MCNC: 8.5 G/DL — LOW (ref 12–16)
HOROWITZ INDEX BLDA+IHG-RTO: 60 — SIGNIFICANT CHANGE UP
IMM GRANULOCYTES NFR BLD AUTO: 0.6 % — HIGH (ref 0.1–0.3)
INR BLD: 1.16 RATIO — SIGNIFICANT CHANGE UP (ref 0.65–1.3)
LYMPHOCYTES # BLD AUTO: 0.84 K/UL — LOW (ref 1.2–3.4)
LYMPHOCYTES # BLD AUTO: 5.9 % — LOW (ref 20.5–51.1)
MAGNESIUM SERPL-MCNC: 2.2 MG/DL — SIGNIFICANT CHANGE UP (ref 1.8–2.4)
MAGNESIUM SERPL-MCNC: 2.3 MG/DL — SIGNIFICANT CHANGE UP (ref 1.8–2.4)
MAGNESIUM SERPL-MCNC: 2.5 MG/DL — HIGH (ref 1.8–2.4)
MCHC RBC-ENTMCNC: 28.1 PG — SIGNIFICANT CHANGE UP (ref 27–31)
MCHC RBC-ENTMCNC: 28.3 PG — SIGNIFICANT CHANGE UP (ref 27–31)
MCHC RBC-ENTMCNC: 28.3 PG — SIGNIFICANT CHANGE UP (ref 27–31)
MCHC RBC-ENTMCNC: 33.3 G/DL — SIGNIFICANT CHANGE UP (ref 32–37)
MCHC RBC-ENTMCNC: 33.5 G/DL — SIGNIFICANT CHANGE UP (ref 32–37)
MCHC RBC-ENTMCNC: 33.9 G/DL — SIGNIFICANT CHANGE UP (ref 32–37)
MCV RBC AUTO: 83.6 FL — SIGNIFICANT CHANGE UP (ref 81–99)
MCV RBC AUTO: 84.3 FL — SIGNIFICANT CHANGE UP (ref 81–99)
MCV RBC AUTO: 84.7 FL — SIGNIFICANT CHANGE UP (ref 81–99)
MONOCYTES # BLD AUTO: 1.04 K/UL — HIGH (ref 0.1–0.6)
MONOCYTES NFR BLD AUTO: 7.3 % — SIGNIFICANT CHANGE UP (ref 1.7–9.3)
NEUTROPHILS # BLD AUTO: 12.33 K/UL — HIGH (ref 1.4–6.5)
NEUTROPHILS NFR BLD AUTO: 86.1 % — HIGH (ref 42.2–75.2)
NRBC # BLD: 0 /100 WBCS — SIGNIFICANT CHANGE UP (ref 0–0)
PCO2 BLDA: 36 MMHG — LOW (ref 38–42)
PH BLDA: 7.44 — HIGH (ref 7.38–7.42)
PLATELET # BLD AUTO: 108 K/UL — LOW (ref 130–400)
PLATELET # BLD AUTO: 134 K/UL — SIGNIFICANT CHANGE UP (ref 130–400)
PLATELET # BLD AUTO: 148 K/UL — SIGNIFICANT CHANGE UP (ref 130–400)
PO2 BLDA: 135 MMHG — HIGH (ref 78–95)
POTASSIUM SERPL-MCNC: 3.6 MMOL/L — SIGNIFICANT CHANGE UP (ref 3.5–5)
POTASSIUM SERPL-MCNC: 3.9 MMOL/L — SIGNIFICANT CHANGE UP (ref 3.5–5)
POTASSIUM SERPL-MCNC: 3.9 MMOL/L — SIGNIFICANT CHANGE UP (ref 3.5–5)
POTASSIUM SERPL-MCNC: 4.2 MMOL/L — SIGNIFICANT CHANGE UP (ref 3.5–5)
POTASSIUM SERPL-SCNC: 3.6 MMOL/L — SIGNIFICANT CHANGE UP (ref 3.5–5)
POTASSIUM SERPL-SCNC: 3.9 MMOL/L — SIGNIFICANT CHANGE UP (ref 3.5–5)
POTASSIUM SERPL-SCNC: 3.9 MMOL/L — SIGNIFICANT CHANGE UP (ref 3.5–5)
POTASSIUM SERPL-SCNC: 4.2 MMOL/L — SIGNIFICANT CHANGE UP (ref 3.5–5)
PROT SERPL-MCNC: 4.9 G/DL — LOW (ref 6–8)
PROT SERPL-MCNC: 5.2 G/DL — LOW (ref 6–8)
PROT SERPL-MCNC: 5.3 G/DL — LOW (ref 6–8)
PROTHROM AB SERPL-ACNC: 13.3 SEC — HIGH (ref 9.95–12.87)
RBC # BLD: 2.81 M/UL — LOW (ref 4.2–5.4)
RBC # BLD: 2.86 M/UL — LOW (ref 4.2–5.4)
RBC # BLD: 3 M/UL — LOW (ref 4.2–5.4)
RBC # FLD: 15.9 % — HIGH (ref 11.5–14.5)
RBC # FLD: 16.4 % — HIGH (ref 11.5–14.5)
RBC # FLD: 16.4 % — HIGH (ref 11.5–14.5)
SAO2 % BLDA: 99 % — HIGH (ref 94–98)
SODIUM SERPL-SCNC: 139 MMOL/L — SIGNIFICANT CHANGE UP (ref 135–146)
SODIUM SERPL-SCNC: 140 MMOL/L — SIGNIFICANT CHANGE UP (ref 135–146)
SODIUM SERPL-SCNC: 141 MMOL/L — SIGNIFICANT CHANGE UP (ref 135–146)
SODIUM SERPL-SCNC: 142 MMOL/L — SIGNIFICANT CHANGE UP (ref 135–146)
VANCOMYCIN TROUGH SERPL-MCNC: 17.7 UG/ML — HIGH (ref 5–10)
WBC # BLD: 14.31 K/UL — HIGH (ref 4.8–10.8)
WBC # BLD: 18.67 K/UL — HIGH (ref 4.8–10.8)
WBC # BLD: 19.33 K/UL — HIGH (ref 4.8–10.8)
WBC # FLD AUTO: 14.31 K/UL — HIGH (ref 4.8–10.8)
WBC # FLD AUTO: 18.67 K/UL — HIGH (ref 4.8–10.8)
WBC # FLD AUTO: 19.33 K/UL — HIGH (ref 4.8–10.8)

## 2021-02-16 PROCEDURE — 71045 X-RAY EXAM CHEST 1 VIEW: CPT | Mod: 26

## 2021-02-16 PROCEDURE — 71045 X-RAY EXAM CHEST 1 VIEW: CPT | Mod: 26,77

## 2021-02-16 PROCEDURE — 92960 CARDIOVERSION ELECTRIC EXT: CPT | Mod: 59

## 2021-02-16 PROCEDURE — 93010 ELECTROCARDIOGRAM REPORT: CPT

## 2021-02-16 PROCEDURE — 99291 CRITICAL CARE FIRST HOUR: CPT | Mod: 25

## 2021-02-16 RX ORDER — NITROGLYCERIN 6.5 MG
5 CAPSULE, EXTENDED RELEASE ORAL
Qty: 50 | Refills: 0 | Status: DISCONTINUED | OUTPATIENT
Start: 2021-02-16 | End: 2021-02-18

## 2021-02-16 RX ORDER — POTASSIUM CHLORIDE 20 MEQ
20 PACKET (EA) ORAL ONCE
Refills: 0 | Status: COMPLETED | OUTPATIENT
Start: 2021-02-16 | End: 2021-02-16

## 2021-02-16 RX ORDER — AMIODARONE HYDROCHLORIDE 400 MG/1
150 TABLET ORAL ONCE
Refills: 0 | Status: COMPLETED | OUTPATIENT
Start: 2021-02-16 | End: 2021-02-16

## 2021-02-16 RX ORDER — BUMETANIDE 0.25 MG/ML
0.25 INJECTION INTRAMUSCULAR; INTRAVENOUS
Qty: 20 | Refills: 0 | Status: DISCONTINUED | OUTPATIENT
Start: 2021-02-16 | End: 2021-02-16

## 2021-02-16 RX ORDER — MIDAZOLAM HYDROCHLORIDE 1 MG/ML
2 INJECTION, SOLUTION INTRAMUSCULAR; INTRAVENOUS ONCE
Refills: 0 | Status: DISCONTINUED | OUTPATIENT
Start: 2021-02-16 | End: 2021-02-16

## 2021-02-16 RX ORDER — AMIODARONE HYDROCHLORIDE 400 MG/1
1 TABLET ORAL
Qty: 900 | Refills: 0 | Status: DISCONTINUED | OUTPATIENT
Start: 2021-02-16 | End: 2021-02-16

## 2021-02-16 RX ORDER — BUMETANIDE 0.25 MG/ML
0.5 INJECTION INTRAMUSCULAR; INTRAVENOUS
Qty: 20 | Refills: 0 | Status: DISCONTINUED | OUTPATIENT
Start: 2021-02-16 | End: 2021-02-16

## 2021-02-16 RX ORDER — LABETALOL HCL 100 MG
10 TABLET ORAL ONCE
Refills: 0 | Status: COMPLETED | OUTPATIENT
Start: 2021-02-16 | End: 2021-02-16

## 2021-02-16 RX ORDER — HYDRALAZINE HCL 50 MG
10 TABLET ORAL EVERY 4 HOURS
Refills: 0 | Status: DISCONTINUED | OUTPATIENT
Start: 2021-02-16 | End: 2021-03-01

## 2021-02-16 RX ORDER — AMIODARONE HYDROCHLORIDE 400 MG/1
0.5 TABLET ORAL
Qty: 900 | Refills: 0 | Status: DISCONTINUED | OUTPATIENT
Start: 2021-02-16 | End: 2021-02-18

## 2021-02-16 RX ORDER — VASOPRESSIN 20 [USP'U]/ML
0.04 INJECTION INTRAVENOUS
Qty: 50 | Refills: 0 | Status: DISCONTINUED | OUTPATIENT
Start: 2021-02-16 | End: 2021-02-18

## 2021-02-16 RX ORDER — DEXMEDETOMIDINE HYDROCHLORIDE IN 0.9% SODIUM CHLORIDE 4 UG/ML
0.7 INJECTION INTRAVENOUS
Qty: 200 | Refills: 0 | Status: DISCONTINUED | OUTPATIENT
Start: 2021-02-16 | End: 2021-02-18

## 2021-02-16 RX ORDER — FENTANYL CITRATE 50 UG/ML
25 INJECTION INTRAVENOUS ONCE
Refills: 0 | Status: DISCONTINUED | OUTPATIENT
Start: 2021-02-16 | End: 2021-02-16

## 2021-02-16 RX ORDER — NOREPINEPHRINE BITARTRATE/D5W 8 MG/250ML
0.05 PLASTIC BAG, INJECTION (ML) INTRAVENOUS
Qty: 8 | Refills: 0 | Status: DISCONTINUED | OUTPATIENT
Start: 2021-02-16 | End: 2021-02-18

## 2021-02-16 RX ORDER — HYDRALAZINE HCL 50 MG
10 TABLET ORAL ONCE
Refills: 0 | Status: COMPLETED | OUTPATIENT
Start: 2021-02-16 | End: 2021-02-16

## 2021-02-16 RX ORDER — IRON SUCROSE 20 MG/ML
200 INJECTION, SOLUTION INTRAVENOUS EVERY 24 HOURS
Refills: 0 | Status: DISCONTINUED | OUTPATIENT
Start: 2021-02-16 | End: 2021-02-19

## 2021-02-16 RX ADMIN — Medication 50 MILLIEQUIVALENT(S): at 18:14

## 2021-02-16 RX ADMIN — MIDAZOLAM HYDROCHLORIDE 2 MILLIGRAM(S): 1 INJECTION, SOLUTION INTRAMUSCULAR; INTRAVENOUS at 08:45

## 2021-02-16 RX ADMIN — FENTANYL CITRATE 25 MICROGRAM(S): 50 INJECTION INTRAVENOUS at 03:00

## 2021-02-16 RX ADMIN — AMIODARONE HYDROCHLORIDE 16.7 MG/MIN: 400 TABLET ORAL at 22:12

## 2021-02-16 RX ADMIN — AMIODARONE HYDROCHLORIDE 600 MILLIGRAM(S): 400 TABLET ORAL at 08:20

## 2021-02-16 RX ADMIN — AMIODARONE HYDROCHLORIDE 33.3 MG/MIN: 400 TABLET ORAL at 11:48

## 2021-02-16 RX ADMIN — CEFEPIME 100 MILLIGRAM(S): 1 INJECTION, POWDER, FOR SOLUTION INTRAMUSCULAR; INTRAVENOUS at 22:11

## 2021-02-16 RX ADMIN — Medication 10 MILLIGRAM(S): at 23:30

## 2021-02-16 RX ADMIN — Medication 10 MILLIGRAM(S): at 07:55

## 2021-02-16 RX ADMIN — CEFEPIME 100 MILLIGRAM(S): 1 INJECTION, POWDER, FOR SOLUTION INTRAMUSCULAR; INTRAVENOUS at 13:36

## 2021-02-16 RX ADMIN — INSULIN HUMAN 2 UNIT(S)/HR: 100 INJECTION, SOLUTION SUBCUTANEOUS at 11:50

## 2021-02-16 RX ADMIN — Medication 500 MICROGRAM(S): at 10:20

## 2021-02-16 RX ADMIN — BUMETANIDE 1.25 MG/HR: 0.25 INJECTION INTRAMUSCULAR; INTRAVENOUS at 11:49

## 2021-02-16 RX ADMIN — Medication 100 MILLIEQUIVALENT(S): at 17:30

## 2021-02-16 RX ADMIN — PANTOPRAZOLE SODIUM 40 MILLIGRAM(S): 20 TABLET, DELAYED RELEASE ORAL at 11:27

## 2021-02-16 RX ADMIN — IRON SUCROSE 110 MILLIGRAM(S): 20 INJECTION, SOLUTION INTRAVENOUS at 18:13

## 2021-02-16 RX ADMIN — Medication 9.09 MICROGRAM(S)/KG/MIN: at 11:49

## 2021-02-16 RX ADMIN — VASOPRESSIN 2.4 UNIT(S)/MIN: 20 INJECTION INTRAVENOUS at 11:50

## 2021-02-16 RX ADMIN — DEXMEDETOMIDINE HYDROCHLORIDE IN 0.9% SODIUM CHLORIDE 17 MICROGRAM(S)/KG/HR: 4 INJECTION INTRAVENOUS at 11:48

## 2021-02-16 RX ADMIN — Medication 250 MILLIGRAM(S): at 09:51

## 2021-02-16 RX ADMIN — Medication 100 GRAM(S): at 05:16

## 2021-02-16 RX ADMIN — CEFEPIME 100 MILLIGRAM(S): 1 INJECTION, POWDER, FOR SOLUTION INTRAMUSCULAR; INTRAVENOUS at 05:11

## 2021-02-16 RX ADMIN — Medication 100 MILLIEQUIVALENT(S): at 22:12

## 2021-02-16 RX ADMIN — Medication 1.5 MICROGRAM(S)/MIN: at 11:50

## 2021-02-16 RX ADMIN — BUMETANIDE 1.25 MG/HR: 0.25 INJECTION INTRAMUSCULAR; INTRAVENOUS at 22:12

## 2021-02-16 RX ADMIN — Medication 100 MILLIEQUIVALENT(S): at 08:30

## 2021-02-16 RX ADMIN — Medication 250 MILLIGRAM(S): at 22:11

## 2021-02-16 RX ADMIN — Medication 100 GRAM(S): at 18:15

## 2021-02-16 RX ADMIN — Medication 500 MICROGRAM(S): at 13:51

## 2021-02-16 NOTE — DIETITIAN INITIAL EVALUATION ADULT. - ADD RECOMMEND
diet advancement when medically feasible (as resp status improves) would recommend SLP c/s prior to initiating po diet; if unable to resume po diet, would consider NST c/s as pt continues to require continuous BiPAP infusion.

## 2021-02-16 NOTE — DIETITIAN INITIAL EVALUATION ADULT. - REASON INDICATOR FOR ASSESSMENT
LOS assessment; d/t pt medical condition, will defer nutrition hx (pt was intubated 2/14, extubated to continuous BiPAP today, 2/16).

## 2021-02-16 NOTE — PROGRESS NOTE ADULT - SUBJECTIVE AND OBJECTIVE BOX
CTU Attending Progress Daily Note     16 Feb 2021 09:45    Procedure:            CABG MVR                                      POD#     4              Patient seen as post-op critical care follow-up    HPI:  69 yo F h/o HTN, DLD, has JACOME on TTE Mod AS by gradient, and mod to severe MR here for DERRICK and RHC/LHC   (09 Feb 2021 09:38)    See preop testing chart H&P    Interval event for past 24 hr:  ADRIAN SAMUELS  70y had respiratory distress responded to BIPAP     Current Complains:  ADRIAN SAMUELS has new complaints of SOB    REVIEW OF SYSTEMS:  CONSTITUTIONAL:  [-] weakness, [-] fevers, [-] chills  EYES/ENT: [-] visual changes, [-] vertigo, [-] throat pain   NECK: [-] pain, [-] stiffness  RESPIRATORY: [-] cough, [-] wheezing, [-] hemoptysis, [+] shortness of breath  CARDIOVASCULAR: [-] chest pain, [-] palpitations, [-] orthopnea  GASTROINTESTINAL:    [-]abdominal pain, [-] nausea, [-] vomiting, [-] hematemesis, [-] diarrhea, [-] constipation, [-] melena, [-] hematochezia.  GENITOURINARY: [-] dysuria, [-] frequency, [-] hematuria  NEUROLOGICAL: [-] numbness, [-] weakness  SKIN: [-] itching, [-] burning, [-] rashes, [-] lesions   All other review of systems is negative unless indicated above.    [  ] Unable to assess ROS because :    OBJECTIVE:  ICU Vital Signs Last 24 Hrs  T(C): 36.1 (16 Feb 2021 08:15), Max: 36.9 (16 Feb 2021 04:00)  T(F): 97 (16 Feb 2021 08:15), Max: 98.4 (16 Feb 2021 04:00)  HR: 93 (16 Feb 2021 09:15) (68 - 165)  BP: 169/67 (16 Feb 2021 08:00) (78/48 - 179/79)  BP(mean): 96 (16 Feb 2021 08:00) (55 - 114)  ABP: 151/56 (16 Feb 2021 09:15) (87/39 - 206/83)  ABP(mean): 86 (16 Feb 2021 09:15) (56 - 131)  RR: 61 (16 Feb 2021 09:15) (9 - 61)  SpO2: 99% (16 Feb 2021 09:15) (95% - 100%)      I&O's Summary    15 Feb 2021 07:01  -  16 Feb 2021 07:00  --------------------------------------------------------  IN: 1869 mL / OUT: 4876 mL / NET: -3007 mL      Adult Advanced Hemodynamics Last 24 Hrs  CVP(mm Hg): 22 (16 Feb 2021 09:15) (2 - 31)  CVP(cm H2O): --  CO: 5.7 (16 Feb 2021 07:00) (4.2 - 5.9)  CI: 3 (16 Feb 2021 07:00) (2.2 - 3.1)  PA: --  PA(mean): --  PCWP: --  SVR: 1019 (16 Feb 2021 06:00) (653 - 1302)  SVRI: 1970 (16 Feb 2021 06:00) (1250 - 2496)  PVR: --  PVRI: --  Mode: CPAP with PS  FiO2: 40  PEEP: 5      PHYSICAL EXAM:  General: WN/WD NAD    HEENT:     [+] NCAT  [+] EOMI  [-] Conjuctival edema   [-] Icterus   [-] Thrush   [-] ETT  [-] NGT/OGT    Neck:         [+] FROM   [-] JVD     [-] Nodes     [-] Masses    [+] Mid-line trachea    [-] Tracheostomy    Chest:         [-] Sternal click   [-] Sternal drainage   [+] Pacing wires   [+] Chest tubes   [-] SubQ emphysema    Lungs:          [-] CTA   [-] Rhonchi   [+] Rales    [-] Wheezing    [-] Decreased BS    [-] Dullness R L    Cardiac:       [+] S1 [+] S2    [+] RRR   [-] Irregular   [-] S3   [-] S4    [-] Murmurs    [-] Rub    Abdomen:    [+] BS    [+] Soft    [+] Non-tender     [-] Distended    [-] Organomegaly  [-] PEG    Extremities:   [-] Cyanosis U/L   [-] Clubbing  U/L  [-] LE/UE Edema   [+] Capillary refill    [+] Pulses     Neuro:        [+] Awake   [+]  Alert   [-] Confused   [-] Lethargic   [-] Sedated   [-] Generalized Weakness    Skin:        [-] Rashes    [-] Erythema   [+] Normal incisions   [+] IV sites intact   [-] Sacral decubitus    Tubes:  LINES:    CAPILLARY BLOOD GLUCOSE      POCT Blood Glucose.: 166 mg/dL (16 Feb 2021 09:22)    CAPILLARY BLOOD GLUCOSE      POCT Blood Glucose.: 166 mg/dL (16 Feb 2021 09:22)  POCT Blood Glucose.: 144 mg/dL (16 Feb 2021 06:22)  POCT Blood Glucose.: 95 mg/dL (16 Feb 2021 02:28)  POCT Blood Glucose.: 114 mg/dL (15 Feb 2021 23:53)  POCT Blood Glucose.: 95 mg/dL (15 Feb 2021 20:42)  POCT Blood Glucose.: 143 mg/dL (15 Feb 2021 18:34)  POCT Blood Glucose.: 145 mg/dL (15 Feb 2021 17:26)  POCT Blood Glucose.: 96 mg/dL (15 Feb 2021 16:40)  POCT Blood Glucose.: 77 mg/dL (15 Feb 2021 15:38)  POCT Blood Glucose.: 97 mg/dL (15 Feb 2021 14:34)  POCT Blood Glucose.: 135 mg/dL (15 Feb 2021 13:11)  POCT Blood Glucose.: 161 mg/dL (15 Feb 2021 12:05)  POCT Blood Glucose.: 199 mg/dL (15 Feb 2021 11:12)  POCT Blood Glucose.: 220 mg/dL (15 Feb 2021 10:06)      HOSPITAL MEDICATIONS:  MEDICATIONS  (STANDING):  albumin human  5% IVPB 500 milliLiter(s) IV Intermittent once  aspirin enteric coated 325 milliGRAM(s) Oral daily  buMETAnide Infusion 0.25 mG/Hr (1.25 mL/Hr) IV Continuous <Continuous>  cefepime   IVPB      cefepime   IVPB 1000 milliGRAM(s) IV Intermittent every 8 hours  chlorhexidine 4% Liquid 1 Application(s) Topical <User Schedule>  dextrose 40% Gel 15 Gram(s) Oral once  dextrose 5%. 1000 milliLiter(s) (50 mL/Hr) IV Continuous <Continuous>  dextrose 5%. 1000 milliLiter(s) (100 mL/Hr) IV Continuous <Continuous>  dextrose 50% Injectable 25 Gram(s) IV Push once  dextrose 50% Injectable 12.5 Gram(s) IV Push once  dextrose 50% Injectable 25 Gram(s) IV Push once  glucagon  Injectable 1 milliGRAM(s) IntraMuscular once  guaiFENesin  milliGRAM(s) Oral every 12 hours  hydrALAZINE Injectable 10 milliGRAM(s) IV Push once  insulin regular Infusion 2 Unit(s)/Hr (2 mL/Hr) IV Continuous <Continuous>  ipratropium    for Nebulization 500 MICROGram(s) Nebulizer every 6 hours  magnesium sulfate  IVPB 1 Gram(s) IV Intermittent every 12 hours  meperidine     Injectable 25 milliGRAM(s) IV Push once  pantoprazole  Injectable 40 milliGRAM(s) IV Push daily  polyethylene glycol 3350 17 Gram(s) Oral daily  potassium chloride  20 mEq/100 mL IVPB 20 milliEquivalent(s) IV Intermittent once  simethicone 80 milliGRAM(s) Chew three times a day  sodium chloride 0.9%. 1000 milliLiter(s) (20 mL/Hr) IV Continuous <Continuous>  vancomycin  IVPB 1000 milliGRAM(s) IV Intermittent every 12 hours    MEDICATIONS  (PRN):  oxyCODONE    IR 10 milliGRAM(s) Oral every 4 hours PRN Severe Pain (7 - 10)  oxyCODONE    IR 5 milliGRAM(s) Oral every 6 hours PRN Moderate Pain (4 - 6)      LABS:  ABG - ( 16 Feb 2021 09:29 )  pH, Arterial: 7.38  pH, Blood: x     /  pCO2: 40    /  pO2: 86    / HCO3: 24    / Base Excess: -1.2  /  SaO2: 97                                      8.5    18.67 )-----------( 134      ( 16 Feb 2021 08:00 )             25.4     02-16    139  |  103  |  53<H>  ----------------------------<  119<H>  3.9   |  24  |  1.4    Ca    8.6      16 Feb 2021 08:00  Mg     2.5     02-16    TPro  4.9<L>  /  Alb  3.4<L>  /  TBili  0.6  /  DBili  x   /  AST  175<H>  /  ALT  207<H>  /  AlkPhos  48  02-16    PT/INR - ( 14 Feb 2021 19:58 )   PT: 15.30 sec;   INR: 1.33 ratio         PTT - ( 14 Feb 2021 19:58 )  PTT:24.2 sec        RADIOLOGY:  Reviewed and interpreted by me  CXR from 02-16-21 shows [+] mild congestion, [-] pneumothorax, [-] R/L effusion, [-] cardiomegaly,   Chest Tubes in place    ECG:  Reviewed and interpreted by me: SR with PVC  QTC: OK    Assessment:  CAD SP CABG MVR  Afib SP emergent ardioversion this AM  acute hypoxemic respiratory failure on BIPAP + O2  Acute blood loss anemia    PAST MEDICAL & SURGICAL HISTORY:  Glaucoma    Chronic sciatica    H/O sleep apnea  on CPAP    Aortic stenosis    Type 2 diabetes mellitus without complication, without long-term current use of insulin    Hyperlipidemia, unspecified hyperlipidemia type    Hypertension, unspecified type    History of carpal tunnel surgery    History of hip surgery  bilateral hip        PLAN:  Neuro: Pain control  Pulm: Encourage coughing, deep breathing and use of incentive spirometry. Wean off supplemental oxygen as able. Daily CXR.   Cardio: Monitor telemetry/alarms. Continue cardiac meds  GI: NPO diet. Continue stool softeners. Continue GI prophylaxis  Renal: monitor urine output, supplement electrolytes as needed  Vasc: SCDs for DVT prophylaxis  Heme: Monitor H/H.   ID: On IV antibiotics. Stable.  Endocrine: Monitor finger stick blood sugar and control hyperglycemia with insulin  Physical Therapy: bed rest   Tubes: Monitor Chest tube output      Discussed with Cardiothoracic Team at AM rounds.    45 minutes of critical care time spent providing medical care for patient's acute illness/conditions that impairs at least one vital organ system and/or poses a high risk of imminent or life threatening deterioration in the patient's condition. It includes time spent evaluating and treating the patient's acute illness as well as time spent reviewing labs, radiology, discussing goals of care with patient and/or patient's family, and discussing the case with a multidisciplinary team in an effort to prevent further life threatening deterioration or end organ damage. This time is independent of any procedures performed.

## 2021-02-16 NOTE — DIETITIAN INITIAL EVALUATION ADULT. - RD TO REMAIN AVAILABLE
INTERVENTION: meals and snacks vs coordination of care. ME: RD to monitor diet order, energy intake, body composition, NFPF, glucose/renal/electrolyte profiles/yes

## 2021-02-16 NOTE — DIETITIAN INITIAL EVALUATION ADULT. - OTHER INFO
Pt admitted d/t cardiac cath. Pt is POD4 sp C1L MV repair. Pt was reintubated on 2/14 d/t hypoxic in the setting of R lung hemothorax. Extubated to continuous BiPAP (2/16). Pt went into Afib (2/16) sp cardioversion. Daily CXR recommended. Monitor urine output/supplementing electrolytes PRN.

## 2021-02-16 NOTE — DIETITIAN INITIAL EVALUATION ADULT. - PHYSCIAL ASSESSMENT
obese Unable to obtain wt hx. Significant variation in wt noted throughout LOS- ranges from 97 kg to 110.7 kg per EMR.     (2/15) +2 edema (L/R feet); Skin assessment on 2/15 shows surgical incision.

## 2021-02-16 NOTE — DIETITIAN INITIAL EVALUATION ADULT. - PERTINENT MEDS FT
maxipime, vancomycin, NaCl 0.9%, insulin, amiodarone, bumex, precedex, nitroglycerin, levophed, vasopressin, magnesium sulfate, mylicon, oxycodone, miralax

## 2021-02-16 NOTE — DIETITIAN INITIAL EVALUATION ADULT. - FACTORS AFF FOOD INTAKE
Pt extubated to continuous BiPAP (2/16), remains NPO for this reason. When pt was on a po diet, po intake varied 0-100% per EMR documentation. Last BM on 2/13 per EMR documentation. Pt is on a bowel regimen./other (specify)/NPO

## 2021-02-16 NOTE — PROGRESS NOTE ADULT - SUBJECTIVE AND OBJECTIVE BOX
OPERATIVE PROCEDURE(s):                POD #                       70yFemale  SURGEON(s): LEANNE Victoria  SUBJECTIVE ASSESSMENT:  Patient has no complaints at this time.    Vital Signs Last 24 Hrs  T(F): 98.4 (2021 04:00), Max: 98.4 (2021 04:00)  HR: 83 (2021 06:00) (82 - 93)  BP: 111/77 (2021 06:00) (78/48 - 179/79)  BP(mean): 88 (2021 06:00) (55 - 114)  ABP: 127/56 (2021 06:00) (97/50 - 206/83)  ABP(mean): 79 (2021 06:00)  RR: 22 (2021 06:00) (9 - 44)  SpO2: 100% (2021 06:00) (95% - 100%)  CVP(mm Hg): 14 (2021 06:00)  CVP(cm H2O): --  CO: 5.8 (2021 06:00)  CI: 3 (2021 06:00)  PA: --  SVR: 1019 (2021 06:00)  Mode: CPAP with PS  FiO2: 40  PEEP: 5    I&O's Detail    2021 07:01  -  15 Feb 2021 07:00  --------------------------------------------------------  IN:    Amiodarone: 183.4 mL    Insulin: 2 mL    IV PiggyBack: 350 mL    Milrinone: 98.1 mL    NiCARdipine: 105 mL    Norepinephrine: 198 mL    Oral Fluid: 600 mL    PRBCs (Packed Red Blood Cells): 550 mL    sodium chloride 0.9%: 480 mL    Vasopressin: 38.4 mL  Total IN: 2604.9 mL    OUT:    Chest Tube (mL): 350 mL    Chest Tube (mL): 130 mL    Indwelling Catheter - Urethral (mL): 715 mL    Voided (mL): 150 mL  Total OUT: 1345 mL        Net:   I&O's Detail    2021 07:01  -  2021 07:00  --------------------------------------------------------  Total NET: 976 mL      2021 07:01  -  15 Feb 2021 07:00  --------------------------------------------------------  Total NET: 1259.9 mL        CAPILLARY BLOOD GLUCOSE      POCT Blood Glucose.: 95 mg/dL (2021 02:28)  POCT Blood Glucose.: 114 mg/dL (15 Feb 2021 23:53)  POCT Blood Glucose.: 95 mg/dL (15 Feb 2021 20:42)  POCT Blood Glucose.: 143 mg/dL (15 Feb 2021 18:34)  POCT Blood Glucose.: 145 mg/dL (15 Feb 2021 17:26)  POCT Blood Glucose.: 96 mg/dL (15 Feb 2021 16:40)  POCT Blood Glucose.: 77 mg/dL (15 Feb 2021 15:38)  POCT Blood Glucose.: 97 mg/dL (15 Feb 2021 14:34)  POCT Blood Glucose.: 135 mg/dL (15 Feb 2021 13:11)  POCT Blood Glucose.: 161 mg/dL (15 Feb 2021 12:05)  POCT Blood Glucose.: 199 mg/dL (15 Feb 2021 11:12)  POCT Blood Glucose.: 220 mg/dL (15 Feb 2021 10:06)  POCT Blood Glucose.: 235 mg/dL (15 Feb 2021 09:13)  POCT Blood Glucose.: 235 mg/dL (15 Feb 2021 07:36)    Physical Exam:  General: NAD; A&Ox3/Patient is intubated and sedated  Cardiac: S1/S2, RRR, no murmur, no rubs  Lungs: unlabored respirations, CTA b/l, no wheeze, no rales, no crackles  Abdomen: Soft/NT/ND; positive bowel sounds x 4  Sternum: Intact, no click, incision healing well with no drainage  Incisions: Incisions clean/dry/intact  Extremities: No edema b/l lower extremities; good capillary refill; no cyanosis; palpable 1+ pedal pulses b/l    Central Venous Catheter: Yes[]  No[] , If Yes indication:           Day #  Spencer Catheter: Yes  [] , No  [] , If yes indication:                      Day #  NGT: Yes [] No [] ,    If Yes Placement:                                     Day #  EPICARDIAL WIRES:  [] YES [] NO                                              Day #  BOWEL MOVEMENT:  [] YES [] NO, If No, Timing since last BM:      Day #  CHEST TUBE(Left/Right):  [] YES [] NO, If yes -  AIR LEAKS:  [] YES [] NO        LABS:                        7.9<L>  14.31<H> )-----------( 108<L>    ( 2021 01:19 )             23.7<L>                        8.4<L>  12.52<H> )-----------( 109<L>    ( 15 Feb 2021 20:24 )             25.1<L>    -    140  |  105  |  50<H>  ----------------------------<  101<H>  4.2   |  23  |  1.4  02-15    137  |  103  |  48<H>  ----------------------------<  104<H>  4.3   |  22  |  1.4    Ca    8.0<L>      2021 01:19  Mg     2.3     -16    TPro  4.9<L> [6.0 - 8.0]  /  Alb  3.4<L> [3.5 - 5.2]  /  TBili  0.6 [0.2 - 1.2]  /  DBili  x   /  AST  175<H> [0 - 41]  /  ALT  207<H> [0 - 41]  /  AlkPhos  48 [30 - 115]  -    PT/INR - ( 2021 19:58 )   PT: ;   INR: 1.33 ratio         PTT - ( 2021 19:58 )  PTT:24.2 sec    ABG - ( 2021 03:53 )  pH: 7.44  /  pCO2: 36    /  pO2: 135   / HCO3: 24    / Base Excess: 0.2   /  SaO2: 99    /  LA: 0.9              RADIOLOGY & ADDITIONAL TESTS:  CXR:   EK Lead ECG:   Ventricular Rate 74 BPM    Atrial Rate 74 BPM    P-R Interval 128 ms    QRS Duration 90 ms    Q-T Interval 392 ms    QTC Calculation(Bazett) 435 ms    P Axis 46 degrees    R Axis 41 degrees    T Axis 51 degrees    Diagnosis Line Normal sinus rhythm  Possible Left atrial enlargement  Borderline ECG    Confirmed by PARKER PACK MD ([id number]) on 2/15/2021 8:28:11 AM (02-15-21 @ 07:28)    MEDICATIONS  (STANDING):  albumin human  5% IVPB 500 milliLiter(s) IV Intermittent once  aspirin enteric coated 325 milliGRAM(s) Oral daily  buMETAnide Infusion 0.5 mG/Hr (2.5 mL/Hr) IV Continuous <Continuous>  cefepime   IVPB      cefepime   IVPB 1000 milliGRAM(s) IV Intermittent once  cefepime   IVPB 1000 milliGRAM(s) IV Intermittent every 8 hours  chlorhexidine 4% Liquid 1 Application(s) Topical <User Schedule>  dexMEDEtomidine Infusion 1 MICROgram(s)/kG/Hr (24.3 mL/Hr) IV Continuous <Continuous>  dextrose 40% Gel 15 Gram(s) Oral once  dextrose 5%. 1000 milliLiter(s) (50 mL/Hr) IV Continuous <Continuous>  dextrose 5%. 1000 milliLiter(s) (100 mL/Hr) IV Continuous <Continuous>  dextrose 50% Injectable 25 Gram(s) IV Push once  dextrose 50% Injectable 12.5 Gram(s) IV Push once  dextrose 50% Injectable 25 Gram(s) IV Push once  glucagon  Injectable 1 milliGRAM(s) IntraMuscular once  guaiFENesin  milliGRAM(s) Oral every 12 hours  insulin regular Infusion 2 Unit(s)/Hr (2 mL/Hr) IV Continuous <Continuous>  ipratropium    for Nebulization 500 MICROGram(s) Nebulizer every 6 hours  magnesium sulfate  IVPB 1 Gram(s) IV Intermittent every 12 hours  meperidine     Injectable 25 milliGRAM(s) IV Push once  norepinephrine Infusion 0.05 MICROgram(s)/kG/Min (9.09 mL/Hr) IV Continuous <Continuous>  pantoprazole  Injectable 40 milliGRAM(s) IV Push daily  polyethylene glycol 3350 17 Gram(s) Oral daily  propofol Infusion 10 MICROgram(s)/kG/Min (5.82 mL/Hr) IV Continuous <Continuous>  simethicone 80 milliGRAM(s) Chew three times a day  sodium chloride 0.9%. 1000 milliLiter(s) (20 mL/Hr) IV Continuous <Continuous>  vancomycin  IVPB 1000 milliGRAM(s) IV Intermittent every 12 hours  vasopressin Infusion 0.04 Unit(s)/Min (2.4 mL/Hr) IV Continuous <Continuous>  veCURonium  Injectable 10 milliGRAM(s) IV Push once    MEDICATIONS  (PRN):  oxyCODONE    IR 10 milliGRAM(s) Oral every 4 hours PRN Severe Pain (7 - 10)  oxyCODONE    IR 5 milliGRAM(s) Oral every 6 hours PRN Moderate Pain (4 - 6)    HEPARIN:  [] YES [] NO  Dose: XX UNITS/HR UNITS Q8H  LOVENOX:[] YES [] NO  Dose: XX mg Q24H  COUMADIN: []  YES [] NO  Dose: XX mg  Q24H  SCD's: YES b/l  GI Prophylaxis: Protonix [], Pepcid [], None [], (Contra-indication:.....)    Post-Op Aspirin: Yes [],  No [], If No, then contra-indication:  Post-Op Statin: Yes [], No[], If No, then contra-indication:  Post-Op Beta-Blockers: Yes [], No [], If No, then contra-indication:    Allergies:  No Known Allergies      Ambulation/Activity Status: Ambulates several times daily without assistance.    Assessment/Plan:  70y Female status-post .....  - Case and plan discussed with CTU Intensivist and CT Surgeon - Dr. Rojas/Edgar  - Continue CTU supportive care    - Continue DVT/GI prophylaxis  - Incentive Spirometry 10 times an hour  - Continue to advance physical activity as tolerated and continue PT/OT as directed  1. CAD: Continue ASA, statin, BB  2. HTN:   3. A. Fib:   4. COPD/Hypoxia:   5. DM/Glucose Control:     Social Service Disposition:     OPERATIVE PROCEDURE(s):          C1L MV Repair        POD #     4                  70yFemale  SURGEON(s): Dr. Rojas  SUBJECTIVE ASSESSMENT:  Patient seen and examined.     Vital Signs Last 24 Hrs  T(F): 98.4 (2021 04:00), Max: 98.4 (2021 04:00)  HR: 83 (2021 06:00) (82 - 93)  BP: 111/77 (2021 06:00) (78/48 - 179/79)  BP(mean): 88 (2021 06:00) (55 - 114)  ABP: 127/56 (2021 06:00) (97/50 - 206/83)  ABP(mean): 79 (2021 06:00)  RR: 22 (2021 06:00) (9 - 44)  SpO2: 100% (2021 06:00) (95% - 100%)  CVP(mm Hg): 14 (2021 06:00)  CO: 5.8 (2021 06:00)  CI: 3 (2021 06:00)  SVR: 1019 (2021 06:00)  Mode: CPAP with PS  FiO2: 40  PEEP: 5    I&O's Detail    2021 07:01  -  15 2021 07:00  --------------------------------------------------------  IN:    Amiodarone: 183.4 mL    Insulin: 2 mL    IV PiggyBack: 350 mL    Milrinone: 98.1 mL    NiCARdipine: 105 mL    Norepinephrine: 198 mL    Oral Fluid: 600 mL    PRBCs (Packed Red Blood Cells): 550 mL    sodium chloride 0.9%: 480 mL    Vasopressin: 38.4 mL  Total IN: 2604.9 mL    OUT:    Chest Tube (mL): 350 mL    Chest Tube (mL): 130 mL    Indwelling Catheter - Urethral (mL): 715 mL    Voided (mL): 150 mL  Total OUT: 1345 mL        Net:   I&O's Detail    2021 07:01  -  2021 07:00  --------------------------------------------------------  Total NET: 976 mL      2021 07:01  -  15 Feb 2021 07:00  --------------------------------------------------------  Total NET: 1259.9 mL        CAPILLARY BLOOD GLUCOSE      POCT Blood Glucose.: 95 mg/dL (2021 02:28)  POCT Blood Glucose.: 114 mg/dL (15 Feb 2021 23:53)  POCT Blood Glucose.: 95 mg/dL (15 Feb 2021 20:42)  POCT Blood Glucose.: 143 mg/dL (15 Feb 2021 18:34)  POCT Blood Glucose.: 145 mg/dL (15 Feb 2021 17:26)  POCT Blood Glucose.: 96 mg/dL (15 Feb 2021 16:40)  POCT Blood Glucose.: 77 mg/dL (15 Feb 2021 15:38)  POCT Blood Glucose.: 97 mg/dL (15 Feb 2021 14:34)  POCT Blood Glucose.: 135 mg/dL (15 Feb 2021 13:11)  POCT Blood Glucose.: 161 mg/dL (15 Feb 2021 12:05)  POCT Blood Glucose.: 199 mg/dL (15 Feb 2021 11:12)  POCT Blood Glucose.: 220 mg/dL (15 Feb 2021 10:06)  POCT Blood Glucose.: 235 mg/dL (15 Feb 2021 09:13)  POCT Blood Glucose.: 235 mg/dL (15 Feb 2021 07:36)      Physical Exam:  General: NAD; A&Ox3/Patient is intubated and sedated  Cardiac: S1/S2, RRR, no murmur, no rubs  Lungs: unlabored respirations, CTA b/l, no wheeze, no rales, no crackles  Abdomen: Soft/NT/ND; positive bowel sounds x 4  Sternum: Intact, no click, incision healing well with no drainage  Incisions: Incisions clean/dry/intact  Extremities: No edema b/l lower extremities; good capillary refill; no cyanosis; palpable 1+ pedal pulses b/l    Central Venous Catheter: Yes[]  No[] , If Yes indication:           Day #  Spencer Catheter: Yes  [] , No  [] , If yes indication:                      Day #  NGT: Yes [] No [] ,    If Yes Placement:                                     Day #  EPICARDIAL WIRES:  [] YES [] NO                                              Day #  BOWEL MOVEMENT:  [] YES [] NO, If No, Timing since last BM:      Day #  CHEST TUBE(Left/Right):  [] YES [] NO, If yes -  AIR LEAKS:  [] YES [] NO        LABS:                        7.9<L>  14.31<H> )-----------( 108<L>    ( 2021 01:19 )             23.7<L>                        8.4<L>  12.52<H> )-----------( 109<L>    ( 15 Feb 2021 20:24 )             25.1<L>    02-16    140  |  105  |  50<H>  ----------------------------<  101<H>  4.2   |  23  |  1.4  -15    137  |  103  |  48<H>  ----------------------------<  104<H>  4.3   |  22  |  1.4    Ca    8.0<L>      2021 01:19  Mg     2.3     02-16    TPro  4.9<L> [6.0 - 8.0]  /  Alb  3.4<L> [3.5 - 5.2]  /  TBili  0.6 [0.2 - 1.2]  /  DBili  x   /  AST  175<H> [0 - 41]  /  ALT  207<H> [0 - 41]  /  AlkPhos  48 [30 - 115]  02-16    PT/INR - ( 2021 19:58 )   PT: ;   INR: 1.33 ratio         PTT - ( 2021 19:58 )  PTT:24.2 sec    ABG - ( 2021 03:53 )  pH: 7.44  /  pCO2: 36    /  pO2: 135   / HCO3: 24    / Base Excess: 0.2   /  SaO2: 99    /  LA: 0.9          RADIOLOGY & ADDITIONAL TESTS:  CXR:   EK Lead ECG:   Ventricular Rate 74 BPM    Atrial Rate 74 BPM    P-R Interval 128 ms    QRS Duration 90 ms    Q-T Interval 392 ms    QTC Calculation(Bazett) 435 ms    P Axis 46 degrees    R Axis 41 degrees    T Axis 51 degrees    Diagnosis Line Normal sinus rhythm  Possible Left atrial enlargement  Borderline ECG    Confirmed by PARKER PACK MD [id number]) on 2/15/2021 8:28:11 AM (02-15-21 @ 07:28)    MEDICATIONS  (STANDING):  albumin human  5% IVPB 500 milliLiter(s) IV Intermittent once  aspirin enteric coated 325 milliGRAM(s) Oral daily  buMETAnide Infusion 0.5 mG/Hr (2.5 mL/Hr) IV Continuous <Continuous>  cefepime   IVPB      cefepime   IVPB 1000 milliGRAM(s) IV Intermittent once  cefepime   IVPB 1000 milliGRAM(s) IV Intermittent every 8 hours  chlorhexidine 4% Liquid 1 Application(s) Topical <User Schedule>  dexMEDEtomidine Infusion 1 MICROgram(s)/kG/Hr (24.3 mL/Hr) IV Continuous <Continuous>  dextrose 40% Gel 15 Gram(s) Oral once  dextrose 5%. 1000 milliLiter(s) (50 mL/Hr) IV Continuous <Continuous>  dextrose 5%. 1000 milliLiter(s) (100 mL/Hr) IV Continuous <Continuous>  dextrose 50% Injectable 25 Gram(s) IV Push once  dextrose 50% Injectable 12.5 Gram(s) IV Push once  dextrose 50% Injectable 25 Gram(s) IV Push once  glucagon  Injectable 1 milliGRAM(s) IntraMuscular once  guaiFENesin  milliGRAM(s) Oral every 12 hours  insulin regular Infusion 2 Unit(s)/Hr (2 mL/Hr) IV Continuous <Continuous>  ipratropium    for Nebulization 500 MICROGram(s) Nebulizer every 6 hours  magnesium sulfate  IVPB 1 Gram(s) IV Intermittent every 12 hours  meperidine     Injectable 25 milliGRAM(s) IV Push once  norepinephrine Infusion 0.05 MICROgram(s)/kG/Min (9.09 mL/Hr) IV Continuous <Continuous>  pantoprazole  Injectable 40 milliGRAM(s) IV Push daily  polyethylene glycol 3350 17 Gram(s) Oral daily  propofol Infusion 10 MICROgram(s)/kG/Min (5.82 mL/Hr) IV Continuous <Continuous>  simethicone 80 milliGRAM(s) Chew three times a day  sodium chloride 0.9%. 1000 milliLiter(s) (20 mL/Hr) IV Continuous <Continuous>  vancomycin  IVPB 1000 milliGRAM(s) IV Intermittent every 12 hours  vasopressin Infusion 0.04 Unit(s)/Min (2.4 mL/Hr) IV Continuous <Continuous>  veCURonium  Injectable 10 milliGRAM(s) IV Push once    MEDICATIONS  (PRN):  oxyCODONE    IR 10 milliGRAM(s) Oral every 4 hours PRN Severe Pain (7 - 10)  oxyCODONE    IR 5 milliGRAM(s) Oral every 6 hours PRN Moderate Pain (4 - 6)    HEPARIN:  [] YES [] NO  Dose: XX UNITS/HR UNITS Q8H  LOVENOX:[] YES [] NO  Dose: XX mg Q24H  COUMADIN: []  YES [] NO  Dose: XX mg  Q24H  SCD's: YES b/l  GI Prophylaxis: Protonix [], Pepcid [], None [], (Contra-indication:.....)    Post-Op Aspirin: Yes [],  No [], If No, then contra-indication:  Post-Op Statin: Yes [], No[], If No, then contra-indication:  Post-Op Beta-Blockers: Yes [], No [], If No, then contra-indication:    Allergies:  No Known Allergies      Ambulation/Activity Status: Ambulates several times daily without assistance.    Assessment/Plan:  70y Female status-post      C1L MV Repair        POD #     4         - Case and plan discussed with CTU Intensivist and CT Surgeon - Dr. Rojas/Edgar  - Continue CTU supportive care    - Continue DVT/GI prophylaxis  - Incentive Spirometry 10 times an hour  - Continue to advance physical activity as tolerated and continue PT/OT as directed  1. CAD: Continue ASA, statin, BB  2. HTN:   3. A. Fib:   4. COPD/Hypoxia:   5. DM/Glucose Control:     Social Service Disposition:     OPERATIVE PROCEDURE(s):          C1L MV Repair        POD #     4                  70yFemale  SURGEON(s): Dr. Rojas  SUBJECTIVE ASSESSMENT:  Pt. seen and examined at bedside. Pt. had one episode of rapid afib. Pt. was given     Vital Signs Last 24 Hrs  T(F): 98.4 (2021 04:00), Max: 98.4 (2021 04:00)  HR: 83 (2021 06:00) (82 - 93)  BP: 111/77 (2021 06:00) (78/48 - 179/79)  BP(mean): 88 (2021 06:00) (55 - 114)  ABP: 127/56 (2021 06:00) (97/50 - 206/83)  ABP(mean): 79 (2021 06:00)  RR: 22 (2021 06:00) (9 - 44)  SpO2: 100% (2021 06:00) (95% - 100%)  CVP(mm Hg): 14 (2021 06:00)  CO: 5.8 (2021 06:00)  CI: 3 (2021 06:00)  SVR: 1019 (2021 06:00)  Mode: CPAP with PS  FiO2: 40  PEEP: 5    I&O's Detail    2021 07:01  -  15 2021 07:00  --------------------------------------------------------  IN:    Amiodarone: 183.4 mL    Insulin: 2 mL    IV PiggyBack: 350 mL    Milrinone: 98.1 mL    NiCARdipine: 105 mL    Norepinephrine: 198 mL    Oral Fluid: 600 mL    PRBCs (Packed Red Blood Cells): 550 mL    sodium chloride 0.9%: 480 mL    Vasopressin: 38.4 mL  Total IN: 2604.9 mL    OUT:    Chest Tube (mL): 350 mL    Chest Tube (mL): 130 mL    Indwelling Catheter - Urethral (mL): 715 mL    Voided (mL): 150 mL  Total OUT: 1345 mL        Net:   I&O's Detail    2021 07:01  -  2021 07:00  --------------------------------------------------------  Total NET: 976 mL      2021 07:01  -  15 Feb 2021 07:00  --------------------------------------------------------  Total NET: 1259.9 mL        CAPILLARY BLOOD GLUCOSE      POCT Blood Glucose.: 95 mg/dL (2021 02:28)  POCT Blood Glucose.: 114 mg/dL (15 Feb 2021 23:53)  POCT Blood Glucose.: 95 mg/dL (15 Feb 2021 20:42)  POCT Blood Glucose.: 143 mg/dL (15 Feb 2021 18:34)  POCT Blood Glucose.: 145 mg/dL (15 Feb 2021 17:26)  POCT Blood Glucose.: 96 mg/dL (15 Feb 2021 16:40)  POCT Blood Glucose.: 77 mg/dL (15 Feb 2021 15:38)  POCT Blood Glucose.: 97 mg/dL (15 Feb 2021 14:34)  POCT Blood Glucose.: 135 mg/dL (15 Feb 2021 13:11)  POCT Blood Glucose.: 161 mg/dL (15 Feb 2021 12:05)  POCT Blood Glucose.: 199 mg/dL (15 Feb 2021 11:12)  POCT Blood Glucose.: 220 mg/dL (15 Feb 2021 10:06)  POCT Blood Glucose.: 235 mg/dL (15 Feb 2021 09:13)  POCT Blood Glucose.: 235 mg/dL (15 Feb 2021 07:36)      Physical Exam:  General: NAD; A&Ox3/Patient is intubated and sedated  Cardiac: S1/S2, RRR, no murmur, no rubs  Lungs: unlabored respirations, CTA b/l, no wheeze, no rales, no crackles  Abdomen: Soft/NT/ND; positive bowel sounds x 4  Sternum: Intact, no click, incision healing well with no drainage  Incisions: Incisions clean/dry/intact  Extremities: No edema b/l lower extremities; good capillary refill; no cyanosis; palpable 1+ pedal pulses b/l    Central Venous Catheter: Yes[]  No[] , If Yes indication:           Day #  Spencer Catheter: Yes  [] , No  [] , If yes indication:                      Day #  NGT: Yes [] No [] ,    If Yes Placement:                                     Day #  EPICARDIAL WIRES:  [] YES [] NO                                              Day #  BOWEL MOVEMENT:  [] YES [] NO, If No, Timing since last BM:      Day #  CHEST TUBE(Left/Right):  [] YES [] NO, If yes -  AIR LEAKS:  [] YES [] NO        LABS:                        7.9<L>  14.31<H> )-----------( 108<L>    ( 2021 01:19 )             23.7<L>                        8.4<L>  12.52<H> )-----------( 109<L>    ( 15 Feb 2021 20:24 )             25.1<L>    02-16    140  |  105  |  50<H>  ----------------------------<  101<H>  4.2   |  23  |  1.4  02-15    137  |  103  |  48<H>  ----------------------------<  104<H>  4.3   |  22  |  1.4    Ca    8.0<L>      2021 01:19  Mg     2.3     02-16    TPro  4.9<L> [6.0 - 8.0]  /  Alb  3.4<L> [3.5 - 5.2]  /  TBili  0.6 [0.2 - 1.2]  /  DBili  x   /  AST  175<H> [0 - 41]  /  ALT  207<H> [0 - 41]  /  AlkPhos  48 [30 - 115]  02-16    PT/INR - ( 2021 19:58 )   PT: ;   INR: 1.33 ratio         PTT - ( 2021 19:58 )  PTT:24.2 sec    ABG - ( 2021 03:53 )  pH: 7.44  /  pCO2: 36    /  pO2: 135   / HCO3: 24    / Base Excess: 0.2   /  SaO2: 99    /  LA: 0.9          RADIOLOGY & ADDITIONAL TESTS:  CXR:   EK Lead ECG:   Ventricular Rate 74 BPM    Atrial Rate 74 BPM    P-R Interval 128 ms    QRS Duration 90 ms    Q-T Interval 392 ms    QTC Calculation(Bazett) 435 ms    P Axis 46 degrees    R Axis 41 degrees    T Axis 51 degrees    Diagnosis Line Normal sinus rhythm  Possible Left atrial enlargement  Borderline ECG    Confirmed by PARKER PACK MD ([id number]) on 2/15/2021 8:28:11 AM (02-15-21 @ 07:28)    MEDICATIONS  (STANDING):  albumin human  5% IVPB 500 milliLiter(s) IV Intermittent once  aspirin enteric coated 325 milliGRAM(s) Oral daily  buMETAnide Infusion 0.5 mG/Hr (2.5 mL/Hr) IV Continuous <Continuous>  cefepime   IVPB      cefepime   IVPB 1000 milliGRAM(s) IV Intermittent once  cefepime   IVPB 1000 milliGRAM(s) IV Intermittent every 8 hours  chlorhexidine 4% Liquid 1 Application(s) Topical <User Schedule>  dexMEDEtomidine Infusion 1 MICROgram(s)/kG/Hr (24.3 mL/Hr) IV Continuous <Continuous>  dextrose 40% Gel 15 Gram(s) Oral once  dextrose 5%. 1000 milliLiter(s) (50 mL/Hr) IV Continuous <Continuous>  dextrose 5%. 1000 milliLiter(s) (100 mL/Hr) IV Continuous <Continuous>  dextrose 50% Injectable 25 Gram(s) IV Push once  dextrose 50% Injectable 12.5 Gram(s) IV Push once  dextrose 50% Injectable 25 Gram(s) IV Push once  glucagon  Injectable 1 milliGRAM(s) IntraMuscular once  guaiFENesin  milliGRAM(s) Oral every 12 hours  insulin regular Infusion 2 Unit(s)/Hr (2 mL/Hr) IV Continuous <Continuous>  ipratropium    for Nebulization 500 MICROGram(s) Nebulizer every 6 hours  magnesium sulfate  IVPB 1 Gram(s) IV Intermittent every 12 hours  meperidine     Injectable 25 milliGRAM(s) IV Push once  norepinephrine Infusion 0.05 MICROgram(s)/kG/Min (9.09 mL/Hr) IV Continuous <Continuous>  pantoprazole  Injectable 40 milliGRAM(s) IV Push daily  polyethylene glycol 3350 17 Gram(s) Oral daily  propofol Infusion 10 MICROgram(s)/kG/Min (5.82 mL/Hr) IV Continuous <Continuous>  simethicone 80 milliGRAM(s) Chew three times a day  sodium chloride 0.9%. 1000 milliLiter(s) (20 mL/Hr) IV Continuous <Continuous>  vancomycin  IVPB 1000 milliGRAM(s) IV Intermittent every 12 hours  vasopressin Infusion 0.04 Unit(s)/Min (2.4 mL/Hr) IV Continuous <Continuous>  veCURonium  Injectable 10 milliGRAM(s) IV Push once    MEDICATIONS  (PRN):  oxyCODONE    IR 10 milliGRAM(s) Oral every 4 hours PRN Severe Pain (7 - 10)  oxyCODONE    IR 5 milliGRAM(s) Oral every 6 hours PRN Moderate Pain (4 - 6)    HEPARIN:  [] YES [] NO  Dose: XX UNITS/HR UNITS Q8H  LOVENOX:[] YES [] NO  Dose: XX mg Q24H  COUMADIN: []  YES [] NO  Dose: XX mg  Q24H  SCD's: YES b/l  GI Prophylaxis: Protonix [], Pepcid [], None [], (Contra-indication:.....)    Post-Op Aspirin: Yes [],  No [], If No, then contra-indication:  Post-Op Statin: Yes [], No[], If No, then contra-indication:  Post-Op Beta-Blockers: Yes [], No [], If No, then contra-indication:    Allergies:  No Known Allergies      Ambulation/Activity Status: Ambulates several times daily without assistance.    Assessment/Plan:  70y Female status-post      C1L MV Repair        POD #     4         - Case and plan discussed with CTU Intensivist and CT Surgeon - Dr. Rojas/Edgar  - Continue CTU supportive care    - Continue DVT/GI prophylaxis  - Incentive Spirometry 10 times an hour  - Continue to advance physical activity as tolerated and continue PT/OT as directed  1. CAD: Continue ASA, statin, BB  2. HTN:   3. A. Fib:   4. COPD/Hypoxia:   5. DM/Glucose Control:     Social Service Disposition:     OPERATIVE PROCEDURE(s):          C1L MV Repair        POD #     4                  [id number]  SURGEON(s): Dr. Rojas  SUBJECTIVE ASSESSMENT:  Pt. seen and examined at bedside. Pt. was extubated and continued overnight with BiPAP. Pt. had one episode of rapid afib at around 8:00. Pt. was given amiodarone, precedex and propofol and was shocked once with subsequent sinus rhythm achieved. + flatulence    Vital Signs Last 24 Hrs  T(F): 98.4 (2021 04:00), Max: 98.4 (2021 04:00)  HR: 83 (2021 06:00) (82 - 93)  BP: 111/77 (2021 06:00) (78/48 - 179/79)  BP(mean): 88 (2021 06:00) (55 - 114)  ABP: 127/56 (2021 06:00) (97/50 - 206/83)  ABP(mean): 79 (2021 06:00)  RR: 22 (2021 06:00) (9 - 44)  SpO2: 100% (2021 06:00) (95% - 100%)  CVP(mm Hg): 14 (2021 06:00)  CO: 5.8 (2021 06:00)  CI: 3 (2021 06:00)  SVR: 1019 (2021 06:00)  Mode: CPAP with PS  FiO2: 40  PEEP: 5    I&O's Detail    2021 07:01  -  15 Feb 2021 07:00  --------------------------------------------------------  IN:    Amiodarone: 183.4 mL    Insulin: 2 mL    IV PiggyBack: 350 mL    Milrinone: 98.1 mL    NiCARdipine: 105 mL    Norepinephrine: 198 mL    Oral Fluid: 600 mL    PRBCs (Packed Red Blood Cells): 550 mL    sodium chloride 0.9%: 480 mL    Vasopressin: 38.4 mL  Total IN: 2604.9 mL    OUT:    Chest Tube (mL): 350 mL    Chest Tube (mL): 130 mL    Indwelling Catheter - Urethral (mL): 715 mL    Voided (mL): 150 mL  Total OUT: 1345 mL        Net:   I&O's Detail    2021 07:01  -  2021 07:00  --------------------------------------------------------  Total NET: 976 mL      2021 07:01  -  15 Feb 2021 07:00  --------------------------------------------------------  Total NET: 1259.9 mL        CAPILLARY BLOOD GLUCOSE  POCT Blood Glucose (21 @ 11:05)   POCT Blood Glucose.: 197 mg/dL   POCT Blood Glucose (21 @ 10:05)   POCT Blood Glucose.: 173 mg/dL   POCT Blood Glucose (21 @ 09:22)   POCT Blood Glucose.: 166 mg/dL   POCT Blood Glucose (21 @ 06:22)   POCT Blood Glucose.: 144 mg/dL   POCT Blood Glucose.: 95 mg/dL (2021 02:28)  POCT Blood Glucose.: 114 mg/dL (15 Feb 2021 23:53)  POCT Blood Glucose.: 95 mg/dL (15 Feb 2021 20:42)  POCT Blood Glucose.: 143 mg/dL (15 Feb 2021 18:34)  POCT Blood Glucose.: 145 mg/dL (15 Feb 2021 17:26)  POCT Blood Glucose.: 96 mg/dL (15 Feb 2021 16:40)  POCT Blood Glucose.: 77 mg/dL (15 Feb 2021 15:38)  POCT Blood Glucose.: 97 mg/dL (15 Feb 2021 14:34)  POCT Blood Glucose.: 135 mg/dL (15 Feb 2021 13:11)  POCT Blood Glucose.: 161 mg/dL (15 Feb 2021 12:05)  POCT Blood Glucose.: 199 mg/dL (15 Feb 2021 11:12)  POCT Blood Glucose.: 220 mg/dL (15 Feb 2021 10:06)  POCT Blood Glucose.: 235 mg/dL (15 Feb 2021 09:13)  POCT Blood Glucose.: 235 mg/dL (15 Feb 2021 07:36)      Physical Exam:  General: NAD; A&Ox3; following commands off sedation   Cardiac: + rubs, S1/S2, RRR, no murmur  Lungs: + BiPAP, mildly labored respirations, CTA b/l, no wheeze, no rales, no crackles  Abdomen: Soft/NT/ND; positive bowel sounds x 4  Sternum: Intact, no click, incision healing well with no drainage  Incisions: Incisions clean/dry/intact  Extremities: Moderate 1+ pitting edema b/l lower extremities; good capillary refill; no cyanosis; palpable 1+ pedal pulses b/l    Central Venous Catheter: Yes[x]  No[] , If Yes indication:           Day # 4  Spencer Catheter: Yes  [x] , No  [] , If yes indication:                      Day # 4  NGT: Yes [] No [x] ,    If Yes Placement:                                     Day #    EPICARDIAL WIRES:  [x] YES [] NO                                              Day #  4  BOWEL MOVEMENT:  [] YES [x] NO, If No, Timing since last BM:      Day #  4  CHEST TUBE(Left/Right):  [x] YES [] NO, If yes -  AIR LEAKS:  [] YES [x] NO        LABS:                        7.9<L>  14.31<H> )-----------( 108<L>    ( 2021 01:19 )             23.7<L>                        8.4<L>  12.52<H> )-----------( 109<L>    ( 15 Feb 2021 20:24 )             25.1<L>    02-16    140  |  105  |  50<H>  ----------------------------<  101<H>  4.2   |  23  |  1.4  02-15    137  |  103  |  48<H>  ----------------------------<  104<H>  4.3   |  22  |  1.4    Ca    8.0<L>      2021 01:19  Mg     2.3         TPro  4.9<L> [6.0 - 8.0]  /  Alb  3.4<L> [3.5 - 5.2]  /  TBili  0.6 [0.2 - 1.2]  /  DBili  x   /  AST  175<H> [0 - 41]  /  ALT  207<H> [0 - 41]  /  AlkPhos  48 [30 - 115]  02-    PT/INR - ( 2021 19:58 )   PT: ;   INR: 1.33 ratio         PTT - ( 2021 19:58 )  PTT:24.2 sec    ABG - ( 2021 03:53 )  pH: 7.44  /  pCO2: 36    /  pO2: 135   / HCO3: 24    / Base Excess: 0.2   /  SaO2: 99    /  LA: 0.9          RADIOLOGY & ADDITIONAL TESTS:  CXR:   < from: Xray Chest 1 View- PORTABLE-Urgent (Xray Chest 1 View- PORTABLE-Urgent .) (21 @ 06:18) >  IMPRESSION:    Support devices: Stable right-sided chest tubes. Stable right IJ introducer sheath. Interval removal of the enteric tube.    Cardiac/mediastinum/hilum: Stable enlarged cardiac silhouette.    Lung parenchyma/Pleura: Stable to increased bilateral opacities/effusions. No visible pneumothorax.    Skeleton/soft tissues: Stable.    EK Lead ECG:   Ventricular Rate 74 BPM    Atrial Rate 74 BPM    P-R Interval 128 ms    QRS Duration 90 ms    Q-T Interval 392 ms    QTC Calculation(Bazett) 435 ms    P Axis 46 degrees    R Axis 41 degrees    T Axis 51 degrees    Diagnosis Line Normal sinus rhythm  Possible Left atrial enlargement  Borderline ECG    Confirmed by PARKER PACK MD ([id number]) on 2/15/2021 8:28:11 AM (02-15-21 @ 07:28)      MEDICATIONS  (STANDING):  albumin human  5% IVPB 500 milliLiter(s) IV Intermittent once  aspirin enteric coated 325 milliGRAM(s) Oral daily  buMETAnide Infusion 0.5 mG/Hr (2.5 mL/Hr) IV Continuous <Continuous>  cefepime   IVPB      cefepime   IVPB 1000 milliGRAM(s) IV Intermittent once  cefepime   IVPB 1000 milliGRAM(s) IV Intermittent every 8 hours  chlorhexidine 4% Liquid 1 Application(s) Topical <User Schedule>  dexMEDEtomidine Infusion 1 MICROgram(s)/kG/Hr (24.3 mL/Hr) IV Continuous <Continuous>  dextrose 40% Gel 15 Gram(s) Oral once  dextrose 5%. 1000 milliLiter(s) (50 mL/Hr) IV Continuous <Continuous>  dextrose 5%. 1000 milliLiter(s) (100 mL/Hr) IV Continuous <Continuous>  dextrose 50% Injectable 25 Gram(s) IV Push once  dextrose 50% Injectable 12.5 Gram(s) IV Push once  dextrose 50% Injectable 25 Gram(s) IV Push once  glucagon  Injectable 1 milliGRAM(s) IntraMuscular once  guaiFENesin  milliGRAM(s) Oral every 12 hours  insulin regular Infusion 2 Unit(s)/Hr (2 mL/Hr) IV Continuous <Continuous>  ipratropium    for Nebulization 500 MICROGram(s) Nebulizer every 6 hours  magnesium sulfate  IVPB 1 Gram(s) IV Intermittent every 12 hours  meperidine     Injectable 25 milliGRAM(s) IV Push once  norepinephrine Infusion 0.05 MICROgram(s)/kG/Min (9.09 mL/Hr) IV Continuous <Continuous>  pantoprazole  Injectable 40 milliGRAM(s) IV Push daily  polyethylene glycol 3350 17 Gram(s) Oral daily  propofol Infusion 10 MICROgram(s)/kG/Min (5.82 mL/Hr) IV Continuous <Continuous>  simethicone 80 milliGRAM(s) Chew three times a day  sodium chloride 0.9%. 1000 milliLiter(s) (20 mL/Hr) IV Continuous <Continuous>  vancomycin  IVPB 1000 milliGRAM(s) IV Intermittent every 12 hours  vasopressin Infusion 0.04 Unit(s)/Min (2.4 mL/Hr) IV Continuous <Continuous>  veCURonium  Injectable 10 milliGRAM(s) IV Push once    MEDICATIONS  (PRN):  oxyCODONE    IR 10 milliGRAM(s) Oral every 4 hours PRN Severe Pain (7 - 10)  oxyCODONE    IR 5 milliGRAM(s) Oral every 6 hours PRN Moderate Pain (4 - 6)    HEPARIN:  [] YES [x] NO  Dose: XX UNITS/HR UNITS Q8H  LOVENOX:[] YES [x] NO  Dose: XX mg Q24H  COUMADIN: []  YES [x] NO  Dose: XX mg  Q24H  SCD's: YES b/l  GI Prophylaxis: Protonix [], Pepcid [x], None [], (Contra-indication:.....)    Post-Op Aspirin: Yes [x],  No [], If No, then contra-indication:  Post-Op Statin: Yes [], No[x], If No, then contra-indication: elevated LFTs  Post-Op Beta-Blockers: Yes [], No [x], If No, then contra-indication: hypotensive on pressors     Allergies:  No Known Allergies    Ambulation/Activity Status: bedrest    Assessment/Plan:  70y Female status-post      C1L MV Repair        POD #     4         - Case and plan discussed with CTU Intensivist and CT Surgeon - Dr. Rojas  - Continue CTU supportive care    - Continue DVT/GI prophylaxis  - Incentive Spirometry 10 times an hour  - Continue to advance physical activity as tolerated and continue PT/OT as directed  1. Cont asa, hold bb for hypotension now requiring pressors, hold statin for elevated lfts, cont milrinone, bumex 2mg iv x 1, repeat echo  2. Hypotension: cont to monitor, wean pressors as tolerated  3. Hypoxia secondary to respiratory failure- wean vent as tolerated   4. DM/Glucose Control: insulin gtt   5. Leukocytosis- cont prophylactic abx, pt currently afebrile, cont to monitor   6. Anemia secondary to acute surgical blood loss - H/H elevated to 8.5/25.4; will continue to monitor cbc     OPERATIVE PROCEDURE(s):          C1L MV Repair        POD #     4                  70yFemale  SURGEON(s): Dr. Rojas  SUBJECTIVE ASSESSMENT:  Pt. seen and examined at bedside. Pt. was extubated and continued overnight with BiPAP. Pt. had one episode of rapid afib at around 8:00. Pt. was given amiodarone, precedex and propofol and was shocked once with subsequent sinus rhythm achieved. + flatulence    Vital Signs Last 24 Hrs  T(F): 98.4 (2021 04:00), Max: 98.4 (2021 04:00)  HR: 83 (2021 06:00) (82 - 93)  BP: 111/77 (2021 06:00) (78/48 - 179/79)  BP(mean): 88 (2021 06:00) (55 - 114)  ABP: 127/56 (2021 06:00) (97/50 - 206/83)  ABP(mean): 79 (2021 06:00)  RR: 22 (2021 06:00) (9 - 44)  SpO2: 100% (2021 06:00) (95% - 100%)  CVP(mm Hg): 14 (2021 06:00)  CO: 5.8 (2021 06:00)  CI: 3 (2021 06:00)  SVR: 1019 (2021 06:00)  Mode: CPAP with PS  FiO2: 40  PEEP: 5    I&O's Detail    2021 07:01  -  15 Feb 2021 07:00  --------------------------------------------------------  IN:    Amiodarone: 183.4 mL    Insulin: 2 mL    IV PiggyBack: 350 mL    Milrinone: 98.1 mL    NiCARdipine: 105 mL    Norepinephrine: 198 mL    Oral Fluid: 600 mL    PRBCs (Packed Red Blood Cells): 550 mL    sodium chloride 0.9%: 480 mL    Vasopressin: 38.4 mL  Total IN: 2604.9 mL    OUT:    Chest Tube (mL): 350 mL    Chest Tube (mL): 130 mL    Indwelling Catheter - Urethral (mL): 715 mL    Voided (mL): 150 mL  Total OUT: 1345 mL        Net:   I&O's Detail    2021 07:01  -  2021 07:00  --------------------------------------------------------  Total NET: 976 mL      2021 07:01  -  15 Feb 2021 07:00  --------------------------------------------------------  Total NET: 1259.9 mL        CAPILLARY BLOOD GLUCOSE  POCT Blood Glucose (21 @ 11:05)   POCT Blood Glucose.: 197 mg/dL   POCT Blood Glucose (21 @ 10:05)   POCT Blood Glucose.: 173 mg/dL   POCT Blood Glucose (21 @ 09:22)   POCT Blood Glucose.: 166 mg/dL   POCT Blood Glucose (21 @ 06:22)   POCT Blood Glucose.: 144 mg/dL   POCT Blood Glucose.: 95 mg/dL (2021 02:28)  POCT Blood Glucose.: 114 mg/dL (15 Feb 2021 23:53)  POCT Blood Glucose.: 95 mg/dL (15 Feb 2021 20:42)  POCT Blood Glucose.: 143 mg/dL (15 Feb 2021 18:34)  POCT Blood Glucose.: 145 mg/dL (15 Feb 2021 17:26)  POCT Blood Glucose.: 96 mg/dL (15 Feb 2021 16:40)  POCT Blood Glucose.: 77 mg/dL (15 Feb 2021 15:38)  POCT Blood Glucose.: 97 mg/dL (15 Feb 2021 14:34)  POCT Blood Glucose.: 135 mg/dL (15 Feb 2021 13:11)  POCT Blood Glucose.: 161 mg/dL (15 Feb 2021 12:05)  POCT Blood Glucose.: 199 mg/dL (15 Feb 2021 11:12)  POCT Blood Glucose.: 220 mg/dL (15 Feb 2021 10:06)  POCT Blood Glucose.: 235 mg/dL (15 Feb 2021 09:13)  POCT Blood Glucose.: 235 mg/dL (15 Feb 2021 07:36)      Physical Exam:  General: on bipap   Cardiac: + rubs, S1/S2, RRR, no murmur  Lungs: + BiPAP, mildly labored respirations, CTA b/l, no wheeze, no rales, no crackles  Abdomen: Soft/NT/ND; positive bowel sounds x 4  Sternum: Intact, no click, incision healing well with no drainage  Incisions: Incisions clean/dry/intact  Extremities: Moderate 1+ pitting edema b/l lower extremities; good capillary refill; no cyanosis; palpable 1+ pedal pulses b/l    Central Venous Catheter: Yes[x]  No[] , If Yes indication:     hd unstable      Day # 4  Spencer Catheter: Yes  [x] , No  [] , If yes indication:       strict i.o              Day # 4  NGT: Yes [] No [x] ,    If Yes Placement:                                     Day #    EPICARDIAL WIRES:  [x] YES [] NO                                              Day #  4  BOWEL MOVEMENT:  [] YES [x] NO, If No, Timing since last BM:      Day #    CHEST TUBE(Left/Right):  [x] YES [] NO, If yes -  AIR LEAKS:  [] YES [x] NO        LABS:                        7.9<L>  14.31<H> )-----------( 108<L>    ( 2021 01:19 )             23.7<L>                        8.4<L>  12.52<H> )-----------( 109<L>    ( 15 Feb 2021 20:24 )             25.1<L>    02-16    140  |  105  |  50<H>  ----------------------------<  101<H>  4.2   |  23  |  1.4  02-15    137  |  103  |  48<H>  ----------------------------<  104<H>  4.3   |  22  |  1.4    Ca    8.0<L>      2021 01:19  Mg     2.3         TPro  4.9<L> [6.0 - 8.0]  /  Alb  3.4<L> [3.5 - 5.2]  /  TBili  0.6 [0.2 - 1.2]  /  DBili  x   /  AST  175<H> [0 - 41]  /  ALT  207<H> [0 - 41]  /  AlkPhos  48 [30 - 115]  02-    PT/INR - ( 2021 19:58 )   PT: ;   INR: 1.33 ratio         PTT - ( 2021 19:58 )  PTT:24.2 sec    ABG - ( 2021 03:53 )  pH: 7.44  /  pCO2: 36    /  pO2: 135   / HCO3: 24    / Base Excess: 0.2   /  SaO2: 99    /  LA: 0.9          RADIOLOGY & ADDITIONAL TESTS:  CXR:   < from: Xray Chest 1 View- PORTABLE-Urgent (Xray Chest 1 View- PORTABLE-Urgent .) (21 @ 06:18) >  IMPRESSION:    Support devices: Stable right-sided chest tubes. Stable right IJ introducer sheath. Interval removal of the enteric tube.    Cardiac/mediastinum/hilum: Stable enlarged cardiac silhouette.    Lung parenchyma/Pleura: Stable to increased bilateral opacities/effusions. No visible pneumothorax.    Skeleton/soft tissues: Stable.    EK Lead ECG:   Ventricular Rate 74 BPM    Atrial Rate 74 BPM    P-R Interval 128 ms    QRS Duration 90 ms    Q-T Interval 392 ms    QTC Calculation(Bazett) 435 ms    P Axis 46 degrees    R Axis 41 degrees    T Axis 51 degrees    Diagnosis Line Normal sinus rhythm  Possible Left atrial enlargement  Borderline ECG    Confirmed by PARKER PACK MD ([id number]) on 2/15/2021 8:28:11 AM (02-15-21 @ 07:28)      MEDICATIONS  (STANDING):  albumin human  5% IVPB 500 milliLiter(s) IV Intermittent once  aspirin enteric coated 325 milliGRAM(s) Oral daily  buMETAnide Infusion 0.5 mG/Hr (2.5 mL/Hr) IV Continuous <Continuous>  cefepime   IVPB      cefepime   IVPB 1000 milliGRAM(s) IV Intermittent once  cefepime   IVPB 1000 milliGRAM(s) IV Intermittent every 8 hours  chlorhexidine 4% Liquid 1 Application(s) Topical <User Schedule>  dexMEDEtomidine Infusion 1 MICROgram(s)/kG/Hr (24.3 mL/Hr) IV Continuous <Continuous>  dextrose 40% Gel 15 Gram(s) Oral once  dextrose 5%. 1000 milliLiter(s) (50 mL/Hr) IV Continuous <Continuous>  dextrose 5%. 1000 milliLiter(s) (100 mL/Hr) IV Continuous <Continuous>  dextrose 50% Injectable 25 Gram(s) IV Push once  dextrose 50% Injectable 12.5 Gram(s) IV Push once  dextrose 50% Injectable 25 Gram(s) IV Push once  glucagon  Injectable 1 milliGRAM(s) IntraMuscular once  guaiFENesin  milliGRAM(s) Oral every 12 hours  insulin regular Infusion 2 Unit(s)/Hr (2 mL/Hr) IV Continuous <Continuous>  ipratropium    for Nebulization 500 MICROGram(s) Nebulizer every 6 hours  magnesium sulfate  IVPB 1 Gram(s) IV Intermittent every 12 hours  meperidine     Injectable 25 milliGRAM(s) IV Push once  norepinephrine Infusion 0.05 MICROgram(s)/kG/Min (9.09 mL/Hr) IV Continuous <Continuous>  pantoprazole  Injectable 40 milliGRAM(s) IV Push daily  polyethylene glycol 3350 17 Gram(s) Oral daily  propofol Infusion 10 MICROgram(s)/kG/Min (5.82 mL/Hr) IV Continuous <Continuous>  simethicone 80 milliGRAM(s) Chew three times a day  sodium chloride 0.9%. 1000 milliLiter(s) (20 mL/Hr) IV Continuous <Continuous>  vancomycin  IVPB 1000 milliGRAM(s) IV Intermittent every 12 hours  vasopressin Infusion 0.04 Unit(s)/Min (2.4 mL/Hr) IV Continuous <Continuous>  veCURonium  Injectable 10 milliGRAM(s) IV Push once    MEDICATIONS  (PRN):  oxyCODONE    IR 10 milliGRAM(s) Oral every 4 hours PRN Severe Pain (7 - 10)  oxyCODONE    IR 5 milliGRAM(s) Oral every 6 hours PRN Moderate Pain (4 - 6)    HEPARIN:  [] YES [x] NO  Dose: XX UNITS/HR UNITS Q8H  LOVENOX:[] YES [x] NO  Dose: XX mg Q24H  COUMADIN: []  YES [x] NO  Dose: XX mg  Q24H  SCD's: YES b/l  GI Prophylaxis: Protonix [], Pepcid [x], None [], (Contra-indication:.....)    Post-Op Aspirin: Yes [x],  No [], If No, then contra-indication:  Post-Op Statin: Yes [], No[x], If No, then contra-indication: elevated LFTs  Post-Op Beta-Blockers: Yes [], No [x], If No, then contra-indication: hypotensive on pressors     Allergies:  No Known Allergies    Ambulation/Activity Status: bedrest    Assessment/Plan:  70y Female status-post      C1L MV Repair        POD #     4         - Case and plan discussed with CTU Intensivist and CT Surgeon - Dr. Rojas  - Continue CTU supportive care    - Continue DVT/GI prophylaxis  - Incentive Spirometry 10 times an hour  - Continue to advance physical activity as tolerated and continue PT/OT as directed  1. Cont asa, hold bb for hypotension now requiring pressors, hold statin for elevated lfts, cont bumex gtt, repeat labs today  2. Hypotension: cont to monitor, wean pressors as tolerated  3. Hypoxia secondary to respiratory failure- bipap   4. DM/Glucose Control: insulin gtt   5. Leukocytosis- cont prophylactic abx, pt currently afebrile, cont to monitor   6. Anemia secondary to acute surgical blood loss - H/H elevated to 8.5/25.4; will continue to monitor cbc

## 2021-02-16 NOTE — DIETITIAN NUTRITION RISK NOTIFICATION - TREATMENT: THE FOLLOWING DIET HAS BEEN RECOMMENDED
Diet, NPO:   Except Medications (02-15-21 @ 08:44) [Active]      Height: 59"; Weight: 97 kg; BMI 43.7    Rec: diet advancement when medically feasible (as resp status improves) would recommend SLP c/s prior to initiating po diet; if unable to resume po diet, would consider NST c/s as pt continues to require continuous BiPAP infusion

## 2021-02-16 NOTE — DIETITIAN INITIAL EVALUATION ADULT. - OTHER CALCULATIONS
wt used: dosing 97 kg vs IBW 44.2 kg; Kcal: 6350-7650 (MSJ x 1-1.1 AF) obese BMI considered; Protein: 53-62 g (1.2-1.4 g/kg IBW) same as above + significant difference between CBW and IBW considered; Fluid: per CTU team

## 2021-02-17 LAB
ALBUMIN SERPL ELPH-MCNC: 3.4 G/DL — LOW (ref 3.5–5.2)
ALBUMIN SERPL ELPH-MCNC: 3.5 G/DL — SIGNIFICANT CHANGE UP (ref 3.5–5.2)
ALBUMIN SERPL ELPH-MCNC: 3.7 G/DL — SIGNIFICANT CHANGE UP (ref 3.5–5.2)
ALP SERPL-CCNC: 49 U/L — SIGNIFICANT CHANGE UP (ref 30–115)
ALP SERPL-CCNC: 53 U/L — SIGNIFICANT CHANGE UP (ref 30–115)
ALP SERPL-CCNC: 55 U/L — SIGNIFICANT CHANGE UP (ref 30–115)
ALT FLD-CCNC: 132 U/L — HIGH (ref 0–41)
ALT FLD-CCNC: 170 U/L — HIGH (ref 0–41)
ALT FLD-CCNC: 173 U/L — HIGH (ref 0–41)
ANION GAP SERPL CALC-SCNC: 10 MMOL/L — SIGNIFICANT CHANGE UP (ref 7–14)
ANION GAP SERPL CALC-SCNC: 13 MMOL/L — SIGNIFICANT CHANGE UP (ref 7–14)
ANION GAP SERPL CALC-SCNC: 9 MMOL/L — SIGNIFICANT CHANGE UP (ref 7–14)
APTT BLD: 21.4 SEC — CRITICAL LOW (ref 27–39.2)
AST SERPL-CCNC: 104 U/L — HIGH (ref 0–41)
AST SERPL-CCNC: 115 U/L — HIGH (ref 0–41)
AST SERPL-CCNC: 71 U/L — HIGH (ref 0–41)
BASE EXCESS BLDA CALC-SCNC: 5 MMOL/L — HIGH (ref -2–2)
BASOPHILS # BLD AUTO: 0.02 K/UL — SIGNIFICANT CHANGE UP (ref 0–0.2)
BASOPHILS NFR BLD AUTO: 0.1 % — SIGNIFICANT CHANGE UP (ref 0–1)
BILIRUB SERPL-MCNC: 1.4 MG/DL — HIGH (ref 0.2–1.2)
BILIRUB SERPL-MCNC: 1.5 MG/DL — HIGH (ref 0.2–1.2)
BILIRUB SERPL-MCNC: 1.8 MG/DL — HIGH (ref 0.2–1.2)
BLD GP AB SCN SERPL QL: SIGNIFICANT CHANGE UP
BUN SERPL-MCNC: 44 MG/DL — HIGH (ref 10–20)
BUN SERPL-MCNC: 47 MG/DL — HIGH (ref 10–20)
BUN SERPL-MCNC: 51 MG/DL — HIGH (ref 10–20)
CALCIUM SERPL-MCNC: 8.1 MG/DL — LOW (ref 8.5–10.1)
CALCIUM SERPL-MCNC: 8.4 MG/DL — LOW (ref 8.5–10.1)
CALCIUM SERPL-MCNC: 8.4 MG/DL — LOW (ref 8.5–10.1)
CHLORIDE SERPL-SCNC: 101 MMOL/L — SIGNIFICANT CHANGE UP (ref 98–110)
CHLORIDE SERPL-SCNC: 102 MMOL/L — SIGNIFICANT CHANGE UP (ref 98–110)
CHLORIDE SERPL-SCNC: 102 MMOL/L — SIGNIFICANT CHANGE UP (ref 98–110)
CO2 SERPL-SCNC: 25 MMOL/L — SIGNIFICANT CHANGE UP (ref 17–32)
CO2 SERPL-SCNC: 26 MMOL/L — SIGNIFICANT CHANGE UP (ref 17–32)
CO2 SERPL-SCNC: 28 MMOL/L — SIGNIFICANT CHANGE UP (ref 17–32)
CREAT SERPL-MCNC: 1 MG/DL — SIGNIFICANT CHANGE UP (ref 0.7–1.5)
CREAT SERPL-MCNC: 1.1 MG/DL — SIGNIFICANT CHANGE UP (ref 0.7–1.5)
CREAT SERPL-MCNC: 1.3 MG/DL — SIGNIFICANT CHANGE UP (ref 0.7–1.5)
EOSINOPHIL # BLD AUTO: 0.02 K/UL — SIGNIFICANT CHANGE UP (ref 0–0.7)
EOSINOPHIL NFR BLD AUTO: 0.1 % — SIGNIFICANT CHANGE UP (ref 0–8)
GAS PNL BLDA: SIGNIFICANT CHANGE UP
GLUCOSE BLDC GLUCOMTR-MCNC: 105 MG/DL — HIGH (ref 70–99)
GLUCOSE BLDC GLUCOMTR-MCNC: 106 MG/DL — HIGH (ref 70–99)
GLUCOSE BLDC GLUCOMTR-MCNC: 125 MG/DL — HIGH (ref 70–99)
GLUCOSE BLDC GLUCOMTR-MCNC: 129 MG/DL — HIGH (ref 70–99)
GLUCOSE BLDC GLUCOMTR-MCNC: 135 MG/DL — HIGH (ref 70–99)
GLUCOSE BLDC GLUCOMTR-MCNC: 148 MG/DL — HIGH (ref 70–99)
GLUCOSE BLDC GLUCOMTR-MCNC: 200 MG/DL — HIGH (ref 70–99)
GLUCOSE BLDC GLUCOMTR-MCNC: 202 MG/DL — HIGH (ref 70–99)
GLUCOSE SERPL-MCNC: 106 MG/DL — HIGH (ref 70–99)
GLUCOSE SERPL-MCNC: 111 MG/DL — HIGH (ref 70–99)
GLUCOSE SERPL-MCNC: 152 MG/DL — HIGH (ref 70–99)
HCO3 BLDA-SCNC: 28 MMOL/L — HIGH (ref 23–27)
HCT VFR BLD CALC: 25.7 % — LOW (ref 37–47)
HCT VFR BLD CALC: 26.5 % — LOW (ref 37–47)
HCT VFR BLD CALC: 29.2 % — LOW (ref 37–47)
HGB BLD-MCNC: 9 G/DL — LOW (ref 12–16)
HGB BLD-MCNC: 9.1 G/DL — LOW (ref 12–16)
HGB BLD-MCNC: 9.8 G/DL — LOW (ref 12–16)
IMM GRANULOCYTES NFR BLD AUTO: 0.6 % — HIGH (ref 0.1–0.3)
INR BLD: 1.29 RATIO — SIGNIFICANT CHANGE UP (ref 0.65–1.3)
LYMPHOCYTES # BLD AUTO: 1.32 K/UL — SIGNIFICANT CHANGE UP (ref 1.2–3.4)
LYMPHOCYTES # BLD AUTO: 8.7 % — LOW (ref 20.5–51.1)
MAGNESIUM SERPL-MCNC: 2.2 MG/DL — SIGNIFICANT CHANGE UP (ref 1.8–2.4)
MCHC RBC-ENTMCNC: 28.3 PG — SIGNIFICANT CHANGE UP (ref 27–31)
MCHC RBC-ENTMCNC: 29 PG — SIGNIFICANT CHANGE UP (ref 27–31)
MCHC RBC-ENTMCNC: 29.4 PG — SIGNIFICANT CHANGE UP (ref 27–31)
MCHC RBC-ENTMCNC: 33.6 G/DL — SIGNIFICANT CHANGE UP (ref 32–37)
MCHC RBC-ENTMCNC: 34 G/DL — SIGNIFICANT CHANGE UP (ref 32–37)
MCHC RBC-ENTMCNC: 35.4 G/DL — SIGNIFICANT CHANGE UP (ref 32–37)
MCV RBC AUTO: 82.9 FL — SIGNIFICANT CHANGE UP (ref 81–99)
MCV RBC AUTO: 84.4 FL — SIGNIFICANT CHANGE UP (ref 81–99)
MCV RBC AUTO: 85.5 FL — SIGNIFICANT CHANGE UP (ref 81–99)
MONOCYTES # BLD AUTO: 1.6 K/UL — HIGH (ref 0.1–0.6)
MONOCYTES NFR BLD AUTO: 10.5 % — HIGH (ref 1.7–9.3)
NEUTROPHILS # BLD AUTO: 12.17 K/UL — HIGH (ref 1.4–6.5)
NEUTROPHILS NFR BLD AUTO: 80 % — HIGH (ref 42.2–75.2)
NRBC # BLD: 0 /100 WBCS — SIGNIFICANT CHANGE UP (ref 0–0)
PCO2 BLDA: 34 MMHG — LOW (ref 38–42)
PH BLDA: 7.52 — HIGH (ref 7.38–7.42)
PHOSPHATE SERPL-MCNC: 3.5 MG/DL — SIGNIFICANT CHANGE UP (ref 2.1–4.9)
PLATELET # BLD AUTO: 122 K/UL — LOW (ref 130–400)
PLATELET # BLD AUTO: 134 K/UL — SIGNIFICANT CHANGE UP (ref 130–400)
PLATELET # BLD AUTO: 147 K/UL — SIGNIFICANT CHANGE UP (ref 130–400)
PO2 BLDA: 126 MMHG — HIGH (ref 78–95)
POTASSIUM SERPL-MCNC: 3.5 MMOL/L — SIGNIFICANT CHANGE UP (ref 3.5–5)
POTASSIUM SERPL-MCNC: 3.6 MMOL/L — SIGNIFICANT CHANGE UP (ref 3.5–5)
POTASSIUM SERPL-MCNC: 4 MMOL/L — SIGNIFICANT CHANGE UP (ref 3.5–5)
POTASSIUM SERPL-SCNC: 3.5 MMOL/L — SIGNIFICANT CHANGE UP (ref 3.5–5)
POTASSIUM SERPL-SCNC: 3.6 MMOL/L — SIGNIFICANT CHANGE UP (ref 3.5–5)
POTASSIUM SERPL-SCNC: 4 MMOL/L — SIGNIFICANT CHANGE UP (ref 3.5–5)
PROT SERPL-MCNC: 4.8 G/DL — LOW (ref 6–8)
PROT SERPL-MCNC: 5.1 G/DL — LOW (ref 6–8)
PROT SERPL-MCNC: 5.3 G/DL — LOW (ref 6–8)
PROTHROM AB SERPL-ACNC: 14.8 SEC — HIGH (ref 9.95–12.87)
RBC # BLD: 3.1 M/UL — LOW (ref 4.2–5.4)
RBC # BLD: 3.1 M/UL — LOW (ref 4.2–5.4)
RBC # BLD: 3.46 M/UL — LOW (ref 4.2–5.4)
RBC # FLD: 15.5 % — HIGH (ref 11.5–14.5)
RBC # FLD: 15.8 % — HIGH (ref 11.5–14.5)
RBC # FLD: 16.6 % — HIGH (ref 11.5–14.5)
SAO2 % BLDA: 99 % — HIGH (ref 94–98)
SODIUM SERPL-SCNC: 138 MMOL/L — SIGNIFICANT CHANGE UP (ref 135–146)
SODIUM SERPL-SCNC: 138 MMOL/L — SIGNIFICANT CHANGE UP (ref 135–146)
SODIUM SERPL-SCNC: 140 MMOL/L — SIGNIFICANT CHANGE UP (ref 135–146)
WBC # BLD: 15.22 K/UL — HIGH (ref 4.8–10.8)
WBC # BLD: 15.78 K/UL — HIGH (ref 4.8–10.8)
WBC # BLD: 17.23 K/UL — HIGH (ref 4.8–10.8)
WBC # FLD AUTO: 15.22 K/UL — HIGH (ref 4.8–10.8)
WBC # FLD AUTO: 15.78 K/UL — HIGH (ref 4.8–10.8)
WBC # FLD AUTO: 17.23 K/UL — HIGH (ref 4.8–10.8)

## 2021-02-17 PROCEDURE — 71045 X-RAY EXAM CHEST 1 VIEW: CPT | Mod: 26

## 2021-02-17 PROCEDURE — 93926 LOWER EXTREMITY STUDY: CPT | Mod: 26,RT

## 2021-02-17 PROCEDURE — 99291 CRITICAL CARE FIRST HOUR: CPT

## 2021-02-17 PROCEDURE — 93306 TTE W/DOPPLER COMPLETE: CPT | Mod: 26

## 2021-02-17 PROCEDURE — 93010 ELECTROCARDIOGRAM REPORT: CPT

## 2021-02-17 RX ORDER — POTASSIUM CHLORIDE 20 MEQ
20 PACKET (EA) ORAL ONCE
Refills: 0 | Status: COMPLETED | OUTPATIENT
Start: 2021-02-17 | End: 2021-02-17

## 2021-02-17 RX ORDER — ASPIRIN/CALCIUM CARB/MAGNESIUM 324 MG
325 TABLET ORAL DAILY
Refills: 0 | Status: DISCONTINUED | OUTPATIENT
Start: 2021-02-17 | End: 2021-03-01

## 2021-02-17 RX ORDER — FENTANYL CITRATE 50 UG/ML
25 INJECTION INTRAVENOUS ONCE
Refills: 0 | Status: DISCONTINUED | OUTPATIENT
Start: 2021-02-17 | End: 2021-02-17

## 2021-02-17 RX ORDER — ASPIRIN/CALCIUM CARB/MAGNESIUM 324 MG
300 TABLET ORAL DAILY
Refills: 0 | Status: DISCONTINUED | OUTPATIENT
Start: 2021-02-17 | End: 2021-02-17

## 2021-02-17 RX ORDER — ESCITALOPRAM OXALATE 10 MG/1
20 TABLET, FILM COATED ORAL DAILY
Refills: 0 | Status: DISCONTINUED | OUTPATIENT
Start: 2021-02-17 | End: 2021-03-01

## 2021-02-17 RX ORDER — AMLODIPINE BESYLATE 2.5 MG/1
5 TABLET ORAL DAILY
Refills: 0 | Status: DISCONTINUED | OUTPATIENT
Start: 2021-02-17 | End: 2021-03-01

## 2021-02-17 RX ORDER — SENNA PLUS 8.6 MG/1
2 TABLET ORAL AT BEDTIME
Refills: 0 | Status: DISCONTINUED | OUTPATIENT
Start: 2021-02-17 | End: 2021-02-20

## 2021-02-17 RX ORDER — CEFEPIME 1 G/1
1000 INJECTION, POWDER, FOR SOLUTION INTRAMUSCULAR; INTRAVENOUS EVERY 8 HOURS
Refills: 0 | Status: DISCONTINUED | OUTPATIENT
Start: 2021-02-17 | End: 2021-02-18

## 2021-02-17 RX ADMIN — Medication 250 MILLIGRAM(S): at 06:20

## 2021-02-17 RX ADMIN — Medication 100 MILLIEQUIVALENT(S): at 14:00

## 2021-02-17 RX ADMIN — CEFEPIME 100 MILLIGRAM(S): 1 INJECTION, POWDER, FOR SOLUTION INTRAMUSCULAR; INTRAVENOUS at 13:18

## 2021-02-17 RX ADMIN — Medication 100 MILLIEQUIVALENT(S): at 06:20

## 2021-02-17 RX ADMIN — AMIODARONE HYDROCHLORIDE 16.7 MG/MIN: 400 TABLET ORAL at 06:23

## 2021-02-17 RX ADMIN — DEXMEDETOMIDINE HYDROCHLORIDE IN 0.9% SODIUM CHLORIDE 17 MICROGRAM(S)/KG/HR: 4 INJECTION INTRAVENOUS at 08:00

## 2021-02-17 RX ADMIN — IRON SUCROSE 110 MILLIGRAM(S): 20 INJECTION, SOLUTION INTRAVENOUS at 17:35

## 2021-02-17 RX ADMIN — CHLORHEXIDINE GLUCONATE 1 APPLICATION(S): 213 SOLUTION TOPICAL at 06:20

## 2021-02-17 RX ADMIN — ESCITALOPRAM OXALATE 20 MILLIGRAM(S): 10 TABLET, FILM COATED ORAL at 15:39

## 2021-02-17 RX ADMIN — DEXMEDETOMIDINE HYDROCHLORIDE IN 0.9% SODIUM CHLORIDE 17 MICROGRAM(S)/KG/HR: 4 INJECTION INTRAVENOUS at 10:30

## 2021-02-17 RX ADMIN — PANTOPRAZOLE SODIUM 40 MILLIGRAM(S): 20 TABLET, DELAYED RELEASE ORAL at 12:46

## 2021-02-17 RX ADMIN — FENTANYL CITRATE 25 MICROGRAM(S): 50 INJECTION INTRAVENOUS at 17:15

## 2021-02-17 RX ADMIN — Medication 100 MILLIEQUIVALENT(S): at 17:00

## 2021-02-17 RX ADMIN — Medication 100 MILLIEQUIVALENT(S): at 15:40

## 2021-02-17 RX ADMIN — Medication 325 MILLIGRAM(S): at 13:18

## 2021-02-17 RX ADMIN — Medication 100 MILLIEQUIVALENT(S): at 07:00

## 2021-02-17 RX ADMIN — POLYETHYLENE GLYCOL 3350 17 GRAM(S): 17 POWDER, FOR SOLUTION ORAL at 12:46

## 2021-02-17 RX ADMIN — Medication 250 MILLIGRAM(S): at 17:31

## 2021-02-17 RX ADMIN — AMLODIPINE BESYLATE 5 MILLIGRAM(S): 2.5 TABLET ORAL at 13:15

## 2021-02-17 RX ADMIN — Medication 100 MILLIEQUIVALENT(S): at 08:30

## 2021-02-17 RX ADMIN — SIMETHICONE 80 MILLIGRAM(S): 80 TABLET, CHEWABLE ORAL at 12:46

## 2021-02-17 RX ADMIN — Medication 600 MILLIGRAM(S): at 17:31

## 2021-02-17 RX ADMIN — Medication 1 MILLIGRAM(S): at 12:45

## 2021-02-17 RX ADMIN — INSULIN HUMAN 2 UNIT(S)/HR: 100 INJECTION, SOLUTION SUBCUTANEOUS at 06:23

## 2021-02-17 RX ADMIN — Medication 100 GRAM(S): at 19:24

## 2021-02-17 RX ADMIN — DEXMEDETOMIDINE HYDROCHLORIDE IN 0.9% SODIUM CHLORIDE 17 MICROGRAM(S)/KG/HR: 4 INJECTION INTRAVENOUS at 12:30

## 2021-02-17 RX ADMIN — Medication 100 GRAM(S): at 06:22

## 2021-02-17 RX ADMIN — CEFEPIME 100 MILLIGRAM(S): 1 INJECTION, POWDER, FOR SOLUTION INTRAMUSCULAR; INTRAVENOUS at 06:20

## 2021-02-17 RX ADMIN — INSULIN HUMAN 2 UNIT(S)/HR: 100 INJECTION, SOLUTION SUBCUTANEOUS at 20:42

## 2021-02-17 NOTE — PROGRESS NOTE ADULT - ATTENDING COMMENTS
patient is well  known to me . Seen in the office. based on cath and DERRICK has at least mod (maybe mod to sever) MR and mild AS. Has symptoms of SOB, which may n be related to MR and critical osteal LAD stenosis. after discussion with entire team, feel that CABG/MV procedure best option. The risks of the procedure were explained in detail to patient and sister. They understands and agree. will start preop workup and possible surgery on friday.
Feels that breathing is better. does not seem in distress at this time. VSS. on no ionotropes. CI. 2.5(flotrac). excellent pedal pulses. CXR is much improved over 72h.. we reviewed DERRICK done intraop(after repair) and the one done on the night she decompensated(after CTs placed). There is are no signs to suggest that a large hematoma is compressing the atria to cause hemodynamic compromise.
agree that CABG poss MVVrepair best option. possibilty of replacement explained. also explained risk of surgery including death , stroke, bleeding, infection to patient and sister. for surgery tomorrow.

## 2021-02-17 NOTE — PROGRESS NOTE ADULT - SUBJECTIVE AND OBJECTIVE BOX
OPERATIVE PROCEDURE(s):                POD #                       70yFemale  SURGEON(s): LEANNE Victoria  SUBJECTIVE ASSESSMENT:  Patient has no complaints at this time.    Vital Signs Last 24 Hrs  T(F): 98.1 (2021 00:00), Max: 98.5 (2021 20:00)  HR: 62 (2021 02:00) (60 - 165)  BP: 115/56 (2021 22:00) (105/49 - 169/67)  BP(mean): 78 (2021 22:00) (70 - 96)  ABP: 131/46 (2021 02:00) (87/39 - 181/80)  ABP(mean): 73 (2021 02:00)  RR: 21 (2021 02:00) (9 - 61)  SpO2: 99% (2021 02:00) (95% - 100%)  CVP(mm Hg): 6 (2021 02:00)  CVP(cm H2O): --  CO: 5.7 (2021 03:00)  CI: 3 (2021 03:00)  PA: --  SVR: 1031 (2021 22:00)    I&O's Detail    15 Feb 2021 07:01  -  2021 07:00  --------------------------------------------------------  IN:    Bumetanide: 72.5 mL    Bumetanide: 2.5 mL    Dexmedetomidine: 211.2 mL    Insulin: 82 mL    IV PiggyBack: 850 mL    Milrinone: 21.8 mL    Norepinephrine: 95 mL    Propofol: 30 mL    sodium chloride 0.9%: 480 mL    Vasopressin: 24 mL  Total IN: 1869 mL    OUT:    Chest Tube (mL): 25 mL    Chest Tube (mL): 70 mL    Indwelling Catheter - Urethral (mL): 4481 mL    Nasogastric/Oral tube (mL): 300 mL  Total OUT: 4876 mL        Net:   I&O's Detail    2021 07:01  -  15 Feb 2021 07:00  --------------------------------------------------------  Total NET: 1259.9 mL      15 Feb 2021 07:01  -  2021 07:00  --------------------------------------------------------  Total NET: -3007 mL        CAPILLARY BLOOD GLUCOSE  122 (2021 14:00)      POCT Blood Glucose.: 106 mg/dL (2021 03:15)  POCT Blood Glucose.: 125 mg/dL (2021 00:59)  POCT Blood Glucose.: 151 mg/dL (2021 23:38)  POCT Blood Glucose.: 105 mg/dL (2021 18:32)  POCT Blood Glucose.: 121 mg/dL (2021 16:53)  POCT Blood Glucose.: 124 mg/dL (2021 15:26)  POCT Blood Glucose.: 157 mg/dL (2021 12:14)  POCT Blood Glucose.: 197 mg/dL (2021 11:05)  POCT Blood Glucose.: 173 mg/dL (2021 10:05)  POCT Blood Glucose.: 166 mg/dL (2021 09:22)  POCT Blood Glucose.: 144 mg/dL (2021 06:22)    Physical Exam:  General: NAD; A&Ox3/Patient is intubated and sedated  Cardiac: S1/S2, RRR, no murmur, no rubs  Lungs: unlabored respirations, CTA b/l, no wheeze, no rales, no crackles  Abdomen: Soft/NT/ND; positive bowel sounds x 4  Sternum: Intact, no click, incision healing well with no drainage  Incisions: Incisions clean/dry/intact  Extremities: No edema b/l lower extremities; good capillary refill; no cyanosis; palpable 1+ pedal pulses b/l    Central Venous Catheter: Yes[]  No[] , If Yes indication:           Day #  Spencer Catheter: Yes  [] , No  [] , If yes indication:                      Day #  NGT: Yes [] No [] ,    If Yes Placement:                                     Day #  EPICARDIAL WIRES:  [] YES [] NO                                              Day #  BOWEL MOVEMENT:  [] YES [] NO, If No, Timing since last BM:      Day #  CHEST TUBE(Left/Right):  [] YES [] NO, If yes -  AIR LEAKS:  [] YES [] NO        LABS:                        9.8<L>  15.22<H> )-----------( 122<L>    ( 2021 02:00 )             29.2<L>                        8.1<L>  19.33<H> )-----------( 148      ( 2021 14:07 )             23.9<L>    02-17    140  |  102  |  51<H>  ----------------------------<  111<H>  3.5   |  25  |  1.3  02-16    142  |  104  |  53<H>  ----------------------------<  103<H>  3.9   |  26  |  1.2    Ca    8.1<L>      2021 02:00  Mg     2.2     02-17    TPro  5.1<L> [6.0 - 8.0]  /  Alb  3.5 [3.5 - 5.2]  /  TBili  1.8<H> [0.2 - 1.2]  /  DBili  x   /  AST  115<H> [0 - 41]  /  ALT  170<H> [0 - 41]  /  AlkPhos  55 [30 - 115]  -17    PT/INR - ( 2021 14:07 )   PT: ;   INR: 1.16 ratio         PTT - ( 2021 14:07 )  PTT:21.2 sec    ABG - ( 2021 05:27 )  pH: 7.52  /  pCO2: 34    /  pO2: 126   / HCO3: 28    / Base Excess: 5.0   /  SaO2: 99    /  LA: 1.0              RADIOLOGY & ADDITIONAL TESTS:  CXR:   EK Lead ECG:   Ventricular Rate 70 BPM    Atrial Rate 57 BPM    P-R Interval 118 ms    QRS Duration 94 ms    Q-T Interval 414 ms    QTC Calculation(Bazett) 447 ms    P Axis 22 degrees    R Axis 2 degrees    T Axis 27 degrees    Diagnosis Line Sinus bradycardia with frequent Premature ventricular complexes and Premature  atrial complexes  Possible Left atrial enlargement  Nonspecific T wave abnormality  Abnormal ECG    Confirmed by PARKER PACK MD [id number]) on 2021 8:43:36 AM (21 @ 07:18)    MEDICATIONS  (STANDING):  albumin human  5% IVPB 500 milliLiter(s) IV Intermittent once  aMIOdarone Infusion 0.5 mG/Min (16.7 mL/Hr) IV Continuous <Continuous>  aspirin enteric coated 325 milliGRAM(s) Oral daily  cefepime   IVPB      cefepime   IVPB 1000 milliGRAM(s) IV Intermittent every 8 hours  chlorhexidine 4% Liquid 1 Application(s) Topical <User Schedule>  dexMEDEtomidine Infusion 0.7 MICROgram(s)/kG/Hr (17 mL/Hr) IV Continuous <Continuous>  dextrose 40% Gel 15 Gram(s) Oral once  dextrose 5%. 1000 milliLiter(s) (50 mL/Hr) IV Continuous <Continuous>  dextrose 5%. 1000 milliLiter(s) (100 mL/Hr) IV Continuous <Continuous>  dextrose 50% Injectable 25 Gram(s) IV Push once  dextrose 50% Injectable 12.5 Gram(s) IV Push once  dextrose 50% Injectable 25 Gram(s) IV Push once  glucagon  Injectable 1 milliGRAM(s) IntraMuscular once  guaiFENesin  milliGRAM(s) Oral every 12 hours  insulin regular Infusion 2 Unit(s)/Hr (2 mL/Hr) IV Continuous <Continuous>  ipratropium    for Nebulization 500 MICROGram(s) Nebulizer every 6 hours  iron sucrose IVPB 200 milliGRAM(s) IV Intermittent every 24 hours  magnesium sulfate  IVPB 1 Gram(s) IV Intermittent every 12 hours  meperidine     Injectable 25 milliGRAM(s) IV Push once  nitroglycerin  Infusion 5 MICROgram(s)/Min (1.5 mL/Hr) IV Continuous <Continuous>  norepinephrine Infusion 0.05 MICROgram(s)/kG/Min (9.09 mL/Hr) IV Continuous <Continuous>  pantoprazole  Injectable 40 milliGRAM(s) IV Push daily  polyethylene glycol 3350 17 Gram(s) Oral daily  potassium chloride  20 mEq/100 mL IVPB 20 milliEquivalent(s) IV Intermittent once  potassium chloride  20 mEq/100 mL IVPB 20 milliEquivalent(s) IV Intermittent once  potassium chloride  20 mEq/100 mL IVPB 20 milliEquivalent(s) IV Intermittent once  simethicone 80 milliGRAM(s) Chew three times a day  sodium chloride 0.9%. 1000 milliLiter(s) (20 mL/Hr) IV Continuous <Continuous>  vancomycin  IVPB 1000 milliGRAM(s) IV Intermittent every 12 hours  vasopressin Infusion 0.04 Unit(s)/Min (2.4 mL/Hr) IV Continuous <Continuous>    MEDICATIONS  (PRN):  hydrALAZINE Injectable 10 milliGRAM(s) IV Push every 4 hours PRN SBP > 155  oxyCODONE    IR 10 milliGRAM(s) Oral every 4 hours PRN Severe Pain (7 - 10)  oxyCODONE    IR 5 milliGRAM(s) Oral every 6 hours PRN Moderate Pain (4 - 6)    HEPARIN:  [] YES [] NO  Dose: XX UNITS/HR UNITS Q8H  LOVENOX:[] YES [] NO  Dose: XX mg Q24H  COUMADIN: []  YES [] NO  Dose: XX mg  Q24H  SCD's: YES b/l  GI Prophylaxis: Protonix [], Pepcid [], None [], (Contra-indication:.....)    Post-Op Aspirin: Yes [],  No [], If No, then contra-indication:  Post-Op Statin: Yes [], No[], If No, then contra-indication:  Post-Op Beta-Blockers: Yes [], No [], If No, then contra-indication:    Allergies:  No Known Allergies      Ambulation/Activity Status: Ambulates several times daily without assistance.    Assessment/Plan:  70y Female status-post .....  - Case and plan discussed with CTU Intensivist and CT Surgeon - Dr. Rojas/Edgar  - Continue CTU supportive care    - Continue DVT/GI prophylaxis  - Incentive Spirometry 10 times an hour  - Continue to advance physical activity as tolerated and continue PT/OT as directed  1. CAD: Continue ASA, statin, BB  2. HTN:   3. A. Fib:   4. COPD/Hypoxia:   5. DM/Glucose Control:     Social Service Disposition:     OPERATIVE PROCEDURE(s):       C1L MV Repair         POD #        5               70yFemale  SURGEON(s): LEANNE Victoria  SUBJECTIVE ASSESSMENT:  Patient has no complaints at this time.    Vital Signs Last 24 Hrs  T(F): 98.1 (2021 00:00), Max: 98.5 (2021 20:00)  HR: 62 (2021 02:00) (60 - 165)  BP: 115/56 (2021 22:00) (105/49 - 169/67)  BP(mean): 78 (2021 22:00) (70 - 96)  ABP: 131/46 (2021 02:00) (87/39 - 181/80)  ABP(mean): 73 (2021 02:00)  RR: 21 (2021 02:00) (9 - 61)  SpO2: 99% (2021 02:00) (95% - 100%)  CVP(mm Hg): 6 (2021 02:00)  CVP(cm H2O): --  CO: 5.7 (2021 03:00)  CI: 3 (2021 03:00)  PA: --  SVR: 1031 (2021 22:00)    I&O's Detail    15 Feb 2021 07:01  -  2021 07:00  --------------------------------------------------------  IN:    Bumetanide: 72.5 mL    Bumetanide: 2.5 mL    Dexmedetomidine: 211.2 mL    Insulin: 82 mL    IV PiggyBack: 850 mL    Milrinone: 21.8 mL    Norepinephrine: 95 mL    Propofol: 30 mL    sodium chloride 0.9%: 480 mL    Vasopressin: 24 mL  Total IN: 1869 mL    OUT:    Chest Tube (mL): 25 mL    Chest Tube (mL): 70 mL    Indwelling Catheter - Urethral (mL): 4481 mL    Nasogastric/Oral tube (mL): 300 mL  Total OUT: 4876 mL        Net:   I&O's Detail    2021 07:01  -  15 b 2021 07:00  --------------------------------------------------------  Total NET: 1259.9 mL      15 Feb 2021 07:01  -  2021 07:00  --------------------------------------------------------  Total NET: -3007 mL        CAPILLARY BLOOD GLUCOSE  122 (2021 14:00)      POCT Blood Glucose.: 106 mg/dL (2021 03:15)  POCT Blood Glucose.: 125 mg/dL (2021 00:59)  POCT Blood Glucose.: 151 mg/dL (2021 23:38)  POCT Blood Glucose.: 105 mg/dL (2021 18:32)  POCT Blood Glucose.: 121 mg/dL (2021 16:53)  POCT Blood Glucose.: 124 mg/dL (2021 15:26)  POCT Blood Glucose.: 157 mg/dL (2021 12:14)  POCT Blood Glucose.: 197 mg/dL (2021 11:05)  POCT Blood Glucose.: 173 mg/dL (2021 10:05)  POCT Blood Glucose.: 166 mg/dL (2021 09:22)  POCT Blood Glucose.: 144 mg/dL (2021 06:22)    Physical Exam:  General: NAD; A&Ox3/Patient is intubated and sedated  Cardiac: S1/S2, RRR, no murmur, no rubs  Lungs: unlabored respirations, CTA b/l, no wheeze, no rales, no crackles  Abdomen: Soft/NT/ND; positive bowel sounds x 4  Sternum: Intact, no click, incision healing well with no drainage  Incisions: Incisions clean/dry/intact  Extremities: No edema b/l lower extremities; good capillary refill; no cyanosis; palpable 1+ pedal pulses b/l    Central Venous Catheter: Yes[]  No[] , If Yes indication:           Day #  Spencer Catheter: Yes  [] , No  [] , If yes indication:                      Day #  NGT: Yes [] No [] ,    If Yes Placement:                                     Day #  EPICARDIAL WIRES:  [] YES [] NO                                              Day #  BOWEL MOVEMENT:  [] YES [] NO, If No, Timing since last BM:      Day #  CHEST TUBE(Left/Right):  [] YES [] NO, If yes -  AIR LEAKS:  [] YES [] NO        LABS:                        9.8<L>  15.22<H> )-----------( 122<L>    ( 2021 02:00 )             29.2<L>                        8.1<L>  19.33<H> )-----------( 148      ( 2021 14:07 )             23.9<L>    02-    140  |  102  |  51<H>  ----------------------------<  111<H>  3.5   |  25  |  1.3  02-16    142  |  104  |  53<H>  ----------------------------<  103<H>  3.9   |  26  |  1.2    Ca    8.1<L>      2021 02:00  Mg     2.2     02-17    TPro  5.1<L> [6.0 - 8.0]  /  Alb  3.5 [3.5 - 5.2]  /  TBili  1.8<H> [0.2 - 1.2]  /  DBili  x   /  AST  115<H> [0 - 41]  /  ALT  170<H> [0 - 41]  /  AlkPhos  55 [30 - 115]  02-17    PT/INR - ( 2021 14:07 )   PT: ;   INR: 1.16 ratio         PTT - ( 2021 14:07 )  PTT:21.2 sec    ABG - ( 2021 05:27 )  pH: 7.52  /  pCO2: 34    /  pO2: 126   / HCO3: 28    / Base Excess: 5.0   /  SaO2: 99    /  LA: 1.0              RADIOLOGY & ADDITIONAL TESTS:  CXR:   EK Lead ECG:   Ventricular Rate 70 BPM    Atrial Rate 57 BPM    P-R Interval 118 ms    QRS Duration 94 ms    Q-T Interval 414 ms    QTC Calculation(Bazett) 447 ms    P Axis 22 degrees    R Axis 2 degrees    T Axis 27 degrees    Diagnosis Line Sinus bradycardia with frequent Premature ventricular complexes and Premature  atrial complexes  Possible Left atrial enlargement  Nonspecific T wave abnormality  Abnormal ECG    Confirmed by PARKER PACK MD [id number]) on 2021 8:43:36 AM (21 @ 07:18)    MEDICATIONS  (STANDING):  albumin human  5% IVPB 500 milliLiter(s) IV Intermittent once  aMIOdarone Infusion 0.5 mG/Min (16.7 mL/Hr) IV Continuous <Continuous>  aspirin enteric coated 325 milliGRAM(s) Oral daily  cefepime   IVPB      cefepime   IVPB 1000 milliGRAM(s) IV Intermittent every 8 hours  chlorhexidine 4% Liquid 1 Application(s) Topical <User Schedule>  dexMEDEtomidine Infusion 0.7 MICROgram(s)/kG/Hr (17 mL/Hr) IV Continuous <Continuous>  dextrose 40% Gel 15 Gram(s) Oral once  dextrose 5%. 1000 milliLiter(s) (50 mL/Hr) IV Continuous <Continuous>  dextrose 5%. 1000 milliLiter(s) (100 mL/Hr) IV Continuous <Continuous>  dextrose 50% Injectable 25 Gram(s) IV Push once  dextrose 50% Injectable 12.5 Gram(s) IV Push once  dextrose 50% Injectable 25 Gram(s) IV Push once  glucagon  Injectable 1 milliGRAM(s) IntraMuscular once  guaiFENesin  milliGRAM(s) Oral every 12 hours  insulin regular Infusion 2 Unit(s)/Hr (2 mL/Hr) IV Continuous <Continuous>  ipratropium    for Nebulization 500 MICROGram(s) Nebulizer every 6 hours  iron sucrose IVPB 200 milliGRAM(s) IV Intermittent every 24 hours  magnesium sulfate  IVPB 1 Gram(s) IV Intermittent every 12 hours  meperidine     Injectable 25 milliGRAM(s) IV Push once  nitroglycerin  Infusion 5 MICROgram(s)/Min (1.5 mL/Hr) IV Continuous <Continuous>  norepinephrine Infusion 0.05 MICROgram(s)/kG/Min (9.09 mL/Hr) IV Continuous <Continuous>  pantoprazole  Injectable 40 milliGRAM(s) IV Push daily  polyethylene glycol 3350 17 Gram(s) Oral daily  potassium chloride  20 mEq/100 mL IVPB 20 milliEquivalent(s) IV Intermittent once  potassium chloride  20 mEq/100 mL IVPB 20 milliEquivalent(s) IV Intermittent once  potassium chloride  20 mEq/100 mL IVPB 20 milliEquivalent(s) IV Intermittent once  simethicone 80 milliGRAM(s) Chew three times a day  sodium chloride 0.9%. 1000 milliLiter(s) (20 mL/Hr) IV Continuous <Continuous>  vancomycin  IVPB 1000 milliGRAM(s) IV Intermittent every 12 hours  vasopressin Infusion 0.04 Unit(s)/Min (2.4 mL/Hr) IV Continuous <Continuous>    MEDICATIONS  (PRN):  hydrALAZINE Injectable 10 milliGRAM(s) IV Push every 4 hours PRN SBP > 155  oxyCODONE    IR 10 milliGRAM(s) Oral every 4 hours PRN Severe Pain (7 - 10)  oxyCODONE    IR 5 milliGRAM(s) Oral every 6 hours PRN Moderate Pain (4 - 6)    HEPARIN:  [] YES [] NO  Dose: XX UNITS/HR UNITS Q8H  LOVENOX:[] YES [] NO  Dose: XX mg Q24H  COUMADIN: []  YES [] NO  Dose: XX mg  Q24H  SCD's: YES b/l  GI Prophylaxis: Protonix [], Pepcid [], None [], (Contra-indication:.....)    Post-Op Aspirin: Yes [],  No [], If No, then contra-indication:  Post-Op Statin: Yes [], No[], If No, then contra-indication:  Post-Op Beta-Blockers: Yes [], No [], If No, then contra-indication:    Allergies:  No Known Allergies      Ambulation/Activity Status: Ambulates several times daily without assistance.    Assessment/Plan:  70y Female status-post .....  - Case and plan discussed with CTU Intensivist and CT Surgeon - Dr. Rojas/Edgar  - Continue CTU supportive care    - Continue DVT/GI prophylaxis  - Incentive Spirometry 10 times an hour  - Continue to advance physical activity as tolerated and continue PT/OT as directed  1. CAD: Continue ASA, statin, BB  2. HTN:   3. A. Fib:   4. COPD/Hypoxia:   5. DM/Glucose Control:     Social Service Disposition:     OPERATIVE PROCEDURE(s):       C1L MV Repair         POD #        5               70yFemale  SURGEON(s): LEANNE Victoria  SUBJECTIVE ASSESSMENT:  Patient has no complaints at this time.    Vital Signs Last 24 Hrs  T(F): 98.1 (2021 00:00), Max: 98.5 (2021 20:00)  HR: 62 (2021 02:00) (60 - 165)  BP: 115/56 (2021 22:00) (105/49 - 169/67)  BP(mean): 78 (2021 22:00) (70 - 96)  ABP: 131/46 (2021 02:00) (87/39 - 181/80)  ABP(mean): 73 (2021 02:00)  RR: 21 (2021 02:00) (9 - 61)  SpO2: 99% (2021 02:00) (95% - 100%)  CVP(mm Hg): 6 (2021 02:00)  CO: 5.7 (2021 03:00)  CI: 3 (2021 03:00)  SVR: 1031 (2021 22:00)    I&O's Detail    15 Feb 2021 07:01  -  2021 07:00  --------------------------------------------------------  IN:    Bumetanide: 72.5 mL    Bumetanide: 2.5 mL    Dexmedetomidine: 211.2 mL    Insulin: 82 mL    IV PiggyBack: 850 mL    Milrinone: 21.8 mL    Norepinephrine: 95 mL    Propofol: 30 mL    sodium chloride 0.9%: 480 mL    Vasopressin: 24 mL  Total IN: 1869 mL    OUT:    Chest Tube (mL): 25 mL    Chest Tube (mL): 70 mL    Indwelling Catheter - Urethral (mL): 4481 mL    Nasogastric/Oral tube (mL): 300 mL  Total OUT: 4876 mL      15 Feb 2021 07:01  -  2021 07:00  --------------------------------------------------------  Total NET: -3007 mL    CAPILLARY BLOOD GLUCOSE  122 (2021 14:00)      POCT Blood Glucose.: 106 mg/dL (2021 03:15)  POCT Blood Glucose.: 125 mg/dL (2021 00:59)  POCT Blood Glucose.: 151 mg/dL (2021 23:38)  POCT Blood Glucose.: 105 mg/dL (2021 18:32)  POCT Blood Glucose.: 121 mg/dL (2021 16:53)  POCT Blood Glucose.: 124 mg/dL (2021 15:26)  POCT Blood Glucose.: 157 mg/dL (2021 12:14)  POCT Blood Glucose.: 197 mg/dL (2021 11:05)  POCT Blood Glucose.: 173 mg/dL (2021 10:05)  POCT Blood Glucose.: 166 mg/dL (2021 09:22)  POCT Blood Glucose.: 144 mg/dL (2021 06:22)    Physical Exam:  General: NAD; A&Ox3/Patient is intubated and sedated  Cardiac: S1/S2, RRR, no murmur, no rubs  Lungs: unlabored respirations, CTA b/l, no wheeze, no rales, no crackles  Abdomen: Soft/NT/ND; positive bowel sounds x 4  Sternum: Intact, no click, incision healing well with no drainage  Incisions: Incisions clean/dry/intact  Extremities: No edema b/l lower extremities; good capillary refill; no cyanosis; palpable 1+ pedal pulses b/l    Central Venous Catheter: Yes[x]  No[] , If Yes indication:           Day #5  Spencer Catheter: Yes  [] , No  [] , If yes indication:                      Day #  NGT: Yes [] No [] ,    If Yes Placement:                                     Day #  EPICARDIAL WIRES:  [] YES [] NO                                              Day #  BOWEL MOVEMENT:  [] YES [] NO, If No, Timing since last BM:      Day #  CHEST TUBE(Left/Right):  [] YES [] NO, If yes -  AIR LEAKS:  [] YES [] NO        LABS:                        9.8<L>  15.22<H> )-----------( 122<L>    ( 2021 02:00 )             29.2<L>                        8.1<L>  19.33<H> )-----------( 148      ( 2021 14:07 )             23.9<L>    02-17    140  |  102  |  51<H>  ----------------------------<  111<H>  3.5   |  25  |  1.3  02-16    142  |  104  |  53<H>  ----------------------------<  103<H>  3.9   |  26  |  1.2    Ca    8.1<L>      2021 02:00  Mg     2.2     02-17    TPro  5.1<L> [6.0 - 8.0]  /  Alb  3.5 [3.5 - 5.2]  /  TBili  1.8<H> [0.2 - 1.2]  /  DBili  x   /  AST  115<H> [0 - 41]  /  ALT  170<H> [0 - 41]  /  AlkPhos  55 [30 - 115]  02-17    PT/INR - ( 2021 14:07 )   PT: ;   INR: 1.16 ratio         PTT - ( 2021 14:07 )  PTT:21.2 sec    ABG - ( 2021 05:27 )  pH: 7.52  /  pCO2: 34    /  pO2: 126   / HCO3: 28    / Base Excess: 5.0   /  SaO2: 99    /  LA: 1.0              RADIOLOGY & ADDITIONAL TESTS:  CXR:   EK Lead ECG:   Ventricular Rate 70 BPM    Atrial Rate 57 BPM    P-R Interval 118 ms    QRS Duration 94 ms    Q-T Interval 414 ms    QTC Calculation(Bazett) 447 ms    P Axis 22 degrees    R Axis 2 degrees    T Axis 27 degrees    Diagnosis Line Sinus bradycardia with frequent Premature ventricular complexes and Premature  atrial complexes  Possible Left atrial enlargement  Nonspecific T wave abnormality  Abnormal ECG    Confirmed by PARKER PACK MD ([id number]) on 2021 8:43:36 AM (21 @ 07:18)    MEDICATIONS  (STANDING):  albumin human  5% IVPB 500 milliLiter(s) IV Intermittent once  aMIOdarone Infusion 0.5 mG/Min (16.7 mL/Hr) IV Continuous <Continuous>  aspirin enteric coated 325 milliGRAM(s) Oral daily  cefepime   IVPB      cefepime   IVPB 1000 milliGRAM(s) IV Intermittent every 8 hours  chlorhexidine 4% Liquid 1 Application(s) Topical <User Schedule>  dexMEDEtomidine Infusion 0.7 MICROgram(s)/kG/Hr (17 mL/Hr) IV Continuous <Continuous>  dextrose 40% Gel 15 Gram(s) Oral once  dextrose 5%. 1000 milliLiter(s) (50 mL/Hr) IV Continuous <Continuous>  dextrose 5%. 1000 milliLiter(s) (100 mL/Hr) IV Continuous <Continuous>  dextrose 50% Injectable 25 Gram(s) IV Push once  dextrose 50% Injectable 12.5 Gram(s) IV Push once  dextrose 50% Injectable 25 Gram(s) IV Push once  glucagon  Injectable 1 milliGRAM(s) IntraMuscular once  guaiFENesin  milliGRAM(s) Oral every 12 hours  insulin regular Infusion 2 Unit(s)/Hr (2 mL/Hr) IV Continuous <Continuous>  ipratropium    for Nebulization 500 MICROGram(s) Nebulizer every 6 hours  iron sucrose IVPB 200 milliGRAM(s) IV Intermittent every 24 hours  magnesium sulfate  IVPB 1 Gram(s) IV Intermittent every 12 hours  meperidine     Injectable 25 milliGRAM(s) IV Push once  nitroglycerin  Infusion 5 MICROgram(s)/Min (1.5 mL/Hr) IV Continuous <Continuous>  norepinephrine Infusion 0.05 MICROgram(s)/kG/Min (9.09 mL/Hr) IV Continuous <Continuous>  pantoprazole  Injectable 40 milliGRAM(s) IV Push daily  polyethylene glycol 3350 17 Gram(s) Oral daily  potassium chloride  20 mEq/100 mL IVPB 20 milliEquivalent(s) IV Intermittent once  potassium chloride  20 mEq/100 mL IVPB 20 milliEquivalent(s) IV Intermittent once  potassium chloride  20 mEq/100 mL IVPB 20 milliEquivalent(s) IV Intermittent once  simethicone 80 milliGRAM(s) Chew three times a day  sodium chloride 0.9%. 1000 milliLiter(s) (20 mL/Hr) IV Continuous <Continuous>  vancomycin  IVPB 1000 milliGRAM(s) IV Intermittent every 12 hours  vasopressin Infusion 0.04 Unit(s)/Min (2.4 mL/Hr) IV Continuous <Continuous>    MEDICATIONS  (PRN):  hydrALAZINE Injectable 10 milliGRAM(s) IV Push every 4 hours PRN SBP > 155  oxyCODONE    IR 10 milliGRAM(s) Oral every 4 hours PRN Severe Pain (7 - 10)  oxyCODONE    IR 5 milliGRAM(s) Oral every 6 hours PRN Moderate Pain (4 - 6)    HEPARIN:  [] YES [] NO  Dose: XX UNITS/HR UNITS Q8H  LOVENOX:[] YES [] NO  Dose: XX mg Q24H  COUMADIN: []  YES [] NO  Dose: XX mg  Q24H  SCD's: YES b/l  GI Prophylaxis: Protonix [], Pepcid [], None [], (Contra-indication:.....)    Post-Op Aspirin: Yes [],  No [], If No, then contra-indication:  Post-Op Statin: Yes [], No[], If No, then contra-indication:  Post-Op Beta-Blockers: Yes [], No [], If No, then contra-indication:    Allergies:  No Known Allergies      Ambulation/Activity Status: Ambulates several times daily without assistance.    Assessment/Plan:  70y Female status-post .....  - Case and plan discussed with CTU Intensivist and CT Surgeon - Dr. Rojas/Edgar  - Continue CTU supportive care    - Continue DVT/GI prophylaxis  - Incentive Spirometry 10 times an hour  - Continue to advance physical activity as tolerated and continue PT/OT as directed  1. CAD: Continue ASA, statin, BB  2. HTN:   3. A. Fib:   4. COPD/Hypoxia:   5. DM/Glucose Control:     Social Service Disposition:     OPERATIVE PROCEDURE(s):       C1L MV Repair         POD #        5               70yFemale  SURGEON(s): LEANNE Victoria  SUBJECTIVE ASSESSMENT:  Patient has no complaints at this time.    Vital Signs Last 24 Hrs  T(F): 98.1 (2021 00:00), Max: 98.5 (2021 20:00)  HR: 62 (2021 02:00) (60 - 165)  BP: 115/56 (2021 22:00) (105/49 - 169/67)  BP(mean): 78 (2021 22:00) (70 - 96)  ABP: 131/46 (2021 02:00) (87/39 - 181/80)  ABP(mean): 73 (2021 02:00)  RR: 21 (2021 02:00) (9 - 61)  SpO2: 99% (2021 02:00) (95% - 100%)  CVP(mm Hg): 6 (2021 02:00)  CO: 5.7 (2021 03:00)  CI: 3 (2021 03:00)  SVR: 1031 (2021 22:00)    I&O's Detail    15 Feb 2021 07:01  -  2021 07:00  --------------------------------------------------------  IN:    Bumetanide: 72.5 mL    Bumetanide: 2.5 mL    Dexmedetomidine: 211.2 mL    Insulin: 82 mL    IV PiggyBack: 850 mL    Milrinone: 21.8 mL    Norepinephrine: 95 mL    Propofol: 30 mL    sodium chloride 0.9%: 480 mL    Vasopressin: 24 mL  Total IN: 1869 mL    OUT:    Chest Tube (mL): 25 mL    Chest Tube (mL): 70 mL    Indwelling Catheter - Urethral (mL): 4481 mL    Nasogastric/Oral tube (mL): 300 mL  Total OUT: 4876 mL    15 Feb 2021 07:01  -  2021 07:00  --------------------------------------------------------  Total NET: -3007 mL    CAPILLARY BLOOD GLUCOSE  122 (2021 14:00)  POCT Blood Glucose.: 106 mg/dL (2021 03:15)  POCT Blood Glucose.: 125 mg/dL (2021 00:59)  POCT Blood Glucose.: 151 mg/dL (2021 23:38)  POCT Blood Glucose.: 105 mg/dL (2021 18:32)  POCT Blood Glucose.: 121 mg/dL (2021 16:53)  POCT Blood Glucose.: 124 mg/dL (2021 15:26)  POCT Blood Glucose.: 157 mg/dL (2021 12:14)  POCT Blood Glucose.: 197 mg/dL (2021 11:05)  POCT Blood Glucose.: 173 mg/dL (2021 10:05)  POCT Blood Glucose.: 166 mg/dL (2021 09:22)  POCT Blood Glucose.: 144 mg/dL (2021 06:22)    A1C with Estimated Average Glucose Result: 6.1    Physical Exam:  General: NAD; A&Ox3/Patient is intubated and sedated  Cardiac: S1/S2, RRR, no murmur, no rubs  Lungs: unlabored respirations, CTA b/l, no wheeze, no rales, no crackles  Abdomen: Soft/NT/ND; positive bowel sounds x 4  Sternum: Intact, no click, incision healing well with no drainage  Incisions: Incisions clean/dry/intact  Extremities: No edema b/l lower extremities; good capillary refill; no cyanosis; palpable 1+ pedal pulses b/l    Central Venous Catheter: Yes[x]  No[] , If Yes indication:           Day #5  Spencer Catheter: Yes  [] , No  [] , If yes indication:                      Day #  NGT: Yes [] No [] ,    If Yes Placement:                                     Day #  EPICARDIAL WIRES:  [] YES [] NO                                              Day #  BOWEL MOVEMENT:  [] YES [] NO, If No, Timing since last BM:      Day #  CHEST TUBE(Left/Right):  [] YES [] NO, If yes -  AIR LEAKS:  [] YES [] NO        LABS:                        9.8<L>  15.22<H> )-----------( 122<L>    ( 2021 02:00 )             29.2<L>                        8.1<L>  19.33<H> )-----------( 148      ( 2021 14:07 )             23.9<L>    02-17    140  |  102  |  51<H>  ----------------------------<  111<H>  3.5   |  25  |  1.3  02-16    142  |  104  |  53<H>  ----------------------------<  103<H>  3.9   |  26  |  1.2    Ca    8.1<L>      2021 02:00  Mg     2.2     02-17    TPro  5.1<L> [6.0 - 8.0]  /  Alb  3.5 [3.5 - 5.2]  /  TBili  1.8<H> [0.2 - 1.2]  /  DBili  x   /  AST  115<H> [0 - 41]  /  ALT  170<H> [0 - 41]  /  AlkPhos  55 [30 - 115]  02-17    PT/INR - ( 2021 14:07 )   PT: ;   INR: 1.16 ratio       PTT - ( 2021 14:07 )  PTT:21.2 sec    ABG - ( 2021 05:27 )  pH: 7.52  /  pCO2: 34    /  pO2: 126   / HCO3: 28    / Base Excess: 5.0   /  SaO2: 99    /  LA: 1.0        RADIOLOGY & ADDITIONAL TESTS:  CXR:   EK Lead ECG:   Ventricular Rate 70 BPM    Atrial Rate 57 BPM    P-R Interval 118 ms    QRS Duration 94 ms    Q-T Interval 414 ms    QTC Calculation(Bazett) 447 ms    P Axis 22 degrees    R Axis 2 degrees    T Axis 27 degrees    Diagnosis Line Sinus bradycardia with frequent Premature ventricular complexes and Premature  atrial complexes  Possible Left atrial enlargement  Nonspecific T wave abnormality  Abnormal ECG    Confirmed by PARKER PACK MD [id number]) on 2021 8:43:36 AM (21 @ 07:18)    MEDICATIONS  (STANDING):  albumin human  5% IVPB 500 milliLiter(s) IV Intermittent once  aMIOdarone Infusion 0.5 mG/Min (16.7 mL/Hr) IV Continuous <Continuous>  aspirin enteric coated 325 milliGRAM(s) Oral daily  cefepime   IVPB      cefepime   IVPB 1000 milliGRAM(s) IV Intermittent every 8 hours  chlorhexidine 4% Liquid 1 Application(s) Topical <User Schedule>  dexMEDEtomidine Infusion 0.7 MICROgram(s)/kG/Hr (17 mL/Hr) IV Continuous <Continuous>  dextrose 40% Gel 15 Gram(s) Oral once  dextrose 5%. 1000 milliLiter(s) (50 mL/Hr) IV Continuous <Continuous>  dextrose 5%. 1000 milliLiter(s) (100 mL/Hr) IV Continuous <Continuous>  dextrose 50% Injectable 25 Gram(s) IV Push once  dextrose 50% Injectable 12.5 Gram(s) IV Push once  dextrose 50% Injectable 25 Gram(s) IV Push once  glucagon  Injectable 1 milliGRAM(s) IntraMuscular once  guaiFENesin  milliGRAM(s) Oral every 12 hours  insulin regular Infusion 2 Unit(s)/Hr (2 mL/Hr) IV Continuous <Continuous>  ipratropium    for Nebulization 500 MICROGram(s) Nebulizer every 6 hours  iron sucrose IVPB 200 milliGRAM(s) IV Intermittent every 24 hours  magnesium sulfate  IVPB 1 Gram(s) IV Intermittent every 12 hours  meperidine     Injectable 25 milliGRAM(s) IV Push once  nitroglycerin  Infusion 5 MICROgram(s)/Min (1.5 mL/Hr) IV Continuous <Continuous>  norepinephrine Infusion 0.05 MICROgram(s)/kG/Min (9.09 mL/Hr) IV Continuous <Continuous>  pantoprazole  Injectable 40 milliGRAM(s) IV Push daily  polyethylene glycol 3350 17 Gram(s) Oral daily  potassium chloride  20 mEq/100 mL IVPB 20 milliEquivalent(s) IV Intermittent once  potassium chloride  20 mEq/100 mL IVPB 20 milliEquivalent(s) IV Intermittent once  potassium chloride  20 mEq/100 mL IVPB 20 milliEquivalent(s) IV Intermittent once  simethicone 80 milliGRAM(s) Chew three times a day  sodium chloride 0.9%. 1000 milliLiter(s) (20 mL/Hr) IV Continuous <Continuous>  vancomycin  IVPB 1000 milliGRAM(s) IV Intermittent every 12 hours  vasopressin Infusion 0.04 Unit(s)/Min (2.4 mL/Hr) IV Continuous <Continuous>    MEDICATIONS  (PRN):  hydrALAZINE Injectable 10 milliGRAM(s) IV Push every 4 hours PRN SBP > 155  oxyCODONE    IR 10 milliGRAM(s) Oral every 4 hours PRN Severe Pain (7 - 10)  oxyCODONE    IR 5 milliGRAM(s) Oral every 6 hours PRN Moderate Pain (4 - 6)    Home Medications:  aspirin 325 mg oral tablet: orally once a day (2021 09:55)  lisinopril-hydrochlorothiazide 20 mg-12.5 mg oral tablet: 1 tab(s) orally once a day (2021 09:55)  meloxicam: orally once a day (2021 09:55)  metFORMIN 500 mg oral tablet: orally once a day (2021 09:55)  metoprolol tartrate 25 mg oral tablet: 1 tab(s) orally 2 times a day (2021 09:55)  oxybutynin 10 mg/24 hr oral tablet, extended release: 1 tab(s) orally once a day (2021 09:55)  pantoprazole 40 mg oral delayed release tablet: 1 tab(s) orally once a day (2021 09:55)    HEPARIN:  [] YES [] NO  Dose: XX UNITS/HR UNITS Q8H  LOVENOX:[] YES [] NO  Dose: XX mg Q24H  COUMADIN: []  YES [] NO  Dose: XX mg  Q24H  SCD's: YES b/l  GI Prophylaxis: Protonix [], Pepcid [], None [], (Contra-indication:.....)    Post-Op Aspirin: Yes [],  No [], If No, then contra-indication:  Post-Op Statin: Yes [], No[], If No, then contra-indication:  Post-Op Beta-Blockers: Yes [], No [], If No, then contra-indication:    Allergies:  No Known Allergies      Ambulation/Activity Status: Ambulates several times daily without assistance.    Assessment/Plan:  70y Female status-post .....  - Case and plan discussed with CTU Intensivist and CT Surgeon - Dr. Rojas/Edgar  - Continue CTU supportive care    - Continue DVT/GI prophylaxis  - Incentive Spirometry 10 times an hour  - Continue to advance physical activity as tolerated and continue PT/OT as directed  1. CAD: Continue ASA, statin, BB  2. HTN:   3. A. Fib:   4. COPD/Hypoxia:   5. DM/Glucose Control:     Social Service Disposition:     OPERATIVE PROCEDURE(s):       C1L MV Repair         POD #        5               70yFemale  SURGEON(s): LEANNE Victoria  SUBJECTIVE ASSESSMENT:  Patient seen and examined at bedside. No repeat afib episodes overnight. Pt. continued on BiPAP.      Vital Signs Last 24 Hrs  T(F): 98.1 (2021 00:00), Max: 98.5 (2021 20:00)  HR: 62 (2021 02:00) (60 - 165)  BP: 115/56 (2021 22:00) (105/49 - 169/67)  BP(mean): 78 (2021 22:00) (70 - 96)  ABP: 131/46 (2021 02:00) (87/39 - 181/80)  ABP(mean): 73 (2021 02:00)  RR: 21 (2021 02:00) (9 - 61)  SpO2: 99% (2021 02:00) (95% - 100%)  CVP(mm Hg): 6 (2021 02:00)  CO: 5.7 (2021 03:00)  CI: 3 (2021 03:00)  SVR: 1031 (2021 22:00)    I&O's Detail    15 Feb 2021 07:01  -  2021 07:00  --------------------------------------------------------  IN:    Bumetanide: 72.5 mL    Bumetanide: 2.5 mL    Dexmedetomidine: 211.2 mL    Insulin: 82 mL    IV PiggyBack: 850 mL    Milrinone: 21.8 mL    Norepinephrine: 95 mL    Propofol: 30 mL    sodium chloride 0.9%: 480 mL    Vasopressin: 24 mL  Total IN: 1869 mL    OUT:    Chest Tube (mL): 25 mL    Chest Tube (mL): 70 mL    Indwelling Catheter - Urethral (mL): 4481 mL    Nasogastric/Oral tube (mL): 300 mL  Total OUT: 4876 mL    15 Feb 2021 07:01  -  2021 07:00  --------------------------------------------------------  Total NET: -3007 mL    CAPILLARY BLOOD GLUCOSE  122 (2021 14:00)  POCT Blood Glucose.: 106 mg/dL (2021 03:15)  POCT Blood Glucose.: 125 mg/dL (2021 00:59)  POCT Blood Glucose.: 151 mg/dL (2021 23:38)  POCT Blood Glucose.: 105 mg/dL (2021 18:32)  POCT Blood Glucose.: 121 mg/dL (2021 16:53)  POCT Blood Glucose.: 124 mg/dL (2021 15:26)  POCT Blood Glucose.: 157 mg/dL (2021 12:14)  POCT Blood Glucose.: 197 mg/dL (2021 11:05)  POCT Blood Glucose.: 173 mg/dL (2021 10:05)  POCT Blood Glucose.: 166 mg/dL (2021 09:22)  POCT Blood Glucose.: 144 mg/dL (2021 06:22)    A1C with Estimated Average Glucose Result: 6.1    Physical Exam:  General: + BipPAP   Cardiac: + rubs, S1/S2, RRR, no murmur  Lungs: mildly labored respirations, CTA b/l, no wheeze, no rales, no crackles  Abdomen: Soft/NT/ND; positive bowel sounds x 4  Sternum: Intact, no click, incision healing well with no drainage  Incisions: Incisions clean/dry/intact  Extremities: Moderate 1+ pitting edema b/l lower extremities; good capillary refill; no cyanosis; palpable 1+ pedal pulses b/l    Central Venous Catheter: Yes[x]  No[] , If Yes indication:           Day # 5  Spencer Catheter: Yes  [x] , No  [] , If yes indication:                      Day # 5   NGT: Yes [] No [x] ,    If Yes Placement:                                     Day #  EPICARDIAL WIRES:  [x] YES [] NO                                              Day #  5  BOWEL MOVEMENT:  [] YES [x] NO, If No, Timing since last BM:      Day #    CHEST TUBE(Left/Right):  [x] YES [] NO, If yes -  AIR LEAKS:  [] YES [x] NO        LABS:                        9.8<L>  15.22<H> )-----------( 122<L>    ( 2021 02:00 )             29.2<L>                        8.1<L>  19.33<H> )-----------( 148      ( 2021 14:07 )             23.9<L>    02    140  |  102  |  51<H>  ----------------------------<  111<H>  3.5   |  25  |  1.3      142  |  104  |  53<H>  ----------------------------<  103<H>  3.9   |  26  |  1.2    Ca    8.1<L>      2021 02:00  Mg     2.2     -    TPro  5.1<L> [6.0 - 8.0]  /  Alb  3.5 [3.5 - 5.2]  /  TBili  1.8<H> [0.2 - 1.2]  /  DBili  x   /  AST  115<H> [0 - 41]  /  ALT  170<H> [0 - 41]  /  AlkPhos  55 [30 - 115]      PT/INR - ( 2021 14:07 )   PT: ;   INR: 1.16 ratio       PTT - ( 2021 14:07 )  PTT:21.2 sec    ABG - ( 2021 05:27 )  pH: 7.52  /  pCO2: 34    /  pO2: 126   / HCO3: 28    / Base Excess: 5.0   /  SaO2: 99    /  LA: 1.0        RADIOLOGY & ADDITIONAL TESTS:  CXR:   < from: Xray Chest 1 View- PORTABLE-Routine (Xray Chest 1 View- PORTABLE-Routine in AM.) (21 @ 06:18) >  Impression:  No definite pneumothorax visualized. Unchanged bilateral opacities and effusions.  Stable support devices.  Stable enlarged cardiac silhouette.      EK Lead ECG:   Ventricular Rate 70 BPM    Atrial Rate 57 BPM    P-R Interval 118 ms    QRS Duration 94 ms    Q-T Interval 414 ms    QTC Calculation(Bazett) 447 ms    P Axis 22 degrees    R Axis 2 degrees    T Axis 27 degrees    Diagnosis Line Sinus bradycardia with frequent Premature ventricular complexes and Premature  atrial complexes  Possible Left atrial enlargement  Nonspecific T wave abnormality  Abnormal ECG    Confirmed by PARKER PACK MD [id number]) on 2021 8:43:36 AM (21 @ 07:18)    MEDICATIONS  (STANDING):  albumin human  5% IVPB 500 milliLiter(s) IV Intermittent once  aMIOdarone Infusion 0.5 mG/Min (16.7 mL/Hr) IV Continuous <Continuous>  aspirin enteric coated 325 milliGRAM(s) Oral daily  cefepime   IVPB      cefepime   IVPB 1000 milliGRAM(s) IV Intermittent every 8 hours  chlorhexidine 4% Liquid 1 Application(s) Topical <User Schedule>  dexMEDEtomidine Infusion 0.7 MICROgram(s)/kG/Hr (17 mL/Hr) IV Continuous <Continuous>  dextrose 40% Gel 15 Gram(s) Oral once  dextrose 5%. 1000 milliLiter(s) (50 mL/Hr) IV Continuous <Continuous>  dextrose 5%. 1000 milliLiter(s) (100 mL/Hr) IV Continuous <Continuous>  dextrose 50% Injectable 25 Gram(s) IV Push once  dextrose 50% Injectable 12.5 Gram(s) IV Push once  dextrose 50% Injectable 25 Gram(s) IV Push once  glucagon  Injectable 1 milliGRAM(s) IntraMuscular once  guaiFENesin  milliGRAM(s) Oral every 12 hours  insulin regular Infusion 2 Unit(s)/Hr (2 mL/Hr) IV Continuous <Continuous>  ipratropium    for Nebulization 500 MICROGram(s) Nebulizer every 6 hours  iron sucrose IVPB 200 milliGRAM(s) IV Intermittent every 24 hours  magnesium sulfate  IVPB 1 Gram(s) IV Intermittent every 12 hours  meperidine     Injectable 25 milliGRAM(s) IV Push once  nitroglycerin  Infusion 5 MICROgram(s)/Min (1.5 mL/Hr) IV Continuous <Continuous>  norepinephrine Infusion 0.05 MICROgram(s)/kG/Min (9.09 mL/Hr) IV Continuous <Continuous>  pantoprazole  Injectable 40 milliGRAM(s) IV Push daily  polyethylene glycol 3350 17 Gram(s) Oral daily  potassium chloride  20 mEq/100 mL IVPB 20 milliEquivalent(s) IV Intermittent once  potassium chloride  20 mEq/100 mL IVPB 20 milliEquivalent(s) IV Intermittent once  potassium chloride  20 mEq/100 mL IVPB 20 milliEquivalent(s) IV Intermittent once  simethicone 80 milliGRAM(s) Chew three times a day  sodium chloride 0.9%. 1000 milliLiter(s) (20 mL/Hr) IV Continuous <Continuous>  vancomycin  IVPB 1000 milliGRAM(s) IV Intermittent every 12 hours  vasopressin Infusion 0.04 Unit(s)/Min (2.4 mL/Hr) IV Continuous <Continuous>    MEDICATIONS  (PRN):  hydrALAZINE Injectable 10 milliGRAM(s) IV Push every 4 hours PRN SBP > 155  oxyCODONE    IR 10 milliGRAM(s) Oral every 4 hours PRN Severe Pain (7 - 10)  oxyCODONE    IR 5 milliGRAM(s) Oral every 6 hours PRN Moderate Pain (4 - 6)    Home Medications:  aspirin 325 mg oral tablet: orally once a day (2021 09:55)  lisinopril-hydrochlorothiazide 20 mg-12.5 mg oral tablet: 1 tab(s) orally once a day (2021 09:55)  meloxicam: orally once a day (2021 09:55)  metFORMIN 500 mg oral tablet: orally once a day (2021 09:55)  metoprolol tartrate 25 mg oral tablet: 1 tab(s) orally 2 times a day (2021 09:55)  oxybutynin 10 mg/24 hr oral tablet, extended release: 1 tab(s) orally once a day (2021 09:55)  pantoprazole 40 mg oral delayed release tablet: 1 tab(s) orally once a day (2021 09:55)    HEPARIN:  [] YES [x] NO  Dose: XX UNITS/HR UNITS Q8H  LOVENOX:[] YES [x] NO  Dose: XX mg Q24H  COUMADIN: []  YES [x] NO  Dose: XX mg  Q24H  SCD's: YES b/l  GI Prophylaxis: Protonix [x], Pepcid [], None [], (Contra-indication:.....)    Post-Op Aspirin: Yes [x],  No [], If No, then contra-indication:  Post-Op Statin: Yes [], No[x], If No, then contra-indication: Elevated LFTs  Post-Op Beta-Blockers: Yes [], No [x], If No, then contra-indication:  hypotension now requiring pressors    Allergies:  No Known Allergies    Ambulation/Activity Status: bed rest    Assessment/Plan:  70y Female status-post      C1L MV Repair         POD #        5   - Case and plan discussed with CTU Intensivist and CT Surgeon - Dr. Rojas  - Continue CTU supportive care    - Continue DVT/GI prophylaxis  - Incentive Spirometry 10 times an hour  - Continue to advance physical activity as tolerated and continue PT/OT as directed  1. CAD: Cont asa, hold bb for hypotension now requiring pressors, hold statin for elevated LFTs, cont bumex gtt, Repeat and continue to monitor labs   2. Hypotension: cont to monitor, wean pressors as tolerated  3. Hypoxia secondary to respiratory failure: Continue BiPAP   4. DM/Glucose Control: Continue insulin gtt   5. Leukocytosis- Cont prophylactic abx, pt currently afebrile, cont to monitor   6. Anemia secondary to acute surgical blood loss - H/H elevated to 9.8/29.2; will continue to monitor cbc       OPERATIVE PROCEDURE(s):       C1L MV Repair         POD #        5               70yFemale  SURGEON(s): LEANNE Victoria  SUBJECTIVE ASSESSMENT:  Patient seen and examined at bedside. No repeat episodes of afib overnight. Pt. continues to require BiPAP overnight. Oxygenating better on ABG now being weaned down to 5L NC. Patient reports anxiety pre-op treated with lorazepam 4mg po qd.       Vital Signs Last 24 Hrs  T(F): 98.1 (2021 00:00), Max: 98.5 (2021 20:00)  HR: 62 (2021 02:00) (60 - 165)  BP: 115/56 (2021 22:00) (105/49 - 169/67)  BP(mean): 78 (2021 22:00) (70 - 96)  ABP: 131/46 (2021 02:00) (87/39 - 181/80)  ABP(mean): 73 (2021 02:00)  RR: 21 (2021 02:00) (9 - 61)  SpO2: 99% (2021 02:00) (95% - 100%) BIPAP  CVP(mm Hg): 6 (2021 02:00)  CO: 5.7 (2021 03:00)  CI: 3 (2021 03:00)  SVR: 1031 (2021 22:00)    I&O's Detail    15 Feb 2021 07:01  -  2021 07:00  --------------------------------------------------------  IN:    Bumetanide: 72.5 mL    Bumetanide: 2.5 mL    Dexmedetomidine: 211.2 mL    Insulin: 82 mL    IV PiggyBack: 850 mL    Milrinone: 21.8 mL    Norepinephrine: 95 mL    Propofol: 30 mL    sodium chloride 0.9%: 480 mL    Vasopressin: 24 mL  Total IN: 1869 mL    OUT:    Chest Tube (mL): 25 mL    Chest Tube (mL): 70 mL    Indwelling Catheter - Urethral (mL): 4481 mL    Total OUT: 4876 mL    15 Feb 2021 07:01  -  2021 07:00  --------------------------------------------------------  Total NET: -3007 mL    CAPILLARY BLOOD GLUCOSE  122 (2021 14:00)  POCT Blood Glucose.: 106 mg/dL (2021 03:15)  POCT Blood Glucose.: 125 mg/dL (2021 00:59)  POCT Blood Glucose.: 151 mg/dL (2021 23:38)  POCT Blood Glucose.: 105 mg/dL (2021 18:32)  POCT Blood Glucose.: 121 mg/dL (2021 16:53)  POCT Blood Glucose.: 124 mg/dL (2021 15:26)  POCT Blood Glucose.: 157 mg/dL (2021 12:14)  POCT Blood Glucose.: 197 mg/dL (2021 11:05)  POCT Blood Glucose.: 173 mg/dL (2021 10:05)  POCT Blood Glucose.: 166 mg/dL (2021 09:22)  POCT Blood Glucose.: 144 mg/dL (2021 06:22)    A1C with Estimated Average Glucose Result: 6.1    Physical Exam:  General: + BipPAP   Cardiac: + rubs, S1/S2, RRR, no murmur  Lungs: mildly labored respirations, CTA b/l, no wheeze, no rales, no crackles  Abdomen: Soft/NT/ND; positive bowel sounds x 4  Sternum: Intact, no click, incision healing well with no drainage  Incisions: Incisions clean/dry/intact  Extremities: Moderate 1+ pitting edema b/l lower extremities; good capillary refill; no cyanosis; palpable 1+ pedal pulses b/l    Central Venous Catheter: Yes[x]  No[] , If Yes indication:           Day # 5  Spencer Catheter: Yes  [x] , No  [] , If yes indication:                      Day # 5   EPICARDIAL WIRES:  [x] YES [] NO                                              Day #  5  BOWEL MOVEMENT:  [] YES [x] NO, If No, Timing since last BM:      Day #    CHEST TUBE(Right):  [x] YES [] NO, If yes -  AIR LEAKS:  [] YES [x] NO        LABS:                        9.8<L>  15.22<H> )-----------( 122<L>    ( 2021 02:00 )             29.2<L>                        8.1<L>  19.33<H> )-----------( 148      ( 2021 14:07 )             23.9<L>    02    140  |  102  |  51<H>  ----------------------------<  111<H>  3.5   |  25  |  1.3  02    142  |  104  |  53<H>  ----------------------------<  103<H>  3.9   |  26  |  1.2    Ca    8.1<L>      2021 02:00  Mg     2.2     -17    TPro  5.1<L> [6.0 - 8.0]  /  Alb  3.5 [3.5 - 5.2]  /  TBili  1.8<H> [0.2 - 1.2]  /  DBili  x   /  AST  115<H> [0 - 41]  /  ALT  170<H> [0 - 41]  /  AlkPhos  55 [30 - 115]      PT/INR - ( 2021 14:07 )   PT: ;   INR: 1.16 ratio       PTT - ( 2021 14:07 )  PTT:21.2 sec    ABG - ( 2021 05:27 )  pH: 7.52  /  pCO2: 34    /  pO2: 126   / HCO3: 28    / Base Excess: 5.0   /  SaO2: 99    /  LA: 1.0        RADIOLOGY & ADDITIONAL TESTS:  CXR:   < from: Xray Chest 1 View- PORTABLE-Routine (Xray Chest 1 View- PORTABLE-Routine in AM.) (21 @ 06:18) >  Impression:  No definite pneumothorax visualized. Unchanged bilateral opacities and effusions.  Stable support devices.  Stable enlarged cardiac silhouette.      EK Lead ECG:   Ventricular Rate 70 BPM    Atrial Rate 57 BPM    P-R Interval 118 ms    QRS Duration 94 ms    Q-T Interval 414 ms    QTC Calculation(Bazett) 447 ms    P Axis 22 degrees    R Axis 2 degrees    T Axis 27 degrees    Diagnosis Line Sinus bradycardia with frequent Premature ventricular complexes and Premature  atrial complexes  Possible Left atrial enlargement  Nonspecific T wave abnormality  Abnormal ECG    Confirmed by PARKER PACK MD ([id number]) on 2021 8:43:36 AM (21 @ 07:18)    MEDICATIONS  (STANDING):  albumin human  5% IVPB 500 milliLiter(s) IV Intermittent once  aMIOdarone Infusion 0.5 mG/Min (16.7 mL/Hr) IV Continuous <Continuous>  aspirin enteric coated 325 milliGRAM(s) Oral daily    cefepime   IVPB 1000 milliGRAM(s) IV Intermittent every 8 hours  chlorhexidine 4% Liquid 1 Application(s) Topical <User Schedule>  dexMEDEtomidine Infusion 0.7 MICROgram(s)/kG/Hr (17 mL/Hr) IV Continuous <Continuous>  dextrose 40% Gel 15 Gram(s) Oral once  dextrose 5%. 1000 milliLiter(s) (50 mL/Hr) IV Continuous <Continuous>  dextrose 5%. 1000 milliLiter(s) (100 mL/Hr) IV Continuous <Continuous>  dextrose 50% Injectable 25 Gram(s) IV Push once  dextrose 50% Injectable 12.5 Gram(s) IV Push once  dextrose 50% Injectable 25 Gram(s) IV Push once  glucagon  Injectable 1 milliGRAM(s) IntraMuscular once  guaiFENesin  milliGRAM(s) Oral every 12 hours  insulin regular Infusion 2 Unit(s)/Hr (2 mL/Hr) IV Continuous <Continuous>  ipratropium    for Nebulization 500 MICROGram(s) Nebulizer every 6 hours  iron sucrose IVPB 200 milliGRAM(s) IV Intermittent every 24 hours  magnesium sulfate  IVPB 1 Gram(s) IV Intermittent every 12 hours  meperidine     Injectable 25 milliGRAM(s) IV Push once  nitroglycerin  Infusion 5 MICROgram(s)/Min (1.5 mL/Hr) IV Continuous <Continuous>  norepinephrine Infusion 0.05 MICROgram(s)/kG/Min (9.09 mL/Hr) IV Continuous <Continuous>  pantoprazole  Injectable 40 milliGRAM(s) IV Push daily  polyethylene glycol 3350 17 Gram(s) Oral daily  potassium chloride  20 mEq/100 mL IVPB 20 milliEquivalent(s) IV Intermittent once  potassium chloride  20 mEq/100 mL IVPB 20 milliEquivalent(s) IV Intermittent once  potassium chloride  20 mEq/100 mL IVPB 20 milliEquivalent(s) IV Intermittent once  simethicone 80 milliGRAM(s) Chew three times a day  sodium chloride 0.9%. 1000 milliLiter(s) (20 mL/Hr) IV Continuous <Continuous>  vancomycin  IVPB 1000 milliGRAM(s) IV Intermittent every 12 hours  vasopressin Infusion 0.04 Unit(s)/Min (2.4 mL/Hr) IV Continuous <Continuous>    MEDICATIONS  (PRN):  hydrALAZINE Injectable 10 milliGRAM(s) IV Push every 4 hours PRN SBP > 155  oxyCODONE    IR 10 milliGRAM(s) Oral every 4 hours PRN Severe Pain (7 - 10)  oxyCODONE    IR 5 milliGRAM(s) Oral every 6 hours PRN Moderate Pain (4 - 6)    Home Medications:  aspirin 325 mg oral tablet: orally once a day (2021 09:55)  lisinopril-hydrochlorothiazide 20 mg-12.5 mg oral tablet: 1 tab(s) orally once a day (2021 09:55)  meloxicam: orally once a day (2021 09:55)  metFORMIN 500 mg oral tablet: orally once a day (2021 09:55)  metoprolol tartrate 25 mg oral tablet: 1 tab(s) orally 2 times a day (2021 09:55)  oxybutynin 10 mg/24 hr oral tablet, extended release: 1 tab(s) orally once a day (2021 09:55)  pantoprazole 40 mg oral delayed release tablet: 1 tab(s) orally once a day (2021 09:55)    HEPARIN:  [] YES [x] NO  Dose: XX UNITS/HR UNITS Q8H  LOVENOX:[] YES [x] NO  Dose: XX mg Q24H  COUMADIN: []  YES [x] NO  Dose: XX mg  Q24H  SCD's: YES b/l  GI Prophylaxis: Protonix [x], Pepcid [], None [], (Contra-indication:.....)    Post-Op Aspirin: Yes [x],  No [], If No, then contra-indication:  Post-Op Statin: Yes [], No[x], If No, then contra-indication: Elevated LFTs  Post-Op Beta-Blockers: Yes [], No [x], If No, then contra-indication:  hypotension now requiring pressors    Allergies:  No Known Allergies    Ambulation/Activity Status: bed rest    Assessment/Plan:  70y Female status-post      C1L MV Repair         POD #        5   - Case and plan discussed with CTU Intensivist and CT Surgeon - Dr. Rojas  - Continue CTU supportive care    - Continue DVT/GI prophylaxis  - Incentive Spirometry 10 times an hour  - Continue to advance physical activity as tolerated and continue PT/OT as directed  1. CAD s/p CABG: Cont asa, hold bb for hypotension recently weaned off of pressors, hold statin for elevated LFTs.  2. Hypoxia secondary to respiratory failure: Continue to wean BiPAP.   3. DM/Glucose Control: Continue insulin gtt   4. Leukocytosis- Cont broad spectrum abx, Leukocytosis improving and pt currently afebrile, cont to monitor   5. Anemia secondary to acute surgical blood loss - H/H stable s/p 2 U prbc  (9.8/29.2); will continue to monitor cbc

## 2021-02-17 NOTE — PROGRESS NOTE ADULT - SUBJECTIVE AND OBJECTIVE BOX
CTU Attending Progress Daily Note     17 Feb 2021 10:51    Procedure:                                                  POD#                   Patient seen as post-op critical care follow-up    HPI:  71 yo F h/o HTN, DLD, has JACOME on TTE Mod AS by gradient, and mod to severe MR here for DERRICK and RHC/LHC   (09 Feb 2021 09:38)    See preop testing chart H&P    Interval event for past 24 hr:  ADRIAN SAMUELS  70yrespiratory failure on BIPAP    Current Complains:  ADRIAN SAMUELS has no new complaints    REVIEW OF SYSTEMS:  CONSTITUTIONAL:  [-] weakness, [-] fevers, [-] chills  EYES/ENT: [-] visual changes, [-] vertigo, [-] throat pain   NECK: [-] pain, [-] stiffness  RESPIRATORY: [-] cough, [-] wheezing, [-] hemoptysis, [-] shortness of breath  CARDIOVASCULAR: [-] chest pain, [-] palpitations, [-] orthopnea  GASTROINTESTINAL:    [-]abdominal pain, [-] nausea, [-] vomiting, [-] hematemesis, [-] diarrhea, [-] constipation, [-] melena, [-] hematochezia.  GENITOURINARY: [-] dysuria, [-] frequency, [-] hematuria  NEUROLOGICAL: [-] numbness, [-] weakness  SKIN: [-] itching, [-] burning, [-] rashes, [-] lesions   All other review of systems is negative unless indicated above.    [  ] Unable to assess ROS because :    OBJECTIVE:  ICU Vital Signs Last 24 Hrs  T(C): 37.1 (17 Feb 2021 08:00), Max: 37.1 (17 Feb 2021 08:00)  T(F): 98.8 (17 Feb 2021 08:00), Max: 98.8 (17 Feb 2021 08:00)  HR: 83 (17 Feb 2021 10:00) (60 - 83)  BP: 115/56 (16 Feb 2021 22:00) (105/49 - 132/58)  BP(mean): 78 (16 Feb 2021 22:00) (70 - 84)  ABP: 131/70 (17 Feb 2021 10:00) (111/37 - 178/120)  ABP(mean): 93 (17 Feb 2021 10:00) (65 - 144)  RR: 27 (17 Feb 2021 10:00) (9 - 35)  SpO2: 100% (17 Feb 2021 10:00) (97% - 100%)      I&O's Summary    16 Feb 2021 07:01  -  17 Feb 2021 07:00  --------------------------------------------------------  IN: 3364.5 mL / OUT: 5210 mL / NET: -1845.5 mL    17 Feb 2021 07:01  -  17 Feb 2021 10:51  --------------------------------------------------------  IN: 180 mL / OUT: 400 mL / NET: -220 mL      Adult Advanced Hemodynamics Last 24 Hrs  CVP(mm Hg): 17 (17 Feb 2021 10:00) (4 - 35)  CVP(cm H2O): --  CO: 6 (17 Feb 2021 06:00) (4.4 - 6.5)  CI: 3.1 (17 Feb 2021 06:00) (2.3 - 3.4)  PA: --  PA(mean): --  PCWP: --  SVR: 1031 (16 Feb 2021 22:00) (917 - 1252)  SVRI: 2026 (16 Feb 2021 22:00) (1829 - 2397)  PVR: --  PVRI: --      PHYSICAL EXAM:  General: WN/WD NAD    HEENT:     [+] NCAT  [+] EOMI  [-] Conjuctival edema   [-] Icterus   [-] Thrush   [-] ETT  [-] NGT/OGT    Neck:         [+] FROM   [-] JVD     [-] Nodes     [-] Masses    [+] Mid-line trachea    [-] Tracheostomy    Chest:         [-] Sternal click   [-] Sternal drainage   [+] Pacing wires   [+] Chest tubes   [-] SubQ emphysema    Lungs:          [+] CTA   [-] Rhonchi   [-] Rales    [-] Wheezing    [-] Decreased BS    [-] Dullness R L    Cardiac:       [+] S1 [+] S2    [+] RRR   [-] Irregular   [-] S3   [-] S4    [-] Murmurs    [-] Rub    Abdomen:    [+] BS    [+] Soft    [+] Non-tender     [-] Distended    [-] Organomegaly  [-] PEG    Extremities:   [-] Cyanosis U/L   [-] Clubbing  U/L  [-] LE/UE Edema   [+] Capillary refill    [+] Pulses     Neuro:        [+] Awake   [+]  Alert   [-] Confused   [-] Lethargic   [-] Sedated   [-] Generalized Weakness    Skin:        [-] Rashes    [-] Erythema   [+] Normal incisions   [+] IV sites intact   [-] Sacral decubitus    Tubes:  LINES:    CAPILLARY BLOOD GLUCOSE  122 (16 Feb 2021 14:00)      POCT Blood Glucose.: 148 mg/dL (17 Feb 2021 08:36)    CAPILLARY BLOOD GLUCOSE  122 (16 Feb 2021 14:00)      POCT Blood Glucose.: 148 mg/dL (17 Feb 2021 08:36)  POCT Blood Glucose.: 129 mg/dL (17 Feb 2021 06:36)  POCT Blood Glucose.: 106 mg/dL (17 Feb 2021 03:15)  POCT Blood Glucose.: 125 mg/dL (17 Feb 2021 00:59)  POCT Blood Glucose.: 151 mg/dL (16 Feb 2021 23:38)  POCT Blood Glucose.: 105 mg/dL (16 Feb 2021 18:32)  POCT Blood Glucose.: 121 mg/dL (16 Feb 2021 16:53)  POCT Blood Glucose.: 124 mg/dL (16 Feb 2021 15:26)  POCT Blood Glucose.: 157 mg/dL (16 Feb 2021 12:14)  POCT Blood Glucose.: 197 mg/dL (16 Feb 2021 11:05)      HOSPITAL MEDICATIONS:  MEDICATIONS  (STANDING):  albumin human  5% IVPB 500 milliLiter(s) IV Intermittent once  aMIOdarone Infusion 0.5 mG/Min (16.7 mL/Hr) IV Continuous <Continuous>  aspirin Suppository 300 milliGRAM(s) Rectal daily  chlorhexidine 4% Liquid 1 Application(s) Topical <User Schedule>  dexMEDEtomidine Infusion 0.7 MICROgram(s)/kG/Hr (17 mL/Hr) IV Continuous <Continuous>  dextrose 40% Gel 15 Gram(s) Oral once  dextrose 5%. 1000 milliLiter(s) (50 mL/Hr) IV Continuous <Continuous>  dextrose 5%. 1000 milliLiter(s) (100 mL/Hr) IV Continuous <Continuous>  dextrose 50% Injectable 25 Gram(s) IV Push once  dextrose 50% Injectable 12.5 Gram(s) IV Push once  dextrose 50% Injectable 25 Gram(s) IV Push once  glucagon  Injectable 1 milliGRAM(s) IntraMuscular once  guaiFENesin  milliGRAM(s) Oral every 12 hours  insulin regular Infusion 2 Unit(s)/Hr (2 mL/Hr) IV Continuous <Continuous>  ipratropium    for Nebulization 500 MICROGram(s) Nebulizer every 6 hours  iron sucrose IVPB 200 milliGRAM(s) IV Intermittent every 24 hours  magnesium sulfate  IVPB 1 Gram(s) IV Intermittent every 12 hours  meperidine     Injectable 25 milliGRAM(s) IV Push once  nitroglycerin  Infusion 5 MICROgram(s)/Min (1.5 mL/Hr) IV Continuous <Continuous>  norepinephrine Infusion 0.05 MICROgram(s)/kG/Min (9.09 mL/Hr) IV Continuous <Continuous>  pantoprazole  Injectable 40 milliGRAM(s) IV Push daily  polyethylene glycol 3350 17 Gram(s) Oral daily  simethicone 80 milliGRAM(s) Chew three times a day  sodium chloride 0.9%. 1000 milliLiter(s) (20 mL/Hr) IV Continuous <Continuous>  vancomycin  IVPB 1000 milliGRAM(s) IV Intermittent every 12 hours  vasopressin Infusion 0.04 Unit(s)/Min (2.4 mL/Hr) IV Continuous <Continuous>    MEDICATIONS  (PRN):  hydrALAZINE Injectable 10 milliGRAM(s) IV Push every 4 hours PRN SBP > 155  oxyCODONE    IR 10 milliGRAM(s) Oral every 4 hours PRN Severe Pain (7 - 10)  oxyCODONE    IR 5 milliGRAM(s) Oral every 6 hours PRN Moderate Pain (4 - 6)      LABS:  ABG - ( 17 Feb 2021 05:27 )  pH, Arterial: 7.52  pH, Blood: x     /  pCO2: 34    /  pO2: 126   / HCO3: 28    / Base Excess: 5.0   /  SaO2: 99                                      9.8    15.22 )-----------( 122      ( 17 Feb 2021 02:00 )             29.2     02-17    140  |  102  |  51<H>  ----------------------------<  111<H>  3.5   |  25  |  1.3    Ca    8.1<L>      17 Feb 2021 02:00  Mg     2.2     02-17    TPro  5.1<L>  /  Alb  3.5  /  TBili  1.8<H>  /  DBili  x   /  AST  115<H>  /  ALT  170<H>  /  AlkPhos  55  02-17    PT/INR - ( 16 Feb 2021 14:07 )   PT: 13.30 sec;   INR: 1.16 ratio         PTT - ( 16 Feb 2021 14:07 )  PTT:21.2 sec        RADIOLOGY:  Reviewed and interpreted by me  CXR from 02-17-21 shows [+] mild congestion, [-] pneumothorax, [+] R/L effusion, [+] cardiomegaly,   Chest Tubes in place    ECG:  Reviewed and interpreted by me:   QTC:    Assessment:  CAD SP CABG  Afib  Acute respiratory failure - BIPAP dependent  Acute blood loss anemia    PAST MEDICAL & SURGICAL HISTORY:  Glaucoma    Chronic sciatica    H/O sleep apnea  on CPAP    Aortic stenosis    Type 2 diabetes mellitus without complication, without long-term current use of insulin    Hyperlipidemia, unspecified hyperlipidemia type    Hypertension, unspecified type    History of carpal tunnel surgery    History of hip surgery  bilateral hip        PLAN:  Neuro: Pain control  Pulm: Encourage coughing, deep breathing and use of incentive spirometry. Wean off supplemental oxygen as able. Daily CXR.   Cardio: Monitor telemetry/alarms. Continue cardiac meds  GI: Tolerating diet. Continue stool softeners. Continue GI prophylaxis  Renal: monitor urine output, supplement electrolytes as needed  Vasc: Heparin SC/SCDs for DVT prophylaxis  Heme: Monitor H/H.   ID: Off antibiotics. Stable.  Endocrine: Monitor finger stick blood sugar and control hyperglycemia with insulin  Physical Therapy: OOB/ambulate  Tubes: Monitor Chest tube output      Discussed with Cardiothoracic Team at AM rounds.    45 minutes of critical care time spent providing medical care for patient's acute illness/conditions that impairs at least one vital organ system and/or poses a high risk of imminent or life threatening deterioration in the patient's condition. It includes time spent evaluating and treating the patient's acute illness as well as time spent reviewing labs, radiology, discussing goals of care with patient and/or patient's family, and discussing the case with a multidisciplinary team in an effort to prevent further life threatening deterioration or end organ damage. This time is independent of any procedures performed.

## 2021-02-18 LAB
ALBUMIN SERPL ELPH-MCNC: 3.2 G/DL — LOW (ref 3.5–5.2)
ALP SERPL-CCNC: 51 U/L — SIGNIFICANT CHANGE UP (ref 30–115)
ALT FLD-CCNC: 124 U/L — HIGH (ref 0–41)
ANION GAP SERPL CALC-SCNC: 13 MMOL/L — SIGNIFICANT CHANGE UP (ref 7–14)
APTT BLD: 28.6 SEC — SIGNIFICANT CHANGE UP (ref 27–39.2)
AST SERPL-CCNC: 63 U/L — HIGH (ref 0–41)
BASOPHILS # BLD AUTO: 0.02 K/UL — SIGNIFICANT CHANGE UP (ref 0–0.2)
BASOPHILS NFR BLD AUTO: 0.1 % — SIGNIFICANT CHANGE UP (ref 0–1)
BILIRUB SERPL-MCNC: 1.6 MG/DL — HIGH (ref 0.2–1.2)
BUN SERPL-MCNC: 44 MG/DL — HIGH (ref 10–20)
CALCIUM SERPL-MCNC: 8.1 MG/DL — LOW (ref 8.5–10.1)
CHLORIDE SERPL-SCNC: 98 MMOL/L — SIGNIFICANT CHANGE UP (ref 98–110)
CO2 SERPL-SCNC: 24 MMOL/L — SIGNIFICANT CHANGE UP (ref 17–32)
CREAT SERPL-MCNC: 1.1 MG/DL — SIGNIFICANT CHANGE UP (ref 0.7–1.5)
EOSINOPHIL # BLD AUTO: 0.12 K/UL — SIGNIFICANT CHANGE UP (ref 0–0.7)
EOSINOPHIL NFR BLD AUTO: 0.8 % — SIGNIFICANT CHANGE UP (ref 0–8)
GAS PNL BLDA: SIGNIFICANT CHANGE UP
GLUCOSE BLDC GLUCOMTR-MCNC: 120 MG/DL — HIGH (ref 70–99)
GLUCOSE BLDC GLUCOMTR-MCNC: 124 MG/DL — HIGH (ref 70–99)
GLUCOSE BLDC GLUCOMTR-MCNC: 134 MG/DL — HIGH (ref 70–99)
GLUCOSE BLDC GLUCOMTR-MCNC: 137 MG/DL — HIGH (ref 70–99)
GLUCOSE BLDC GLUCOMTR-MCNC: 141 MG/DL — HIGH (ref 70–99)
GLUCOSE BLDC GLUCOMTR-MCNC: 141 MG/DL — HIGH (ref 70–99)
GLUCOSE BLDC GLUCOMTR-MCNC: 145 MG/DL — HIGH (ref 70–99)
GLUCOSE BLDC GLUCOMTR-MCNC: 156 MG/DL — HIGH (ref 70–99)
GLUCOSE SERPL-MCNC: 132 MG/DL — HIGH (ref 70–99)
HCT VFR BLD CALC: 25.4 % — LOW (ref 37–47)
HGB BLD-MCNC: 8.6 G/DL — LOW (ref 12–16)
IMM GRANULOCYTES NFR BLD AUTO: 0.8 % — HIGH (ref 0.1–0.3)
INR BLD: 1.4 RATIO — HIGH (ref 0.65–1.3)
LYMPHOCYTES # BLD AUTO: 1.29 K/UL — SIGNIFICANT CHANGE UP (ref 1.2–3.4)
LYMPHOCYTES # BLD AUTO: 8.8 % — LOW (ref 20.5–51.1)
MAGNESIUM SERPL-MCNC: 2.4 MG/DL — SIGNIFICANT CHANGE UP (ref 1.8–2.4)
MCHC RBC-ENTMCNC: 29.1 PG — SIGNIFICANT CHANGE UP (ref 27–31)
MCHC RBC-ENTMCNC: 33.9 G/DL — SIGNIFICANT CHANGE UP (ref 32–37)
MCV RBC AUTO: 85.8 FL — SIGNIFICANT CHANGE UP (ref 81–99)
MONOCYTES # BLD AUTO: 1.41 K/UL — HIGH (ref 0.1–0.6)
MONOCYTES NFR BLD AUTO: 9.7 % — HIGH (ref 1.7–9.3)
NEUTROPHILS # BLD AUTO: 11.66 K/UL — HIGH (ref 1.4–6.5)
NEUTROPHILS NFR BLD AUTO: 79.8 % — HIGH (ref 42.2–75.2)
NRBC # BLD: 1 /100 WBCS — HIGH (ref 0–0)
PLATELET # BLD AUTO: 140 K/UL — SIGNIFICANT CHANGE UP (ref 130–400)
POTASSIUM SERPL-MCNC: 4.1 MMOL/L — SIGNIFICANT CHANGE UP (ref 3.5–5)
POTASSIUM SERPL-SCNC: 4.1 MMOL/L — SIGNIFICANT CHANGE UP (ref 3.5–5)
PROT SERPL-MCNC: 4.8 G/DL — LOW (ref 6–8)
PROTHROM AB SERPL-ACNC: 16.1 SEC — HIGH (ref 9.95–12.87)
RBC # BLD: 2.96 M/UL — LOW (ref 4.2–5.4)
RBC # FLD: 15.5 % — HIGH (ref 11.5–14.5)
SODIUM SERPL-SCNC: 135 MMOL/L — SIGNIFICANT CHANGE UP (ref 135–146)
WBC # BLD: 14.61 K/UL — HIGH (ref 4.8–10.8)
WBC # FLD AUTO: 14.61 K/UL — HIGH (ref 4.8–10.8)

## 2021-02-18 PROCEDURE — 99233 SBSQ HOSP IP/OBS HIGH 50: CPT

## 2021-02-18 PROCEDURE — 93010 ELECTROCARDIOGRAM REPORT: CPT

## 2021-02-18 PROCEDURE — 71045 X-RAY EXAM CHEST 1 VIEW: CPT | Mod: 26,77

## 2021-02-18 PROCEDURE — 71045 X-RAY EXAM CHEST 1 VIEW: CPT | Mod: 26

## 2021-02-18 RX ORDER — NOREPINEPHRINE BITARTRATE/D5W 8 MG/250ML
0.02 PLASTIC BAG, INJECTION (ML) INTRAVENOUS
Qty: 8 | Refills: 0 | Status: DISCONTINUED | OUTPATIENT
Start: 2021-02-18 | End: 2021-02-21

## 2021-02-18 RX ORDER — DILTIAZEM HCL 120 MG
10 CAPSULE, EXT RELEASE 24 HR ORAL ONCE
Refills: 0 | Status: COMPLETED | OUTPATIENT
Start: 2021-02-18 | End: 2021-02-18

## 2021-02-18 RX ORDER — POTASSIUM CHLORIDE 20 MEQ
20 PACKET (EA) ORAL ONCE
Refills: 0 | Status: COMPLETED | OUTPATIENT
Start: 2021-02-18 | End: 2021-02-18

## 2021-02-18 RX ORDER — AMIODARONE HYDROCHLORIDE 400 MG/1
0.5 TABLET ORAL
Qty: 900 | Refills: 0 | Status: DISCONTINUED | OUTPATIENT
Start: 2021-02-18 | End: 2021-02-19

## 2021-02-18 RX ORDER — INSULIN GLARGINE 100 [IU]/ML
20 INJECTION, SOLUTION SUBCUTANEOUS EVERY MORNING
Refills: 0 | Status: DISCONTINUED | OUTPATIENT
Start: 2021-02-18 | End: 2021-02-21

## 2021-02-18 RX ORDER — PROCHLORPERAZINE MALEATE 5 MG
10 TABLET ORAL ONCE
Refills: 0 | Status: COMPLETED | OUTPATIENT
Start: 2021-02-18 | End: 2021-02-18

## 2021-02-18 RX ORDER — PANTOPRAZOLE SODIUM 20 MG/1
40 TABLET, DELAYED RELEASE ORAL
Refills: 0 | Status: DISCONTINUED | OUTPATIENT
Start: 2021-02-18 | End: 2021-03-01

## 2021-02-18 RX ORDER — ATORVASTATIN CALCIUM 80 MG/1
40 TABLET, FILM COATED ORAL AT BEDTIME
Refills: 0 | Status: DISCONTINUED | OUTPATIENT
Start: 2021-02-18 | End: 2021-03-01

## 2021-02-18 RX ORDER — METOCLOPRAMIDE HCL 10 MG
10 TABLET ORAL EVERY 8 HOURS
Refills: 0 | Status: COMPLETED | OUTPATIENT
Start: 2021-02-18 | End: 2021-02-19

## 2021-02-18 RX ORDER — INSULIN LISPRO 100/ML
VIAL (ML) SUBCUTANEOUS
Refills: 0 | Status: DISCONTINUED | OUTPATIENT
Start: 2021-02-18 | End: 2021-03-01

## 2021-02-18 RX ORDER — HEPARIN SODIUM 5000 [USP'U]/ML
5000 INJECTION INTRAVENOUS; SUBCUTANEOUS EVERY 8 HOURS
Refills: 0 | Status: DISCONTINUED | OUTPATIENT
Start: 2021-02-18 | End: 2021-02-21

## 2021-02-18 RX ORDER — MAGNESIUM SULFATE 500 MG/ML
1 VIAL (ML) INJECTION ONCE
Refills: 0 | Status: COMPLETED | OUTPATIENT
Start: 2021-02-18 | End: 2021-02-18

## 2021-02-18 RX ORDER — BUMETANIDE 0.25 MG/ML
1 INJECTION INTRAMUSCULAR; INTRAVENOUS ONCE
Refills: 0 | Status: COMPLETED | OUTPATIENT
Start: 2021-02-18 | End: 2021-02-18

## 2021-02-18 RX ORDER — AMIODARONE HYDROCHLORIDE 400 MG/1
1 TABLET ORAL
Qty: 900 | Refills: 0 | Status: DISCONTINUED | OUTPATIENT
Start: 2021-02-18 | End: 2021-02-18

## 2021-02-18 RX ORDER — AMIODARONE HYDROCHLORIDE 400 MG/1
150 TABLET ORAL ONCE
Refills: 0 | Status: COMPLETED | OUTPATIENT
Start: 2021-02-18 | End: 2021-02-18

## 2021-02-18 RX ORDER — METOCLOPRAMIDE HCL 10 MG
10 TABLET ORAL ONCE
Refills: 0 | Status: COMPLETED | OUTPATIENT
Start: 2021-02-18 | End: 2021-02-18

## 2021-02-18 RX ORDER — FENTANYL CITRATE 50 UG/ML
25 INJECTION INTRAVENOUS
Refills: 0 | Status: DISCONTINUED | OUTPATIENT
Start: 2021-02-18 | End: 2021-02-18

## 2021-02-18 RX ORDER — METOPROLOL TARTRATE 50 MG
25 TABLET ORAL
Refills: 0 | Status: DISCONTINUED | OUTPATIENT
Start: 2021-02-18 | End: 2021-02-20

## 2021-02-18 RX ADMIN — CEFEPIME 100 MILLIGRAM(S): 1 INJECTION, POWDER, FOR SOLUTION INTRAMUSCULAR; INTRAVENOUS at 00:00

## 2021-02-18 RX ADMIN — FENTANYL CITRATE 25 MICROGRAM(S): 50 INJECTION INTRAVENOUS at 00:15

## 2021-02-18 RX ADMIN — Medication 10 MILLIGRAM(S): at 20:38

## 2021-02-18 RX ADMIN — CEFEPIME 100 MILLIGRAM(S): 1 INJECTION, POWDER, FOR SOLUTION INTRAMUSCULAR; INTRAVENOUS at 05:24

## 2021-02-18 RX ADMIN — Medication 325 MILLIGRAM(S): at 11:17

## 2021-02-18 RX ADMIN — Medication 100 GRAM(S): at 00:15

## 2021-02-18 RX ADMIN — POLYETHYLENE GLYCOL 3350 17 GRAM(S): 17 POWDER, FOR SOLUTION ORAL at 11:17

## 2021-02-18 RX ADMIN — Medication 10 MILLIGRAM(S): at 01:15

## 2021-02-18 RX ADMIN — SIMETHICONE 80 MILLIGRAM(S): 80 TABLET, CHEWABLE ORAL at 17:15

## 2021-02-18 RX ADMIN — Medication 10 MILLIGRAM(S): at 10:00

## 2021-02-18 RX ADMIN — Medication 250 MILLIGRAM(S): at 05:24

## 2021-02-18 RX ADMIN — Medication 10 MILLIGRAM(S): at 21:03

## 2021-02-18 RX ADMIN — Medication 20 MILLIEQUIVALENT(S): at 10:10

## 2021-02-18 RX ADMIN — Medication 10 MILLIGRAM(S): at 01:20

## 2021-02-18 RX ADMIN — Medication 100 GRAM(S): at 05:34

## 2021-02-18 RX ADMIN — Medication 600 MILLIGRAM(S): at 17:15

## 2021-02-18 RX ADMIN — Medication 1 MILLIGRAM(S): at 21:02

## 2021-02-18 RX ADMIN — IRON SUCROSE 110 MILLIGRAM(S): 20 INJECTION, SOLUTION INTRAVENOUS at 17:20

## 2021-02-18 RX ADMIN — Medication 10 MILLIGRAM(S): at 12:35

## 2021-02-18 RX ADMIN — FENTANYL CITRATE 25 MICROGRAM(S): 50 INJECTION INTRAVENOUS at 03:30

## 2021-02-18 RX ADMIN — SENNA PLUS 2 TABLET(S): 8.6 TABLET ORAL at 21:02

## 2021-02-18 RX ADMIN — AMIODARONE HYDROCHLORIDE 600 MILLIGRAM(S): 400 TABLET ORAL at 01:08

## 2021-02-18 RX ADMIN — Medication 25 MILLIGRAM(S): at 23:00

## 2021-02-18 RX ADMIN — Medication 100 MILLIEQUIVALENT(S): at 01:09

## 2021-02-18 RX ADMIN — BUMETANIDE 1 MILLIGRAM(S): 0.25 INJECTION INTRAMUSCULAR; INTRAVENOUS at 06:29

## 2021-02-18 RX ADMIN — ESCITALOPRAM OXALATE 20 MILLIGRAM(S): 10 TABLET, FILM COATED ORAL at 11:17

## 2021-02-18 RX ADMIN — SIMETHICONE 80 MILLIGRAM(S): 80 TABLET, CHEWABLE ORAL at 21:02

## 2021-02-18 RX ADMIN — Medication 500 MICROGRAM(S): at 08:09

## 2021-02-18 RX ADMIN — INSULIN GLARGINE 20 UNIT(S): 100 INJECTION, SOLUTION SUBCUTANEOUS at 11:17

## 2021-02-18 RX ADMIN — CHLORHEXIDINE GLUCONATE 1 APPLICATION(S): 213 SOLUTION TOPICAL at 05:24

## 2021-02-18 RX ADMIN — Medication 100 GRAM(S): at 17:20

## 2021-02-18 RX ADMIN — ATORVASTATIN CALCIUM 40 MILLIGRAM(S): 80 TABLET, FILM COATED ORAL at 21:02

## 2021-02-18 RX ADMIN — Medication 10 MILLIGRAM(S): at 16:00

## 2021-02-18 NOTE — PROGRESS NOTE ADULT - SUBJECTIVE AND OBJECTIVE BOX
CTU Attending Progress Daily Note     18 Feb 2021 23:00  POD# - 6 CABG MV repair  He has history of Glaucoma    Chronic sciatica    H/O sleep apnea    Aortic stenosis    Type 2 diabetes mellitus without complication, without long-term current use of insulin    Hyperlipidemia, unspecified hyperlipidemia type    Hypertension, unspecified type      Interval event for past 24 hr:  ADRIAN SAMUELS  70y had no event.   Current Complains:  ADRIAN SAMUELS has no new complains  HPI:  69 yo F h/o HTN, DLD, has JACOME on TTE Mod AS by gradient, and mod to severe MR here for DERRICK and RHC/LHC   (09 Feb 2021 09:38)    OBJECTIVE:  ICU Vital Signs Last 24 Hrs  T(C): 36.7 (18 Feb 2021 18:00), Max: 36.7 (18 Feb 2021 00:00)  T(F): 98 (18 Feb 2021 18:00), Max: 98 (18 Feb 2021 00:00)  HR: 97 (18 Feb 2021 21:00) (72 - 172)  BP: --  BP(mean): --  ABP: 140/53 (18 Feb 2021 21:00) (100/59 - 154/62)  ABP(mean): 77 (18 Feb 2021 21:00) (65 - 100)  RR: 41 (18 Feb 2021 21:00) (18 - 41)  SpO2: 97% (18 Feb 2021 21:00) (97% - 100%)    I&O's Summary    17 Feb 2021 07:01  -  18 Feb 2021 07:00  --------------------------------------------------------  IN: 4346.9 mL / OUT: 2151 mL / NET: 2195.9 mL    18 Feb 2021 07:01  -  18 Feb 2021 23:00  --------------------------------------------------------  IN: 1042 mL / OUT: 1265 mL / NET: -223 mL      I&O's Detail    17 Feb 2021 07:01  -  18 Feb 2021 07:00  --------------------------------------------------------  IN:    Amiodarone: 283.4 mL    Amiodarone: 199.8 mL    Amiodarone: 16.6 mL    Dexmedetomidine: 211.1 mL    Insulin: 43 mL    IV PiggyBack: 1800 mL    Nitroglycerin: 310 mL    Oral Fluid: 672 mL    PRBCs (Packed Red Blood Cells): 331 mL    sodium chloride 0.9%: 480 mL  Total IN: 4346.9 mL    OUT:    Chest Tube (mL): 20 mL    Chest Tube (mL): 80 mL    Indwelling Catheter - Urethral (mL): 2051 mL  Total OUT: 2151 mL    Total NET: 2195.9 mL      18 Feb 2021 07:01  -  18 Feb 2021 23:00  --------------------------------------------------------  IN:    Amiodarone: 233.4 mL    Dexmedetomidine: 14.6 mL    Insulin: 4 mL    IV PiggyBack: 200 mL    Nitroglycerin: 30 mL    Oral Fluid: 540 mL    sodium chloride 0.9%: 20 mL  Total IN: 1042 mL    OUT:    Chest Tube (mL): 20 mL    Chest Tube (mL): 80 mL    Indwelling Catheter - Urethral (mL): 1165 mL  Total OUT: 1265 mL    Total NET: -223 mL            CAPILLARY BLOOD GLUCOSE      POCT Blood Glucose.: 134 mg/dL (18 Feb 2021 16:58)  POCT Blood Glucose.: 124 mg/dL (18 Feb 2021 10:59)  POCT Blood Glucose.: 141 mg/dL (18 Feb 2021 06:44)  POCT Blood Glucose.: 120 mg/dL (18 Feb 2021 04:50)  POCT Blood Glucose.: 141 mg/dL (18 Feb 2021 03:40)  POCT Blood Glucose.: 156 mg/dL (18 Feb 2021 02:38)  POCT Blood Glucose.: 145 mg/dL (18 Feb 2021 01:13)    LABS:  ABG - ( 18 Feb 2021 03:18 )  pH, Arterial: 7.49  pH, Blood: x     /  pCO2: 36    /  pO2: 121   / HCO3: 28    / Base Excess: 4.0   /  SaO2: 99                                      8.6    14.61 )-----------( 140      ( 18 Feb 2021 02:50 )             25.4     02-18    135  |  98  |  44<H>  ----------------------------<  132<H>  4.1   |  24  |  1.1    Ca    8.1<L>      18 Feb 2021 02:50  Phos  3.5     02-17  Mg     2.4     02-18    TPro  4.8<L>  /  Alb  3.2<L>  /  TBili  1.6<H>  /  DBili  x   /  AST  63<H>  /  ALT  124<H>  /  AlkPhos  51  02-18    PT/INR - ( 18 Feb 2021 02:50 )   PT: 16.10 sec;   INR: 1.40 ratio         PTT - ( 18 Feb 2021 02:50 )  PTT:28.6 sec      Home Medications:  aspirin 325 mg oral tablet: orally once a day (09 Feb 2021 09:55)  lisinopril-hydrochlorothiazide 20 mg-12.5 mg oral tablet: 1 tab(s) orally once a day (09 Feb 2021 09:55)  meloxicam: orally once a day (09 Feb 2021 09:55)  metFORMIN 500 mg oral tablet: orally once a day (09 Feb 2021 09:55)  metoprolol tartrate 25 mg oral tablet: 1 tab(s) orally 2 times a day (09 Feb 2021 09:55)  oxybutynin 10 mg/24 hr oral tablet, extended release: 1 tab(s) orally once a day (09 Feb 2021 09:55)  pantoprazole 40 mg oral delayed release tablet: 1 tab(s) orally once a day (09 Feb 2021 09:55)    HOSPITAL MEDICATIONS:  MEDICATIONS  (STANDING):  albumin human  5% IVPB 500 milliLiter(s) IV Intermittent once  aMIOdarone Infusion 0.5 mG/Min (16.7 mL/Hr) IV Continuous <Continuous>  amLODIPine   Tablet 5 milliGRAM(s) Oral daily  aspirin 325 milliGRAM(s) Oral daily  atorvastatin 40 milliGRAM(s) Oral at bedtime  chlorhexidine 4% Liquid 1 Application(s) Topical <User Schedule>  dextrose 40% Gel 15 Gram(s) Oral once  dextrose 5%. 1000 milliLiter(s) (50 mL/Hr) IV Continuous <Continuous>  dextrose 5%. 1000 milliLiter(s) (100 mL/Hr) IV Continuous <Continuous>  dextrose 50% Injectable 25 Gram(s) IV Push once  dextrose 50% Injectable 12.5 Gram(s) IV Push once  dextrose 50% Injectable 25 Gram(s) IV Push once  escitalopram 20 milliGRAM(s) Oral daily  glucagon  Injectable 1 milliGRAM(s) IntraMuscular once  guaiFENesin  milliGRAM(s) Oral every 12 hours  heparin   Injectable 5000 Unit(s) SubCutaneous every 8 hours  insulin glargine Injectable (LANTUS) 20 Unit(s) SubCutaneous every morning  insulin lispro (ADMELOG) corrective regimen sliding scale   SubCutaneous three times a day before meals  ipratropium    for Nebulization 500 MICROGram(s) Nebulizer every 6 hours  iron sucrose IVPB 200 milliGRAM(s) IV Intermittent every 24 hours  magnesium sulfate  IVPB 1 Gram(s) IV Intermittent every 12 hours  meperidine     Injectable 25 milliGRAM(s) IV Push once  metoclopramide Injectable 10 milliGRAM(s) IV Push every 8 hours  metoprolol tartrate 25 milliGRAM(s) Oral two times a day  pantoprazole    Tablet 40 milliGRAM(s) Oral before breakfast  polyethylene glycol 3350 17 Gram(s) Oral daily  senna 2 Tablet(s) Oral at bedtime  simethicone 80 milliGRAM(s) Chew three times a day  sodium chloride 0.9%. 1000 milliLiter(s) (20 mL/Hr) IV Continuous <Continuous>    MEDICATIONS  (PRN):  hydrALAZINE Injectable 10 milliGRAM(s) IV Push every 4 hours PRN SBP > 155  LORazepam     Tablet 1 milliGRAM(s) Oral every 12 hours PRN Anxiety  oxyCODONE    IR 5 milliGRAM(s) Oral every 6 hours PRN Moderate Pain (4 - 6)  oxyCODONE    IR 10 milliGRAM(s) Oral every 4 hours PRN Severe Pain (7 - 10)      REVIEW OF SYSTEMS:  CONSTITUTIONAL: [X] all negative; [ ] weakness, [ ] fevers, [ ] chills  EYES/ENT: [X] all negative; [ ] visual changes, [ ] vertigo, [ ] throat pain   NECK: [X] all negative; [ ] pain, [ ] stiffness  RESPIRATORY: [] all negative, [ ] cough, [ ] wheezing, [ ] hemoptysis, [ ] shortness of breath  CARDIOVASCULAR: [] all negative; [ ] chest pain, [ ] palpitations, [ ] orthopnea  GASTROINTESTINAL: [X] all negative; [ ]abdominal pain, [ ] nausea, [ ] vomiting, [ ] hematemesis, [ ] diarrhea, [ ] constipation, [ ] melena, [ ] hematochezia.  GENITOURINARY: [X] all negative; [ ] dysuria, [ ] frequency, [ ] hematuria  NEUROLOGICAL: [X] all negative; [ ] numbness, [ ] weakness  SKIN: [X] all negative; [ ] itching, [ ] burning, [ ] rashes, [ ] lesions   All other review of systems is negative unless indicated above.    [  ] Unable to assess ROS because     PHYSICAL EXAM:          CONSTITUTIONAL: Well-developed; well-nourished; in no acute distress.   	SKIN: warm, dry  	HEAD: Normocephalic; atraumatic.  	EYES: PERRL, EOM, no conj injection, sclera clear  	ENT: No nasal discharge; airway clear.  	NECK: Supple; non tender.  No midline ttp ctls  	CARD: S1, S2 normal; no murmurs, gallops, or rubs. Regular rate and rhythm. 2+ RPs and DPs bilat, no carotid bruits, no pedal   edema, no calf pain b/l  	RESP: CTA  bilat good air movement No wheezes, rales or rhonchi.  	ABD: Soft, not tender, not distended, no CVA ttp no rebound or guarding, bowel sounds present  	EXT: Normal ROM.  No clubbing, cyanosis or edema.   	  	NEURO: Alert, awake, motor 5/5 R, 5/5 L        RADIOLOGY:  xray  < from: Xray Chest 1 View- PORTABLE-Routine (Xray Chest 1 View- PORTABLE-Routine in AM.) (02.18.21 @ 06:46) >  Stable bilateral opacities and effusions. Stable nonspecific relative lucency atthe right lung base; pneumothorax is not excluded. Follow-up is recommended.      < end of copied text >    I spent 45 minutes of critical care time examining patient, reviewing vitals, labs, medications, imaging and discussing with the team goals of care to prevent life-threatening in this patient who is at high risk for deterioration or death due to:

## 2021-02-18 NOTE — PROGRESS NOTE ADULT - SUBJECTIVE AND OBJECTIVE BOX
OPERATIVE PROCEDURE(s):                POD #    SURGEON(s):      SUBJECTIVE ASSESSMENT:    Vital Signs Last 24 Hrs  T(C): 36.5 (18 Feb 2021 04:00), Max: 36.7 (18 Feb 2021 00:00)  T(F): 97.7 (18 Feb 2021 04:00), Max: 98 (18 Feb 2021 00:00)  HR: 83 (18 Feb 2021 07:00) (80 - 172)  BP: 106/52 (17 Feb 2021 22:22) (106/52 - 106/52)  BP(mean): 75 (17 Feb 2021 22:22) (75 - 75)  RR: 22 (18 Feb 2021 07:00) (18 - 39)  SpO2: 100% (18 Feb 2021 07:00) (96% - 100%)  02-17-21 @ 07:01  -  02-18-21 @ 07:00  --------------------------------------------------------  IN: 4346.9 mL / OUT: 2151 mL / NET: 2195.9 mL        Physical Exam  General:  Chest:  CVS:  Abd:   GI/ :  Ext:    Central Venous Catheter: Yes[ ]  No[ ] , If Yes indication:           Day #    Spencer Catheter: Yes  [ ] , No [ ] : If yes indication:                         Day #    NGT: Yes [ ] No [  ]     If Yes Placement:                                          Day #    Post-Op Beta-Blockers: Yes [ ], No[ ], If No, then contraindication:    CHEST TUBE:  [ ] YES [ ] NO  OUTPUT:     per 24 hours    AIR LEAKS:  [ ] YES [ ] NO      EPICARDIAL WIRES:  [ ] YES [ ] NO      BOWEL MOVEMENT:  [ ] YES [ ] NO          LABS:                        8.6    14.61 )-----------( 140      ( 18 Feb 2021 02:50 )             25.4     COUMADIN:   [ ] YES [ ] NO    PT/INR - ( 18 Feb 2021 02:50 )   PT: 16.10 sec;   INR: 1.40 ratio         PTT - ( 18 Feb 2021 02:50 )  PTT:28.6 sec  02-18    135  |  98  |  44<H>  ----------------------------<  132<H>  4.1   |  24  |  1.1    Ca    8.1<L>      18 Feb 2021 02:50  Phos  3.5     02-17  Mg     2.4     02-18    TPro  4.8<L>  /  Alb  3.2<L>  /  TBili  1.6<H>  /  DBili  x   /  AST  63<H>  /  ALT  124<H>  /  AlkPhos  51  02-18        MEDICATIONS  (STANDING):  albumin human  5% IVPB 500 milliLiter(s) IV Intermittent once  aMIOdarone Infusion 0.5 mG/Min (16.7 mL/Hr) IV Continuous <Continuous>  amLODIPine   Tablet 5 milliGRAM(s) Oral daily  aspirin 325 milliGRAM(s) Oral daily  cefepime   IVPB 1000 milliGRAM(s) IV Intermittent every 8 hours  chlorhexidine 4% Liquid 1 Application(s) Topical <User Schedule>  dexMEDEtomidine Infusion 0.7 MICROgram(s)/kG/Hr (17 mL/Hr) IV Continuous <Continuous>  dextrose 40% Gel 15 Gram(s) Oral once  dextrose 5%. 1000 milliLiter(s) (50 mL/Hr) IV Continuous <Continuous>  dextrose 5%. 1000 milliLiter(s) (100 mL/Hr) IV Continuous <Continuous>  dextrose 50% Injectable 25 Gram(s) IV Push once  dextrose 50% Injectable 12.5 Gram(s) IV Push once  dextrose 50% Injectable 25 Gram(s) IV Push once  escitalopram 20 milliGRAM(s) Oral daily  glucagon  Injectable 1 milliGRAM(s) IntraMuscular once  guaiFENesin  milliGRAM(s) Oral every 12 hours  insulin regular Infusion 2 Unit(s)/Hr (2 mL/Hr) IV Continuous <Continuous>  ipratropium    for Nebulization 500 MICROGram(s) Nebulizer every 6 hours  iron sucrose IVPB 200 milliGRAM(s) IV Intermittent every 24 hours  magnesium sulfate  IVPB 1 Gram(s) IV Intermittent every 12 hours  meperidine     Injectable 25 milliGRAM(s) IV Push once  nitroglycerin  Infusion 5 MICROgram(s)/Min (1.5 mL/Hr) IV Continuous <Continuous>  pantoprazole  Injectable 40 milliGRAM(s) IV Push daily  polyethylene glycol 3350 17 Gram(s) Oral daily  senna 2 Tablet(s) Oral at bedtime  simethicone 80 milliGRAM(s) Chew three times a day  sodium chloride 0.9%. 1000 milliLiter(s) (20 mL/Hr) IV Continuous <Continuous>  vancomycin  IVPB 1000 milliGRAM(s) IV Intermittent every 12 hours    MEDICATIONS  (PRN):  hydrALAZINE Injectable 10 milliGRAM(s) IV Push every 4 hours PRN SBP > 155  LORazepam     Tablet 1 milliGRAM(s) Oral every 12 hours PRN Anxiety  oxyCODONE    IR 10 milliGRAM(s) Oral every 4 hours PRN Severe Pain (7 - 10)  oxyCODONE    IR 5 milliGRAM(s) Oral every 6 hours PRN Moderate Pain (4 - 6)      Allergies    No Known Allergies    Intolerances        Ambulation/Activity Status:        RADIOLOGY & ADDITIONAL TESTS:        Assessment/Plan:  start lantus for post op hyperglycemia  s/p cabg/mv repair- cont asa, will start statin now being lft's are dec, no b blocker being the patient is a paced  gi ppx and no heparin for dvt ppx being the patient did develop post op hematoma  wean bipap as tolerated   diuresis with bumex  supplement potassium for hypekalemia  htn- wean ntg gtt and cont norvasc as well as hydralazine prn  post op a. fib- pt now in sr; will cont to a pace her and cont amio gtt  ambulate and encourage is  Social Service Disposition:

## 2021-02-18 NOTE — PROGRESS NOTE ADULT - SUBJECTIVE AND OBJECTIVE BOX
OPERATIVE PROCEDURE(s):     C1L MV Repair           POD #            6           70yFemale  SURGEON(s): LEANNE Victoria  SUBJECTIVE ASSESSMENT:  Patient has no complaints at this time.    Vital Signs Last 24 Hrs  T(F): 97.7 (2021 04:00), Max: 98.8 (2021 08:00)  HR: 83 (2021 05:45) (66 - 172)  BP: 106/52 (2021 22:22) (106/52 - 106/52)  BP(mean): 75 (2021 22:22) (75 - 75)  ABP: 106/54 (2021 05:45) (91/45 - 178/120)  ABP(mean): 70 (2021 05:45)  RR: 24 (2021 05:45) (18 - 39)  SpO2: 100% (2021 05:45) (96% - 100%)  CVP(mm Hg): 19 (2021 14:00)  CVP(cm H2O): --  CO: 5.7 (2021 09:00)  CI: 3 (2021 09:00)  PA: --  SVR: --    I&O's Detail    2021 07:01  -  2021 07:00  --------------------------------------------------------  IN:    Amiodarone: 183.2 mL    Amiodarone: 250.1 mL    Bumetanide: 20.8 mL    Dexmedetomidine: 79.5 mL    Insulin: 33 mL    IV PiggyBack: 1550 mL    Nitroglycerin: 10 mL    Norepinephrine: 72.7 mL    PRBCs (Packed Red Blood Cells): 666 mL    sodium chloride 0.9%: 480 mL    Vasopressin: 19.2 mL  Total IN: 3364.5 mL    OUT:    Chest Tube (mL): 35 mL    Chest Tube (mL): 50 mL    Indwelling Catheter - Urethral (mL): 5125 mL  Total OUT: 5210 mL        Net:   I&O's Detail    15 Feb 2021 07:01  -  2021 07:00  --------------------------------------------------------  Total NET: -3007 mL      2021 07:01  -  2021 07:00  --------------------------------------------------------  Total NET: -1845.5 mL        CAPILLARY BLOOD GLUCOSE  109 (2021 23:00)      POCT Blood Glucose.: 120 mg/dL (2021 04:50)  POCT Blood Glucose.: 141 mg/dL (2021 03:40)  POCT Blood Glucose.: 156 mg/dL (2021 02:38)  POCT Blood Glucose.: 145 mg/dL (2021 01:13)  POCT Blood Glucose.: 105 mg/dL (2021 22:03)  POCT Blood Glucose.: 200 mg/dL (2021 19:54)  POCT Blood Glucose.: 135 mg/dL (2021 14:26)  POCT Blood Glucose.: 202 mg/dL (2021 11:55)  POCT Blood Glucose.: 148 mg/dL (2021 08:36)  POCT Blood Glucose.: 129 mg/dL (2021 06:36)    Physical Exam:  General: NAD; A&Ox3/Patient is intubated and sedated  Cardiac: S1/S2, RRR, no murmur, no rubs  Lungs: unlabored respirations, CTA b/l, no wheeze, no rales, no crackles  Abdomen: Soft/NT/ND; positive bowel sounds x 4  Sternum: Intact, no click, incision healing well with no drainage  Incisions: Incisions clean/dry/intact  Extremities: No edema b/l lower extremities; good capillary refill; no cyanosis; palpable 1+ pedal pulses b/l    Central Venous Catheter: Yes[]  No[] , If Yes indication:           Day #  Spencer Catheter: Yes  [] , No  [] , If yes indication:                      Day #  NGT: Yes [] No [] ,    If Yes Placement:                                     Day #  EPICARDIAL WIRES:  [] YES [] NO                                              Day #  BOWEL MOVEMENT:  [] YES [] NO, If No, Timing since last BM:      Day #  CHEST TUBE(Left/Right):  [] YES [] NO, If yes -  AIR LEAKS:  [] YES [] NO        LABS:                        8.6<L>  14.61<H> )-----------( 140      ( 2021 02:50 )             25.4<L>                        9.1<L>  17.23<H> )-----------( 134      ( 2021 22:40 )             25.7<L>        135  |  98  |  44<H>  ----------------------------<  132<H>  4.1   |  24  |  1.1      138  |  102  |  44<H>  ----------------------------<  106<H>  4.0   |  26  |  1.1    Ca    8.1<L>      2021 02:50  Phos  3.5       Mg     2.4         TPro  4.8<L> [6.0 - 8.0]  /  Alb  3.2<L> [3.5 - 5.2]  /  TBili  1.6<H> [0.2 - 1.2]  /  DBili  x   /  AST  63<H> [0 - 41]  /  ALT  124<H> [0 - 41]  /  AlkPhos  51 [30 - 115]  18    PT/INR - ( 2021 02:50 )   PT: ;   INR: 1.40 ratio         PT/INR - ( 2021 17:13 )   PT: ;   INR: 1.29 ratio         PTT - ( 2021 02:50 )  PTT:28.6 sec, PTT - ( 2021 17:13 )  PTT:21.4 sec    ABG - ( 2021 03:18 )  pH: 7.49  /  pCO2: 36    /  pO2: 121   / HCO3: 28    / Base Excess: 4.0   /  SaO2: 99    /  LA: 1.2              RADIOLOGY & ADDITIONAL TESTS:  CXR:   EK Lead ECG:   Ventricular Rate 65 BPM    Atrial Rate 65 BPM    P-R Interval 104 ms    QRS Duration 98 ms    Q-T Interval 476 ms    QTC Calculation(Bazett) 495 ms    R Axis 0 degrees    T Axis 34 degrees    Diagnosis Line Sinus rhythm with short DC  Possible Inferior infarct , age undetermined  Abnormal ECG    Confirmed by Trenton Diaz ([id number]) on 2021 1:41:18 PM (21 @ 07:22)    MEDICATIONS  (STANDING):  albumin human  5% IVPB 500 milliLiter(s) IV Intermittent once  aMIOdarone Infusion 0.5 mG/Min (16.7 mL/Hr) IV Continuous <Continuous>  amLODIPine   Tablet 5 milliGRAM(s) Oral daily  aspirin 325 milliGRAM(s) Oral daily  buMETAnide Injectable 1 milliGRAM(s) IV Push once  cefepime   IVPB 1000 milliGRAM(s) IV Intermittent every 8 hours  chlorhexidine 4% Liquid 1 Application(s) Topical <User Schedule>  dexMEDEtomidine Infusion 0.7 MICROgram(s)/kG/Hr (17 mL/Hr) IV Continuous <Continuous>  dextrose 40% Gel 15 Gram(s) Oral once  dextrose 5%. 1000 milliLiter(s) (50 mL/Hr) IV Continuous <Continuous>  dextrose 5%. 1000 milliLiter(s) (100 mL/Hr) IV Continuous <Continuous>  dextrose 50% Injectable 25 Gram(s) IV Push once  dextrose 50% Injectable 12.5 Gram(s) IV Push once  dextrose 50% Injectable 25 Gram(s) IV Push once  escitalopram 20 milliGRAM(s) Oral daily  glucagon  Injectable 1 milliGRAM(s) IntraMuscular once  guaiFENesin  milliGRAM(s) Oral every 12 hours  insulin regular Infusion 2 Unit(s)/Hr (2 mL/Hr) IV Continuous <Continuous>  ipratropium    for Nebulization 500 MICROGram(s) Nebulizer every 6 hours  iron sucrose IVPB 200 milliGRAM(s) IV Intermittent every 24 hours  magnesium sulfate  IVPB 1 Gram(s) IV Intermittent every 12 hours  meperidine     Injectable 25 milliGRAM(s) IV Push once  nitroglycerin  Infusion 5 MICROgram(s)/Min (1.5 mL/Hr) IV Continuous <Continuous>  norepinephrine Infusion 0.05 MICROgram(s)/kG/Min (9.09 mL/Hr) IV Continuous <Continuous>  pantoprazole  Injectable 40 milliGRAM(s) IV Push daily  polyethylene glycol 3350 17 Gram(s) Oral daily  senna 2 Tablet(s) Oral at bedtime  simethicone 80 milliGRAM(s) Chew three times a day  sodium chloride 0.9%. 1000 milliLiter(s) (20 mL/Hr) IV Continuous <Continuous>  vancomycin  IVPB 1000 milliGRAM(s) IV Intermittent every 12 hours  vasopressin Infusion 0.04 Unit(s)/Min (2.4 mL/Hr) IV Continuous <Continuous>    MEDICATIONS  (PRN):  fentaNYL    Injectable 25 MICROGram(s) IV Push every 2 hours PRN Severe Pain (7 - 10)  hydrALAZINE Injectable 10 milliGRAM(s) IV Push every 4 hours PRN SBP > 155  LORazepam     Tablet 1 milliGRAM(s) Oral every 12 hours PRN Anxiety  oxyCODONE    IR 10 milliGRAM(s) Oral every 4 hours PRN Severe Pain (7 - 10)  oxyCODONE    IR 5 milliGRAM(s) Oral every 6 hours PRN Moderate Pain (4 - 6)    HEPARIN:  [] YES [] NO  Dose: XX UNITS/HR UNITS Q8H  LOVENOX:[] YES [] NO  Dose: XX mg Q24H  COUMADIN: []  YES [] NO  Dose: XX mg  Q24H  SCD's: YES b/l  GI Prophylaxis: Protonix [], Pepcid [], None [], (Contra-indication:.....)    Post-Op Aspirin: Yes [],  No [], If No, then contra-indication:  Post-Op Statin: Yes [], No[], If No, then contra-indication:  Post-Op Beta-Blockers: Yes [], No [], If No, then contra-indication:    Allergies:  No Known Allergies      Ambulation/Activity Status: Ambulates several times daily without assistance.    Assessment/Plan:  70y Female status-post .....  - Case and plan discussed with CTU Intensivist and CT Surgeon - Dr. Rojas/Edgar  - Continue CTU supportive care    - Continue DVT/GI prophylaxis  - Incentive Spirometry 10 times an hour  - Continue to advance physical activity as tolerated and continue PT/OT as directed  1. CAD: Continue ASA, statin, BB  2. HTN:   3. A. Fib:   4. COPD/Hypoxia:   5. DM/Glucose Control:     Social Service Disposition:     OPERATIVE PROCEDURE(s):     C1L MV Repair           POD #            6           70yFemale  SURGEON(s): LEANNE Victoria  SUBJECTIVE ASSESSMENT:  Patient has no complaints at this time.    Vital Signs Last 24 Hrs  T(F): 97.7 (2021 04:00), Max: 98.8 (2021 08:00)  HR: 83 (2021 05:45) (66 - 172)  BP: 106/52 (2021 22:22) (106/52 - 106/52)  BP(mean): 75 (2021 22:22) (75 - 75)  ABP: 106/54 (2021 05:45) (91/45 - 178/120)  ABP(mean): 70 (2021 05:45)  RR: 24 (2021 05:45) (18 - 39)  SpO2: 100% (2021 05:45) (96% - 100%)  CVP(mm Hg): 19 (2021 14:00)  CVP(cm H2O): --  CO: 5.7 (2021 09:00)  CI: 3 (2021 09:00)  PA: --  SVR: --    I&O's Detail    2021 07:01  -  2021 07:00  --------------------------------------------------------  IN:    Amiodarone: 183.2 mL    Amiodarone: 250.1 mL    Bumetanide: 20.8 mL    Dexmedetomidine: 79.5 mL    Insulin: 33 mL    IV PiggyBack: 1550 mL    Nitroglycerin: 10 mL    Norepinephrine: 72.7 mL    PRBCs (Packed Red Blood Cells): 666 mL    sodium chloride 0.9%: 480 mL    Vasopressin: 19.2 mL  Total IN: 3364.5 mL    OUT:    Chest Tube (mL): 35 mL    Chest Tube (mL): 50 mL    Indwelling Catheter - Urethral (mL): 5125 mL  Total OUT: 5210 mL        Net:   I&O's Detail    15 Feb 2021 07:01  -  2021 07:00  --------------------------------------------------------  Total NET: -3007 mL      2021 07:01  -  2021 07:00  --------------------------------------------------------  Total NET: -1845.5 mL        CAPILLARY BLOOD GLUCOSE  109 (2021 23:00)      POCT Blood Glucose.: 120 mg/dL (2021 04:50)  POCT Blood Glucose.: 141 mg/dL (2021 03:40)  POCT Blood Glucose.: 156 mg/dL (2021 02:38)  POCT Blood Glucose.: 145 mg/dL (2021 01:13)  POCT Blood Glucose.: 105 mg/dL (2021 22:03)  POCT Blood Glucose.: 200 mg/dL (2021 19:54)  POCT Blood Glucose.: 135 mg/dL (2021 14:26)  POCT Blood Glucose.: 202 mg/dL (2021 11:55)  POCT Blood Glucose.: 148 mg/dL (2021 08:36)  POCT Blood Glucose.: 129 mg/dL (2021 06:36)    Physical Exam:  General: NAD; A&Ox3/Patient is intubated and sedated  Cardiac: S1/S2, RRR, no murmur, no rubs  Lungs: unlabored respirations, CTA b/l, no wheeze, no rales, no crackles  Abdomen: Soft/NT/ND; positive bowel sounds x 4  Sternum: Intact, no click, incision healing well with no drainage  Incisions: Incisions clean/dry/intact  Extremities: No edema b/l lower extremities; good capillary refill; no cyanosis; palpable 1+ pedal pulses b/l    Central Venous Catheter: Yes[x]  No[] , If Yes indication:           Day #  Spencer Catheter: Yes  [x] , No  [] , If yes indication:                      Day #  NGT: Yes [] No [x] ,    If Yes Placement:                                     Day #  EPICARDIAL WIRES:  [x] YES [] NO                                              Day #  BOWEL MOVEMENT:  [] YES [x] NO, If No, Timing since last BM:      Day #  CHEST TUBE(Left/Right):  x[] YES [] NO, If yes -  AIR LEAKS:  [] YES [x] NO        LABS:                        8.6<L>  14.61<H> )-----------( 140      ( 2021 02:50 )             25.4<L>                        9.1<L>  17.23<H> )-----------( 134      ( 2021 22:40 )             25.7<L>        135  |  98  |  44<H>  ----------------------------<  132<H>  4.1   |  24  |  1.1      138  |  102  |  44<H>  ----------------------------<  106<H>  4.0   |  26  |  1.1    Ca    8.1<L>      2021 02:50  Phos  3.5       Mg     2.4         TPro  4.8<L> [6.0 - 8.0]  /  Alb  3.2<L> [3.5 - 5.2]  /  TBili  1.6<H> [0.2 - 1.2]  /  DBili  x   /  AST  63<H> [0 - 41]  /  ALT  124<H> [0 - 41]  /  AlkPhos  51 [30 - 115]  18    PT/INR - ( 2021 02:50 )   PT: ;   INR: 1.40 ratio         PT/INR - ( 2021 17:13 )   PT: ;   INR: 1.29 ratio         PTT - ( 2021 02:50 )  PTT:28.6 sec, PTT - ( 2021 17:13 )  PTT:21.4 sec    ABG - ( 2021 03:18 )  pH: 7.49  /  pCO2: 36    /  pO2: 121   / HCO3: 28    / Base Excess: 4.0   /  SaO2: 99    /  LA: 1.2              RADIOLOGY & ADDITIONAL TESTS:  CXR: EXAM:  XR CHEST PORTABLE ROUTINE 1V          PROCEDURE DATE:  2021      INTERPRETATION:  Clinical History / Reason for exam: Shortness of Breath    Comparison : Chest radiograph:  2021    Technique/Positioning: Frontal view of the chest    Findings:    Support devices: Unchanged right-sided chest tubes, right IJ introducer sheath.    Cardiac/mediastinum/hilum: Partially obscured and not well evaluated although appears grossly without significant change    Lung parenchyma/Pleura: Stable bilateral opacities and effusions. Stable nonspecific relative lucency at the right lung base.    Skeleton/soft tissues: No significant change.    Impression:  Stable bilateral opacities and effusions. Stable nonspecific relative lucency atthe right lung base; pneumothorax is not excluded. Follow-up is recommended.    EK Lead ECG:   Ventricular Rate 65 BPM    Atrial Rate 65 BPM    P-R Interval 104 ms    QRS Duration 98 ms    Q-T Interval 476 ms    QTC Calculation(Bazett) 495 ms    R Axis 0 degrees    T Axis 34 degrees    Diagnosis Line Sinus rhythm with short TX  Possible Inferior infarct , age undetermined  Abnormal ECG    Confirmed by Trenton Diaz ([id number]) on 2021 1:41:18 PM (21 @ 07:22)    MEDICATIONS  (STANDING):  albumin human  5% IVPB 500 milliLiter(s) IV Intermittent once  aMIOdarone Infusion 0.5 mG/Min (16.7 mL/Hr) IV Continuous <Continuous>  amLODIPine   Tablet 5 milliGRAM(s) Oral daily  aspirin 325 milliGRAM(s) Oral daily  buMETAnide Injectable 1 milliGRAM(s) IV Push once  chlorhexidine 4% Liquid 1 Application(s) Topical <User Schedule>  dextrose 40% Gel 15 Gram(s) Oral once  dextrose 5%. 1000 milliLiter(s) (50 mL/Hr) IV Continuous <Continuous>  dextrose 5%. 1000 milliLiter(s) (100 mL/Hr) IV Continuous <Continuous>  dextrose 50% Injectable 25 Gram(s) IV Push once  dextrose 50% Injectable 12.5 Gram(s) IV Push once  dextrose 50% Injectable 25 Gram(s) IV Push once  escitalopram 20 milliGRAM(s) Oral daily  glucagon  Injectable 1 milliGRAM(s) IntraMuscular once  guaiFENesin  milliGRAM(s) Oral every 12 hours  insulin regular Infusion 2 Unit(s)/Hr (2 mL/Hr) IV Continuous <Continuous>  ipratropium    for Nebulization 500 MICROGram(s) Nebulizer every 6 hours  iron sucrose IVPB 200 milliGRAM(s) IV Intermittent every 24 hours  magnesium sulfate  IVPB 1 Gram(s) IV Intermittent every 12 hours  meperidine     Injectable 25 milliGRAM(s) IV Push once  nitroglycerin  Infusion 5 MICROgram(s)/Min (1.5 mL/Hr) IV Continuous <Continuous>  norepinephrine Infusion 0.05 MICROgram(s)/kG/Min (9.09 mL/Hr) IV Continuous <Continuous>  pantoprazole  Injectable 40 milliGRAM(s) IV Push daily  polyethylene glycol 3350 17 Gram(s) Oral daily  senna 2 Tablet(s) Oral at bedtime  simethicone 80 milliGRAM(s) Chew three times a day  sodium chloride 0.9%. 1000 milliLiter(s) (20 mL/Hr) IV Continuous <Continuous>  vasopressin Infusion 0.04 Unit(s)/Min (2.4 mL/Hr) IV Continuous <Continuous>    MEDICATIONS  (PRN):  fentaNYL    Injectable 25 MICROGram(s) IV Push every 2 hours PRN Severe Pain (7 - 10)  hydrALAZINE Injectable 10 milliGRAM(s) IV Push every 4 hours PRN SBP > 155  LORazepam     Tablet 1 milliGRAM(s) Oral every 12 hours PRN Anxiety  oxyCODONE    IR 10 milliGRAM(s) Oral every 4 hours PRN Severe Pain (7 - 10)  oxyCODONE    IR 5 milliGRAM(s) Oral every 6 hours PRN Moderate Pain (4 - 6)    HEPARIN:  [] YES [x NO  Dose: XX UNITS/HR UNITS Q8H  LOVENOX:[] YES [x NO  Dose: XX mg Q24H  COUMADIN: []  YES [x NO  Dose: XX mg  Q24H  SCD's: YES b/l  GI Prophylaxis: Protonix [x, Pepcid [], None [], (Contra-indication:.....)    Post-Op Aspirin: Yes [],  No [], If No, then contra-indication:  Post-Op Statin: Yes [], No[], If No, then contra-indication:  Post-Op Beta-Blockers: Yes [], No [], If No, then contra-indication:    Allergies:  No Known Allergies      Ambulation/Activity Status: Ambulates several times daily without assistance.    Assessment/Plan: Patient is a 71 yo female s/p cabg x 1 / mv repair  - Case and plan discussed with CTU Intensivist and CT Surgeon - Dr. Rojas/Edgar  - Continue CTU supportive care    - Continue DVT/GI prophylaxis  - Incentive Spirometry 10 times an hour  - Continue to advance physical activity as tolerated and continue PT/OT as directed  start lantus for post op hyperglycemia  s/p cabg/mv repair- cont asa, will start statin now being lft's are dec, no b blocker being the patient is a paced  gi ppx and no heparin for dvt ppx being the patient did develop post op hematoma  wean bipap as tolerated   diuresis with bumex  supplement potassium for hypekalemia  htn- wean ntg gtt and cont norvasc as well as hydralazine prn  post op a. fib- pt now in sr; will cont to a pace her and cont amio gtt  ambulate and encourage is  constipation- cont stool softeners  nausea- cont reglan prn and standing x 24 hours  Social Service Disposition:  home in a few days

## 2021-02-19 LAB
ALBUMIN SERPL ELPH-MCNC: 3.6 G/DL — SIGNIFICANT CHANGE UP (ref 3.5–5.2)
ALP SERPL-CCNC: 63 U/L — SIGNIFICANT CHANGE UP (ref 30–115)
ALT FLD-CCNC: 98 U/L — HIGH (ref 0–41)
ANION GAP SERPL CALC-SCNC: 9 MMOL/L — SIGNIFICANT CHANGE UP (ref 7–14)
APPEARANCE UR: ABNORMAL
APTT BLD: 27.5 SEC — SIGNIFICANT CHANGE UP (ref 27–39.2)
AST SERPL-CCNC: 48 U/L — HIGH (ref 0–41)
BACTERIA # UR AUTO: NEGATIVE — SIGNIFICANT CHANGE UP
BASOPHILS # BLD AUTO: 0.03 K/UL — SIGNIFICANT CHANGE UP (ref 0–0.2)
BASOPHILS NFR BLD AUTO: 0.1 % — SIGNIFICANT CHANGE UP (ref 0–1)
BILIRUB SERPL-MCNC: 1.6 MG/DL — HIGH (ref 0.2–1.2)
BILIRUB UR-MCNC: NEGATIVE — SIGNIFICANT CHANGE UP
BUN SERPL-MCNC: 42 MG/DL — HIGH (ref 10–20)
CALCIUM SERPL-MCNC: 8.8 MG/DL — SIGNIFICANT CHANGE UP (ref 8.5–10.1)
CHLORIDE SERPL-SCNC: 99 MMOL/L — SIGNIFICANT CHANGE UP (ref 98–110)
CO2 SERPL-SCNC: 25 MMOL/L — SIGNIFICANT CHANGE UP (ref 17–32)
COLOR SPEC: YELLOW — SIGNIFICANT CHANGE UP
CREAT SERPL-MCNC: 1.2 MG/DL — SIGNIFICANT CHANGE UP (ref 0.7–1.5)
DIFF PNL FLD: ABNORMAL
EOSINOPHIL # BLD AUTO: 0.16 K/UL — SIGNIFICANT CHANGE UP (ref 0–0.7)
EOSINOPHIL NFR BLD AUTO: 0.8 % — SIGNIFICANT CHANGE UP (ref 0–8)
EPI CELLS # UR: 8 /HPF — HIGH (ref 0–5)
GAS PNL BLDA: SIGNIFICANT CHANGE UP
GLUCOSE BLDC GLUCOMTR-MCNC: 108 MG/DL — HIGH (ref 70–99)
GLUCOSE BLDC GLUCOMTR-MCNC: 125 MG/DL — HIGH (ref 70–99)
GLUCOSE BLDC GLUCOMTR-MCNC: 139 MG/DL — HIGH (ref 70–99)
GLUCOSE SERPL-MCNC: 140 MG/DL — HIGH (ref 70–99)
GLUCOSE UR QL: NEGATIVE — SIGNIFICANT CHANGE UP
HCT VFR BLD CALC: 25.7 % — LOW (ref 37–47)
HGB BLD-MCNC: 8.6 G/DL — LOW (ref 12–16)
HYALINE CASTS # UR AUTO: 7 /LPF — SIGNIFICANT CHANGE UP (ref 0–7)
IMM GRANULOCYTES NFR BLD AUTO: 1.2 % — HIGH (ref 0.1–0.3)
INR BLD: 1.5 RATIO — HIGH (ref 0.65–1.3)
KETONES UR-MCNC: NEGATIVE — SIGNIFICANT CHANGE UP
LEUKOCYTE ESTERASE UR-ACNC: NEGATIVE — SIGNIFICANT CHANGE UP
LYMPHOCYTES # BLD AUTO: 1.5 K/UL — SIGNIFICANT CHANGE UP (ref 1.2–3.4)
LYMPHOCYTES # BLD AUTO: 7.2 % — LOW (ref 20.5–51.1)
MAGNESIUM SERPL-MCNC: 2.6 MG/DL — HIGH (ref 1.8–2.4)
MCHC RBC-ENTMCNC: 28.9 PG — SIGNIFICANT CHANGE UP (ref 27–31)
MCHC RBC-ENTMCNC: 33.5 G/DL — SIGNIFICANT CHANGE UP (ref 32–37)
MCV RBC AUTO: 86.2 FL — SIGNIFICANT CHANGE UP (ref 81–99)
MONOCYTES # BLD AUTO: 2.35 K/UL — HIGH (ref 0.1–0.6)
MONOCYTES NFR BLD AUTO: 11.3 % — HIGH (ref 1.7–9.3)
NEUTROPHILS # BLD AUTO: 16.51 K/UL — HIGH (ref 1.4–6.5)
NEUTROPHILS NFR BLD AUTO: 79.4 % — HIGH (ref 42.2–75.2)
NITRITE UR-MCNC: NEGATIVE — SIGNIFICANT CHANGE UP
NRBC # BLD: 0 /100 WBCS — SIGNIFICANT CHANGE UP (ref 0–0)
PH UR: 6 — SIGNIFICANT CHANGE UP (ref 5–8)
PLATELET # BLD AUTO: 200 K/UL — SIGNIFICANT CHANGE UP (ref 130–400)
POTASSIUM SERPL-MCNC: 4.4 MMOL/L — SIGNIFICANT CHANGE UP (ref 3.5–5)
POTASSIUM SERPL-SCNC: 4.4 MMOL/L — SIGNIFICANT CHANGE UP (ref 3.5–5)
PROT SERPL-MCNC: 5.3 G/DL — LOW (ref 6–8)
PROT UR-MCNC: ABNORMAL
PROTHROM AB SERPL-ACNC: 17.3 SEC — HIGH (ref 9.95–12.87)
RBC # BLD: 2.98 M/UL — LOW (ref 4.2–5.4)
RBC # FLD: 16.5 % — HIGH (ref 11.5–14.5)
RBC CASTS # UR COMP ASSIST: 17 /HPF — HIGH (ref 0–4)
SODIUM SERPL-SCNC: 133 MMOL/L — LOW (ref 135–146)
SP GR SPEC: 1.02 — SIGNIFICANT CHANGE UP (ref 1.01–1.03)
UROBILINOGEN FLD QL: SIGNIFICANT CHANGE UP
WBC # BLD: 20.8 K/UL — HIGH (ref 4.8–10.8)
WBC # FLD AUTO: 20.8 K/UL — HIGH (ref 4.8–10.8)
WBC UR QL: 6 /HPF — HIGH (ref 0–5)

## 2021-02-19 PROCEDURE — 71045 X-RAY EXAM CHEST 1 VIEW: CPT | Mod: 26,76

## 2021-02-19 PROCEDURE — 93010 ELECTROCARDIOGRAM REPORT: CPT

## 2021-02-19 PROCEDURE — 99233 SBSQ HOSP IP/OBS HIGH 50: CPT

## 2021-02-19 RX ORDER — AMIODARONE HYDROCHLORIDE 400 MG/1
400 TABLET ORAL EVERY 8 HOURS
Refills: 0 | Status: COMPLETED | OUTPATIENT
Start: 2021-02-19 | End: 2021-02-22

## 2021-02-19 RX ORDER — AMIODARONE HYDROCHLORIDE 400 MG/1
200 TABLET ORAL DAILY
Refills: 0 | Status: DISCONTINUED | OUTPATIENT
Start: 2021-02-23 | End: 2021-03-01

## 2021-02-19 RX ORDER — IRON SUCROSE 20 MG/ML
200 INJECTION, SOLUTION INTRAVENOUS EVERY 24 HOURS
Refills: 0 | Status: COMPLETED | OUTPATIENT
Start: 2021-02-19 | End: 2021-02-20

## 2021-02-19 RX ORDER — AMIODARONE HYDROCHLORIDE 400 MG/1
TABLET ORAL
Refills: 0 | Status: DISCONTINUED | OUTPATIENT
Start: 2021-02-22 | End: 2021-03-01

## 2021-02-19 RX ADMIN — Medication 325 MILLIGRAM(S): at 12:29

## 2021-02-19 RX ADMIN — SIMETHICONE 80 MILLIGRAM(S): 80 TABLET, CHEWABLE ORAL at 13:17

## 2021-02-19 RX ADMIN — AMIODARONE HYDROCHLORIDE 400 MILLIGRAM(S): 400 TABLET ORAL at 09:21

## 2021-02-19 RX ADMIN — INSULIN GLARGINE 20 UNIT(S): 100 INJECTION, SOLUTION SUBCUTANEOUS at 07:39

## 2021-02-19 RX ADMIN — ATORVASTATIN CALCIUM 40 MILLIGRAM(S): 80 TABLET, FILM COATED ORAL at 22:42

## 2021-02-19 RX ADMIN — Medication 100 GRAM(S): at 06:24

## 2021-02-19 RX ADMIN — Medication 1 MILLIGRAM(S): at 10:40

## 2021-02-19 RX ADMIN — HEPARIN SODIUM 5000 UNIT(S): 5000 INJECTION INTRAVENOUS; SUBCUTANEOUS at 22:43

## 2021-02-19 RX ADMIN — Medication 10 MILLIGRAM(S): at 10:52

## 2021-02-19 RX ADMIN — Medication 600 MILLIGRAM(S): at 06:24

## 2021-02-19 RX ADMIN — Medication 25 MILLIGRAM(S): at 06:24

## 2021-02-19 RX ADMIN — HEPARIN SODIUM 5000 UNIT(S): 5000 INJECTION INTRAVENOUS; SUBCUTANEOUS at 13:18

## 2021-02-19 RX ADMIN — POLYETHYLENE GLYCOL 3350 17 GRAM(S): 17 POWDER, FOR SOLUTION ORAL at 12:29

## 2021-02-19 RX ADMIN — Medication 100 GRAM(S): at 17:23

## 2021-02-19 RX ADMIN — Medication 10 MILLIGRAM(S): at 06:24

## 2021-02-19 RX ADMIN — SIMETHICONE 80 MILLIGRAM(S): 80 TABLET, CHEWABLE ORAL at 06:25

## 2021-02-19 RX ADMIN — AMLODIPINE BESYLATE 5 MILLIGRAM(S): 2.5 TABLET ORAL at 06:24

## 2021-02-19 RX ADMIN — IRON SUCROSE 110 MILLIGRAM(S): 20 INJECTION, SOLUTION INTRAVENOUS at 12:29

## 2021-02-19 RX ADMIN — Medication 600 MILLIGRAM(S): at 17:23

## 2021-02-19 RX ADMIN — ESCITALOPRAM OXALATE 20 MILLIGRAM(S): 10 TABLET, FILM COATED ORAL at 12:29

## 2021-02-19 RX ADMIN — SIMETHICONE 80 MILLIGRAM(S): 80 TABLET, CHEWABLE ORAL at 22:42

## 2021-02-19 RX ADMIN — AMIODARONE HYDROCHLORIDE 400 MILLIGRAM(S): 400 TABLET ORAL at 22:42

## 2021-02-19 RX ADMIN — Medication 25 MILLIGRAM(S): at 17:23

## 2021-02-19 RX ADMIN — CHLORHEXIDINE GLUCONATE 1 APPLICATION(S): 213 SOLUTION TOPICAL at 06:24

## 2021-02-19 RX ADMIN — Medication 10 MILLIGRAM(S): at 01:08

## 2021-02-19 RX ADMIN — PANTOPRAZOLE SODIUM 40 MILLIGRAM(S): 20 TABLET, DELAYED RELEASE ORAL at 06:24

## 2021-02-19 RX ADMIN — HEPARIN SODIUM 5000 UNIT(S): 5000 INJECTION INTRAVENOUS; SUBCUTANEOUS at 06:25

## 2021-02-19 RX ADMIN — AMIODARONE HYDROCHLORIDE 400 MILLIGRAM(S): 400 TABLET ORAL at 14:14

## 2021-02-19 NOTE — CHART NOTE - NSCHARTNOTEFT_GEN_A_CORE
Registered Dietitian Follow-Up     Patient Profile Reviewed                           Yes [x]   No []     Nutrition History Previously Obtained        Yes []  No [x]       Pertinent Subjective Information: This note was charted remotely. Noted pt. continues with poor PO intake, does not feel well enough to eat. Did not attempt to obtain nutr hx at this time.  No need for renal restr at this time.     Pertinent Medical Interventions: S/p CABG. 1L MV Repair POD # 7. On statins. CTU supportive care. On/off BiPAP. Diuresis with Bumex.  Post op hyperglycemia: insulin regimen.   Diet order: consistent carb renal NS     Anthropometrics:  - Ht. 149.9cm  - Wt. 103.8kg on 2/19 vs. 97kg on 2/12 pt. with edema, will continue to monitor wt trends  - %wt change  - BMI 43.2  - IBW 44.2kg     Pertinent Lab Data: (2/19) WBC 20.80, RBC 3.98, Hg 8.6, Hct 25.7, POCT glu 139, BUN 42, glu 140, bilirubin 1.6, AST 48, ALT 98, eGFR 45, Mg 2.6  (2/12) Hgb A1C 6.1     Pertinent Meds: Heparin, Glargine, Lispro, Metoprolol, Senna, Oxycodone      Physical Findings:  - Appearance: alert, 3+ L, R leg edema noted  - GI function: abd noted soft/nontender per EMR, last BM 2/12- on bowel regimen  - Tubes: chest tube  - Oral/Mouth cavity: no symptoms noted  - Skin: incision     Nutrition Requirements (from RD note on 2/16)   Weight Used: 97kg dosing, ideal 44.2kg     Estimated Energy Needs    Continue [x]  Adjust [] [id number] (MSJ x 1-1.1 AF) obese BMI considere  Adjusted Energy Recommendations:   kcal/day        Estimated Protein Needs    Continue [x]  Adjust [] 53-62 g (1.2-1.4 g/kg IBW) same as above  Adjusted Protein Recommendations:   gm/day        Estimated Fluid Needs        Continue [x]  Adjust [] per CTU team  Adjusted Fluid Recommendations:   mL/day     Nutrient Intake: <25% PO at meals        [] Previous Nutrition Diagnosis:  Inadequate Oral Intake            [x] Ongoing          [] Resolved      Nutrition Intervention: meals and snacks, coordination of care, medical food supplement    Rec: Provide DASH/TLC, carb consistent w/snack diet order, add Glucerna BID, Prosource gelatein SF BID. Continue to assist and encourage pt. during meals.      Goal/Expected Outcome: In 4 days pt. to consistently consume at least 50-75% PO and supplement      Indicator/Monitoring: diet order, energy intake, body composition, NFPF, glucose/renal/electrolyte profiles

## 2021-02-19 NOTE — PROGRESS NOTE ADULT - SUBJECTIVE AND OBJECTIVE BOX
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 secondary to acute surgical blood loss - H/H decreased but stable s/p 2 U prbc  (8.6/25.7); Order IV iron and will continue to monitor cbc      Social Service Disposition:  home in a few days OPERATIVE PROCEDURE(s):        C1L MV Repair        POD #            7           70yFemale  SURGEON(s): FIONA Rojas  SUBJECTIVE ASSESSMENT:  Patient has no complaints at this time.      Vital Signs Last 24 Hrs  T(F): 98.6 (2021 04:00), Max: 98.6 (2021 04:00)  HR: 87 (2021 06:00) (72 - 102)  ABP: 145/57 (2021 06:00) (110/56 - 154/62)  ABP(mean): 78 (2021 06:00)  RR: 36 (2021 06:00) (22 - 41)  SpO2: 97% (2021 06:00) (96% - 100%) [facility]    I&O's Detail    2021 07:01  -  2021 07:00  --------------------------------------------------------  IN:    Amiodarone: 283.4 mL    Amiodarone: 199.8 mL    Amiodarone: 16.6 mL    Dexmedetomidine: 211.1 mL    Insulin: 43 mL    IV PiggyBack: 1800 mL    Nitroglycerin: 310 mL    Oral Fluid: 672 mL    PRBCs (Packed Red Blood Cells): 331 mL    sodium chloride 0.9%: 480 mL  Total IN: 4346.9 mL    OUT:    Chest Tube (mL): 20 mL    Chest Tube (mL): 80 mL    Indwelling Catheter - Urethral (mL): 2051 mL  Total OUT: 2151 mL      Net:   I&O's Detail    2021 07:01  -  2021 07:00  --------------------------------------------------------  Total NET: -1845.5 mL      2021 07:01  -  2021 07:00  --------------------------------------------------------  Total NET: 2195.9 mL      CAPILLARY BLOOD GLUCOSE  POCT Blood Glucose.: 137 mg/dL (2021 23:21)  POCT Blood Glucose.: 134 mg/dL (2021 16:58)  POCT Blood Glucose.: 124 mg/dL (2021 10:59)  POCT Blood Glucose.: 141 mg/dL (2021 06:44)      Physical Exam:  General: NAD; A&Ox3  Cardiac: S1/S2, RRR, no murmur, no rubs  Lungs: unlabored respirations, CTA b/l, no wheeze, no rales, no crackles  Abdomen: Soft/NT/ND; positive bowel sounds x 4  Sternum: Intact, no click, incision healing well with no drainage  Incisions: Incisions clean/dry/intact  Extremities: Moderate 1+ pitting edema b/l lower extremities; good capillary refill; no cyanosis; palpable 1+ pedal pulses b/l    Central Venous Catheter: Yes[]  No[x] , If Yes indication:           Day # 7  EPICARDIAL WIRES:  [X] YES [] NO                                              Day #  7  BOWEL MOVEMENT:  [X] YES [] NO, If No, Timing since last BM:      Day #  7  CHEST TUBE (Right):  [] YES [X] NO, If yes -  AIR LEAKS:  [] YES [X] NO        LABS:             8.6<L>  20.80<H> )-----------( 200      ( 2021 01:35 )             25.7<L>                        8.6<L>  14.61<H> )-----------( 140      ( 2021 02:50 )             25.4<L>    02-19    133<L>  |  99  |  42<H>  ----------------------------<  140<H>  4.4   |  25  |  1.2  02-18    135  |  98  |  44<H>  ----------------------------<  132<H>  4.1   |  24  |  1.1    Ca    8.8      2021 01:35  Phos  3.5       Mg     2.6         TPro  5.3<L> [6.0 - 8.0]  /  Alb  3.6 [3.5 - 5.2]  /  TBili  1.6<H> [0.2 - 1.2]  /  DBili  x   /  AST  48<H> [0 - 41]  /  ALT  98<H> [0 - 41]  /  AlkPhos  63 [30 - 115]  02-    PT/INR - ( 2021 01:35 )   PT: ;   INR: 1.50 ratio         PT/INR - ( 2021 02:50 )   PT: ;   INR: 1.40 ratio         PTT - ( 2021 01:35 )  PTT:27.5 sec, PTT - ( 2021 02:50 )  PTT:28.6 sec    ABG - ( 2021 02:48 )  pH: 7.45  /  pCO2: 42    /  pO2: 76    / HCO3: 29    / Base Excess: 4.3   /  SaO2: 96    /  LA: x          RADIOLOGY & ADDITIONAL TESTS:  CXR:   Xray Chest 1 View- PORTABLE-Routine:   EXAM:  XR CHEST PORTABLE ROUTINE 1V        PROCEDURE DATE:  2021      INTERPRETATION:  Clinical History / Reason for exam: Shortness of Breath    Comparison : Chest radiograph:  2021    Technique/Positioning: Frontal view of the chest    Findings:  - Support devices: Unchanged right-sided chest tubes, right IJ introducer sheath.    - Cardiac/mediastinum/hilum: Partially obscured and not well evaluated although appears grossly without significant change    - Lung parenchyma/Pleura: Stable bilateral opacities and effusions. Stable nonspecific relative lucency at the right lung base.    - Skeleton/soft tissues: No significant change.    Impression:  Stable bilateral opacities and effusions. Stable nonspecific relative lucency at the right lung base; pneumothorax is not excluded. Follow-up is recommended.    MAURA STANFORD MD; Attending Radiologist  This document has been electronically signed. 2021 12:07PM (21 @ 06:46)    EK Lead ECG:   Ventricular Rate 73 BPM    Atrial Rate 73 BPM    P-R Interval 130 ms    QRS Duration 104 ms    Q-T Interval 420 ms    QTC Calculation(Bazett) 462 ms    P Axis 23 degrees    R Axis -11 degrees    T Axis 33 degrees    Diagnosis Line Normal sinus rhythm  Moderate voltage criteria for LVH, may be normal variant  Inferior infarct , age undetermined  Abnormal ECG    Confirmed by Panchito Miranda ([id number]) on 2021 9:30:16 AM (21 @ 08:41)      MEDICATIONS  (STANDING):  albumin human  5% IVPB 500 milliLiter(s) IV Intermittent once  aMIOdarone Infusion 0.5 mG/Min (16.7 mL/Hr) IV Continuous <Continuous>  amLODIPine   Tablet 5 milliGRAM(s) Oral daily  aspirin 325 milliGRAM(s) Oral daily  atorvastatin 40 milliGRAM(s) Oral at bedtime  chlorhexidine 4% Liquid 1 Application(s) Topical <User Schedule>  dextrose 40% Gel 15 Gram(s) Oral once  dextrose 5%. 1000 milliLiter(s) (50 mL/Hr) IV Continuous <Continuous>  dextrose 5%. 1000 milliLiter(s) (100 mL/Hr) IV Continuous <Continuous>  dextrose 50% Injectable 25 Gram(s) IV Push once  dextrose 50% Injectable 12.5 Gram(s) IV Push once  dextrose 50% Injectable 25 Gram(s) IV Push once  escitalopram 20 milliGRAM(s) Oral daily  glucagon  Injectable 1 milliGRAM(s) IntraMuscular once  guaiFENesin  milliGRAM(s) Oral every 12 hours  heparin   Injectable 5000 Unit(s) SubCutaneous every 8 hours  insulin glargine Injectable (LANTUS) 20 Unit(s) SubCutaneous every morning  insulin lispro (ADMELOG) corrective regimen sliding scale   SubCutaneous three times a day before meals  ipratropium    for Nebulization 500 MICROGram(s) Nebulizer every 6 hours  iron sucrose IVPB 200 milliGRAM(s) IV Intermittent every 24 hours  magnesium sulfate  IVPB 1 Gram(s) IV Intermittent every 12 hours  meperidine     Injectable 25 milliGRAM(s) IV Push once  metoprolol tartrate 25 milliGRAM(s) Oral two times a day  norepinephrine Infusion 0.02 MICROgram(s)/kG/Min (3.64 mL/Hr) IV Continuous <Continuous>  pantoprazole    Tablet 40 milliGRAM(s) Oral before breakfast  polyethylene glycol 3350 17 Gram(s) Oral daily  senna 2 Tablet(s) Oral at bedtime  simethicone 80 milliGRAM(s) Chew three times a day  sodium chloride 0.9%. 1000 milliLiter(s) (20 mL/Hr) IV Continuous <Continuous>    MEDICATIONS  (PRN):  hydrALAZINE Injectable 10 milliGRAM(s) IV Push every 4 hours PRN SBP > 155  LORazepam     Tablet 1 milliGRAM(s) Oral every 12 hours PRN Anxiety  oxyCODONE    IR 5 milliGRAM(s) Oral every 6 hours PRN Moderate Pain (4 - 6)  oxyCODONE    IR 10 milliGRAM(s) Oral every 4 hours PRN Severe Pain (7 - 10)    HEPARIN:  [X] YES [] NO  Dose: 5,000 UNITS/HR UNITS Q8H  SCD's: YES b/l  GI Prophylaxis: Protonix [X], Pepcid [], None [], (Contra-indication:.....)    Post-Op Aspirin: Yes [X],  No [], If No, then contra-indication:  Post-Op Statin: Yes [X], No[], If No, then contra-indication:  Post-Op Beta-Blockers: Yes [X], No [], If No, then contra-indication:    Allergies:  No Known Allergies    Ambulation/Activity Status: Ambulates several times daily with walker assistance.    Assessment/Plan:  70y Female status-post    C1L MV Repair        POD #            7   - Case and plan discussed with CTU Intensivist and CT Surgeon - Dr. Rojas  - Continue CTU supportive care    - Continue DVT/GI prophylaxis; Start SQ heparin   - Incentive Spirometry 10 times an hour  - Continue to advance physical activity as tolerated and continue PT/OT as directed  - Remove chest tube   1. CAD s/p CABG: Cont ASA, Continue statin: LFTs downtrending, Start BB for pt is no longer hypotensive and getting weaned off pressors  2. Post-op afib: Discontinue amio ggt and start amio PO taper   3. Hypoxia secondary to respiratory failure: Pt weaned of BiPAP yesterday. Continue to wean off nasal cannula as tolerated   4. DM/Glucose Control: Continue insulin gtt   5 HTN: Continue hydralazine, norvasc  6. Leukocytosis- Broad spectrum abx completed yesterday. WBC increased today to 20.80 from 14.61. Pt currently afebrile. Order UA/Cx.  7. Anemia secondary to acute surgical blood loss - H/H decreased but stable s/p 2 U prbc  (8.6/25.7); Order IV iron and will continue to monitor cbc      Social Service Disposition:  home in a few days

## 2021-02-19 NOTE — PROGRESS NOTE ADULT - SUBJECTIVE AND OBJECTIVE BOX
CTU Attending Progress Daily Note     2021 10:57    Procedure:              CABG MVR                                    POD#                   Patient seen as post-op critical care follow-up    HPI:  69 yo F h/o HTN, DLD, has JACOME on TTE Mod AS by gradient, and mod to severe MR here for DERRICK and RHC/LHC   (2021 09:38)    See preop testing chart H&P    Interval event for past 24 hr:  ADRIAN SAMUELS  70y remains on O2    Current Complains:  ADRIAN SAMUELS has no new complaints    REVIEW OF SYSTEMS:  CONSTITUTIONAL:  [-] weakness, [-] fevers, [-] chills  EYES/ENT: [-] visual changes, [-] vertigo, [-] throat pain   NECK: [-] pain, [-] stiffness  RESPIRATORY: [-] cough, [-] wheezing, [-] hemoptysis, [-] shortness of breath  CARDIOVASCULAR: [-] chest pain, [-] palpitations, [-] orthopnea  GASTROINTESTINAL:    [-]abdominal pain, [-] nausea, [-] vomiting, [-] hematemesis, [-] diarrhea, [-] constipation, [-] melena, [-] hematochezia.  GENITOURINARY: [-] dysuria, [-] frequency, [-] hematuria  NEUROLOGICAL: [-] numbness, [-] weakness  SKIN: [-] itching, [-] burning, [-] rashes, [-] lesions   All other review of systems is negative unless indicated above.    [  ] Unable to assess ROS because :    OBJECTIVE:  ICU Vital Signs Last 24 Hrs  T(C): 37.2 (2021 08:00), Max: 37.2 (2021 08:00)  T(F): 98.9 (2021 08:00), Max: 98.9 (2021 08:00)  HR: 75 (2021 10:00) (71 - 102)  BP: 117/56 (2021 08:00) (117/56 - 117/56)  BP(mean): 81 (2021 08:00) (81 - 81)  ABP: 145/62 (2021 10:00) (128/60 - 154/62)  ABP(mean): 84 (2021 10:00) (77 - 91)  RR: 40 (2021 10:00) (22 - 41)  SpO2: 96% (2021 10:00) (96% - 100%)      I&O's Summary    2021 07:01  -  2021 07:00  --------------------------------------------------------  IN: 1549 mL / OUT: 1920 mL / NET: -371 mL    2021 07:01  -  2021 10:57  --------------------------------------------------------  IN: 216.7 mL / OUT: 110 mL / NET: 106.7 mL      PHYSICAL EXAM:  General: WN/WD NAD    HEENT:     [+] NCAT  [+] EOMI  [-] Conjuctival edema   [-] Icterus   [-] Thrush   [-] ETT  [-] NGT/OGT    Neck:         [+] FROM   [-] JVD     [-] Nodes     [-] Masses    [+] Mid-line trachea    [-] Tracheostomy    Chest:         [-] Sternal click   [-] Sternal drainage   [+] Pacing wires   [+] Chest tubes   [-] SubQ emphysema    Lungs:          [+] CTA   [-] Rhonchi   [-] Rales    [-] Wheezing    [-] Decreased BS    [-] Dullness R L    Cardiac:       [+] S1 [+] S2    [+] RRR   [-] Irregular   [-] S3   [-] S4    [-] Murmurs    [-] Rub    Abdomen:    [+] BS    [+] Soft    [+] Non-tender     [-] Distended    [-] Organomegaly  [-] PEG    Extremities:   [-] Cyanosis U/L   [-] Clubbing  U/L  [+] LE/UE Edema   [+] Capillary refill    [+] Pulses     Neuro:        [+] Awake   [+]  Alert   [-] Confused   [-] Lethargic   [-] Sedated   [-] Generalized Weakness    Skin:        [-] Rashes    [-] Erythema   [+] Normal incisions   [+] IV sites intact   [-] Sacral decubitus    Tubes:  LINES:    CAPILLARY BLOOD GLUCOSE  109 (2021 23:00)      POCT Blood Glucose.: 139 mg/dL (2021 06:57)    CAPILLARY BLOOD GLUCOSE      POCT Blood Glucose.: 139 mg/dL (2021 06:57)  POCT Blood Glucose.: 137 mg/dL (2021 23:21)  POCT Blood Glucose.: 134 mg/dL (2021 16:58)  POCT Blood Glucose.: 124 mg/dL (2021 10:59)      HOSPITAL MEDICATIONS:  MEDICATIONS  (STANDING):  albumin human  5% IVPB 500 milliLiter(s) IV Intermittent once  aMIOdarone    Tablet 400 milliGRAM(s) Oral every 8 hours  aMIOdarone    Tablet   Oral   amLODIPine   Tablet 5 milliGRAM(s) Oral daily  aspirin 325 milliGRAM(s) Oral daily  atorvastatin 40 milliGRAM(s) Oral at bedtime  chlorhexidine 4% Liquid 1 Application(s) Topical <User Schedule>  dextrose 40% Gel 15 Gram(s) Oral once  dextrose 5%. 1000 milliLiter(s) (50 mL/Hr) IV Continuous <Continuous>  dextrose 5%. 1000 milliLiter(s) (100 mL/Hr) IV Continuous <Continuous>  dextrose 50% Injectable 25 Gram(s) IV Push once  dextrose 50% Injectable 12.5 Gram(s) IV Push once  dextrose 50% Injectable 25 Gram(s) IV Push once  escitalopram 20 milliGRAM(s) Oral daily  glucagon  Injectable 1 milliGRAM(s) IntraMuscular once  guaiFENesin  milliGRAM(s) Oral every 12 hours  heparin   Injectable 5000 Unit(s) SubCutaneous every 8 hours  insulin glargine Injectable (LANTUS) 20 Unit(s) SubCutaneous every morning  insulin lispro (ADMELOG) corrective regimen sliding scale   SubCutaneous three times a day before meals  ipratropium    for Nebulization 500 MICROGram(s) Nebulizer every 6 hours  iron sucrose IVPB 200 milliGRAM(s) IV Intermittent every 24 hours  magnesium sulfate  IVPB 1 Gram(s) IV Intermittent every 12 hours  meperidine     Injectable 25 milliGRAM(s) IV Push once  metoprolol tartrate 25 milliGRAM(s) Oral two times a day  norepinephrine Infusion 0.02 MICROgram(s)/kG/Min (3.64 mL/Hr) IV Continuous <Continuous>  pantoprazole    Tablet 40 milliGRAM(s) Oral before breakfast  polyethylene glycol 3350 17 Gram(s) Oral daily  senna 2 Tablet(s) Oral at bedtime  simethicone 80 milliGRAM(s) Chew three times a day  sodium chloride 0.9%. 1000 milliLiter(s) (20 mL/Hr) IV Continuous <Continuous>    MEDICATIONS  (PRN):  hydrALAZINE Injectable 10 milliGRAM(s) IV Push every 4 hours PRN SBP > 155  LORazepam     Tablet 1 milliGRAM(s) Oral every 12 hours PRN Anxiety  oxyCODONE    IR 5 milliGRAM(s) Oral every 6 hours PRN Moderate Pain (4 - 6)  oxyCODONE    IR 10 milliGRAM(s) Oral every 4 hours PRN Severe Pain (7 - 10)      LABS:  ABG - ( 2021 02:48 )  pH, Arterial: 7.45  pH, Blood: x     /  pCO2: 42    /  pO2: 76    / HCO3: 29    / Base Excess: 4.3   /  SaO2: 96                                      8.6    20.80 )-----------( 200      ( 2021 01:35 )             25.7     02-19    133<L>  |  99  |  42<H>  ----------------------------<  140<H>  4.4   |  25  |  1.2    Ca    8.8      2021 01:35  Phos  3.5     02-17  Mg     2.6     02-    TPro  5.3<L>  /  Alb  3.6  /  TBili  1.6<H>  /  DBili  x   /  AST  48<H>  /  ALT  98<H>  /  AlkPhos  63  02-19    PT/INR - ( 2021 01:35 )   PT: 17.30 sec;   INR: 1.50 ratio         PTT - ( 2021 01:35 )  PTT:27.5 sec  Urinalysis Basic - ( 2021 09:30 )    Color: Yellow / Appearance: Slightly Turbid / S.018 / pH: x  Gluc: x / Ketone: Negative  / Bili: Negative / Urobili: <2 mg/dL   Blood: x / Protein: 30 mg/dL / Nitrite: Negative   Leuk Esterase: Negative / RBC: 17 /HPF / WBC 6 /HPF   Sq Epi: x / Non Sq Epi: 8 /HPF / Bacteria: Negative          RADIOLOGY:  Reviewed and interpreted by me  CXR from 21 shows [+] mild congestion, [-] pneumothorax, [-] R/L effusion, [+] cardiomegaly,   Chest Tubes in place    ECG:  Reviewed and interpreted by me:   QTC:    Assessment:  CAD SP CABG MVR  Anemia of acute blood loss  Afib    PAST MEDICAL & SURGICAL HISTORY:  Glaucoma    Chronic sciatica    H/O sleep apnea  on CPAP    Aortic stenosis    Type 2 diabetes mellitus without complication, without long-term current use of insulin    Hyperlipidemia, unspecified hyperlipidemia type    Hypertension, unspecified type    History of carpal tunnel surgery    History of hip surgery  bilateral hip        PLAN:  Neuro: Pain control  Pulm: Encourage coughing, deep breathing and use of incentive spirometry. Wean off supplemental oxygen as able. Daily CXR.   Cardio: Monitor telemetry/alarms. Continue cardiac meds  GI: Tolerating diet. Continue stool softeners. Continue GI prophylaxis  Renal: monitor urine output, supplement electrolytes as needed  Vasc: Heparin SC/SCDs for DVT prophylaxis  Heme: Monitor H/H.   ID: Off antibiotics. Stable.  Endocrine: Monitor finger stick blood sugar and control hyperglycemia with insulin  Physical Therapy: OOB/ambulate  Tubes: Monitor Chest tube output      Discussed with Cardiothoracic Team at AM rounds.

## 2021-02-20 LAB
ALBUMIN SERPL ELPH-MCNC: 3.4 G/DL — LOW (ref 3.5–5.2)
ALP SERPL-CCNC: 63 U/L — SIGNIFICANT CHANGE UP (ref 30–115)
ALT FLD-CCNC: 64 U/L — HIGH (ref 0–41)
ANION GAP SERPL CALC-SCNC: 13 MMOL/L — SIGNIFICANT CHANGE UP (ref 7–14)
APTT BLD: 26.7 SEC — LOW (ref 27–39.2)
AST SERPL-CCNC: 28 U/L — SIGNIFICANT CHANGE UP (ref 0–41)
BASE EXCESS BLDA CALC-SCNC: 3.8 MMOL/L — HIGH (ref -2–2)
BASOPHILS # BLD AUTO: 0.02 K/UL — SIGNIFICANT CHANGE UP (ref 0–0.2)
BASOPHILS NFR BLD AUTO: 0.1 % — SIGNIFICANT CHANGE UP (ref 0–1)
BILIRUB SERPL-MCNC: 1.6 MG/DL — HIGH (ref 0.2–1.2)
BUN SERPL-MCNC: 35 MG/DL — HIGH (ref 10–20)
CALCIUM SERPL-MCNC: 9.1 MG/DL — SIGNIFICANT CHANGE UP (ref 8.5–10.1)
CHLORIDE SERPL-SCNC: 100 MMOL/L — SIGNIFICANT CHANGE UP (ref 98–110)
CO2 SERPL-SCNC: 25 MMOL/L — SIGNIFICANT CHANGE UP (ref 17–32)
CREAT SERPL-MCNC: 1 MG/DL — SIGNIFICANT CHANGE UP (ref 0.7–1.5)
EOSINOPHIL # BLD AUTO: 0.16 K/UL — SIGNIFICANT CHANGE UP (ref 0–0.7)
EOSINOPHIL NFR BLD AUTO: 0.9 % — SIGNIFICANT CHANGE UP (ref 0–8)
GAS PNL BLDA: SIGNIFICANT CHANGE UP
GAS PNL BLDA: SIGNIFICANT CHANGE UP
GLUCOSE BLDC GLUCOMTR-MCNC: 104 MG/DL — HIGH (ref 70–99)
GLUCOSE BLDC GLUCOMTR-MCNC: 105 MG/DL — HIGH (ref 70–99)
GLUCOSE BLDC GLUCOMTR-MCNC: 105 MG/DL — HIGH (ref 70–99)
GLUCOSE SERPL-MCNC: 91 MG/DL — SIGNIFICANT CHANGE UP (ref 70–99)
HCO3 BLDA-SCNC: 29 MMOL/L — HIGH (ref 23–27)
HCT VFR BLD CALC: 26.5 % — LOW (ref 37–47)
HGB BLD-MCNC: 8.6 G/DL — LOW (ref 12–16)
HOROWITZ INDEX BLDA+IHG-RTO: 28 — SIGNIFICANT CHANGE UP
IMM GRANULOCYTES NFR BLD AUTO: 1.1 % — HIGH (ref 0.1–0.3)
INR BLD: 1.25 RATIO — SIGNIFICANT CHANGE UP (ref 0.65–1.3)
LYMPHOCYTES # BLD AUTO: 1.25 K/UL — SIGNIFICANT CHANGE UP (ref 1.2–3.4)
LYMPHOCYTES # BLD AUTO: 7.1 % — LOW (ref 20.5–51.1)
MAGNESIUM SERPL-MCNC: 2.6 MG/DL — HIGH (ref 1.8–2.4)
MCHC RBC-ENTMCNC: 29.3 PG — SIGNIFICANT CHANGE UP (ref 27–31)
MCHC RBC-ENTMCNC: 32.5 G/DL — SIGNIFICANT CHANGE UP (ref 32–37)
MCV RBC AUTO: 90.1 FL — SIGNIFICANT CHANGE UP (ref 81–99)
MONOCYTES # BLD AUTO: 1.83 K/UL — HIGH (ref 0.1–0.6)
MONOCYTES NFR BLD AUTO: 10.3 % — HIGH (ref 1.7–9.3)
NEUTROPHILS # BLD AUTO: 14.24 K/UL — HIGH (ref 1.4–6.5)
NEUTROPHILS NFR BLD AUTO: 80.5 % — HIGH (ref 42.2–75.2)
NRBC # BLD: 0 /100 WBCS — SIGNIFICANT CHANGE UP (ref 0–0)
PCO2 BLDA: 43 MMHG — HIGH (ref 38–42)
PH BLDA: 7.43 — HIGH (ref 7.38–7.42)
PLATELET # BLD AUTO: 237 K/UL — SIGNIFICANT CHANGE UP (ref 130–400)
PO2 BLDA: 98 MMHG — HIGH (ref 78–95)
POTASSIUM SERPL-MCNC: 3.9 MMOL/L — SIGNIFICANT CHANGE UP (ref 3.5–5)
POTASSIUM SERPL-SCNC: 3.9 MMOL/L — SIGNIFICANT CHANGE UP (ref 3.5–5)
PROT SERPL-MCNC: 5.1 G/DL — LOW (ref 6–8)
PROTHROM AB SERPL-ACNC: 14.4 SEC — HIGH (ref 9.95–12.87)
RBC # BLD: 2.94 M/UL — LOW (ref 4.2–5.4)
RBC # FLD: 17.2 % — HIGH (ref 11.5–14.5)
SAO2 % BLDA: 98 % — SIGNIFICANT CHANGE UP (ref 94–98)
SODIUM SERPL-SCNC: 138 MMOL/L — SIGNIFICANT CHANGE UP (ref 135–146)
WBC # BLD: 17.7 K/UL — HIGH (ref 4.8–10.8)
WBC # FLD AUTO: 17.7 K/UL — HIGH (ref 4.8–10.8)

## 2021-02-20 PROCEDURE — 93010 ELECTROCARDIOGRAM REPORT: CPT

## 2021-02-20 PROCEDURE — 71045 X-RAY EXAM CHEST 1 VIEW: CPT | Mod: 26

## 2021-02-20 PROCEDURE — 99233 SBSQ HOSP IP/OBS HIGH 50: CPT

## 2021-02-20 RX ORDER — POTASSIUM CHLORIDE 20 MEQ
20 PACKET (EA) ORAL ONCE
Refills: 0 | Status: COMPLETED | OUTPATIENT
Start: 2021-02-20 | End: 2021-02-20

## 2021-02-20 RX ORDER — POTASSIUM CHLORIDE 20 MEQ
20 PACKET (EA) ORAL
Refills: 0 | Status: COMPLETED | OUTPATIENT
Start: 2021-02-20 | End: 2021-02-20

## 2021-02-20 RX ORDER — METOPROLOL TARTRATE 50 MG
25 TABLET ORAL ONCE
Refills: 0 | Status: COMPLETED | OUTPATIENT
Start: 2021-02-20 | End: 2021-02-20

## 2021-02-20 RX ORDER — METFORMIN HYDROCHLORIDE 850 MG/1
500 TABLET ORAL
Refills: 0 | Status: DISCONTINUED | OUTPATIENT
Start: 2021-02-21 | End: 2021-03-01

## 2021-02-20 RX ORDER — POTASSIUM CHLORIDE 20 MEQ
20 PACKET (EA) ORAL DAILY
Refills: 0 | Status: DISCONTINUED | OUTPATIENT
Start: 2021-02-21 | End: 2021-03-01

## 2021-02-20 RX ORDER — POTASSIUM CHLORIDE 20 MEQ
20 PACKET (EA) ORAL ONCE
Refills: 0 | Status: DISCONTINUED | OUTPATIENT
Start: 2021-02-20 | End: 2021-02-20

## 2021-02-20 RX ORDER — METOPROLOL TARTRATE 50 MG
50 TABLET ORAL EVERY 12 HOURS
Refills: 0 | Status: DISCONTINUED | OUTPATIENT
Start: 2021-02-20 | End: 2021-03-01

## 2021-02-20 RX ORDER — FUROSEMIDE 40 MG
40 TABLET ORAL DAILY
Refills: 0 | Status: DISCONTINUED | OUTPATIENT
Start: 2021-02-20 | End: 2021-02-24

## 2021-02-20 RX ADMIN — IRON SUCROSE 110 MILLIGRAM(S): 20 INJECTION, SOLUTION INTRAVENOUS at 12:34

## 2021-02-20 RX ADMIN — Medication 25 MILLIGRAM(S): at 06:05

## 2021-02-20 RX ADMIN — Medication 100 GRAM(S): at 18:57

## 2021-02-20 RX ADMIN — SIMETHICONE 80 MILLIGRAM(S): 80 TABLET, CHEWABLE ORAL at 06:05

## 2021-02-20 RX ADMIN — Medication 50 MILLIGRAM(S): at 21:14

## 2021-02-20 RX ADMIN — Medication 600 MILLIGRAM(S): at 18:56

## 2021-02-20 RX ADMIN — AMIODARONE HYDROCHLORIDE 400 MILLIGRAM(S): 400 TABLET ORAL at 21:15

## 2021-02-20 RX ADMIN — Medication 100 GRAM(S): at 06:07

## 2021-02-20 RX ADMIN — Medication 20 MILLIEQUIVALENT(S): at 18:57

## 2021-02-20 RX ADMIN — AMLODIPINE BESYLATE 5 MILLIGRAM(S): 2.5 TABLET ORAL at 06:05

## 2021-02-20 RX ADMIN — Medication 325 MILLIGRAM(S): at 12:18

## 2021-02-20 RX ADMIN — HEPARIN SODIUM 5000 UNIT(S): 5000 INJECTION INTRAVENOUS; SUBCUTANEOUS at 06:06

## 2021-02-20 RX ADMIN — Medication 20 MILLIEQUIVALENT(S): at 10:02

## 2021-02-20 RX ADMIN — Medication 25 MILLIGRAM(S): at 09:37

## 2021-02-20 RX ADMIN — ATORVASTATIN CALCIUM 40 MILLIGRAM(S): 80 TABLET, FILM COATED ORAL at 21:15

## 2021-02-20 RX ADMIN — POLYETHYLENE GLYCOL 3350 17 GRAM(S): 17 POWDER, FOR SOLUTION ORAL at 12:19

## 2021-02-20 RX ADMIN — AMIODARONE HYDROCHLORIDE 400 MILLIGRAM(S): 400 TABLET ORAL at 12:35

## 2021-02-20 RX ADMIN — PANTOPRAZOLE SODIUM 40 MILLIGRAM(S): 20 TABLET, DELAYED RELEASE ORAL at 06:06

## 2021-02-20 RX ADMIN — ESCITALOPRAM OXALATE 20 MILLIGRAM(S): 10 TABLET, FILM COATED ORAL at 12:19

## 2021-02-20 RX ADMIN — CHLORHEXIDINE GLUCONATE 1 APPLICATION(S): 213 SOLUTION TOPICAL at 06:07

## 2021-02-20 RX ADMIN — Medication 40 MILLIGRAM(S): at 09:37

## 2021-02-20 RX ADMIN — SIMETHICONE 80 MILLIGRAM(S): 80 TABLET, CHEWABLE ORAL at 21:15

## 2021-02-20 RX ADMIN — Medication 20 MILLIEQUIVALENT(S): at 09:37

## 2021-02-20 RX ADMIN — Medication 600 MILLIGRAM(S): at 06:05

## 2021-02-20 RX ADMIN — AMIODARONE HYDROCHLORIDE 400 MILLIGRAM(S): 400 TABLET ORAL at 06:05

## 2021-02-20 RX ADMIN — HEPARIN SODIUM 5000 UNIT(S): 5000 INJECTION INTRAVENOUS; SUBCUTANEOUS at 12:34

## 2021-02-20 RX ADMIN — SIMETHICONE 80 MILLIGRAM(S): 80 TABLET, CHEWABLE ORAL at 12:34

## 2021-02-20 RX ADMIN — HEPARIN SODIUM 5000 UNIT(S): 5000 INJECTION INTRAVENOUS; SUBCUTANEOUS at 21:15

## 2021-02-20 RX ADMIN — INSULIN GLARGINE 20 UNIT(S): 100 INJECTION, SOLUTION SUBCUTANEOUS at 08:37

## 2021-02-20 NOTE — PROGRESS NOTE ADULT - SUBJECTIVE AND OBJECTIVE BOX
OPERATIVE PROCEDURE(s):                POD #     8  s/p MV repair and CABG                  70yFemale  SURGEON(s): FIONA Rojas  SUBJECTIVE ASSESSMENT: doing better  Vital Signs Last 24 Hrs  T(F): 98.2 (2021 04:00), Max: 98.9 (2021 08:00)  HR: 91 (2021 07:00) (66 - 91)  BP: 117/56 (2021 08:00) (117/56 - 117/56)  BP(mean): 81 (2021 08:00) (81 - 81)  ABP: 123/66 (2021 07:00) (123/66 - 161/59)  ABP(mean): 83 (2021 07:00)  RR: 35 (2021 07:00) (17 - 46)  SpO2: 94% (2021 07:00) (94% - 100%)    I&O's Detail    2021 07:01  -  2021 07:00  --------------------------------------------------------  IN:    Amiodarone: 16.7 mL    IV PiggyBack: 200 mL    Oral Fluid: 880 mL  Total IN: 1096.7 mL    OUT:    Chest Tube (mL): 10 mL    Voided (mL): 1450 mL  Total OUT: 1460 mL    Net:   I&O's Detail    2021 07:01  -  2021 07:00  --------------------------------------------------------  Total NET: -371 mL      2021 07:01  -  2021 07:00  --------------------------------------------------------  Total NET: -363.3 mL    CAPILLARY BLOOD GLUCOSE      POCT Blood Glucose.: 104 mg/dL (2021 02:13)  POCT Blood Glucose.: 125 mg/dL (2021 16:15)  POCT Blood Glucose.: 108 mg/dL (2021 11:49)    Physical Exam:  General: NAD; A&Ox3  Cardiac: S1/S2, RRR, no murmur, no rubs  Lungs: unlabored respirations, CTA b/l, no wheeze, no rales, no crackles  Abdomen: Soft/NT/ND; positive bowel sounds x 4  Sternum: Intact, no click, incision healing well with no drainage  Incisions: Incisions clean/dry/intact  Extremities: No edema b/l lower extremities; good capillary refill; no cyanosis; palpable 1+ pedal pulses b/l    EPICARDIAL WIRES:  [] YES   BOWEL MOVEMENT:  [] YES [] NO, If No, Timing since last BM:      Day #        LABS:                        8.6<L>  17.70<H> )-----------( 237      ( 2021 02:00 )             26.5<L>                        8.6<L>  20.80<H> )-----------( 200      ( 2021 01:35 )             25.7<L>    02-20    138  |  100  |  35<H>  ----------------------------<  91  3.9   |  25  |  1.0  02-19    133<L>  |  99  |  42<H>  ----------------------------<  140<H>  4.4   |  25  |  1.2    Ca    9.1      2021 02:00  Phos  3.5     02-17  Mg     2.6     02-20    TPro  5.1<L> [6.0 - 8.0]  /  Alb  3.4<L> [3.5 - 5.2]  /  TBili  1.6<H> [0.2 - 1.2]  /  DBili  x   /  AST  28 [0 - 41]  /  ALT  64<H> [0 - 41]  /  AlkPhos  63 [30 - 115]  02-20    PT/INR - ( 2021 02:00 )   PT: ;   INR: 1.25 ratio         PT/INR - ( 2021 01:35 )   PT: ;   INR: 1.50 ratio         PTT - ( 2021 02:00 )  PTT:26.7 sec, PTT - ( 2021 01:35 )  PTT:27.5 sec  Urinalysis Basic - ( 2021 09:30 )    Color: Yellow / Appearance: Slightly Turbid / S.018 / pH: x  Gluc: x / Ketone: Negative  / Bili: Negative / Urobili: <2 mg/dL   Blood: x / Protein: 30 mg/dL / Nitrite: Negative   Leuk Esterase: Negative / RBC: 17 /HPF / WBC 6 /HPF   Sq Epi: x / Non Sq Epi: 8 /HPF / Bacteria: Negative      ABG - ( 2021 02:32 )  pH: 7.45  /  pCO2: 42    /  pO2: 111   / HCO3: 29    / Base Excess: 4.8   /  SaO2: 99    /  LA: 0.5        RADIOLOGY & ADDITIONAL TESTS:  CXR:  EKG:  MEDICATIONS  (STANDING):  albumin human  5% IVPB 500 milliLiter(s) IV Intermittent once  aMIOdarone    Tablet 400 milliGRAM(s) Oral every 8 hours  aMIOdarone    Tablet   Oral   amLODIPine   Tablet 5 milliGRAM(s) Oral daily  aspirin 325 milliGRAM(s) Oral daily  atorvastatin 40 milliGRAM(s) Oral at bedtime  chlorhexidine 4% Liquid 1 Application(s) Topical <User Schedule>  dextrose 40% Gel 15 Gram(s) Oral once  dextrose 5%. 1000 milliLiter(s) (50 mL/Hr) IV Continuous <Continuous>  dextrose 5%. 1000 milliLiter(s) (100 mL/Hr) IV Continuous <Continuous>  dextrose 50% Injectable 12.5 Gram(s) IV Push once  dextrose 50% Injectable 25 Gram(s) IV Push once  dextrose 50% Injectable 25 Gram(s) IV Push once  escitalopram 20 milliGRAM(s) Oral daily  glucagon  Injectable 1 milliGRAM(s) IntraMuscular once  guaiFENesin  milliGRAM(s) Oral every 12 hours  heparin   Injectable 5000 Unit(s) SubCutaneous every 8 hours  insulin glargine Injectable (LANTUS) 20 Unit(s) SubCutaneous every morning  insulin lispro (ADMELOG) corrective regimen sliding scale   SubCutaneous three times a day before meals  ipratropium    for Nebulization 500 MICROGram(s) Nebulizer every 6 hours  iron sucrose IVPB 200 milliGRAM(s) IV Intermittent every 24 hours  magnesium sulfate  IVPB 1 Gram(s) IV Intermittent every 12 hours  meperidine     Injectable 25 milliGRAM(s) IV Push once  metoprolol tartrate 25 milliGRAM(s) Oral two times a day  norepinephrine Infusion 0.02 MICROgram(s)/kG/Min (3.64 mL/Hr) IV Continuous <Continuous>  pantoprazole    Tablet 40 milliGRAM(s) Oral before breakfast  polyethylene glycol 3350 17 Gram(s) Oral daily  potassium chloride  20 mEq/100 mL IVPB 20 milliEquivalent(s) IV Intermittent once  senna 2 Tablet(s) Oral at bedtime  simethicone 80 milliGRAM(s) Chew three times a day  sodium chloride 0.9%. 1000 milliLiter(s) (20 mL/Hr) IV Continuous <Continuous>    MEDICATIONS  (PRN):  hydrALAZINE Injectable 10 milliGRAM(s) IV Push every 4 hours PRN SBP > 155  LORazepam     Tablet 1 milliGRAM(s) Oral every 12 hours PRN Anxiety    Allergies    No Known Allergies    Intolerances      Ambulation/Activity Status:    Assessment/Plan:  70y Female status-post Mitral Valve repair and CABG with PO course of          acute PO blood loss anemia         right hemothorax - s/p         hypoxic respiratory failure requiring intubation         atrial fibrillation with RVR requiring cardioversions and pharmacologic medications  - Case and plan discussed with CTU Intensivist and CT Surgeon - Dr. Rojas/Edgar/Patricia  - Continue CTU supportive care    - Continue DVT/GI prophylaxis  - Incentive Spirometry 10 times an hour  - Continue to advance physical activity as tolerated and continue PT/OT as directed  1. CAD s/p CABG: Cont ASA, Continue statin: LFTs downtrending, Start BB for pt is no longer hypotensive and getting weaned off pressors  2. Post-op afib: Discontinue amio ggt and start amio PO taper   3. Hypoxia secondary to respiratory failure: Pt weaned of BiPAP yesterday. Continue to wean off nasal cannula as tolerated   4. DM/Glucose Control: Continue insulin gtt   5 HTN: Continue hydralazine, norvasc  6. Leukocytosis- Broad spectrum abx completed yesterday. WBC increased today to 20.80 from 14.61. Pt currently afebrile. Order UA/Cx.  7. Anemia secondary to acute surgical blood loss - H/H decreased but stable s/p 2 U prbc  (8.6/25.7); Order IV iron and will continue to monitor cbc    Social Service Disposition:     OPERATIVE PROCEDURE(s):                POD #     8  s/p MV repair and CABG                  70yFemale  SURGEON(s): FIONA Rojas  SUBJECTIVE ASSESSMENT: doing better  Vital Signs Last 24 Hrs  T(F): 98.2 (2021 04:00), Max: 98.9 (2021 08:00)  HR: 91 (2021 07:00) (66 - 91)  BP: 117/56 (2021 08:00) (117/56 - 117/56)  BP(mean): 81 (2021 08:00) (81 - 81)  ABP: 123/66 (2021 07:00) (123/66 - 161/59)  ABP(mean): 83 (2021 07:00)  RR: 35 (2021 07:00) (17 - 46)  SpO2: 94% (2021 07:00) (94% - 100%)    I&O's Detail    2021 07:01  -  2021 07:00  --------------------------------------------------------  IN:    Amiodarone: 16.7 mL    IV PiggyBack: 200 mL    Oral Fluid: 880 mL  Total IN: 1096.7 mL    OUT:    Chest Tube (mL): 10 mL    Voided (mL): 1450 mL  Total OUT: 1460 mL    Net:   I&O's Detail    2021 07:01  -  2021 07:00  --------------------------------------------------------  Total NET: -371 mL      2021 07:01  -  2021 07:00  --------------------------------------------------------  Total NET: -363.3 mL    CAPILLARY BLOOD GLUCOSE      POCT Blood Glucose.: 104 mg/dL (2021 02:13)  POCT Blood Glucose.: 125 mg/dL (2021 16:15)  POCT Blood Glucose.: 108 mg/dL (2021 11:49)    Physical Exam:  General: NAD; A&Ox3  Cardiac: S1/S2, RRR, no murmur, no rubs  Lungs: unlabored shallow respirations, bilat bs decreased at bases  Abdomen: Soft/NT  Sternum: Intact, no click, incision healing well with no drainage  Incisions: Incisions clean/dry/intact  Extremities: mil edema b/l lower extremities;     EPICARDIAL WIRES:  [] YES         LABS:                        8.6<L>  17.70<H> )-----------( 237      ( 2021 02:00 )             26.5<L>                        8.6<L>  20.80<H> )-----------( 200      ( 2021 01:35 )             25.7<L>    02-20    138  |  100  |  35<H>  ----------------------------<  91  3.9   |  25  |  1.0  02-19    133<L>  |  99  |  42<H>  ----------------------------<  140<H>  4.4   |  25  |  1.2    Ca    9.1      2021 02:00  Phos  3.5     02-17  Mg     2.6     02-20    TPro  5.1<L> [6.0 - 8.0]  /  Alb  3.4<L> [3.5 - 5.2]  /  TBili  1.6<H> [0.2 - 1.2]  /  DBili  x   /  AST  28 [0 - 41]  /  ALT  64<H> [0 - 41]  /  AlkPhos  63 [30 - 115]  02-20    PT/INR - ( 2021 02:00 )   PT: ;   INR: 1.25 ratio         PT/INR - ( 2021 01:35 )   PT: ;   INR: 1.50 ratio         PTT - ( 2021 02:00 )  PTT:26.7 sec, PTT - ( 2021 01:35 )  PTT:27.5 sec  Urinalysis Basic - ( 2021 09:30 )    Color: Yellow / Appearance: Slightly Turbid / S.018 / pH: x  Gluc: x / Ketone: Negative  / Bili: Negative / Urobili: <2 mg/dL   Blood: x / Protein: 30 mg/dL / Nitrite: Negative   Leuk Esterase: Negative / RBC: 17 /HPF / WBC 6 /HPF   Sq Epi: x / Non Sq Epi: 8 /HPF / Bacteria: Negative      ABG - ( 2021 02:32 )  pH: 7.45  /  pCO2: 42    /  pO2: 111   / HCO3: 29    / Base Excess: 4.8   /  SaO2: 99    /  LA: 0.5        RADIOLOGY & ADDITIONAL TESTS:  CXR: < from: Xray Chest 1 View- PORTABLE-Routine (Xray Chest 1 View- PORTABLE-Routine in AM.) (21 @ 06:21) >    Impression:    Status post a median sternotomy and cardiomegaly.    Bilateral opacities.    No significant change from 2021.    < end of copied text >    EKG:< from: 12 Lead ECG (21 @ 07:28) >    Ventricular Rate 72 BPM    Atrial Rate 72 BPM    P-R Interval 144 ms    QRS Duration 102 ms    Q-T Interval 388 ms    QTC Calculation(Bazett) 424 ms    P Axis 21 degrees    R Axis -15 degrees    T Axis 84 degrees    Diagnosis Line Sinus rhythm withoccasional Premature ventricular complexes  Moderate voltage criteria for LVH, may be normal variant  Inferior infarct , age undetermined  Abnormal ECG    < end of copied text >    MEDICATIONS  (STANDING):  albumin human  5% IVPB 500 milliLiter(s) IV Intermittent once  aMIOdarone    Tablet 400 milliGRAM(s) Oral every 8 hours  aMIOdarone    Tablet   Oral   amLODIPine   Tablet 5 milliGRAM(s) Oral daily  aspirin 325 milliGRAM(s) Oral daily  atorvastatin 40 milliGRAM(s) Oral at bedtime  chlorhexidine 4% Liquid 1 Application(s) Topical <User Schedule>  dextrose 40% Gel 15 Gram(s) Oral once  dextrose 5%. 1000 milliLiter(s) (50 mL/Hr) IV Continuous <Continuous>  dextrose 5%. 1000 milliLiter(s) (100 mL/Hr) IV Continuous <Continuous>  dextrose 50% Injectable 12.5 Gram(s) IV Push once  dextrose 50% Injectable 25 Gram(s) IV Push once  dextrose 50% Injectable 25 Gram(s) IV Push once  escitalopram 20 milliGRAM(s) Oral daily  glucagon  Injectable 1 milliGRAM(s) IntraMuscular once  guaiFENesin  milliGRAM(s) Oral every 12 hours  heparin   Injectable 5000 Unit(s) SubCutaneous every 8 hours  insulin glargine Injectable (LANTUS) 20 Unit(s) SubCutaneous every morning  insulin lispro (ADMELOG) corrective regimen sliding scale   SubCutaneous three times a day before meals  ipratropium    for Nebulization 500 MICROGram(s) Nebulizer every 6 hours  iron sucrose IVPB 200 milliGRAM(s) IV Intermittent every 24 hours  magnesium sulfate  IVPB 1 Gram(s) IV Intermittent every 12 hours  meperidine     Injectable 25 milliGRAM(s) IV Push once  metoprolol tartrate 25 milliGRAM(s) Oral two times a day  norepinephrine Infusion 0.02 MICROgram(s)/kG/Min (3.64 mL/Hr) IV Continuous <Continuous>  pantoprazole    Tablet 40 milliGRAM(s) Oral before breakfast  polyethylene glycol 3350 17 Gram(s) Oral daily  potassium chloride  20 mEq/100 mL IVPB 20 milliEquivalent(s) IV Intermittent once  senna 2 Tablet(s) Oral at bedtime  simethicone 80 milliGRAM(s) Chew three times a day  sodium chloride 0.9%. 1000 milliLiter(s) (20 mL/Hr) IV Continuous <Continuous>    MEDICATIONS  (PRN):  hydrALAZINE Injectable 10 milliGRAM(s) IV Push every 4 hours PRN SBP > 155  LORazepam     Tablet 1 milliGRAM(s) Oral every 12 hours PRN Anxiety    Allergies    No Known Allergies    Intolerances      Ambulation/Activity Status:    Assessment/Plan:  70y Female status-post Mitral Valve repair and CABG with PO course of          acute PO blood loss anemia         right hemothorax - s/p CT's         hypoxic respiratory failure requiring intubation         atrial fibrillation with RVR requiring cardioversions and pharmacologic medications  - Case and plan discussed with CTU Intensivist and CT Surgeon - Dr. Rojas/Edgar/Patricia  - Continue CTU supportive care    - Continue DVT/GI prophylaxis  - Incentive Spirometry 10 times an hour  - Continue to advance physical activity as tolerated and continue PT/OT as directed  1. CAD s/p CABG: Cont ASA, Continue statin: LFTs downtrending, Start BB for pt is no longer hypotensive and getting weaned off pressors  2. Post-op afib: Discontinue amio ggt and start amio PO taper ,   3. Hypoxia secondary to respiratory failure: Pt weaned of BiPAP yesterday. Continue to wean off nasal cannula as tolerated   4. DM/Glucose Control: stop insulin, start metformin 500, A1c 6.1  5 HTN: Continue hydralazine, norvasc, increase metoprolol to 25 BID  6. Leukocytosis- Broad spectrum abx completed yesterday. WBC down today to 17.7 from 20.8. Pt currently afebrile. Order UA yesterday negative.  7. Anemia secondary to acute surgical blood loss - H/H decreased but stable s/p 2 U prbc  (8.6/25.7); Order IV iron and will continue to monitor cbc  8. + BM stop Senna  9. remove wires in AM, hold Am anticoagulation    Social Service Disposition:

## 2021-02-20 NOTE — PROGRESS NOTE ADULT - SUBJECTIVE AND OBJECTIVE BOX
CTU Attending Progress Daily Note     20 Feb 2021 11:53  Admited 02-09-21, Hospital Day 11d  POD# -     HPI:  71 yo F h/o HTN, DLD, has JACOME on TTE Mod AS by gradient, and mod to severe MR here for DERRICK and RHC/LHC   (09 Feb 2021 09:38)    Home Medications:  aspirin 325 mg oral tablet: orally once a day (09 Feb 2021 09:55)  lisinopril-hydrochlorothiazide 20 mg-12.5 mg oral tablet: 1 tab(s) orally once a day (09 Feb 2021 09:55)  meloxicam: orally once a day (09 Feb 2021 09:55)  metFORMIN 500 mg oral tablet: orally once a day (09 Feb 2021 09:55)  metoprolol tartrate 25 mg oral tablet: 1 tab(s) orally 2 times a day (09 Feb 2021 09:55)  oxybutynin 10 mg/24 hr oral tablet, extended release: 1 tab(s) orally once a day (09 Feb 2021 09:55)  pantoprazole 40 mg oral delayed release tablet: 1 tab(s) orally once a day (09 Feb 2021 09:55)    FAMILY HISTORY:    PAST MEDICAL & SURGICAL HISTORY:  Glaucoma    Chronic sciatica    H/O sleep apnea  on CPAP    Aortic stenosis    Type 2 diabetes mellitus without complication, without long-term current use of insulin    Hyperlipidemia, unspecified hyperlipidemia type    Hypertension, unspecified type    History of carpal tunnel surgery    History of hip surgery  bilateral hip      Interval event for past 24 hr:  CHARYADRIAN CODY  70y had no event.   Current Complains:  ADRIAN SAMUELS has no new complains  Allergies    No Known Allergies    Intolerances      OBJECTIVE:  Vitals last 24 hrs  ICU Vital Signs Last 24 Hrs  T(C): 36.8 (20 Feb 2021 04:00), Max: 37.2 (19 Feb 2021 20:00)  T(F): 98.2 (20 Feb 2021 04:00), Max: 98.9 (19 Feb 2021 20:00)  HR: 91 (20 Feb 2021 07:00) (66 - 91)  BP: --  BP(mean): --  ABP: 123/66 (20 Feb 2021 07:00) (123/66 - 161/59)  ABP(mean): 83 (20 Feb 2021 07:00) (72 - 93)  RR: 35 (20 Feb 2021 07:00) (17 - 46)  SpO2: 94% (20 Feb 2021 07:00) (94% - 100%)    T(C): 36.8 (02-20-21 @ 04:00), Max: 37.2 (02-19-21 @ 20:00)  T(F): 98.2 (02-20-21 @ 04:00), Max: 98.9 (02-19-21 @ 20:00)  HR: 91 (02-20-21 @ 07:00) (66 - 91)  BP: --  ABP: 123/66 (02-20-21 @ 07:00) (123/66 - 161/59)  ABP(mean): 83 (02-20-21 @ 07:00) (72 - 93)  RR: 35 (02-20-21 @ 07:00) (17 - 46)  SpO2: 94% (02-20-21 @ 07:00) (94% - 100%)  CVP(mm Hg): --  CI: --  PA: --  PA(direct): --  PCWP: --  SVR: --  PVR: --    02-19-21 @ 07:01  -  02-20-21 @ 07:00  --------------------------------------------------------  IN:    Amiodarone: 16.7 mL    IV PiggyBack: 200 mL    Oral Fluid: 880 mL  Total IN: 1096.7 mL    OUT:    Chest Tube (mL): 10 mL    Voided (mL): 1450 mL  Total OUT: 1460 mL    Total NET: -363.3 mL             CAPILLARY BLOOD GLUCOSE      POCT Blood Glucose.: 105 mg/dL (20 Feb 2021 10:59)  POCT Blood Glucose.: 104 mg/dL (20 Feb 2021 02:13)  POCT Blood Glucose.: 125 mg/dL (19 Feb 2021 16:15)    LABS:  ABG - ( 20 Feb 2021 09:13 )  pH, Arterial: 7.43  pH, Blood: x     /  pCO2: 43    /  pO2: 98    / HCO3: 29    / Base Excess: 3.8   /  SaO2: 98              Blood Gas Arterial, Lactate: 0.9 mmoL/L (02-20-21 @ 09:13)  Blood Gas Arterial, Lactate: 0.5 mmoL/L (02-20-21 @ 02:32)                          8.6    17.70 )-----------( 237      ( 20 Feb 2021 02:00 )             26.5     Hemoglobin: 8.6 g/dL (02-20 @ 02:00)  Hemoglobin: 8.6 g/dL (02-19 @ 01:35)  Hemoglobin: 8.6 g/dL (02-18 @ 02:50)  Hemoglobin: 9.1 g/dL (02-17 @ 22:40)  Hemoglobin: 9.0 g/dL (02-17 @ 17:18)    02-20    138  |  100  |  35<H>  ----------------------------<  91  3.9   |  25  |  1.0    Ca    9.1      20 Feb 2021 02:00  Mg     2.6     02-20    TPro  5.1<L>  /  Alb  3.4<L>  /  TBili  1.6<H>  /  DBili  x   /  AST  28  /  ALT  64<H>  /  AlkPhos  63  02-20    Creatinine, Serum: 1.0 mg/dL (02-20 @ 02:00)  Creatinine, Serum: 1.2 mg/dL (02-19 @ 01:35)  Creatinine, Serum: 1.1 mg/dL (02-18 @ 02:50)  Creatinine, Serum: 1.1 mg/dL (02-17 @ 22:40)  Creatinine, Serum: 1.0 mg/dL (02-17 @ 10:30)  Creatinine, Serum: 1.3 mg/dL (02-17 @ 02:00)  Creatinine, Serum: 1.2 mg/dL (02-16 @ 18:50)  Creatinine, Serum: 1.3 mg/dL (02-16 @ 14:07)    PT/INR - ( 20 Feb 2021 02:00 )   PT: 14.40 sec;   INR: 1.25 ratio     PTT - ( 20 Feb 2021 02:00 )  PTT:26.7 sec          HOSPITAL MEDICATIONS:  MEDICATIONS  (STANDING):  albumin human  5% IVPB 500 milliLiter(s) IV Intermittent once  aMIOdarone    Tablet 400 milliGRAM(s) Oral every 8 hours  aMIOdarone    Tablet   Oral   amLODIPine   Tablet 5 milliGRAM(s) Oral daily  aspirin 325 milliGRAM(s) Oral daily  atorvastatin 40 milliGRAM(s) Oral at bedtime  chlorhexidine 4% Liquid 1 Application(s) Topical <User Schedule>  dextrose 40% Gel 15 Gram(s) Oral once  dextrose 5%. 1000 milliLiter(s) (50 mL/Hr) IV Continuous <Continuous>  dextrose 5%. 1000 milliLiter(s) (100 mL/Hr) IV Continuous <Continuous>  dextrose 50% Injectable 12.5 Gram(s) IV Push once  dextrose 50% Injectable 25 Gram(s) IV Push once  dextrose 50% Injectable 25 Gram(s) IV Push once  escitalopram 20 milliGRAM(s) Oral daily  furosemide    Tablet 40 milliGRAM(s) Oral daily  glucagon  Injectable 1 milliGRAM(s) IntraMuscular once  guaiFENesin  milliGRAM(s) Oral every 12 hours  heparin   Injectable 5000 Unit(s) SubCutaneous every 8 hours  insulin glargine Injectable (LANTUS) 20 Unit(s) SubCutaneous every morning  insulin lispro (ADMELOG) corrective regimen sliding scale   SubCutaneous three times a day before meals  ipratropium    for Nebulization 500 MICROGram(s) Nebulizer every 6 hours  iron sucrose IVPB 200 milliGRAM(s) IV Intermittent every 24 hours  magnesium sulfate  IVPB 1 Gram(s) IV Intermittent every 12 hours  metoprolol tartrate 50 milliGRAM(s) Oral every 12 hours  norepinephrine Infusion 0.02 MICROgram(s)/kG/Min (3.64 mL/Hr) IV Continuous <Continuous>  pantoprazole    Tablet 40 milliGRAM(s) Oral before breakfast  polyethylene glycol 3350 17 Gram(s) Oral daily  potassium chloride    Tablet ER 20 milliEquivalent(s) Oral once  simethicone 80 milliGRAM(s) Chew three times a day  sodium chloride 0.9%. 1000 milliLiter(s) (20 mL/Hr) IV Continuous <Continuous>    MEDICATIONS  (PRN):  hydrALAZINE Injectable 10 milliGRAM(s) IV Push every 4 hours PRN SBP > 155      REVIEW OF SYSTEMS:  CONSTITUTIONAL: [X] all negative; [ ] weakness, [ ] fevers, [ ] chills  EYES/ENT: [X] all negative; [ ] visual changes, [ ] vertigo, [ ] throat pain, [ ] eye pain  NECK: [X] all negative; [ ] pain, [ ] stiffness  RESPIRATORY: [ ] all negative, [x ] cough, [ ] wheezing, [ ] hemoptysis, [ x] shortness of breath, [  ] chest pain  CARDIOVASCULAR: [X] all negative; [ ] anginal chest pain, [ ] palpitations, [ ] orthopnea  GASTROINTESTINAL: [X] all negative; [ ]abdominal pain, [ ] nausea, [ ] vomiting, [ ] hematemesis, [ ] diarrhea, [ ] constipation, [ ] melena, [ ] hematochezia.  GENITOURINARY: [X] all negative; [ ] dysuria, [ ] frequency, [ ] hematuria  NEUROLOGICAL: [X] all negative; [ ] numbness, [ ] weakness, [ ] paresthesias  MUSCULOSKELETAL: [X] all negative, [ ] joint pain, [ ] joint swelling, [ ] joint redness, [ ] bone pain  SKIN: [X] all negative; [ ] itching, [ ] burning, [ ] rashes, [ ] lesions   All other review of systems is negative unless indicated above.    [  ] Unable to assess ROS because     PHYSICAL EXAM:          CONSTITUTIONAL: Well-developed; well-nourished; in no acute distress.   	SKIN: warm, dry, no rashes or lesions  	HEENT: Atraumatic. Normocephalic. PERRL. Moist membranes, no conjunctival injection, sclera clear  	NECK: Supple; non tender.  No JVD. No lymphadenopathy.  	CARD: Normal S1, S2. Rate and Rhythm are regular. 1/6 murmur.  	RESP: decreased air entry on left lower 1/2, no wheezes, no rales no rhonchi.  	ABD: Soft, not tender, not distended, no CVA tenderness, no rebound no guarding, bowel sounds present  	EXT: Normal ROM.  No clubbing, no cyanosis, no pedal edema, no calf pain b/l, Peripheral pulses intact.  	LYMPH: No acute cervical adenopathy.  	NEURO: Alert, awake, motor 5/5 R, 5/5 L, sensation intact bilat, CN 2-12 intact,          PSYCH: Cooperative, appropriate. Alert & oriented x 3    RADIOLOGY:  X Reviewed and interpreted by me  CxR from 02-20-21 shows mild-moderate congestion, no pneumothorax, mild bilat. effusions, no cardiomegaly, elevated left hemidiaphragm       ECG:  X Reviewed and interpreted by me NSR 72, QTc - 424;  PVC

## 2021-02-21 LAB
ALBUMIN SERPL ELPH-MCNC: 3.5 G/DL — SIGNIFICANT CHANGE UP (ref 3.5–5.2)
ALP SERPL-CCNC: 71 U/L — SIGNIFICANT CHANGE UP (ref 30–115)
ALT FLD-CCNC: 44 U/L — HIGH (ref 0–41)
ANION GAP SERPL CALC-SCNC: 8 MMOL/L — SIGNIFICANT CHANGE UP (ref 7–14)
APPEARANCE UR: CLEAR — SIGNIFICANT CHANGE UP
APTT BLD: 27.3 SEC — SIGNIFICANT CHANGE UP (ref 27–39.2)
AST SERPL-CCNC: 20 U/L — SIGNIFICANT CHANGE UP (ref 0–41)
BACTERIA # UR AUTO: NEGATIVE — SIGNIFICANT CHANGE UP
BASOPHILS # BLD AUTO: 0.04 K/UL — SIGNIFICANT CHANGE UP (ref 0–0.2)
BASOPHILS NFR BLD AUTO: 0.2 % — SIGNIFICANT CHANGE UP (ref 0–1)
BILIRUB SERPL-MCNC: 1.8 MG/DL — HIGH (ref 0.2–1.2)
BILIRUB UR-MCNC: NEGATIVE — SIGNIFICANT CHANGE UP
BUN SERPL-MCNC: 31 MG/DL — HIGH (ref 10–20)
CALCIUM SERPL-MCNC: 9.4 MG/DL — SIGNIFICANT CHANGE UP (ref 8.5–10.1)
CHLORIDE SERPL-SCNC: 101 MMOL/L — SIGNIFICANT CHANGE UP (ref 98–110)
CO2 SERPL-SCNC: 30 MMOL/L — SIGNIFICANT CHANGE UP (ref 17–32)
COLOR SPEC: YELLOW — SIGNIFICANT CHANGE UP
CREAT SERPL-MCNC: 1.1 MG/DL — SIGNIFICANT CHANGE UP (ref 0.7–1.5)
DIFF PNL FLD: SIGNIFICANT CHANGE UP
EOSINOPHIL # BLD AUTO: 0.39 K/UL — SIGNIFICANT CHANGE UP (ref 0–0.7)
EOSINOPHIL NFR BLD AUTO: 1.9 % — SIGNIFICANT CHANGE UP (ref 0–8)
EPI CELLS # UR: 4 /HPF — SIGNIFICANT CHANGE UP (ref 0–5)
GLUCOSE BLDC GLUCOMTR-MCNC: 121 MG/DL — HIGH (ref 70–99)
GLUCOSE BLDC GLUCOMTR-MCNC: 121 MG/DL — HIGH (ref 70–99)
GLUCOSE BLDC GLUCOMTR-MCNC: 126 MG/DL — HIGH (ref 70–99)
GLUCOSE BLDC GLUCOMTR-MCNC: 128 MG/DL — HIGH (ref 70–99)
GLUCOSE BLDC GLUCOMTR-MCNC: 138 MG/DL — HIGH (ref 70–99)
GLUCOSE SERPL-MCNC: 122 MG/DL — HIGH (ref 70–99)
GLUCOSE UR QL: NEGATIVE — SIGNIFICANT CHANGE UP
HCT VFR BLD CALC: 31.6 % — LOW (ref 37–47)
HGB BLD-MCNC: 10 G/DL — LOW (ref 12–16)
HYALINE CASTS # UR AUTO: 2 /LPF — SIGNIFICANT CHANGE UP (ref 0–7)
IMM GRANULOCYTES NFR BLD AUTO: 1.1 % — HIGH (ref 0.1–0.3)
INR BLD: 1.23 RATIO — SIGNIFICANT CHANGE UP (ref 0.65–1.3)
KETONES UR-MCNC: NEGATIVE — SIGNIFICANT CHANGE UP
LEUKOCYTE ESTERASE UR-ACNC: NEGATIVE — SIGNIFICANT CHANGE UP
LYMPHOCYTES # BLD AUTO: 1.23 K/UL — SIGNIFICANT CHANGE UP (ref 1.2–3.4)
LYMPHOCYTES # BLD AUTO: 6.1 % — LOW (ref 20.5–51.1)
MAGNESIUM SERPL-MCNC: 2.4 MG/DL — SIGNIFICANT CHANGE UP (ref 1.8–2.4)
MCHC RBC-ENTMCNC: 29.4 PG — SIGNIFICANT CHANGE UP (ref 27–31)
MCHC RBC-ENTMCNC: 31.6 G/DL — LOW (ref 32–37)
MCV RBC AUTO: 92.9 FL — SIGNIFICANT CHANGE UP (ref 81–99)
MONOCYTES # BLD AUTO: 2.5 K/UL — HIGH (ref 0.1–0.6)
MONOCYTES NFR BLD AUTO: 12.5 % — HIGH (ref 1.7–9.3)
NEUTROPHILS # BLD AUTO: 15.67 K/UL — HIGH (ref 1.4–6.5)
NEUTROPHILS NFR BLD AUTO: 78.2 % — HIGH (ref 42.2–75.2)
NITRITE UR-MCNC: NEGATIVE — SIGNIFICANT CHANGE UP
NRBC # BLD: 0 /100 WBCS — SIGNIFICANT CHANGE UP (ref 0–0)
PH UR: 6 — SIGNIFICANT CHANGE UP (ref 5–8)
PLATELET # BLD AUTO: 339 K/UL — SIGNIFICANT CHANGE UP (ref 130–400)
POTASSIUM SERPL-MCNC: 4.7 MMOL/L — SIGNIFICANT CHANGE UP (ref 3.5–5)
POTASSIUM SERPL-SCNC: 4.7 MMOL/L — SIGNIFICANT CHANGE UP (ref 3.5–5)
PROT SERPL-MCNC: 5.5 G/DL — LOW (ref 6–8)
PROT UR-MCNC: ABNORMAL
PROTHROM AB SERPL-ACNC: 14.1 SEC — HIGH (ref 9.95–12.87)
RBC # BLD: 3.4 M/UL — LOW (ref 4.2–5.4)
RBC # FLD: 19.1 % — HIGH (ref 11.5–14.5)
RBC CASTS # UR COMP ASSIST: 9 /HPF — HIGH (ref 0–4)
SODIUM SERPL-SCNC: 139 MMOL/L — SIGNIFICANT CHANGE UP (ref 135–146)
SP GR SPEC: 1.02 — SIGNIFICANT CHANGE UP (ref 1.01–1.03)
UROBILINOGEN FLD QL: SIGNIFICANT CHANGE UP
WBC # BLD: 20.05 K/UL — HIGH (ref 4.8–10.8)
WBC # FLD AUTO: 20.05 K/UL — HIGH (ref 4.8–10.8)
WBC UR QL: 1 /HPF — SIGNIFICANT CHANGE UP (ref 0–5)

## 2021-02-21 PROCEDURE — 93010 ELECTROCARDIOGRAM REPORT: CPT

## 2021-02-21 PROCEDURE — 71045 X-RAY EXAM CHEST 1 VIEW: CPT | Mod: 26

## 2021-02-21 PROCEDURE — 99233 SBSQ HOSP IP/OBS HIGH 50: CPT

## 2021-02-21 RX ORDER — HEPARIN SODIUM 5000 [USP'U]/ML
5000 INJECTION INTRAVENOUS; SUBCUTANEOUS EVERY 12 HOURS
Refills: 0 | Status: DISCONTINUED | OUTPATIENT
Start: 2021-02-21 | End: 2021-03-01

## 2021-02-21 RX ADMIN — Medication 500 MICROGRAM(S): at 00:51

## 2021-02-21 RX ADMIN — Medication 325 MILLIGRAM(S): at 11:18

## 2021-02-21 RX ADMIN — Medication 40 MILLIGRAM(S): at 05:23

## 2021-02-21 RX ADMIN — SIMETHICONE 80 MILLIGRAM(S): 80 TABLET, CHEWABLE ORAL at 22:03

## 2021-02-21 RX ADMIN — Medication 50 MILLIGRAM(S): at 05:28

## 2021-02-21 RX ADMIN — AMIODARONE HYDROCHLORIDE 400 MILLIGRAM(S): 400 TABLET ORAL at 05:23

## 2021-02-21 RX ADMIN — METFORMIN HYDROCHLORIDE 500 MILLIGRAM(S): 850 TABLET ORAL at 17:27

## 2021-02-21 RX ADMIN — AMIODARONE HYDROCHLORIDE 400 MILLIGRAM(S): 400 TABLET ORAL at 13:49

## 2021-02-21 RX ADMIN — PANTOPRAZOLE SODIUM 40 MILLIGRAM(S): 20 TABLET, DELAYED RELEASE ORAL at 07:58

## 2021-02-21 RX ADMIN — SIMETHICONE 80 MILLIGRAM(S): 80 TABLET, CHEWABLE ORAL at 05:24

## 2021-02-21 RX ADMIN — Medication 600 MILLIGRAM(S): at 17:27

## 2021-02-21 RX ADMIN — AMIODARONE HYDROCHLORIDE 400 MILLIGRAM(S): 400 TABLET ORAL at 22:03

## 2021-02-21 RX ADMIN — Medication 100 GRAM(S): at 05:28

## 2021-02-21 RX ADMIN — Medication 600 MILLIGRAM(S): at 05:23

## 2021-02-21 RX ADMIN — METFORMIN HYDROCHLORIDE 500 MILLIGRAM(S): 850 TABLET ORAL at 07:58

## 2021-02-21 RX ADMIN — Medication 50 MILLIGRAM(S): at 17:27

## 2021-02-21 RX ADMIN — HEPARIN SODIUM 5000 UNIT(S): 5000 INJECTION INTRAVENOUS; SUBCUTANEOUS at 05:24

## 2021-02-21 RX ADMIN — AMLODIPINE BESYLATE 5 MILLIGRAM(S): 2.5 TABLET ORAL at 05:22

## 2021-02-21 RX ADMIN — ATORVASTATIN CALCIUM 40 MILLIGRAM(S): 80 TABLET, FILM COATED ORAL at 22:03

## 2021-02-21 RX ADMIN — CHLORHEXIDINE GLUCONATE 1 APPLICATION(S): 213 SOLUTION TOPICAL at 05:27

## 2021-02-21 RX ADMIN — POLYETHYLENE GLYCOL 3350 17 GRAM(S): 17 POWDER, FOR SOLUTION ORAL at 11:18

## 2021-02-21 RX ADMIN — ESCITALOPRAM OXALATE 20 MILLIGRAM(S): 10 TABLET, FILM COATED ORAL at 11:18

## 2021-02-21 RX ADMIN — HEPARIN SODIUM 5000 UNIT(S): 5000 INJECTION INTRAVENOUS; SUBCUTANEOUS at 22:03

## 2021-02-21 RX ADMIN — SIMETHICONE 80 MILLIGRAM(S): 80 TABLET, CHEWABLE ORAL at 13:48

## 2021-02-21 RX ADMIN — Medication 100 GRAM(S): at 17:27

## 2021-02-21 NOTE — ASSESSMENT
[FreeTextEntry1] : Ms. ADRIAN SAMUELS 70 year F arrives today for evaluation of their Aortic Stenosis. Symptoms include SOB which has recently improved. NYHA class II. All questions and concerns were addressed with the patient. Pre-op planning was discussed with the patient. \par \par Plan:\par Echocardiogram reviewed, symptoms may actually be coming from her moderate mitral regurgitation\par Will obtain further eval with DERRICK\par Advised ER if develops chest pain\par F/U with Cardiologist \par F/U with PMD for routine medical care \par \par I, Christi Ventura FNP-BC, am acting as scribe for Dr. Maciel

## 2021-02-21 NOTE — END OF VISIT
[FreeTextEntry3] : Seen / examined and above reviewed.\par \par Progressive valve disease.\par Comorbidities as above.\par \par Exertional dyspnea.  Modest improvement with significant weight loss, but persistent.\par Uses walker post hip surgery.\par BP mildly elevated.\par ECHO reviewed: nL LVSF.  Mild AS.  Moderate to severe AS (posterior).\par \par May have severe symptomatic aortic stenosis.  Also at risk for CAD.\par Plan DERRICK / Cath to further evaluate MR and assess for CAD.\par Discussed with Dr. Porras.

## 2021-02-21 NOTE — REASON FOR VISIT
[Consultation] : a consultation regarding [Aortic Stenosis] : aortic stenosis [FreeTextEntry2] : Aortic Stenosis [FreeTextEntry1] : Ms. ADRIAN SAMUELS 70 year F, former smoker,  arrives  today for evaluation of their Aortic Stenosis.  Patient PMH include HTN, HLD, Lymphedema, Sciatica, Spinal Stenosis,JUAN PABLO on CPAP at night, glaucoma.  Symptoms include SOB which has recently improved.  Recent Echocardiogram showed peak/mean gradient of 20.2/9.68 with ELIAS 0.9 and moderate MR.  NYHA class II. Here for consultation of her AS.\par \par Has lost a total of 60 lb prior to her 2 recent hip surgery July 2020 and October 2020\par \par Her healthcare teams is as follows:\par PMD: Dr. Gardiner \par Cardio: Dr. David\par Pulm: Dr. Mcelroy

## 2021-02-21 NOTE — PROGRESS NOTE ADULT - SUBJECTIVE AND OBJECTIVE BOX
CTU Attending Progress Daily Note     21 Feb 2021 11:59  POD# - CABG x1 MV repair   He has history of Glaucoma    Chronic sciatica    H/O sleep apnea    Aortic stenosis    Type 2 diabetes mellitus without complication, without long-term current use of insulin    Hyperlipidemia, unspecified hyperlipidemia type    Hypertension, unspecified type      Interval event for past 24 hr:  ADRIAN SAMUELS  70y had no event.   Current Complains:  ADRIAN SAMUELS has no new complains  HPI:  69 yo F h/o HTN, DLD, has JACOME on TTE Mod AS by gradient, and mod to severe MR here for DERRICK and RHC/LHC   (09 Feb 2021 09:38)    OBJECTIVE:  ICU Vital Signs Last 24 Hrs  T(C): 37.3 (21 Feb 2021 08:00), Max: 37.3 (21 Feb 2021 08:00)  T(F): 99.2 (21 Feb 2021 08:00), Max: 99.2 (21 Feb 2021 08:00)  HR: 70 (21 Feb 2021 08:00) (64 - 84)  BP: 179/73 (21 Feb 2021 08:00) (96/74 - 179/73)  BP(mean): 105 (21 Feb 2021 08:00) (72 - 105)  ABP: --  ABP(mean): --  RR: 32 (21 Feb 2021 08:00) (22 - 41)  SpO2: 100% (21 Feb 2021 08:00) (93% - 100%)    I&O's Summary    20 Feb 2021 07:01  -  21 Feb 2021 07:00  --------------------------------------------------------  IN: 920 mL / OUT: 1950 mL / NET: -1030 mL    21 Feb 2021 07:01  -  21 Feb 2021 11:59  --------------------------------------------------------  IN: 360 mL / OUT: 300 mL / NET: 60 mL      I&O's Detail    20 Feb 2021 07:01  -  21 Feb 2021 07:00  --------------------------------------------------------  IN:    IV PiggyBack: 200 mL    Oral Fluid: 720 mL  Total IN: 920 mL    OUT:    Voided (mL): 1950 mL  Total OUT: 1950 mL    Total NET: -1030 mL      21 Feb 2021 07:01  -  21 Feb 2021 11:59  --------------------------------------------------------  IN:    Oral Fluid: 360 mL  Total IN: 360 mL    OUT:    Voided (mL): 300 mL  Total OUT: 300 mL    Total NET: 60 mL        Adult Advanced Hemodynamics Last 24 Hrs  CVP(mm Hg): --  CVP(cm H2O): --  CO: --  CI: --  PA: --  PA(mean): --  PCWP: --  SVR: --  SVRI: --  PVR: --  PVRI: --    CAPILLARY BLOOD GLUCOSE      POCT Blood Glucose.: 138 mg/dL (21 Feb 2021 11:01)  POCT Blood Glucose.: 121 mg/dL (21 Feb 2021 06:45)  POCT Blood Glucose.: 121 mg/dL (21 Feb 2021 00:18)  POCT Blood Glucose.: 105 mg/dL (20 Feb 2021 16:07)    LABS:  ABG - ( 20 Feb 2021 09:13 )  pH, Arterial: 7.43  pH, Blood: x     /  pCO2: 43    /  pO2: 98    / HCO3: 29    / Base Excess: 3.8   /  SaO2: 98                                      8.6    17.70 )-----------( 237      ( 20 Feb 2021 02:00 )             26.5     02-20    138  |  100  |  35<H>  ----------------------------<  91  3.9   |  25  |  1.0    Ca    9.1      20 Feb 2021 02:00  Mg     2.6     02-20    TPro  5.1<L>  /  Alb  3.4<L>  /  TBili  1.6<H>  /  DBili  x   /  AST  28  /  ALT  64<H>  /  AlkPhos  63  02-20    PT/INR - ( 20 Feb 2021 02:00 )   PT: 14.40 sec;   INR: 1.25 ratio         PTT - ( 20 Feb 2021 02:00 )  PTT:26.7 sec      Home Medications:  aspirin 325 mg oral tablet: orally once a day (09 Feb 2021 09:55)  lisinopril-hydrochlorothiazide 20 mg-12.5 mg oral tablet: 1 tab(s) orally once a day (09 Feb 2021 09:55)  meloxicam: orally once a day (09 Feb 2021 09:55)  metFORMIN 500 mg oral tablet: orally once a day (09 Feb 2021 09:55)  metoprolol tartrate 25 mg oral tablet: 1 tab(s) orally 2 times a day (09 Feb 2021 09:55)  oxybutynin 10 mg/24 hr oral tablet, extended release: 1 tab(s) orally once a day (09 Feb 2021 09:55)  pantoprazole 40 mg oral delayed release tablet: 1 tab(s) orally once a day (09 Feb 2021 09:55)    HOSPITAL MEDICATIONS:  MEDICATIONS  (STANDING):  albumin human  5% IVPB 500 milliLiter(s) IV Intermittent once  aMIOdarone    Tablet 400 milliGRAM(s) Oral every 8 hours  aMIOdarone    Tablet   Oral   amLODIPine   Tablet 5 milliGRAM(s) Oral daily  aspirin 325 milliGRAM(s) Oral daily  atorvastatin 40 milliGRAM(s) Oral at bedtime  chlorhexidine 4% Liquid 1 Application(s) Topical <User Schedule>  dextrose 40% Gel 15 Gram(s) Oral once  dextrose 5%. 1000 milliLiter(s) (50 mL/Hr) IV Continuous <Continuous>  dextrose 5%. 1000 milliLiter(s) (100 mL/Hr) IV Continuous <Continuous>  dextrose 50% Injectable 25 Gram(s) IV Push once  dextrose 50% Injectable 12.5 Gram(s) IV Push once  dextrose 50% Injectable 25 Gram(s) IV Push once  escitalopram 20 milliGRAM(s) Oral daily  furosemide    Tablet 40 milliGRAM(s) Oral daily  glucagon  Injectable 1 milliGRAM(s) IntraMuscular once  guaiFENesin  milliGRAM(s) Oral every 12 hours  heparin   Injectable 5000 Unit(s) SubCutaneous every 8 hours  insulin lispro (ADMELOG) corrective regimen sliding scale   SubCutaneous three times a day before meals  ipratropium    for Nebulization 500 MICROGram(s) Nebulizer every 6 hours  magnesium sulfate  IVPB 1 Gram(s) IV Intermittent every 12 hours  metFORMIN 500 milliGRAM(s) Oral two times a day with meals  metoprolol tartrate 50 milliGRAM(s) Oral every 12 hours  pantoprazole    Tablet 40 milliGRAM(s) Oral before breakfast  polyethylene glycol 3350 17 Gram(s) Oral daily  potassium chloride    Tablet ER 20 milliEquivalent(s) Oral daily  simethicone 80 milliGRAM(s) Chew three times a day  sodium chloride 0.9%. 1000 milliLiter(s) (20 mL/Hr) IV Continuous <Continuous>    MEDICATIONS  (PRN):  hydrALAZINE Injectable 10 milliGRAM(s) IV Push every 4 hours PRN SBP > 155      REVIEW OF SYSTEMS:  CONSTITUTIONAL: [X] all negative; [ ] weakness, [ ] fevers, [ ] chills  EYES/ENT: [X] all negative; [ ] visual changes, [ ] vertigo, [ ] throat pain   NECK: [X] all negative; [ ] pain, [ ] stiffness  RESPIRATORY: [] all negative, [ ] cough, [ ] wheezing, [ ] hemoptysis, [ ] shortness of breath  CARDIOVASCULAR: [] all negative; [ ] chest pain, [ ] palpitations, [ ] orthopnea  GASTROINTESTINAL: [X] all negative; [ ]abdominal pain, [ ] nausea, [ ] vomiting, [ ] hematemesis, [ ] diarrhea, [ ] constipation, [ ] melena, [ ] hematochezia.  GENITOURINARY: [X] all negative; [ ] dysuria, [ ] frequency, [ ] hematuria  NEUROLOGICAL: [X] all negative; [ ] numbness, [ ] weakness  SKIN: [X] all negative; [ ] itching, [ ] burning, [ ] rashes, [ ] lesions   All other review of systems is negative unless indicated above.    [  ] Unable to assess ROS because     PHYSICAL EXAM:          CONSTITUTIONAL: Well-developed; well-nourished; in no acute distress.   	SKIN: warm, dry  	HEAD: Normocephalic; atraumatic.  	EYES: PERRL, EOM, no conj injection, sclera clear  	ENT: No nasal discharge; airway clear.  	NECK: Supple; non tender.  No midline ttp ctls  	CARD: S1, S2 normal; no murmurs, gallops, or rubs. Regular rate and rhythm. 2+ RPs and DPs bilat, no carotid bruits, no pedal   edema, no calf pain b/l  	RESP: CTA  bilat good air movement No wheezes, rales or rhonchi.  	ABD: Soft, not tender, not distended, no CVA ttp no rebound or guarding, bowel sounds present  	EXT: Normal ROM.  No clubbing, cyanosis or edema.   	  	NEURO: Alert, awake, motor 5/5 R, 5/5 L        RADIOLOGY:  xray    I spent 45 minutes of critical care time examining patient, reviewing vitals, labs, medications, imaging and discussing with the team goals of care to prevent life-threatening in this patient who is at high risk for deterioration or death due to:

## 2021-02-21 NOTE — PROGRESS NOTE ADULT - NUTRITIONAL ASSESSMENT
This patient has been assessed with a concern for Malnutrition and has been determined to have a diagnosis/diagnoses of Morbid obesity (BMI > 40).    This patient is being managed with:   Diet Consistent Carbohydrate Renal/No Snacks-  Entered: Feb 17 2021 12:07PM    

## 2021-02-21 NOTE — PROGRESS NOTE ADULT - SUBJECTIVE AND OBJECTIVE BOX
OPERATIVE PROCEDURE(s):                POD #  9 s/p MV repair with PO right hemothorax, respiratory failure and atrial fibrillation                     70yFemale  SURGEON(s): FIONA Rojas  SUBJECTIVE ASSESSMENT: doing better less pain  Vital Signs Last 24 Hrs  T(F): 99 (2021 04:00), Max: 99 (2021 12:00)  HR: 66 (2021 07:00) (64 - 84)  BP: 160/67 (2021 07:00) (96/74 - 179/69)  BP(mean): 97 (2021 07:00) (72 - 103)  ABP: 139/59 (2021 10:00) (120/42 - 139/59)  ABP(mean): 84 (2021 10:00)  RR: 29 (2021 07:00) (22 - 42)  SpO2: 99% (2021 07:00) (93% - 100%)    I&O's Detail    2021 07:01  -  2021 07:00  --------------------------------------------------------  IN:    IV PiggyBack: 200 mL    Oral Fluid: 720 mL  Total IN: 920 mL    OUT:    Voided (mL): 1950 mL  Total OUT: 1950 mL        Net:   I&O's Detail    2021 07:01  -  2021 07:00  --------------------------------------------------------  Total NET: -363.3 mL      2021 07:01  -  2021 07:00  --------------------------------------------------------  Total NET: -1030 mL        CAPILLARY BLOOD GLUCOSE      POCT Blood Glucose.: 121 mg/dL (2021 06:45)  POCT Blood Glucose.: 121 mg/dL (2021 00:18)  POCT Blood Glucose.: 105 mg/dL (2021 16:07)  POCT Blood Glucose.: 105 mg/dL (2021 10:59)    Physical Exam:  General: NAD; A&Ox3  Cardiac: S1/S2, RRR, no murmur, no rubs  Lungs: unlabored shallow respirations, bilat bs decreased at bases  Abdomen: Soft/NT  Sternum: Intact, no click, incision healing well with no drainage  Incisions: Incisions clean/dry/intact  Extremities: mil edema b/l lower extremities;     EPICARDIAL WIRES:  [] YES       LABS:                        8.6<L>  17.70<H> )-----------( 237      ( 2021 02:00 )             26.5<L>                        8.6<L>  20.80<H> )-----------( 200      ( 2021 01:35 )             25.7<L>    02-20    138  |  100  |  35<H>  ----------------------------<  91  3.9   |  25  |  1.0  02-19    133<L>  |  99  |  42<H>  ----------------------------<  140<H>  4.4   |  25  |  1.2    Ca    9.1      2021 02:00  Mg     2.6     02-20    TPro  5.1<L> [6.0 - 8.0]  /  Alb  3.4<L> [3.5 - 5.2]  /  TBili  1.6<H> [0.2 - 1.2]  /  DBili  x   /  AST  28 [0 - 41]  /  ALT  64<H> [0 - 41]  /  AlkPhos  63 [30 - 115]  02-20    PT/INR - ( 2021 02:00 )   PT: ;   INR: 1.25 ratio         PTT - ( 2021 02:00 )  PTT:26.7 sec  Urinalysis Basic - ( 2021 09:30 )    Color: Yellow / Appearance: Slightly Turbid / S.018 / pH: x  Gluc: x / Ketone: Negative  / Bili: Negative / Urobili: <2 mg/dL   Blood: x / Protein: 30 mg/dL / Nitrite: Negative   Leuk Esterase: Negative / RBC: 17 /HPF / WBC 6 /HPF   Sq Epi: x / Non Sq Epi: 8 /HPF / Bacteria: Negative      ABG - ( 2021 09:13 )  pH: 7.43  /  pCO2: 43    /  pO2: 98    / HCO3: 29    / Base Excess: 3.8   /  SaO2: 98    /  LA: 0.9      RADIOLOGY & ADDITIONAL TESTS:  CXR:  EKG:  MEDICATIONS  (STANDING):  albumin human  5% IVPB 500 milliLiter(s) IV Intermittent once  aMIOdarone    Tablet 400 milliGRAM(s) Oral every 8 hours  aMIOdarone    Tablet   Oral   amLODIPine   Tablet 5 milliGRAM(s) Oral daily  aspirin 325 milliGRAM(s) Oral daily  atorvastatin 40 milliGRAM(s) Oral at bedtime  chlorhexidine 4% Liquid 1 Application(s) Topical <User Schedule>  dextrose 40% Gel 15 Gram(s) Oral once  dextrose 5%. 1000 milliLiter(s) (50 mL/Hr) IV Continuous <Continuous>  dextrose 5%. 1000 milliLiter(s) (100 mL/Hr) IV Continuous <Continuous>  dextrose 50% Injectable 25 Gram(s) IV Push once  dextrose 50% Injectable 12.5 Gram(s) IV Push once  dextrose 50% Injectable 25 Gram(s) IV Push once  escitalopram 20 milliGRAM(s) Oral daily  furosemide    Tablet 40 milliGRAM(s) Oral daily  glucagon  Injectable 1 milliGRAM(s) IntraMuscular once  guaiFENesin  milliGRAM(s) Oral every 12 hours  heparin   Injectable 5000 Unit(s) SubCutaneous every 8 hours  insulin lispro (ADMELOG) corrective regimen sliding scale   SubCutaneous three times a day before meals  ipratropium    for Nebulization 500 MICROGram(s) Nebulizer every 6 hours  magnesium sulfate  IVPB 1 Gram(s) IV Intermittent every 12 hours  metFORMIN 500 milliGRAM(s) Oral two times a day with meals  metoprolol tartrate 50 milliGRAM(s) Oral every 12 hours  pantoprazole    Tablet 40 milliGRAM(s) Oral before breakfast  polyethylene glycol 3350 17 Gram(s) Oral daily  potassium chloride    Tablet ER 20 milliEquivalent(s) Oral daily  simethicone 80 milliGRAM(s) Chew three times a day  sodium chloride 0.9%. 1000 milliLiter(s) (20 mL/Hr) IV Continuous <Continuous>    MEDICATIONS  (PRN):  hydrALAZINE Injectable 10 milliGRAM(s) IV Push every 4 hours PRN SBP > 155    Allergies    No Known Allergies    Intolerances      Ambulation/Activity Status: ambulate as tolerated    Assessment/Plan:  70y Female status-post Mitral Valve repair and CABG with PO course of          acute PO blood loss anemia         right hemothorax - s/p CT's         hypoxic respiratory failure requiring intubation         atrial fibrillation with RVR requiring cardioversions and pharmacologic medications  - Case and plan discussed with CTU Intensivist and CT Surgeon - Dr. Rojas/Edgar/Patricia  - Continue CTU supportive care    - Continue DVT/GI prophylaxis  - Incentive Spirometry 10 times an hour  - Continue to advance physical activity as tolerated and continue PT/OT as directed  1. CAD s/p CABG: Cont ASA, statin and bb  2. Post-op afib:  amio PO taper ,   3. Hypoxia secondary to respiratory failure: Pt weaned of BiPAP yesterday. Continue to wean off nasal cannula as tolerated   4. DM/Glucose Control:  metformin 500, A1c 6.1  5 HTN: Continue hydralazine, norvasc, increase metoprolol as needed  6. Leukocytosis- Broad spectrum abx completed yesterday. WBC down today to 17.7 from 20.8. Pt currently afebrile.   7. Anemia secondary to acute surgical blood loss - H/H decreased but stable s/p 2 U prbc  (8.6/25.7); Order IV iron and will continue to monitor cbc  8. remove wires today  9. Anticipate d/c tomorrow   OPERATIVE PROCEDURE(s):                POD #  9 s/p MV repair with PO right hemothorax, respiratory failure and atrial fibrillation                     70yFemale  SURGEON(s): FIONA Rojas  SUBJECTIVE ASSESSMENT: doing better less pain, but still some sob  Vital Signs Last 24 Hrs  T(F): 99 (2021 04:00), Max: 99 (2021 12:00)  HR: 66 (2021 07:00) (64 - 84)  BP: 160/67 (2021 07:00) (96/74 - 179/69)  BP(mean): 97 (2021 07:00) (72 - 103)  ABP: 139/59 (2021 10:00) (120/42 - 139/59)  ABP(mean): 84 (2021 10:00)  RR: 29 (2021 07:00) (22 - 42)  SpO2: 99% (2021 07:00) (93% - 100%)    I&O's Detail    2021 07:01  -  2021 07:00  --------------------------------------------------------  IN:    IV PiggyBack: 200 mL    Oral Fluid: 720 mL  Total IN: 920 mL    OUT:    Voided (mL): 1950 mL  Total OUT: 1950 mL    Net:   I&O's Detail    2021 07:01  -  2021 07:00  --------------------------------------------------------  Total NET: -363.3 mL      2021 07:01  -  2021 07:00  --------------------------------------------------------  Total NET: -1030 mL    CAPILLARY BLOOD GLUCOSE      POCT Blood Glucose.: 121 mg/dL (2021 06:45)  POCT Blood Glucose.: 121 mg/dL (2021 00:18)  POCT Blood Glucose.: 105 mg/dL (2021 16:07)  POCT Blood Glucose.: 105 mg/dL (2021 10:59)    Physical Exam:  General: NAD; A&Ox3  Cardiac: S1/S2, RRR, no murmur, no rubs  Lungs: unlabored shallow respirations, bilat bs decreased at bases  Abdomen: Soft/NT  Sternum: Intact, no click, incision healing well with no drainage, large amount of echymosis  Incisions: Incisions clean/dry/intact  Extremities: mil edema b/l lower extremities;     EPICARDIAL WIRES:  [] YES       LABS:    LABS:  CAPILLARY BLOOD GLUCOSE      POCT Blood Glucose.: 138 mg/dL (2021 11:01)  POCT Blood Glucose.: 121 mg/dL (2021 06:45)  POCT Blood Glucose.: 121 mg/dL (2021 00:18)  POCT Blood Glucose.: 105 mg/dL (2021 16:07)                          10.0   20.05 )-----------( 339      ( 2021 11:43 )             31.6       02-    139  |  101  |  31<H>  ----------------------------<  122<H>  4.7   |  30  |  1.1    Ca    9.4      2021 11:43  Mg     2.4     -21    TPro  5.5<L>  /  Alb  3.5  /  TBili  1.8<H>  /  DBili  x   /  AST  20  /  ALT  44<H>  /  AlkPhos  71  02-21      PT/INR - ( 2021 11:43 )   PT: 14.10 sec;   INR: 1.23 ratio         PTT - ( 2021 11:43 )  PTT:27.3 sec                         8.6<L>  17.70<H> )-----------( 237      ( 2021 02:00 )             26.5<L>                        8.6<L>  20.80<H> )-----------( 200      ( 2021 01:35 )             25.7<L>    02-20    138  |  100  |  35<H>  ----------------------------<  91  3.9   |  25  |  1.0  02-    133<L>  |  99  |  42<H>  ----------------------------<  140<H>  4.4   |  25  |  1.2    Ca    9.1      2021 02:00  Mg     2.6     02-20    TPro  5.1<L> [6.0 - 8.0]  /  Alb  3.4<L> [3.5 - 5.2]  /  TBili  1.6<H> [0.2 - 1.2]  /  DBili  x   /  AST  28 [0 - 41]  /  ALT  64<H> [0 - 41]  /  AlkPhos  63 [30 - 115]  02-20    PT/INR - ( 2021 02:00 )   PT: ;   INR: 1.25 ratio         PTT - ( 2021 02:00 )  PTT:26.7 sec  Urinalysis Basic - ( 2021 09:30 )    Color: Yellow / Appearance: Slightly Turbid / S.018 / pH: x  Gluc: x / Ketone: Negative  / Bili: Negative / Urobili: <2 mg/dL   Blood: x / Protein: 30 mg/dL / Nitrite: Negative   Leuk Esterase: Negative / RBC: 17 /HPF / WBC 6 /HPF   Sq Epi: x / Non Sq Epi: 8 /HPF / Bacteria: Negative      ABG - ( 2021 09:13 )  pH: 7.43  /  pCO2: 43    /  pO2: 98    / HCO3: 29    / Base Excess: 3.8   /  SaO2: 98    /  LA: 0.9      RADIOLOGY & ADDITIONAL TESTS:  CXR: < from: Xray Chest 1 View- PORTABLE-Routine (Xray Chest 1 View- PORTABLE-Routine in AM.) (21 @ 03:20) >  Bilateral lower lobe opacity/pleural effusion unchanged. No air leak.    < end of copied text >  < from: 12 Lead ECG (21 @ 07:28) >  Ventricular Rate 69 BPM    Atrial Rate 69 BPM    P-R Interval 146 ms    QRS Duration 100 ms    Q-T Interval 454 ms    QTC Calculation(Bazett) 486 ms    P Axis 27 degrees    R Axis -11 degrees    T Axis 62 degrees    Diagnosis Line Sinus rhythm withfrequent Premature ventricular complexes  Nonspecific T wave abnormality  Abnormal ECG    < end of copied text >    MEDICATIONS  (STANDING):  albumin human  5% IVPB 500 milliLiter(s) IV Intermittent once  aMIOdarone    Tablet 400 milliGRAM(s) Oral every 8 hours  aMIOdarone    Tablet   Oral   amLODIPine   Tablet 5 milliGRAM(s) Oral daily  aspirin 325 milliGRAM(s) Oral daily  atorvastatin 40 milliGRAM(s) Oral at bedtime  chlorhexidine 4% Liquid 1 Application(s) Topical <User Schedule>  dextrose 40% Gel 15 Gram(s) Oral once  dextrose 5%. 1000 milliLiter(s) (50 mL/Hr) IV Continuous <Continuous>  dextrose 5%. 1000 milliLiter(s) (100 mL/Hr) IV Continuous <Continuous>  dextrose 50% Injectable 25 Gram(s) IV Push once  dextrose 50% Injectable 12.5 Gram(s) IV Push once  dextrose 50% Injectable 25 Gram(s) IV Push once  escitalopram 20 milliGRAM(s) Oral daily  furosemide    Tablet 40 milliGRAM(s) Oral daily  glucagon  Injectable 1 milliGRAM(s) IntraMuscular once  guaiFENesin  milliGRAM(s) Oral every 12 hours  heparin   Injectable 5000 Unit(s) SubCutaneous every 8 hours  insulin lispro (ADMELOG) corrective regimen sliding scale   SubCutaneous three times a day before meals  ipratropium    for Nebulization 500 MICROGram(s) Nebulizer every 6 hours  magnesium sulfate  IVPB 1 Gram(s) IV Intermittent every 12 hours  metFORMIN 500 milliGRAM(s) Oral two times a day with meals  metoprolol tartrate 50 milliGRAM(s) Oral every 12 hours  pantoprazole    Tablet 40 milliGRAM(s) Oral before breakfast  polyethylene glycol 3350 17 Gram(s) Oral daily  potassium chloride    Tablet ER 20 milliEquivalent(s) Oral daily  simethicone 80 milliGRAM(s) Chew three times a day  sodium chloride 0.9%. 1000 milliLiter(s) (20 mL/Hr) IV Continuous <Continuous>    MEDICATIONS  (PRN):  hydrALAZINE Injectable 10 milliGRAM(s) IV Push every 4 hours PRN SBP > 155    Allergies    No Known Allergies    Intolerances      Ambulation/Activity Status: ambulate as tolerated    Assessment/Plan:  70y Female status-post Mitral Valve repair and CABG with PO course of          acute PO blood loss anemia         right hemothorax - s/p CT's         hypoxic respiratory failure requiring intubation         atrial fibrillation with RVR requiring cardioversions and pharmacologic medications  - Case and plan discussed with CTU Intensivist and CT Surgeon - Dr. Rojas/Edgar/Patricia  - Continue CTU supportive care    - Continue DVT/GI prophylaxis  - Incentive Spirometry 10 times an hour  - Continue to advance physical activity as tolerated and continue PT/OT as directed  1. CAD s/p CABG: Cont ASA, statin and bb  2. Post-op afib:  amio PO taper ,  up form 424  3. Hypoxia secondary to respiratory failure: Continue to wean off nasal cannula as tolerated   4. DM/Glucose Control:  metformin 500, A1c 6.1  5 HTN: Continue hydralazine, norvasc, increase metoprolol as needed  6. Leukocytosis- Broad spectrum abx completed 2 days ago. WBC up  today to 17.7 from 20.8. Pt currently afebrile.   7. Anemia secondary to acute surgical blood loss - H/H decreased but stable s/p 2 U prbc  (8.6/25.7); Order IV iron and will continue to monitor cbc,   8. remove wires today  9. Anticipate d/c 24-48 hours

## 2021-02-21 NOTE — PROGRESS NOTE ADULT - NSHPATTENDINGPLANDISCUSS_GEN_ALL_CORE
CTU team
CTU team
surgeons and Delma Matthew
patient and family
entire team , patient and her sister

## 2021-02-21 NOTE — PHYSICAL EXAM
[General Appearance - Well Developed] : well developed [Normal Appearance] : normal appearance [General Appearance - Well Nourished] : well nourished [Heart Rate And Rhythm] : heart rate and rhythm were normal [Heart Sounds] : normal S1 and S2 [] : no respiratory distress [Respiration, Rhythm And Depth] : normal respiratory rhythm and effort [Exaggerated Use Of Accessory Muscles For Inspiration] : no accessory muscle use [Bowel Sounds] : normal bowel sounds [Abdomen Soft] : soft [Abnormal Walk] : normal gait [Nail Clubbing] : no clubbing of the fingernails [Skin Color & Pigmentation] : normal skin color and pigmentation [Oriented To Time, Place, And Person] : oriented to person, place, and time [FreeTextEntry1] : LE Lymphedema

## 2021-02-21 NOTE — HISTORY OF PRESENT ILLNESS
[FreeTextEntry1] : Echo 11/2020\par EF 57%\par Peak Gradients 20.22 MEan 9.68\par ELIAS 0.9 cm2.\par Moderate Mitral Regurgitation

## 2021-02-22 LAB
ALBUMIN SERPL ELPH-MCNC: 3.3 G/DL — LOW (ref 3.5–5.2)
ALP SERPL-CCNC: 65 U/L — SIGNIFICANT CHANGE UP (ref 30–115)
ALT FLD-CCNC: 36 U/L — SIGNIFICANT CHANGE UP (ref 0–41)
ANION GAP SERPL CALC-SCNC: 10 MMOL/L — SIGNIFICANT CHANGE UP (ref 7–14)
AST SERPL-CCNC: 19 U/L — SIGNIFICANT CHANGE UP (ref 0–41)
BASOPHILS # BLD AUTO: 0.04 K/UL — SIGNIFICANT CHANGE UP (ref 0–0.2)
BASOPHILS NFR BLD AUTO: 0.2 % — SIGNIFICANT CHANGE UP (ref 0–1)
BILIRUB SERPL-MCNC: 1.9 MG/DL — HIGH (ref 0.2–1.2)
BUN SERPL-MCNC: 30 MG/DL — HIGH (ref 10–20)
CALCIUM SERPL-MCNC: 9.1 MG/DL — SIGNIFICANT CHANGE UP (ref 8.5–10.1)
CHLORIDE SERPL-SCNC: 101 MMOL/L — SIGNIFICANT CHANGE UP (ref 98–110)
CO2 SERPL-SCNC: 27 MMOL/L — SIGNIFICANT CHANGE UP (ref 17–32)
CREAT SERPL-MCNC: 0.8 MG/DL — SIGNIFICANT CHANGE UP (ref 0.7–1.5)
EOSINOPHIL # BLD AUTO: 0.42 K/UL — SIGNIFICANT CHANGE UP (ref 0–0.7)
EOSINOPHIL NFR BLD AUTO: 2.2 % — SIGNIFICANT CHANGE UP (ref 0–8)
GLUCOSE BLDC GLUCOMTR-MCNC: 108 MG/DL — HIGH (ref 70–99)
GLUCOSE BLDC GLUCOMTR-MCNC: 109 MG/DL — HIGH (ref 70–99)
GLUCOSE BLDC GLUCOMTR-MCNC: 125 MG/DL — HIGH (ref 70–99)
GLUCOSE BLDC GLUCOMTR-MCNC: 151 MG/DL — HIGH (ref 70–99)
GLUCOSE SERPL-MCNC: 113 MG/DL — HIGH (ref 70–99)
HCT VFR BLD CALC: 30.7 % — LOW (ref 37–47)
HGB BLD-MCNC: 9.8 G/DL — LOW (ref 12–16)
IMM GRANULOCYTES NFR BLD AUTO: 1.1 % — HIGH (ref 0.1–0.3)
LYMPHOCYTES # BLD AUTO: 1.26 K/UL — SIGNIFICANT CHANGE UP (ref 1.2–3.4)
LYMPHOCYTES # BLD AUTO: 6.5 % — LOW (ref 20.5–51.1)
MAGNESIUM SERPL-MCNC: 2.1 MG/DL — SIGNIFICANT CHANGE UP (ref 1.8–2.4)
MCHC RBC-ENTMCNC: 29.8 PG — SIGNIFICANT CHANGE UP (ref 27–31)
MCHC RBC-ENTMCNC: 31.9 G/DL — LOW (ref 32–37)
MCV RBC AUTO: 93.3 FL — SIGNIFICANT CHANGE UP (ref 81–99)
MONOCYTES # BLD AUTO: 2.24 K/UL — HIGH (ref 0.1–0.6)
MONOCYTES NFR BLD AUTO: 11.6 % — HIGH (ref 1.7–9.3)
NEUTROPHILS # BLD AUTO: 15.11 K/UL — HIGH (ref 1.4–6.5)
NEUTROPHILS NFR BLD AUTO: 78.4 % — HIGH (ref 42.2–75.2)
NRBC # BLD: 0 /100 WBCS — SIGNIFICANT CHANGE UP (ref 0–0)
PLATELET # BLD AUTO: 354 K/UL — SIGNIFICANT CHANGE UP (ref 130–400)
POTASSIUM SERPL-MCNC: 4.3 MMOL/L — SIGNIFICANT CHANGE UP (ref 3.5–5)
POTASSIUM SERPL-SCNC: 4.3 MMOL/L — SIGNIFICANT CHANGE UP (ref 3.5–5)
PROT SERPL-MCNC: 5.2 G/DL — LOW (ref 6–8)
RBC # BLD: 3.29 M/UL — LOW (ref 4.2–5.4)
RBC # FLD: 19.1 % — HIGH (ref 11.5–14.5)
SODIUM SERPL-SCNC: 138 MMOL/L — SIGNIFICANT CHANGE UP (ref 135–146)
WBC # BLD: 19.28 K/UL — HIGH (ref 4.8–10.8)
WBC # FLD AUTO: 19.28 K/UL — HIGH (ref 4.8–10.8)

## 2021-02-22 PROCEDURE — 93010 ELECTROCARDIOGRAM REPORT: CPT

## 2021-02-22 PROCEDURE — 99233 SBSQ HOSP IP/OBS HIGH 50: CPT

## 2021-02-22 PROCEDURE — 71045 X-RAY EXAM CHEST 1 VIEW: CPT | Mod: 26

## 2021-02-22 RX ORDER — CEFEPIME 1 G/1
INJECTION, POWDER, FOR SOLUTION INTRAMUSCULAR; INTRAVENOUS
Refills: 0 | Status: DISCONTINUED | OUTPATIENT
Start: 2021-02-22 | End: 2021-02-23

## 2021-02-22 RX ORDER — CEFEPIME 1 G/1
1000 INJECTION, POWDER, FOR SOLUTION INTRAMUSCULAR; INTRAVENOUS EVERY 12 HOURS
Refills: 0 | Status: DISCONTINUED | OUTPATIENT
Start: 2021-02-22 | End: 2021-02-23

## 2021-02-22 RX ORDER — CEFEPIME 1 G/1
1000 INJECTION, POWDER, FOR SOLUTION INTRAMUSCULAR; INTRAVENOUS ONCE
Refills: 0 | Status: COMPLETED | OUTPATIENT
Start: 2021-02-22 | End: 2021-02-22

## 2021-02-22 RX ORDER — VANCOMYCIN HCL 1 G
1000 VIAL (EA) INTRAVENOUS EVERY 12 HOURS
Refills: 0 | Status: DISCONTINUED | OUTPATIENT
Start: 2021-02-22 | End: 2021-02-23

## 2021-02-22 RX ADMIN — Medication 325 MILLIGRAM(S): at 11:14

## 2021-02-22 RX ADMIN — SIMETHICONE 80 MILLIGRAM(S): 80 TABLET, CHEWABLE ORAL at 21:46

## 2021-02-22 RX ADMIN — Medication 100 GRAM(S): at 17:01

## 2021-02-22 RX ADMIN — METFORMIN HYDROCHLORIDE 500 MILLIGRAM(S): 850 TABLET ORAL at 08:21

## 2021-02-22 RX ADMIN — Medication 50 MILLIGRAM(S): at 05:52

## 2021-02-22 RX ADMIN — AMIODARONE HYDROCHLORIDE 400 MILLIGRAM(S): 400 TABLET ORAL at 14:39

## 2021-02-22 RX ADMIN — SIMETHICONE 80 MILLIGRAM(S): 80 TABLET, CHEWABLE ORAL at 14:39

## 2021-02-22 RX ADMIN — CHLORHEXIDINE GLUCONATE 1 APPLICATION(S): 213 SOLUTION TOPICAL at 05:53

## 2021-02-22 RX ADMIN — Medication 100 GRAM(S): at 05:53

## 2021-02-22 RX ADMIN — HEPARIN SODIUM 5000 UNIT(S): 5000 INJECTION INTRAVENOUS; SUBCUTANEOUS at 05:53

## 2021-02-22 RX ADMIN — Medication 2: at 11:22

## 2021-02-22 RX ADMIN — HEPARIN SODIUM 5000 UNIT(S): 5000 INJECTION INTRAVENOUS; SUBCUTANEOUS at 16:34

## 2021-02-22 RX ADMIN — Medication 50 MILLIGRAM(S): at 16:33

## 2021-02-22 RX ADMIN — CEFEPIME 100 MILLIGRAM(S): 1 INJECTION, POWDER, FOR SOLUTION INTRAMUSCULAR; INTRAVENOUS at 21:48

## 2021-02-22 RX ADMIN — METFORMIN HYDROCHLORIDE 500 MILLIGRAM(S): 850 TABLET ORAL at 16:38

## 2021-02-22 RX ADMIN — PANTOPRAZOLE SODIUM 40 MILLIGRAM(S): 20 TABLET, DELAYED RELEASE ORAL at 05:53

## 2021-02-22 RX ADMIN — Medication 600 MILLIGRAM(S): at 05:52

## 2021-02-22 RX ADMIN — CEFEPIME 100 MILLIGRAM(S): 1 INJECTION, POWDER, FOR SOLUTION INTRAMUSCULAR; INTRAVENOUS at 11:13

## 2021-02-22 RX ADMIN — Medication 20 MILLIEQUIVALENT(S): at 11:16

## 2021-02-22 RX ADMIN — ATORVASTATIN CALCIUM 40 MILLIGRAM(S): 80 TABLET, FILM COATED ORAL at 21:46

## 2021-02-22 RX ADMIN — AMIODARONE HYDROCHLORIDE 400 MILLIGRAM(S): 400 TABLET ORAL at 21:46

## 2021-02-22 RX ADMIN — Medication 40 MILLIGRAM(S): at 05:53

## 2021-02-22 RX ADMIN — ESCITALOPRAM OXALATE 20 MILLIGRAM(S): 10 TABLET, FILM COATED ORAL at 11:14

## 2021-02-22 RX ADMIN — AMLODIPINE BESYLATE 5 MILLIGRAM(S): 2.5 TABLET ORAL at 05:55

## 2021-02-22 RX ADMIN — AMIODARONE HYDROCHLORIDE 400 MILLIGRAM(S): 400 TABLET ORAL at 05:53

## 2021-02-22 RX ADMIN — Medication 250 MILLIGRAM(S): at 16:34

## 2021-02-22 RX ADMIN — Medication 600 MILLIGRAM(S): at 16:34

## 2021-02-22 RX ADMIN — SIMETHICONE 80 MILLIGRAM(S): 80 TABLET, CHEWABLE ORAL at 05:52
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
## 2021-02-22 NOTE — PROGRESS NOTE ADULT - SUBJECTIVE AND OBJECTIVE BOX
CTU Attending Progress Daily Note     2021 08:01    Procedure:                                                  POD#                   Patient seen as post-op critical care follow-up    HPI:  69 yo F h/o HTN, DLD, has JACOME on TTE Mod AS by gradient, and mod to severe MR here for DERRICK and RHC/LHC   (2021 09:38)    See preop testing chart H&P    Interval event for past 24 hr:  ADRIAN SAMUELS  70y had no event.     Current Complains:  ADRIAN SAMUELS has no new complaints    REVIEW OF SYSTEMS:  CONSTITUTIONAL:  [-] weakness, [-] fevers, [-] chills  EYES/ENT: [-] visual changes, [-] vertigo, [-] throat pain   NECK: [-] pain, [-] stiffness  RESPIRATORY: [-] cough, [-] wheezing, [-] hemoptysis, [-] shortness of breath  CARDIOVASCULAR: [-] chest pain, [-] palpitations, [-] orthopnea  GASTROINTESTINAL:    [-]abdominal pain, [-] nausea, [-] vomiting, [-] hematemesis, [-] diarrhea, [-] constipation, [-] melena, [-] hematochezia.  GENITOURINARY: [-] dysuria, [-] frequency, [-] hematuria  NEUROLOGICAL: [-] numbness, [-] weakness  SKIN: [-] itching, [-] burning, [-] rashes, [-] lesions   All other review of systems is negative unless indicated above.    [  ] Unable to assess ROS because :    OBJECTIVE:  ICU Vital Signs Last 24 Hrs  T(C): 36.8 (2021 04:00), Max: 36.9 (2021 20:00)  T(F): 98.2 (2021 04:00), Max: 98.4 (2021 20:00)  HR: 61 (2021 07:00) (61 - 81)  BP: 130/62 (2021 07:00) (116/54 - 170/72)  BP(mean): 89 (2021 07:00) (78 - 120)  ABP: --  ABP(mean): --  RR: 19 (2021 07:00) (18 - 49)  SpO2: 100% (2021 07:00) (95% - 100%)      I&O's Summary    2021 07:01  -  2021 07:00  --------------------------------------------------------  IN: 820 mL / OUT: 1200 mL / NET: -380 mL      Adult Advanced Hemodynamics Last 24 Hrs  CVP(mm Hg): --  CVP(cm H2O): --  CO: --  CI: --  PA: --  PA(mean): --  PCWP: --  SVR: --  SVRI: --  PVR: --  PVRI: --      PHYSICAL EXAM:  General: WN/WD NAD    HEENT:     [+] NCAT  [+] EOMI  [-] Conjuctival edema   [-] Icterus   [-] Thrush   [-] ETT  [-] NGT/OGT    Neck:         [+] FROM   [-] JVD     [-] Nodes     [-] Masses    [+] Mid-line trachea    [-] Tracheostomy    Chest:         [-] Sternal click   [-] Sternal drainage   [+] Pacing wires   [+] Chest tubes   [-] SubQ emphysema    Lungs:          [+] CTA   [-] Rhonchi   [-] Rales    [-] Wheezing    [-] Decreased BS    [-] Dullness R L    Cardiac:       [+] S1 [+] S2    [+] RRR   [-] Irregular   [-] S3   [-] S4    [-] Murmurs    [-] Rub    Abdomen:    [+] BS    [+] Soft    [+] Non-tender     [-] Distended    [-] Organomegaly  [-] PEG    Extremities:   [-] Cyanosis U/L   [-] Clubbing  U/L  [-] LE/UE Edema   [+] Capillary refill    [+] Pulses     Neuro:        [+] Awake   [+]  Alert   [-] Confused   [-] Lethargic   [-] Sedated   [-] Generalized Weakness    Skin:        [-] Rashes    [-] Erythema   [+] Normal incisions   [+] IV sites intact   [-] Sacral decubitus    Tubes:  LINES:    CAPILLARY BLOOD GLUCOSE      POCT Blood Glucose.: 125 mg/dL (2021 06:49)    CAPILLARY BLOOD GLUCOSE      POCT Blood Glucose.: 125 mg/dL (2021 06:49)  POCT Blood Glucose.: 126 mg/dL (2021 22:08)  POCT Blood Glucose.: 128 mg/dL (2021 16:06)  POCT Blood Glucose.: 138 mg/dL (2021 11:01)      HOSPITAL MEDICATIONS:  MEDICATIONS  (STANDING):  albumin human  5% IVPB 500 milliLiter(s) IV Intermittent once  aMIOdarone    Tablet 400 milliGRAM(s) Oral every 8 hours  aMIOdarone    Tablet   Oral   amLODIPine   Tablet 5 milliGRAM(s) Oral daily  aspirin 325 milliGRAM(s) Oral daily  atorvastatin 40 milliGRAM(s) Oral at bedtime  chlorhexidine 4% Liquid 1 Application(s) Topical <User Schedule>  dextrose 40% Gel 15 Gram(s) Oral once  dextrose 5%. 1000 milliLiter(s) (50 mL/Hr) IV Continuous <Continuous>  dextrose 5%. 1000 milliLiter(s) (100 mL/Hr) IV Continuous <Continuous>  dextrose 50% Injectable 25 Gram(s) IV Push once  dextrose 50% Injectable 12.5 Gram(s) IV Push once  dextrose 50% Injectable 25 Gram(s) IV Push once  escitalopram 20 milliGRAM(s) Oral daily  furosemide    Tablet 40 milliGRAM(s) Oral daily  glucagon  Injectable 1 milliGRAM(s) IntraMuscular once  guaiFENesin  milliGRAM(s) Oral every 12 hours  heparin   Injectable 5000 Unit(s) SubCutaneous every 12 hours  insulin lispro (ADMELOG) corrective regimen sliding scale   SubCutaneous three times a day before meals  ipratropium    for Nebulization 500 MICROGram(s) Nebulizer every 6 hours  magnesium sulfate  IVPB 1 Gram(s) IV Intermittent every 12 hours  metFORMIN 500 milliGRAM(s) Oral two times a day with meals  metoprolol tartrate 50 milliGRAM(s) Oral every 12 hours  pantoprazole    Tablet 40 milliGRAM(s) Oral before breakfast  polyethylene glycol 3350 17 Gram(s) Oral daily  potassium chloride    Tablet ER 20 milliEquivalent(s) Oral daily  simethicone 80 milliGRAM(s) Chew three times a day  sodium chloride 0.9%. 1000 milliLiter(s) (20 mL/Hr) IV Continuous <Continuous>    MEDICATIONS  (PRN):  hydrALAZINE Injectable 10 milliGRAM(s) IV Push every 4 hours PRN SBP > 155      LABS:  ABG - ( 2021 09:13 )  pH, Arterial: 7.43  pH, Blood: x     /  pCO2: 43    /  pO2: 98    / HCO3: 29    / Base Excess: 3.8   /  SaO2: 98                                      9.8    19.28 )-----------( 354      ( 2021 02:00 )             30.7         138  |  101  |  30<H>  ----------------------------<  113<H>  4.3   |  27  |  0.8    Ca    9.1      2021 02:00  Mg     2.1         TPro  5.2<L>  /  Alb  3.3<L>  /  TBili  1.9<H>  /  DBili  x   /  AST  19  /  ALT  36  /  AlkPhos  65      PT/INR - ( 2021 11:43 )   PT: 14.10 sec;   INR: 1.23 ratio         PTT - ( 2021 11:43 )  PTT:27.3 sec  Urinalysis Basic - ( 2021 15:05 )    Color: Yellow / Appearance: Clear / S.021 / pH: x  Gluc: x / Ketone: Negative  / Bili: Negative / Urobili: <2 mg/dL   Blood: x / Protein: 30 mg/dL / Nitrite: Negative   Leuk Esterase: Negative / RBC: 9 /HPF / WBC 1 /HPF   Sq Epi: x / Non Sq Epi: 4 /HPF / Bacteria: Negative          RADIOLOGY:  Reviewed and interpreted by me  CXR from 21 shows [+] mild congestion, [-] pneumothorax, [-] R/L effusion, [-] cardiomegaly,   NGT in place, S-G Catheter in place, R/L TLC in place, R/L Chest Tubes in place    ECG:  Reviewed and interpreted by me:   QTC:    Assessment:      PAST MEDICAL & SURGICAL HISTORY:  Glaucoma    Chronic sciatica    H/O sleep apnea  on CPAP    Aortic stenosis    Type 2 diabetes mellitus without complication, without long-term current use of insulin    Hyperlipidemia, unspecified hyperlipidemia type    Hypertension, unspecified type    History of carpal tunnel surgery    History of hip surgery  bilateral hip        PLAN:  Neuro: Pain control  Pulm: Encourage coughing, deep breathing and use of incentive spirometry. Wean off supplemental oxygen as able. Daily CXR.   Cardio: Monitor telemetry/alarms. Continue cardiac meds  GI: Tolerating diet. Continue stool softeners. Continue GI prophylaxis  Renal: monitor urine output, supplement electrolytes as needed  Vasc: Heparin SC/SCDs for DVT prophylaxis  Heme: Monitor H/H.   ID: Off antibiotics. Stable.  Endocrine: Monitor finger stick blood sugar and control hyperglycemia with insulin  Physical Therapy: OOB/ambulate  Tubes: Monitor Chest tube output      Discussed with Cardiothoracic Team at AM rounds.    45 minutes of critical care time spent providing medical care for patient's acute illness/conditions that impairs at least one vital organ system and/or poses a high risk of imminent or life threatening deterioration in the patient's condition. It includes time spent evaluating and treating the patient's acute illness as well as time spent reviewing labs, radiology, discussing goals of care with patient and/or patient's family, and discussing the case with a multidisciplinary team in an effort to prevent further life threatening deterioration or end organ damage. This time is independent of any procedures performed. CTU Attending Progress Daily Note     2021 08:01    Procedure:        CABG MVR                                          POD#       10            Patient seen as post-op critical care follow-up    HPI:  71 yo F h/o HTN, DLD, has JACOME on TTE Mod AS by gradient, and mod to severe MR here for DERRICK and RHC/LHC   (2021 09:38)    See preop testing chart H&P    Interval event for past 24 hr:  ADRIAN SAMUELS  70y had no event.     Current Complains:  ADRIAN SAMUELS has no new complaints    REVIEW OF SYSTEMS:  CONSTITUTIONAL:  [-] weakness, [-] fevers, [-] chills  EYES/ENT: [-] visual changes, [-] vertigo, [-] throat pain   NECK: [-] pain, [-] stiffness  RESPIRATORY: [-] cough, [-] wheezing, [-] hemoptysis, [-] shortness of breath  CARDIOVASCULAR: [-] chest pain, [-] palpitations, [-] orthopnea  GASTROINTESTINAL:    [-]abdominal pain, [-] nausea, [-] vomiting, [-] hematemesis, [-] diarrhea, [-] constipation, [-] melena, [-] hematochezia.  GENITOURINARY: [-] dysuria, [-] frequency, [-] hematuria  NEUROLOGICAL: [-] numbness, [-] weakness  SKIN: [-] itching, [-] burning, [-] rashes, [-] lesions   All other review of systems is negative unless indicated above.    [  ] Unable to assess ROS because :    OBJECTIVE:  ICU Vital Signs Last 24 Hrs  T(C): 36.8 (2021 04:00), Max: 36.9 (2021 20:00)  T(F): 98.2 (2021 04:00), Max: 98.4 (2021 20:00)  HR: 61 (2021 07:00) (61 - 81)  BP: 130/62 (2021 07:00) (116/54 - 170/72)  BP(mean): 89 (2021 07:00) (78 - 120)  ABP: --  ABP(mean): --  RR: 19 (2021 07:00) (18 - 49)  SpO2: 100% (2021 07:00) (95% - 100%)      I&O's Summary    2021 07:01  -  2021 07:00  --------------------------------------------------------  IN: 820 mL / OUT: 1200 mL / NET: -380 mL      PHYSICAL EXAM:  General: WN/WD NAD    HEENT:     [+] NCAT  [+] EOMI  [-] Conjuctival edema   [-] Icterus   [-] Thrush   [-] ETT  [-] NGT/OGT    Neck:         [+] FROM   [-] JVD     [-] Nodes     [-] Masses    [+] Mid-line trachea    [-] Tracheostomy    Chest:         [-] Sternal click   [-] Sternal drainage   [-] Pacing wires   [-] Chest tubes   [-] SubQ emphysema    Lungs:          [+] CTA   [-] Rhonchi   [-] Rales    [-] Wheezing    [-] Decreased BS    [-] Dullness R L    Cardiac:       [+] S1 [+] S2    [+] RRR   [-] Irregular   [-] S3   [-] S4    [-] Murmurs    [-] Rub    Abdomen:    [+] BS    [+] Soft    [+] Non-tender     [-] Distended    [-] Organomegaly  [-] PEG    Extremities:   [-] Cyanosis U/L   [-] Clubbing  U/L  [+] LE/UE Edema   [+] Capillary refill    [+] Pulses     Neuro:        [+] Awake   [+]  Alert   [-] Confused   [-] Lethargic   [-] Sedated   [-] Generalized Weakness    Skin:        [-] Rashes    [-] Erythema   [+] Normal incisions   [+] IV sites intact   [-] Sacral decubitus    Tubes:  LINES:    CAPILLARY BLOOD GLUCOSE      POCT Blood Glucose.: 125 mg/dL (2021 06:49)    CAPILLARY BLOOD GLUCOSE      POCT Blood Glucose.: 125 mg/dL (2021 06:49)  POCT Blood Glucose.: 126 mg/dL (2021 22:08)  POCT Blood Glucose.: 128 mg/dL (2021 16:06)  POCT Blood Glucose.: 138 mg/dL (2021 11:01)      HOSPITAL MEDICATIONS:  MEDICATIONS  (STANDING):  albumin human  5% IVPB 500 milliLiter(s) IV Intermittent once  aMIOdarone    Tablet 400 milliGRAM(s) Oral every 8 hours  aMIOdarone    Tablet   Oral   amLODIPine   Tablet 5 milliGRAM(s) Oral daily  aspirin 325 milliGRAM(s) Oral daily  atorvastatin 40 milliGRAM(s) Oral at bedtime  chlorhexidine 4% Liquid 1 Application(s) Topical <User Schedule>  dextrose 40% Gel 15 Gram(s) Oral once  dextrose 5%. 1000 milliLiter(s) (50 mL/Hr) IV Continuous <Continuous>  dextrose 5%. 1000 milliLiter(s) (100 mL/Hr) IV Continuous <Continuous>  dextrose 50% Injectable 25 Gram(s) IV Push once  dextrose 50% Injectable 12.5 Gram(s) IV Push once  dextrose 50% Injectable 25 Gram(s) IV Push once  escitalopram 20 milliGRAM(s) Oral daily  furosemide    Tablet 40 milliGRAM(s) Oral daily  glucagon  Injectable 1 milliGRAM(s) IntraMuscular once  guaiFENesin  milliGRAM(s) Oral every 12 hours  heparin   Injectable 5000 Unit(s) SubCutaneous every 12 hours  insulin lispro (ADMELOG) corrective regimen sliding scale   SubCutaneous three times a day before meals  ipratropium    for Nebulization 500 MICROGram(s) Nebulizer every 6 hours  magnesium sulfate  IVPB 1 Gram(s) IV Intermittent every 12 hours  metFORMIN 500 milliGRAM(s) Oral two times a day with meals  metoprolol tartrate 50 milliGRAM(s) Oral every 12 hours  pantoprazole    Tablet 40 milliGRAM(s) Oral before breakfast  polyethylene glycol 3350 17 Gram(s) Oral daily  potassium chloride    Tablet ER 20 milliEquivalent(s) Oral daily  simethicone 80 milliGRAM(s) Chew three times a day  sodium chloride 0.9%. 1000 milliLiter(s) (20 mL/Hr) IV Continuous <Continuous>    MEDICATIONS  (PRN):  hydrALAZINE Injectable 10 milliGRAM(s) IV Push every 4 hours PRN SBP > 155      LABS:  ABG - ( 2021 09:13 )  pH, Arterial: 7.43  pH, Blood: x     /  pCO2: 43    /  pO2: 98    / HCO3: 29    / Base Excess: 3.8   /  SaO2: 98                                      9.8    19.28 )-----------( 354      ( 2021 02:00 )             30.7         138  |  101  |  30<H>  ----------------------------<  113<H>  4.3   |  27  |  0.8    Ca    9.1      2021 02:00  Mg     2.1         TPro  5.2<L>  /  Alb  3.3<L>  /  TBili  1.9<H>  /  DBili  x   /  AST  19  /  ALT  36  /  AlkPhos  65      PT/INR - ( 2021 11:43 )   PT: 14.10 sec;   INR: 1.23 ratio         PTT - ( 2021 11:43 )  PTT:27.3 sec  Urinalysis Basic - ( 2021 15:05 )    Color: Yellow / Appearance: Clear / S.021 / pH: x  Gluc: x / Ketone: Negative  / Bili: Negative / Urobili: <2 mg/dL   Blood: x / Protein: 30 mg/dL / Nitrite: Negative   Leuk Esterase: Negative / RBC: 9 /HPF / WBC 1 /HPF   Sq Epi: x / Non Sq Epi: 4 /HPF / Bacteria: Negative          RADIOLOGY:  Reviewed and interpreted by me  CXR from 21 shows [+] mild congestion, [-] pneumothorax, [-] R/L effusion, [-] cardiomegaly,       ECG:  Reviewed and interpreted by me: SR  QTC:    Assessment:  CAD SP CABG MVR  leukocytosis  Afib  anemia    PAST MEDICAL & SURGICAL HISTORY:  Glaucoma    Chronic sciatica    H/O sleep apnea  on CPAP    Aortic stenosis    Type 2 diabetes mellitus without complication, without long-term current use of insulin    Hyperlipidemia, unspecified hyperlipidemia type    Hypertension, unspecified type    History of carpal tunnel surgery    History of hip surgery  bilateral hip        PLAN:  Neuro: Pain control  Pulm: Encourage coughing, deep breathing and use of incentive spirometry. Wean off supplemental oxygen as able. Daily CXR.   Cardio: Monitor telemetry/alarms. Continue cardiac meds  GI: Tolerating diet. Continue stool softeners. Continue GI prophylaxis  Renal: monitor urine output, supplement electrolytes as needed  Vasc: Heparin SC/SCDs for DVT prophylaxis  Heme: Monitor H/H.   ID: monitor WBC  Endocrine: Monitor finger stick blood sugar and control hyperglycemia with insulin  Physical Therapy: OOB/ambulate        Discussed with Cardiothoracic Team at AM rounds.

## 2021-02-23 LAB
ANION GAP SERPL CALC-SCNC: 9 MMOL/L — SIGNIFICANT CHANGE UP (ref 7–14)
BUN SERPL-MCNC: 29 MG/DL — HIGH (ref 10–20)
CALCIUM SERPL-MCNC: 8.9 MG/DL — SIGNIFICANT CHANGE UP (ref 8.5–10.1)
CHLORIDE SERPL-SCNC: 99 MMOL/L — SIGNIFICANT CHANGE UP (ref 98–110)
CO2 SERPL-SCNC: 28 MMOL/L — SIGNIFICANT CHANGE UP (ref 17–32)
CREAT SERPL-MCNC: 0.9 MG/DL — SIGNIFICANT CHANGE UP (ref 0.7–1.5)
GLUCOSE BLDC GLUCOMTR-MCNC: 107 MG/DL — HIGH (ref 70–99)
GLUCOSE BLDC GLUCOMTR-MCNC: 110 MG/DL — HIGH (ref 70–99)
GLUCOSE BLDC GLUCOMTR-MCNC: 132 MG/DL — HIGH (ref 70–99)
GLUCOSE BLDC GLUCOMTR-MCNC: 148 MG/DL — HIGH (ref 70–99)
GLUCOSE SERPL-MCNC: 114 MG/DL — HIGH (ref 70–99)
HCT VFR BLD CALC: 32.4 % — LOW (ref 37–47)
HGB BLD-MCNC: 10.1 G/DL — LOW (ref 12–16)
MAGNESIUM SERPL-MCNC: 1.8 MG/DL — SIGNIFICANT CHANGE UP (ref 1.8–2.4)
MCHC RBC-ENTMCNC: 29.5 PG — SIGNIFICANT CHANGE UP (ref 27–31)
MCHC RBC-ENTMCNC: 31.2 G/DL — LOW (ref 32–37)
MCV RBC AUTO: 94.7 FL — SIGNIFICANT CHANGE UP (ref 81–99)
NRBC # BLD: 0 /100 WBCS — SIGNIFICANT CHANGE UP (ref 0–0)
PLATELET # BLD AUTO: 336 K/UL — SIGNIFICANT CHANGE UP (ref 130–400)
POTASSIUM SERPL-MCNC: 4.5 MMOL/L — SIGNIFICANT CHANGE UP (ref 3.5–5)
POTASSIUM SERPL-SCNC: 4.5 MMOL/L — SIGNIFICANT CHANGE UP (ref 3.5–5)
RBC # BLD: 3.42 M/UL — LOW (ref 4.2–5.4)
RBC # FLD: 19.1 % — HIGH (ref 11.5–14.5)
SODIUM SERPL-SCNC: 136 MMOL/L — SIGNIFICANT CHANGE UP (ref 135–146)
WBC # BLD: 17.17 K/UL — HIGH (ref 4.8–10.8)
WBC # FLD AUTO: 17.17 K/UL — HIGH (ref 4.8–10.8)

## 2021-02-23 PROCEDURE — 93010 ELECTROCARDIOGRAM REPORT: CPT

## 2021-02-23 PROCEDURE — 71250 CT THORAX DX C-: CPT | Mod: 26

## 2021-02-23 PROCEDURE — 74176 CT ABD & PELVIS W/O CONTRAST: CPT | Mod: 26

## 2021-02-23 PROCEDURE — 71045 X-RAY EXAM CHEST 1 VIEW: CPT | Mod: 26

## 2021-02-23 RX ORDER — ALPRAZOLAM 0.25 MG
0.25 TABLET ORAL ONCE
Refills: 0 | Status: DISCONTINUED | OUTPATIENT
Start: 2021-02-23 | End: 2021-02-23

## 2021-02-23 RX ORDER — ONDANSETRON 8 MG/1
4 TABLET, FILM COATED ORAL ONCE
Refills: 0 | Status: COMPLETED | OUTPATIENT
Start: 2021-02-23 | End: 2021-02-23

## 2021-02-23 RX ORDER — LISINOPRIL 2.5 MG/1
5 TABLET ORAL DAILY
Refills: 0 | Status: DISCONTINUED | OUTPATIENT
Start: 2021-02-23 | End: 2021-03-01

## 2021-02-23 RX ADMIN — SIMETHICONE 80 MILLIGRAM(S): 80 TABLET, CHEWABLE ORAL at 21:19

## 2021-02-23 RX ADMIN — AMIODARONE HYDROCHLORIDE 200 MILLIGRAM(S): 400 TABLET ORAL at 05:47

## 2021-02-23 RX ADMIN — Medication 500 MICROGRAM(S): at 08:30

## 2021-02-23 RX ADMIN — AMLODIPINE BESYLATE 5 MILLIGRAM(S): 2.5 TABLET ORAL at 05:48

## 2021-02-23 RX ADMIN — CEFEPIME 100 MILLIGRAM(S): 1 INJECTION, POWDER, FOR SOLUTION INTRAMUSCULAR; INTRAVENOUS at 05:53

## 2021-02-23 RX ADMIN — LISINOPRIL 5 MILLIGRAM(S): 2.5 TABLET ORAL at 09:24

## 2021-02-23 RX ADMIN — Medication 100 GRAM(S): at 05:51

## 2021-02-23 RX ADMIN — SIMETHICONE 80 MILLIGRAM(S): 80 TABLET, CHEWABLE ORAL at 13:48

## 2021-02-23 RX ADMIN — Medication 50 MILLIGRAM(S): at 17:11

## 2021-02-23 RX ADMIN — Medication 20 MILLIEQUIVALENT(S): at 11:45

## 2021-02-23 RX ADMIN — PANTOPRAZOLE SODIUM 40 MILLIGRAM(S): 20 TABLET, DELAYED RELEASE ORAL at 08:14

## 2021-02-23 RX ADMIN — CEFEPIME 100 MILLIGRAM(S): 1 INJECTION, POWDER, FOR SOLUTION INTRAMUSCULAR; INTRAVENOUS at 17:10

## 2021-02-23 RX ADMIN — Medication 500 MICROGRAM(S): at 14:00

## 2021-02-23 RX ADMIN — Medication 40 MILLIGRAM(S): at 05:46

## 2021-02-23 RX ADMIN — ATORVASTATIN CALCIUM 40 MILLIGRAM(S): 80 TABLET, FILM COATED ORAL at 21:19

## 2021-02-23 RX ADMIN — POLYETHYLENE GLYCOL 3350 17 GRAM(S): 17 POWDER, FOR SOLUTION ORAL at 11:44

## 2021-02-23 RX ADMIN — Medication 600 MILLIGRAM(S): at 17:10

## 2021-02-23 RX ADMIN — Medication 0.25 MILLIGRAM(S): at 20:30

## 2021-02-23 RX ADMIN — Medication 250 MILLIGRAM(S): at 17:11

## 2021-02-23 RX ADMIN — SIMETHICONE 80 MILLIGRAM(S): 80 TABLET, CHEWABLE ORAL at 05:48

## 2021-02-23 RX ADMIN — HEPARIN SODIUM 5000 UNIT(S): 5000 INJECTION INTRAVENOUS; SUBCUTANEOUS at 17:11

## 2021-02-23 RX ADMIN — Medication 250 MILLIGRAM(S): at 05:51

## 2021-02-23 RX ADMIN — Medication 50 MILLIGRAM(S): at 05:47

## 2021-02-23 RX ADMIN — Medication 325 MILLIGRAM(S): at 11:44

## 2021-02-23 RX ADMIN — ESCITALOPRAM OXALATE 20 MILLIGRAM(S): 10 TABLET, FILM COATED ORAL at 11:44

## 2021-02-23 RX ADMIN — Medication 100 GRAM(S): at 17:11

## 2021-02-23 RX ADMIN — METFORMIN HYDROCHLORIDE 500 MILLIGRAM(S): 850 TABLET ORAL at 17:10

## 2021-02-23 RX ADMIN — HEPARIN SODIUM 5000 UNIT(S): 5000 INJECTION INTRAVENOUS; SUBCUTANEOUS at 05:47

## 2021-02-23 RX ADMIN — METFORMIN HYDROCHLORIDE 500 MILLIGRAM(S): 850 TABLET ORAL at 08:14

## 2021-02-23 RX ADMIN — CHLORHEXIDINE GLUCONATE 1 APPLICATION(S): 213 SOLUTION TOPICAL at 05:48

## 2021-02-23 RX ADMIN — ONDANSETRON 4 MILLIGRAM(S): 8 TABLET, FILM COATED ORAL at 17:30

## 2021-02-23 RX ADMIN — Medication 600 MILLIGRAM(S): at 05:46

## 2021-02-23 NOTE — CONSULT NOTE ADULT - SUBJECTIVE AND OBJECTIVE BOX
ADRIAN SAMUELS  70y, Female  Allergy: No Known Allergies      All historical available data reviewed.    HPI:  71 yo F h/o HTN, DLD, has JACOME on TTE Mod AS by gradient, and mod to severe MR here for DERRICK and RHC/LHC  status-post MV repair and CABG     FAMILY HISTORY:    PAST MEDICAL & SURGICAL HISTORY:  Glaucoma    Chronic sciatica    H/O sleep apnea  on CPAP    Aortic stenosis    Type 2 diabetes mellitus without complication, without long-term current use of insulin    Hyperlipidemia, unspecified hyperlipidemia type    Hypertension, unspecified type    History of carpal tunnel surgery    History of hip surgery  bilateral hip          VITALS:  T(F): 98.9, Max: 99 (21 @ 20:00)  HR: 67  BP: 160/72  RR: 27Vital Signs Last 24 Hrs  T(C): 37.2 (2021 04:00), Max: 37.2 (2021 20:00)  T(F): 98.9 (2021 04:00), Max: 99 (2021 20:00)  HR: 67 (2021 08:04) (60 - 78)  BP: 160/72 (2021 08:04) (116/57 - 162/76)  BP(mean): 104 (2021 08:04) (70 - 109)  RR: 27 (2021 08:04) (18 - 42)  SpO2: 97% (2021 08:04) (95% - 100%)    TESTS & MEASUREMENTS:                        10.1   17.17 )-----------( 336      ( 2021 02:00 )             32.4         136  |  99  |  29<H>  ----------------------------<  114<H>  4.5   |  28  |  0.9    Ca    8.9      2021 02:00  Mg     1.8         TPro  5.2<L>  /  Alb  3.3<L>  /  TBili  1.9<H>  /  DBili  x   /  AST  19  /  ALT  36  /  AlkPhos  65      LIVER FUNCTIONS - ( 2021 02:00 )  Alb: 3.3 g/dL / Pro: 5.2 g/dL / ALK PHOS: 65 U/L / ALT: 36 U/L / AST: 19 U/L / GGT: x             Urinalysis Basic - ( 2021 15:05 )    Color: Yellow / Appearance: Clear / S.021 / pH: x  Gluc: x / Ketone: Negative  / Bili: Negative / Urobili: <2 mg/dL   Blood: x / Protein: 30 mg/dL / Nitrite: Negative   Leuk Esterase: Negative / RBC: 9 /HPF / WBC 1 /HPF   Sq Epi: x / Non Sq Epi: 4 /HPF / Bacteria: Negative          RADIOLOGY & ADDITIONAL TESTS:  Personal review of radiological diagnostics performed  Echo and EKG results noted when applicable.     MEDICATIONS:  albumin human  5% IVPB 500 milliLiter(s) IV Intermittent once  aMIOdarone    Tablet 200 milliGRAM(s) Oral daily  aMIOdarone    Tablet   Oral   amLODIPine   Tablet 5 milliGRAM(s) Oral daily  aspirin 325 milliGRAM(s) Oral daily  atorvastatin 40 milliGRAM(s) Oral at bedtime  cefepime   IVPB 1000 milliGRAM(s) IV Intermittent every 12 hours  cefepime   IVPB      chlorhexidine 4% Liquid 1 Application(s) Topical <User Schedule>  dextrose 40% Gel 15 Gram(s) Oral once  dextrose 5%. 1000 milliLiter(s) IV Continuous <Continuous>  dextrose 5%. 1000 milliLiter(s) IV Continuous <Continuous>  dextrose 50% Injectable 25 Gram(s) IV Push once  dextrose 50% Injectable 12.5 Gram(s) IV Push once  dextrose 50% Injectable 25 Gram(s) IV Push once  escitalopram 20 milliGRAM(s) Oral daily  furosemide    Tablet 40 milliGRAM(s) Oral daily  glucagon  Injectable 1 milliGRAM(s) IntraMuscular once  guaiFENesin  milliGRAM(s) Oral every 12 hours  heparin   Injectable 5000 Unit(s) SubCutaneous every 12 hours  hydrALAZINE Injectable 10 milliGRAM(s) IV Push every 4 hours PRN  insulin lispro (ADMELOG) corrective regimen sliding scale   SubCutaneous three times a day before meals  ipratropium    for Nebulization 500 MICROGram(s) Nebulizer every 6 hours  lisinopril 5 milliGRAM(s) Oral daily  magnesium sulfate  IVPB 1 Gram(s) IV Intermittent every 12 hours  metFORMIN 500 milliGRAM(s) Oral two times a day with meals  metoprolol tartrate 50 milliGRAM(s) Oral every 12 hours  pantoprazole    Tablet 40 milliGRAM(s) Oral before breakfast  polyethylene glycol 3350 17 Gram(s) Oral daily  potassium chloride    Tablet ER 20 milliEquivalent(s) Oral daily  simethicone 80 milliGRAM(s) Chew three times a day  sodium chloride 0.9%. 1000 milliLiter(s) IV Continuous <Continuous>  vancomycin  IVPB 1000 milliGRAM(s) IV Intermittent every 12 hours      ANTIBIOTICS:  cefepime   IVPB 1000 milliGRAM(s) IV Intermittent every 12 hours  cefepime   IVPB      vancomycin  IVPB 1000 milliGRAM(s) IV Intermittent every 12 hours

## 2021-02-23 NOTE — CONSULT NOTE ADULT - ASSESSMENT
70y Female status-post MV repair and CABG     IMPRESSION;  Leucocytosis with no fevers  Possible related to right hemothorax  Underlying PNA cannot be r/o. Clinically has no complaints  Clinically no /GI/SSTI  WBC 17.1    RECOMMENDATIONS;  Consider repeating CT chest   procalcitonin  kendra SPENCE  BCx  D/c vancomycin cefepime 2 gm iv q8h

## 2021-02-23 NOTE — CHART NOTE - NSCHARTNOTEFT_GEN_A_CORE
Registered Dietitian Follow-Up     Patient Profile Reviewed                           Yes [x]   No []     Nutrition History Previously Obtained        Yes [x]  No []       Pertinent Subjective Information: This note was charted remotely. PO intake improved since last follow up, fair intake at this time. Some meals provided from home. No need fro renal restr at this time.      Pertinent Medical Interventions: S/p  CABG MVR. POD #10. Sleep apnea: on CPAP. DM2: insulin regimen. CTU supportive care.      Diet order: consistent carb renal/NS     Anthropometrics:  - Ht. 149.9cm  - Wt. 102.4kg on 2/22 vs. 103.8kg on 2/19 vs. 97kg on 2/12 pt. with edema, will continue to monitor wt trends  - %wt change  - BMI 43.2  - IBW 44.2kg     Pertinent Lab Data: (2/23) WBC 17.17, RBC 3.42, Hg 10.1, Hct 32.4, BUN 29, glu 114     Pertinent Meds: Lisinopril, Lasix, Metoprolol, Atorvastatin, Protonix, Miralax     Physical Findings:  - Appearance: alert per EMR, 3+ L, R foot edema   - GI function: abd noted soft/nontender per flow sheets, last BM 2/22  - Tubes: none noted  - Oral/Mouth cavity: no symptoms noted  - Skin: incision     Nutrition Requirements (from RD note on 2/20)   Weight Used: : 97kg dosing, ideal 44.2kg     Estimated Energy Needs    Continue [x]  Adjust [] [id number] (MSJ x 1-1.1 AF) obese BMI considered  Adjusted Energy Recommendations:   kcal/day        Estimated Protein Needs    Continue [x]  Adjust [] 53-62 g (1.2-1.4 g/kg IBW) same as above  Adjusted Protein Recommendations:   gm/day        Estimated Fluid Needs        Continue []  Adjust [] per CTU team  Adjusted Fluid Recommendations:   mL/day     Nutrient Intake: ~50% PO at meals        [] Previous Nutrition Diagnosis:  Inadequate Oral Intake- improving.        Nutrition Intervention: meals and snacks, medical food supplement    Rec: Provide DASH/TLC< consistent carb w/snack diet order, add Glucerna daily.      Goal/Expected Outcome: In 3 days pt. to consistently consume at least 50-75% PO and supplement      Indicator/Monitoring: diet order, energy intake, body composition, NFPF, glucose/renal/electrolyte profiles.

## 2021-02-23 NOTE — PROGRESS NOTE ADULT - SUBJECTIVE AND OBJECTIVE BOX
OPERATIVE PROCEDURE(s):     CABG & MV repair           POD #       9                70yFemale  SURGEON(s): FIONA Rojas  SUBJECTIVE ASSESSMENT:  Patient has no complaints at this time.    Vital Signs Last 24 Hrs  T(F): 98.9 (2021 04:00), Max: 99 (2021 20:00)  HR: 67 (2021 08:04) (60 - 78)  BP: 160/72 (2021 08:04) (116/57 - 162/76)  BP(mean): 104 (2021 08:04) (70 - 109)  RR: 27 (2021 08:04) (18 - 42)  SpO2: 98% (2021 09:38) (95% - 100%)    I&O's Detail    2021 07:01  -  2021 07:00  --------------------------------------------------------  IN:    IV PiggyBack: 450 mL    Oral Fluid: 980 mL  Total IN: 1430 mL    OUT:    Incontinent per Collection Bag (mL): 1200 mL    Voided (mL): 1100 mL  Total OUT: 2300 mL    Net:   I&O's Detail    2021 07:01  -  2021 07:00  --------------------------------------------------------  Total NET: -380 mL    2021 07:01  -  2021 07:00  --------------------------------------------------------  Total NET: -870 mL        CAPILLARY BLOOD GLUCOSE  POCT Blood Glucose.: 148 mg/dL (2021 06:47)  POCT Blood Glucose.: 108 mg/dL (2021 21:56)  POCT Blood Glucose.: 109 mg/dL (2021 16:29)  POCT Blood Glucose.: 151 mg/dL (2021 11:07)    Physical Exam:  General: NAD; A&Ox3  Cardiac: S1/S2, RRR, no murmur, no rubs  Lungs: unlabored respirations, CTA b/l, no wheeze, no rales, no crackles  Abdomen: Soft/NT/ND; positive bowel sounds x 4  Sternum: Intact, no click, incision healing well with no drainage  Incisions: Incisions clean/dry/intact  Extremities: No edema b/l lower extremities; good capillary refill; no cyanosis; palpable 1+ pedal pulses b/l    Central Venous Catheter: Yes[]  No[] , If Yes indication:           Day #  EPICARDIAL WIRES:  [] YES [] NO                                              Day #  BOWEL MOVEMENT:  [X] YES [] NO, If No, Timing since last BM:      Day #        LABS:                        10.1<L>  17.17<H> )-----------( 336      ( 2021 02:00 )             32.4<L>                        9.8<L>  19.28<H> )-----------( 354      ( 2021 02:00 )             30.7<L>        136  |  99  |  29<H>  ----------------------------<  114<H>  4.5   |  28  |  0.9      138  |  101  |  30<H>  ----------------------------<  113<H>  4.3   |  27  |  0.8    Ca    8.9      2021 02:00  Mg     1.8         TPro  5.2<L> [6.0 - 8.0]  /  Alb  3.3<L> [3.5 - 5.2]  /  TBili  1.9<H> [0.2 - 1.2]  /  DBili  x   /  AST  19 [0 - 41]  /  ALT  36 [0 - 41]  /  AlkPhos  65 [30 - 115]      PT/INR - ( 2021 11:43 )   PT: ;   INR: 1.23 ratio    PTT - ( 2021 11:43 )  PTT:27.3 sec  Urinalysis Basic - ( 2021 15:05 )  Color: Yellow / Appearance: Clear / S.021 / pH: x  Gluc: x / Ketone: Negative  / Bili: Negative / Urobili: <2 mg/dL   Blood: x / Protein: 30 mg/dL / Nitrite: Negative   Leuk Esterase: Negative / RBC: 9 /HPF / WBC 1 /HPF   Sq Epi: x / Non Sq Epi: 4 /HPF / Bacteria: Negative    RADIOLOGY & ADDITIONAL TESTS:  CXR: Xray Chest 1 View- PORTABLE-Routine:   EXAM:  XR CHEST PORTABLE ROUTINE 1V          PROCEDURE DATE:  2021      INTERPRETATION:  Clinical History / Reason for exam: Postoperative x-ray.  Comparison : Chest radiograph 2021.  Technique/Positioning: Single frontal chest x-ray obtained  Findings:  -Support devices: None.  -Cardiac/mediastinum/hilum: Obscured. Mitral valve replacement.  -Lung parenchyma/Pleura: Slight improvement in bilateral pleural effusion/opacities. Known right paramediastinal collection difficult to assess on x-ray.  -Skeleton/soft tissues: Unchanged.  Impression:  -Slight improvement in bilateral pleural effusion/opacities.Known right paramediastinal collection difficult to assess on x-ray.    EKG:< from: 12 Lead ECG (21 @ 07:28) >  -Ventricular Rate 69 BPM  -Atrial Rate 69 BPM  -P-R Interval 146 ms  -QRS Duration 100 ms  -Q-T Interval 454 ms  -QTC Calculation(Bazett) 486 ms  -P Axis 27 degrees  -R Axis -11 degrees  -T Axis 62 degrees  -Diagnosis Line Sinus rhythm withfrequent Premature ventricular complexes  -Nonspecific T wave abnormality  -Abnormal ECG    MEDICATIONS  (STANDING):  albumin human  5% IVPB 500 milliLiter(s) IV Intermittent once  aMIOdarone    Tablet 200 milliGRAM(s) Oral daily  aMIOdarone    Tablet   Oral   amLODIPine   Tablet 5 milliGRAM(s) Oral daily  aspirin 325 milliGRAM(s) Oral daily  atorvastatin 40 milliGRAM(s) Oral at bedtime  cefepime   IVPB 1000 milliGRAM(s) IV Intermittent every 12 hours  cefepime   IVPB      chlorhexidine 4% Liquid 1 Application(s) Topical <User Schedule>  dextrose 40% Gel 15 Gram(s) Oral once  dextrose 5%. 1000 milliLiter(s) (50 mL/Hr) IV Continuous <Continuous>  dextrose 5%. 1000 milliLiter(s) (100 mL/Hr) IV Continuous <Continuous>  dextrose 50% Injectable 25 Gram(s) IV Push once  dextrose 50% Injectable 12.5 Gram(s) IV Push once  dextrose 50% Injectable 25 Gram(s) IV Push once  escitalopram 20 milliGRAM(s) Oral daily  furosemide    Tablet 40 milliGRAM(s) Oral daily  glucagon  Injectable 1 milliGRAM(s) IntraMuscular once  guaiFENesin  milliGRAM(s) Oral every 12 hours  heparin   Injectable 5000 Unit(s) SubCutaneous every 12 hours  insulin lispro (ADMELOG) corrective regimen sliding scale   SubCutaneous three times a day before meals  ipratropium    for Nebulization 500 MICROGram(s) Nebulizer every 6 hours  lisinopril 5 milliGRAM(s) Oral daily  magnesium sulfate  IVPB 1 Gram(s) IV Intermittent every 12 hours  metFORMIN 500 milliGRAM(s) Oral two times a day with meals  metoprolol tartrate 50 milliGRAM(s) Oral every 12 hours  pantoprazole    Tablet 40 milliGRAM(s) Oral before breakfast  polyethylene glycol 3350 17 Gram(s) Oral daily  potassium chloride    Tablet ER 20 milliEquivalent(s) Oral daily  simethicone 80 milliGRAM(s) Chew three times a day  sodium chloride 0.9%. 1000 milliLiter(s) (20 mL/Hr) IV Continuous <Continuous>  vancomycin  IVPB 1000 milliGRAM(s) IV Intermittent every 12 hours    MEDICATIONS  (PRN):  hydrALAZINE Injectable 10 milliGRAM(s) IV Push every 4 hours PRN SBP > 155    HEPARIN:  [X] YES [] NO  Dose: 5000 UNITS/HR UNITS Q12H  SCD's: YES b/l  GI Prophylaxis: Protonix [X], Pepcid [], None [], (Contra-indication:.....)    Post-Op Aspirin: Yes [X],  No [], If No, then contra-indication:  Post-Op Statin: Yes [X], No[], If No, then contra-indication:  Post-Op Beta-Blockers: Yes [X], No [], If No, then contra-indication:    Allergies:  No Known Allergies    Ambulation/Activity Status: Ambulates several times daily without assistance.    Assessment/Plan:  70y Female status-post CABG & MV repair   - Case and plan discussed with CTU Intensivist and CT Surgeon - Dr. Rojas  - Continue CTU supportive care    - Continue DVT/GI prophylaxis  - Incentive Spirometry 10 times an hour  - Continue to advance physical activity as tolerated and continue PT/OT as directed  1. CAD: Continue ASA, statin, BB  2. HTN:   3. A. Fib:   4. COPD/Hypoxia:   5. DM/Glucose Control:     Social Service Disposition:     OPERATIVE PROCEDURE(s):     CABG & MV repair           POD #       9                70yFemale  SURGEON(s): FIONA Rojas  SUBJECTIVE ASSESSMENT:  Patient has no complaints at this time.    Vital Signs Last 24 Hrs  T(F): 98.9 (2021 04:00), Max: 99 (2021 20:00)  HR: 67 (2021 08:04) (60 - 78)  BP: 160/72 (2021 08:04) (116/57 - 162/76)  BP(mean): 104 (2021 08:04) (70 - 109)  RR: 27 (2021 08:04) (18 - 42)  SpO2: 98% (2021 09:38) (95% - 100%)    I&O's Detail    2021 07:01  -  2021 07:00  --------------------------------------------------------  IN:    IV PiggyBack: 450 mL    Oral Fluid: 980 mL  Total IN: 1430 mL    OUT:    Incontinent per Collection Bag (mL): 1200 mL    Voided (mL): 1100 mL  Total OUT: 2300 mL    Net:   I&O's Detail    2021 07:01  -  2021 07:00  --------------------------------------------------------  Total NET: -380 mL    2021 07:01  -  2021 07:00  --------------------------------------------------------  Total NET: -870 mL      CAPILLARY BLOOD GLUCOSE  POCT Blood Glucose.: 148 mg/dL (2021 06:47)  POCT Blood Glucose.: 108 mg/dL (2021 21:56)  POCT Blood Glucose.: 109 mg/dL (2021 16:29)  POCT Blood Glucose.: 151 mg/dL (2021 11:07)    Physical Exam:  General: NAD; A&Ox3  Cardiac: S1/S2, RRR, no murmur, no rubs  Lungs: unlabored respirations, CTA b/l, no wheeze, no rales, no crackles  Abdomen: Soft/NT/ND; positive bowel sounds x 4  Sternum: Intact, no click, incision healing well with no drainage  Incisions: Incisions clean/dry/intact  Extremities: Mild b/l lower extremity edema; good capillary refill; no cyanosis; palpable 1+ pedal pulses b/l  Integumentary: right groin ecchymosis      LABS:                        10.1<L>  17.17<H> )-----------( 336      ( 2021 02:00 )             32.4<L>                        9.8<L>  19.28<H> )-----------( 354      ( 2021 02:00 )             30.7<L>    02    136  |  99  |  29<H>  ----------------------------<  114<H>  4.5   |  28  |  0.9      138  |  101  |  30<H>  ----------------------------<  113<H>  4.3   |  27  |  0.8    Ca    8.9      2021 02:00  Mg     1.8         TPro  5.2<L> [6.0 - 8.0]  /  Alb  3.3<L> [3.5 - 5.2]  /  TBili  1.9<H> [0.2 - 1.2]  /  DBili  x   /  AST  19 [0 - 41]  /  ALT  36 [0 - 41]  /  AlkPhos  65 [30 - 115]      PT/INR - ( 2021 11:43 )   PT: ;   INR: 1.23 ratio    PTT - ( 2021 11:43 )  PTT:27.3 sec  Urinalysis Basic - ( 2021 15:05 )  Color: Yellow / Appearance: Clear / S.021 / pH: x  Gluc: x / Ketone: Negative  / Bili: Negative / Urobili: <2 mg/dL   Blood: x / Protein: 30 mg/dL / Nitrite: Negative   Leuk Esterase: Negative / RBC: 9 /HPF / WBC 1 /HPF   Sq Epi: x / Non Sq Epi: 4 /HPF / Bacteria: Negative    RADIOLOGY & ADDITIONAL TESTS:  CXR: Xray Chest 1 View- PORTABLE-Routine:   EXAM:  XR CHEST PORTABLE ROUTINE 1V          PROCEDURE DATE:  2021    INTERPRETATION:  Clinical History / Reason for exam: Postoperative x-ray.  Comparison : Chest radiograph 2021.  Technique/Positioning: Single frontal chest x-ray obtained  Findings:  -Support devices: None.  -Cardiac/mediastinum/hilum: Obscured. Mitral valve replacement.  -Lung parenchyma/Pleura: Slight improvement in bilateral pleural effusion/opacities. Known right paramediastinal collection difficult to assess on x-ray.  -Skeleton/soft tissues: Unchanged.  Impression:  -Slight improvement in bilateral pleural effusion/opacities.Known right paramediastinal collection difficult to assess on x-ray.    EKG:< from: 12 Lead ECG (21 @ 07:28) >  -Ventricular Rate 69 BPM  -Atrial Rate 69 BPM  -P-R Interval 146 ms  -QRS Duration 100 ms  -Q-T Interval 454 ms  -QTC Calculation(Bazett) 486 ms  -P Axis 27 degrees  -R Axis -11 degrees  -T Axis 62 degrees  -Diagnosis Line Sinus rhythm withfrequent Premature ventricular complexes  -Nonspecific T wave abnormality  -Abnormal ECG    MEDICATIONS  (STANDING):  albumin human  5% IVPB 500 milliLiter(s) IV Intermittent once  aMIOdarone    Tablet 200 milliGRAM(s) Oral daily  aMIOdarone    Tablet   Oral   amLODIPine   Tablet 5 milliGRAM(s) Oral daily  aspirin 325 milliGRAM(s) Oral daily  atorvastatin 40 milliGRAM(s) Oral at bedtime  cefepime   IVPB 1000 milliGRAM(s) IV Intermittent every 12 hours  cefepime   IVPB      chlorhexidine 4% Liquid 1 Application(s) Topical <User Schedule>  dextrose 40% Gel 15 Gram(s) Oral once  dextrose 5%. 1000 milliLiter(s) (50 mL/Hr) IV Continuous <Continuous>  dextrose 5%. 1000 milliLiter(s) (100 mL/Hr) IV Continuous <Continuous>  dextrose 50% Injectable 25 Gram(s) IV Push once  dextrose 50% Injectable 12.5 Gram(s) IV Push once  dextrose 50% Injectable 25 Gram(s) IV Push once  escitalopram 20 milliGRAM(s) Oral daily  furosemide    Tablet 40 milliGRAM(s) Oral daily  glucagon  Injectable 1 milliGRAM(s) IntraMuscular once  guaiFENesin  milliGRAM(s) Oral every 12 hours  heparin   Injectable 5000 Unit(s) SubCutaneous every 12 hours  insulin lispro (ADMELOG) corrective regimen sliding scale   SubCutaneous three times a day before meals  ipratropium    for Nebulization 500 MICROGram(s) Nebulizer every 6 hours  lisinopril 5 milliGRAM(s) Oral daily  magnesium sulfate  IVPB 1 Gram(s) IV Intermittent every 12 hours  metFORMIN 500 milliGRAM(s) Oral two times a day with meals  metoprolol tartrate 50 milliGRAM(s) Oral every 12 hours  pantoprazole    Tablet 40 milliGRAM(s) Oral before breakfast  polyethylene glycol 3350 17 Gram(s) Oral daily  potassium chloride    Tablet ER 20 milliEquivalent(s) Oral daily  simethicone 80 milliGRAM(s) Chew three times a day  sodium chloride 0.9%. 1000 milliLiter(s) (20 mL/Hr) IV Continuous <Continuous>  vancomycin  IVPB 1000 milliGRAM(s) IV Intermittent every 12 hours    MEDICATIONS  (PRN):  hydrALAZINE Injectable 10 milliGRAM(s) IV Push every 4 hours PRN SBP > 155    HEPARIN:  [X] YES [] NO  Dose: 5000 UNITS/HR UNITS Q12H  SCD's: YES b/l  GI Prophylaxis: Protonix [X], Pepcid [], None [], (Contra-indication:.....)    Post-Op Aspirin: Yes [X],  No [], If No, then contra-indication:  Post-Op Statin: Yes [X], No[], If No, then contra-indication:  Post-Op Beta-Blockers: Yes [X], No [], If No, then contra-indication:    Allergies:  No Known Allergies    Ambulation/Activity Status: Ambulates several times daily without assistance.    Assessment/Plan:  70y Female status-post CABG & MV repair   - Case and plan discussed with CTU Intensivist and CT Surgeon - Dr. Rojas  - Continue CTU supportive care    - Continue DVT/GI prophylaxis  - Incentive Spirometry 10 times an hour  - Continue to advance physical activity as tolerated and continue PT/OT as directed  1.     Social Service Disposition:  Plan for discharge to nursing facility tomorrow   OPERATIVE PROCEDURE(s):     CABG & MV repair           POD #       9                70yFemale  SURGEON(s): FIONA Rojas  SUBJECTIVE ASSESSMENT:  Patient has no complaints at this time.    Vital Signs Last 24 Hrs  T(F): 98.9 (2021 04:00), Max: 99 (2021 20:00)  HR: 67 (2021 08:04) (60 - 78)  BP: 160/72 (2021 08:04) (116/57 - 162/76)  BP(mean): 104 (2021 08:04) (70 - 109)  RR: 27 (2021 08:04) (18 - 42)  SpO2: 98% (2021 09:38) (95% - 100%)    I&O's Detail    2021 07:01  -  2021 07:00  --------------------------------------------------------  IN:    IV PiggyBack: 450 mL    Oral Fluid: 980 mL  Total IN: 1430 mL    OUT:    Incontinent per Collection Bag (mL): 1200 mL    Voided (mL): 1100 mL  Total OUT: 2300 mL    Net:   I&O's Detail    2021 07:01  -  2021 07:00  --------------------------------------------------------  Total NET: -380 mL    2021 07:01  -  2021 07:00  --------------------------------------------------------  Total NET: -870 mL      CAPILLARY BLOOD GLUCOSE  POCT Blood Glucose.: 148 mg/dL (2021 06:47)  POCT Blood Glucose.: 108 mg/dL (2021 21:56)  POCT Blood Glucose.: 109 mg/dL (2021 16:29)  POCT Blood Glucose.: 151 mg/dL (2021 11:07)    Physical Exam:  General: NAD; A&Ox3  Cardiac: S1/S2, RRR, no murmur, no rubs  Lungs: unlabored respirations, CTA b/l, no wheeze, no rales, no crackles  Abdomen: Soft/NT/ND; positive bowel sounds x 4  Sternum: Intact, no click, incision healing well with no drainage  Incisions: Incisions clean/dry/intact  Extremities: Mild b/l lower extremity edema; good capillary refill; no cyanosis; palpable 1+ pedal pulses b/l  Integumentary: right groin ecchymosis      LABS:                        10.1<L>  17.17<H> )-----------( 336      ( 2021 02:00 )             32.4<L>                        9.8<L>  19.28<H> )-----------( 354      ( 2021 02:00 )             30.7<L>    02    136  |  99  |  29<H>  ----------------------------<  114<H>  4.5   |  28  |  0.9      138  |  101  |  30<H>  ----------------------------<  113<H>  4.3   |  27  |  0.8    Ca    8.9      2021 02:00  Mg     1.8         TPro  5.2<L> [6.0 - 8.0]  /  Alb  3.3<L> [3.5 - 5.2]  /  TBili  1.9<H> [0.2 - 1.2]  /  DBili  x   /  AST  19 [0 - 41]  /  ALT  36 [0 - 41]  /  AlkPhos  65 [30 - 115]      PT/INR - ( 2021 11:43 )   PT: ;   INR: 1.23 ratio    PTT - ( 2021 11:43 )  PTT:27.3 sec  Urinalysis Basic - ( 2021 15:05 )  Color: Yellow / Appearance: Clear / S.021 / pH: x  Gluc: x / Ketone: Negative  / Bili: Negative / Urobili: <2 mg/dL   Blood: x / Protein: 30 mg/dL / Nitrite: Negative   Leuk Esterase: Negative / RBC: 9 /HPF / WBC 1 /HPF   Sq Epi: x / Non Sq Epi: 4 /HPF / Bacteria: Negative    RADIOLOGY & ADDITIONAL TESTS:  CXR: Xray Chest 1 View- PORTABLE-Routine:   EXAM:  XR CHEST PORTABLE ROUTINE 1V          PROCEDURE DATE:  2021    INTERPRETATION:  Clinical History / Reason for exam: Postoperative x-ray.  Comparison : Chest radiograph 2021.  Technique/Positioning: Single frontal chest x-ray obtained  Findings:  -Support devices: None.  -Cardiac/mediastinum/hilum: Obscured. Mitral valve replacement.  -Lung parenchyma/Pleura: Slight improvement in bilateral pleural effusion/opacities. Known right paramediastinal collection difficult to assess on x-ray.  -Skeleton/soft tissues: Unchanged.  Impression:  -Slight improvement in bilateral pleural effusion/opacities.Known right paramediastinal collection difficult to assess on x-ray.    EKG:< from: 12 Lead ECG (21 @ 07:28) >  -Ventricular Rate 69 BPM  -Atrial Rate 69 BPM  -P-R Interval 146 ms  -QRS Duration 100 ms  -Q-T Interval 454 ms  -QTC Calculation(Bazett) 486 ms  -P Axis 27 degrees  -R Axis -11 degrees  -T Axis 62 degrees  -Diagnosis Line Sinus rhythm withfrequent Premature ventricular complexes  -Nonspecific T wave abnormality  -Abnormal ECG    MEDICATIONS  (STANDING):  albumin human  5% IVPB 500 milliLiter(s) IV Intermittent once  aMIOdarone    Tablet 200 milliGRAM(s) Oral daily  aMIOdarone    Tablet   Oral   amLODIPine   Tablet 5 milliGRAM(s) Oral daily  aspirin 325 milliGRAM(s) Oral daily  atorvastatin 40 milliGRAM(s) Oral at bedtime  cefepime   IVPB 1000 milliGRAM(s) IV Intermittent every 12 hours  cefepime   IVPB      chlorhexidine 4% Liquid 1 Application(s) Topical <User Schedule>  dextrose 40% Gel 15 Gram(s) Oral once  dextrose 5%. 1000 milliLiter(s) (50 mL/Hr) IV Continuous <Continuous>  dextrose 5%. 1000 milliLiter(s) (100 mL/Hr) IV Continuous <Continuous>  dextrose 50% Injectable 25 Gram(s) IV Push once  dextrose 50% Injectable 12.5 Gram(s) IV Push once  dextrose 50% Injectable 25 Gram(s) IV Push once  escitalopram 20 milliGRAM(s) Oral daily  furosemide    Tablet 40 milliGRAM(s) Oral daily  glucagon  Injectable 1 milliGRAM(s) IntraMuscular once  guaiFENesin  milliGRAM(s) Oral every 12 hours  heparin   Injectable 5000 Unit(s) SubCutaneous every 12 hours  insulin lispro (ADMELOG) corrective regimen sliding scale   SubCutaneous three times a day before meals  ipratropium    for Nebulization 500 MICROGram(s) Nebulizer every 6 hours  lisinopril 5 milliGRAM(s) Oral daily  magnesium sulfate  IVPB 1 Gram(s) IV Intermittent every 12 hours  metFORMIN 500 milliGRAM(s) Oral two times a day with meals  metoprolol tartrate 50 milliGRAM(s) Oral every 12 hours  pantoprazole    Tablet 40 milliGRAM(s) Oral before breakfast  polyethylene glycol 3350 17 Gram(s) Oral daily  potassium chloride    Tablet ER 20 milliEquivalent(s) Oral daily  simethicone 80 milliGRAM(s) Chew three times a day  sodium chloride 0.9%. 1000 milliLiter(s) (20 mL/Hr) IV Continuous <Continuous>  vancomycin  IVPB 1000 milliGRAM(s) IV Intermittent every 12 hours    MEDICATIONS  (PRN):  hydrALAZINE Injectable 10 milliGRAM(s) IV Push every 4 hours PRN SBP > 155    HEPARIN:  [X] YES [] NO  Dose: 5000 UNITS/HR UNITS Q12H  SCD's: YES b/l  GI Prophylaxis: Protonix [X], Pepcid [], None [], (Contra-indication:.....)    Post-Op Aspirin: Yes [X],  No [], If No, then contra-indication:  Post-Op Statin: Yes [X], No[], If No, then contra-indication:  Post-Op Beta-Blockers: Yes [X], No [], If No, then contra-indication:    Allergies:  No Known Allergies    Ambulation/Activity Status: Ambulates several times daily without assistance.    Assessment/Plan:  70y Female status-post CABG & MV repair   - Case and plan discussed with CTU Intensivist and CT Surgeon - Dr. Rojas  - Continue CTU supportive care    - Continue DVT/GI prophylaxis  - Incentive Spirometry 10 times an hour  - Continue to advance physical activity as tolerated and continue PT/OT as directed  repeat covid swab in prep for discharge  cont amio for occasional pvc's  ID note appreciated ; will discuss with surgeon possibly stopping  the ABX    Social Service Disposition:  Plan for discharge to nursing facility tomorrow   OPERATIVE PROCEDURE(s):     CABG & MV repair           POD #       11                70yFemale  SURGEON(s): FIONA Rojas  SUBJECTIVE ASSESSMENT:  Patient has no complaints at this time.    Vital Signs Last 24 Hrs  T(F): 98.9 (2021 04:00), Max: 99 (2021 20:00)  HR: 67 (2021 08:04) (60 - 78)  BP: 160/72 (2021 08:04) (116/57 - 162/76)  BP(mean): 104 (2021 08:04) (70 - 109)  RR: 27 (2021 08:04) (18 - 42)  SpO2: 98% (2021 09:38) (95% - 100%)    I&O's Detail    2021 07:01  -  2021 07:00  --------------------------------------------------------  IN:    IV PiggyBack: 450 mL    Oral Fluid: 980 mL  Total IN: 1430 mL    OUT:    Incontinent per Collection Bag (mL): 1200 mL    Voided (mL): 1100 mL  Total OUT: 2300 mL    Net:   I&O's Detail    2021 07:01  -  2021 07:00  --------------------------------------------------------  Total NET: -380 mL    2021 07:01  -  2021 07:00  --------------------------------------------------------  Total NET: -870 mL      CAPILLARY BLOOD GLUCOSE  POCT Blood Glucose.: 148 mg/dL (2021 06:47)  POCT Blood Glucose.: 108 mg/dL (2021 21:56)  POCT Blood Glucose.: 109 mg/dL (2021 16:29)  POCT Blood Glucose.: 151 mg/dL (2021 11:07)    Physical Exam:  General: NAD; A&Ox3  Cardiac: S1/S2, RRR, no murmur, no rubs  Lungs: unlabored respirations, CTA b/l, no wheeze, no rales, no crackles  Abdomen: Soft/NT/ND; positive bowel sounds x 4  Sternum: Intact, no click, incision healing well with no drainage  Incisions: Incisions clean/dry/intact  Extremities: Mild b/l lower extremity edema; good capillary refill; no cyanosis; palpable 1+ pedal pulses b/l  Integumentary: right groin ecchymosis      LABS:                        10.1<L>  17.17<H> )-----------( 336      ( 2021 02:00 )             32.4<L>                        9.8<L>  19.28<H> )-----------( 354      ( 2021 02:00 )             30.7<L>    02    136  |  99  |  29<H>  ----------------------------<  114<H>  4.5   |  28  |  0.9      138  |  101  |  30<H>  ----------------------------<  113<H>  4.3   |  27  |  0.8    Ca    8.9      2021 02:00  Mg     1.8         TPro  5.2<L> [6.0 - 8.0]  /  Alb  3.3<L> [3.5 - 5.2]  /  TBili  1.9<H> [0.2 - 1.2]  /  DBili  x   /  AST  19 [0 - 41]  /  ALT  36 [0 - 41]  /  AlkPhos  65 [30 - 115]      PT/INR - ( 2021 11:43 )   PT: ;   INR: 1.23 ratio    PTT - ( 2021 11:43 )  PTT:27.3 sec  Urinalysis Basic - ( 2021 15:05 )  Color: Yellow / Appearance: Clear / S.021 / pH: x  Gluc: x / Ketone: Negative  / Bili: Negative / Urobili: <2 mg/dL   Blood: x / Protein: 30 mg/dL / Nitrite: Negative   Leuk Esterase: Negative / RBC: 9 /HPF / WBC 1 /HPF   Sq Epi: x / Non Sq Epi: 4 /HPF / Bacteria: Negative    RADIOLOGY & ADDITIONAL TESTS:  CXR: Xray Chest 1 View- PORTABLE-Routine:   EXAM:  XR CHEST PORTABLE ROUTINE 1V          PROCEDURE DATE:  2021    INTERPRETATION:  Clinical History / Reason for exam: Postoperative x-ray.  Comparison : Chest radiograph 2021.  Technique/Positioning: Single frontal chest x-ray obtained  Findings:  -Support devices: None.  -Cardiac/mediastinum/hilum: Obscured. Mitral valve replacement.  -Lung parenchyma/Pleura: Slight improvement in bilateral pleural effusion/opacities. Known right paramediastinal collection difficult to assess on x-ray.  -Skeleton/soft tissues: Unchanged.  Impression:  -Slight improvement in bilateral pleural effusion/opacities.Known right paramediastinal collection difficult to assess on x-ray.    EKG:< from: 12 Lead ECG (21 @ 07:28) >  -Ventricular Rate 69 BPM  -Atrial Rate 69 BPM  -P-R Interval 146 ms  -QRS Duration 100 ms  -Q-T Interval 454 ms  -QTC Calculation(Bazett) 486 ms  -P Axis 27 degrees  -R Axis -11 degrees  -T Axis 62 degrees  -Diagnosis Line Sinus rhythm withfrequent Premature ventricular complexes  -Nonspecific T wave abnormality  -Abnormal ECG    MEDICATIONS  (STANDING):  albumin human  5% IVPB 500 milliLiter(s) IV Intermittent once  aMIOdarone    Tablet 200 milliGRAM(s) Oral daily  aMIOdarone    Tablet   Oral   amLODIPine   Tablet 5 milliGRAM(s) Oral daily  aspirin 325 milliGRAM(s) Oral daily  atorvastatin 40 milliGRAM(s) Oral at bedtime  cefepime   IVPB 1000 milliGRAM(s) IV Intermittent every 12 hours  cefepime   IVPB      chlorhexidine 4% Liquid 1 Application(s) Topical <User Schedule>  dextrose 40% Gel 15 Gram(s) Oral once  dextrose 5%. 1000 milliLiter(s) (50 mL/Hr) IV Continuous <Continuous>  dextrose 5%. 1000 milliLiter(s) (100 mL/Hr) IV Continuous <Continuous>  dextrose 50% Injectable 25 Gram(s) IV Push once  dextrose 50% Injectable 12.5 Gram(s) IV Push once  dextrose 50% Injectable 25 Gram(s) IV Push once  escitalopram 20 milliGRAM(s) Oral daily  furosemide    Tablet 40 milliGRAM(s) Oral daily  glucagon  Injectable 1 milliGRAM(s) IntraMuscular once  guaiFENesin  milliGRAM(s) Oral every 12 hours  heparin   Injectable 5000 Unit(s) SubCutaneous every 12 hours  insulin lispro (ADMELOG) corrective regimen sliding scale   SubCutaneous three times a day before meals  ipratropium    for Nebulization 500 MICROGram(s) Nebulizer every 6 hours  lisinopril 5 milliGRAM(s) Oral daily  magnesium sulfate  IVPB 1 Gram(s) IV Intermittent every 12 hours  metFORMIN 500 milliGRAM(s) Oral two times a day with meals  metoprolol tartrate 50 milliGRAM(s) Oral every 12 hours  pantoprazole    Tablet 40 milliGRAM(s) Oral before breakfast  polyethylene glycol 3350 17 Gram(s) Oral daily  potassium chloride    Tablet ER 20 milliEquivalent(s) Oral daily  simethicone 80 milliGRAM(s) Chew three times a day  sodium chloride 0.9%. 1000 milliLiter(s) (20 mL/Hr) IV Continuous <Continuous>  vancomycin  IVPB 1000 milliGRAM(s) IV Intermittent every 12 hours    MEDICATIONS  (PRN):  hydrALAZINE Injectable 10 milliGRAM(s) IV Push every 4 hours PRN SBP > 155    HEPARIN:  [X] YES [] NO  Dose: 5000 UNITS/HR UNITS Q12H  SCD's: YES b/l  GI Prophylaxis: Protonix [X], Pepcid [], None [], (Contra-indication:.....)    Post-Op Aspirin: Yes [X],  No [], If No, then contra-indication:  Post-Op Statin: Yes [X], No[], If No, then contra-indication:  Post-Op Beta-Blockers: Yes [X], No [], If No, then contra-indication:    Allergies:  No Known Allergies    Ambulation/Activity Status: Ambulates several times daily without assistance.    Assessment/Plan:  70y Female status-post CABG & MV repair   - Case and plan discussed with CTU Intensivist and CT Surgeon - Dr. Rojas  - Continue CTU supportive care    - Continue DVT/GI prophylaxis  - Incentive Spirometry 10 times an hour  - Continue to advance physical activity as tolerated and continue PT/OT as directed  repeat covid swab in prep for discharge  cont amio for occasional pvc's  ID note appreciated ; will discuss with surgeon possibly stopping  the ABX    Social Service Disposition:  Plan for discharge to nursing facility tomorrow   OPERATIVE PROCEDURE(s):     CABG & MV repair           POD #       11                70yFemale  SURGEON(s): FIONA Rojas  SUBJECTIVE ASSESSMENT:  Patient has no complaints at this time.    Vital Signs Last 24 Hrs  T(F): 98.9 (2021 04:00), Max: 99 (2021 20:00)  HR: 67 (2021 08:04) (60 - 78)  BP: 160/72 (2021 08:04) (116/57 - 162/76)  BP(mean): 104 (2021 08:04) (70 - 109)  RR: 27 (2021 08:04) (18 - 42)  SpO2: 98% (2021 09:38) (95% - 100%)    I&O's Detail    2021 07:01  -  2021 07:00  --------------------------------------------------------  IN:    IV PiggyBack: 450 mL    Oral Fluid: 980 mL  Total IN: 1430 mL    OUT:    Incontinent per Collection Bag (mL): 1200 mL    Voided (mL): 1100 mL  Total OUT: 2300 mL    Net:   I&O's Detail    2021 07:01  -  2021 07:00  --------------------------------------------------------  Total NET: -380 mL    2021 07:01  -  2021 07:00  --------------------------------------------------------  Total NET: -870 mL      CAPILLARY BLOOD GLUCOSE  POCT Blood Glucose.: 148 mg/dL (2021 06:47)  POCT Blood Glucose.: 108 mg/dL (2021 21:56)  POCT Blood Glucose.: 109 mg/dL (2021 16:29)  POCT Blood Glucose.: 151 mg/dL (2021 11:07)    Physical Exam:  General: NAD; A&Ox3  Cardiac: S1/S2, RRR, no murmur, no rubs  Lungs: unlabored respirations, CTA b/l, no wheeze, no rales, no crackles  Abdomen: Soft/NT/ND; positive bowel sounds x 4  Sternum: Intact, no click, incision healing well with no drainage  Incisions: Incisions clean/dry/intact  Extremities: Mild b/l lower extremity edema; good capillary refill; no cyanosis; palpable 1+ pedal pulses b/l  Integumentary: right groin ecchymosis      LABS:                        10.1<L>  17.17<H> )-----------( 336      ( 2021 02:00 )             32.4<L>                        9.8<L>  19.28<H> )-----------( 354      ( 2021 02:00 )             30.7<L>    02    136  |  99  |  29<H>  ----------------------------<  114<H>  4.5   |  28  |  0.9      138  |  101  |  30<H>  ----------------------------<  113<H>  4.3   |  27  |  0.8    Ca    8.9      2021 02:00  Mg     1.8         TPro  5.2<L> [6.0 - 8.0]  /  Alb  3.3<L> [3.5 - 5.2]  /  TBili  1.9<H> [0.2 - 1.2]  /  DBili  x   /  AST  19 [0 - 41]  /  ALT  36 [0 - 41]  /  AlkPhos  65 [30 - 115]      PT/INR - ( 2021 11:43 )   PT: ;   INR: 1.23 ratio    PTT - ( 2021 11:43 )  PTT:27.3 sec  Urinalysis Basic - ( 2021 15:05 )  Color: Yellow / Appearance: Clear / S.021 / pH: x  Gluc: x / Ketone: Negative  / Bili: Negative / Urobili: <2 mg/dL   Blood: x / Protein: 30 mg/dL / Nitrite: Negative   Leuk Esterase: Negative / RBC: 9 /HPF / WBC 1 /HPF   Sq Epi: x / Non Sq Epi: 4 /HPF / Bacteria: Negative    RADIOLOGY & ADDITIONAL TESTS:  CXR: Xray Chest 1 View- PORTABLE-Routine:   EXAM:  XR CHEST PORTABLE ROUTINE 1V          PROCEDURE DATE:  2021    INTERPRETATION:  Clinical History / Reason for exam: Postoperative x-ray.  Comparison : Chest radiograph 2021.  Technique/Positioning: Single frontal chest x-ray obtained  Findings:  -Support devices: None.  -Cardiac/mediastinum/hilum: Obscured. Mitral valve replacement.  -Lung parenchyma/Pleura: Slight improvement in bilateral pleural effusion/opacities. Known right paramediastinal collection difficult to assess on x-ray.  -Skeleton/soft tissues: Unchanged.  Impression:  -Slight improvement in bilateral pleural effusion/opacities.Known right paramediastinal collection difficult to assess on x-ray.    EKG:< from: 12 Lead ECG (21 @ 07:28) >  -Ventricular Rate 69 BPM  -Atrial Rate 69 BPM  -P-R Interval 146 ms  -QRS Duration 100 ms  -Q-T Interval 454 ms  -QTC Calculation(Bazett) 486 ms  -P Axis 27 degrees  -R Axis -11 degrees  -T Axis 62 degrees  -Diagnosis Line Sinus rhythm withfrequent Premature ventricular complexes  -Nonspecific T wave abnormality  -Abnormal ECG    MEDICATIONS  (STANDING):  albumin human  5% IVPB 500 milliLiter(s) IV Intermittent once  aMIOdarone    Tablet 200 milliGRAM(s) Oral daily  aMIOdarone    Tablet   Oral   amLODIPine   Tablet 5 milliGRAM(s) Oral daily  aspirin 325 milliGRAM(s) Oral daily  atorvastatin 40 milliGRAM(s) Oral at bedtime  cefepime   IVPB 1000 milliGRAM(s) IV Intermittent every 12 hours  cefepime   IVPB      chlorhexidine 4% Liquid 1 Application(s) Topical <User Schedule>  dextrose 40% Gel 15 Gram(s) Oral once  dextrose 5%. 1000 milliLiter(s) (50 mL/Hr) IV Continuous <Continuous>  dextrose 5%. 1000 milliLiter(s) (100 mL/Hr) IV Continuous <Continuous>  dextrose 50% Injectable 25 Gram(s) IV Push once  dextrose 50% Injectable 12.5 Gram(s) IV Push once  dextrose 50% Injectable 25 Gram(s) IV Push once  escitalopram 20 milliGRAM(s) Oral daily  furosemide    Tablet 40 milliGRAM(s) Oral daily  glucagon  Injectable 1 milliGRAM(s) IntraMuscular once  guaiFENesin  milliGRAM(s) Oral every 12 hours  heparin   Injectable 5000 Unit(s) SubCutaneous every 12 hours  insulin lispro (ADMELOG) corrective regimen sliding scale   SubCutaneous three times a day before meals  ipratropium    for Nebulization 500 MICROGram(s) Nebulizer every 6 hours  lisinopril 5 milliGRAM(s) Oral daily  magnesium sulfate  IVPB 1 Gram(s) IV Intermittent every 12 hours  metFORMIN 500 milliGRAM(s) Oral two times a day with meals  metoprolol tartrate 50 milliGRAM(s) Oral every 12 hours  pantoprazole    Tablet 40 milliGRAM(s) Oral before breakfast  polyethylene glycol 3350 17 Gram(s) Oral daily  potassium chloride    Tablet ER 20 milliEquivalent(s) Oral daily  simethicone 80 milliGRAM(s) Chew three times a day  sodium chloride 0.9%. 1000 milliLiter(s) (20 mL/Hr) IV Continuous <Continuous>  vancomycin  IVPB 1000 milliGRAM(s) IV Intermittent every 12 hours    MEDICATIONS  (PRN):  hydrALAZINE Injectable 10 milliGRAM(s) IV Push every 4 hours PRN SBP > 155    HEPARIN:  [X] YES [] NO  Dose: 5000 UNITS/HR UNITS Q12H  SCD's: YES b/l  GI Prophylaxis: Protonix [X], Pepcid [], None [], (Contra-indication:.....)    Post-Op Aspirin: Yes [X],  No [], If No, then contra-indication:  Post-Op Statin: Yes [X], No[], If No, then contra-indication:  Post-Op Beta-Blockers: Yes [X], No [], If No, then contra-indication:    Allergies:  No Known Allergies    Ambulation/Activity Status: Ambulates several times daily with assistance.    Assessment/Plan:  70y Female status-post CABG & MV repair   - Case and plan discussed with CTU Intensivist and CT Surgeon - Dr. Rojas  - Continue CTU supportive care    - Continue DVT/GI prophylaxis  - Incentive Spirometry 10 times an hour  - Continue to advance physical activity as tolerated and continue PT/OT as directed  repeat covid swab in prep for discharge  cont amio for occasional pvc's  ID note appreciated ; will discuss with surgeon possibly stopping  the ABX    Social Service Disposition:  Plan for discharge to nursing facility tomorrow

## 2021-02-24 LAB
ALBUMIN SERPL ELPH-MCNC: 3.3 G/DL — LOW (ref 3.5–5.2)
ALBUMIN SERPL ELPH-MCNC: 3.4 G/DL — LOW (ref 3.5–5.2)
ALP SERPL-CCNC: 70 U/L — SIGNIFICANT CHANGE UP (ref 30–115)
ALP SERPL-CCNC: 74 U/L — SIGNIFICANT CHANGE UP (ref 30–115)
ALT FLD-CCNC: 23 U/L — SIGNIFICANT CHANGE UP (ref 0–41)
ALT FLD-CCNC: 25 U/L — SIGNIFICANT CHANGE UP (ref 0–41)
AMYLASE P1 CFR SERPL: 249 U/L — HIGH (ref 25–115)
ANION GAP SERPL CALC-SCNC: 8 MMOL/L — SIGNIFICANT CHANGE UP (ref 7–14)
AST SERPL-CCNC: 16 U/L — SIGNIFICANT CHANGE UP (ref 0–41)
AST SERPL-CCNC: 22 U/L — SIGNIFICANT CHANGE UP (ref 0–41)
BILIRUB DIRECT SERPL-MCNC: 0.4 MG/DL — HIGH (ref 0–0.2)
BILIRUB INDIRECT FLD-MCNC: 1.3 MG/DL — HIGH (ref 0.2–1.2)
BILIRUB SERPL-MCNC: 1.7 MG/DL — HIGH (ref 0.2–1.2)
BILIRUB SERPL-MCNC: 1.7 MG/DL — HIGH (ref 0.2–1.2)
BUN SERPL-MCNC: 26 MG/DL — HIGH (ref 10–20)
CALCIUM SERPL-MCNC: 8.8 MG/DL — SIGNIFICANT CHANGE UP (ref 8.5–10.1)
CHLORIDE SERPL-SCNC: 101 MMOL/L — SIGNIFICANT CHANGE UP (ref 98–110)
CO2 SERPL-SCNC: 32 MMOL/L — SIGNIFICANT CHANGE UP (ref 17–32)
CREAT SERPL-MCNC: 0.9 MG/DL — SIGNIFICANT CHANGE UP (ref 0.7–1.5)
GLUCOSE BLDC GLUCOMTR-MCNC: 101 MG/DL — HIGH (ref 70–99)
GLUCOSE BLDC GLUCOMTR-MCNC: 133 MG/DL — HIGH (ref 70–99)
GLUCOSE BLDC GLUCOMTR-MCNC: 142 MG/DL — HIGH (ref 70–99)
GLUCOSE SERPL-MCNC: 101 MG/DL — HIGH (ref 70–99)
HCT VFR BLD CALC: 34 % — LOW (ref 37–47)
HGB BLD-MCNC: 10.5 G/DL — LOW (ref 12–16)
LACTATE SERPL-SCNC: 0.8 MMOL/L — SIGNIFICANT CHANGE UP (ref 0.7–2)
LIDOCAIN IGE QN: 287 U/L — HIGH (ref 7–60)
MAGNESIUM SERPL-MCNC: 2.1 MG/DL — SIGNIFICANT CHANGE UP (ref 1.8–2.4)
MCHC RBC-ENTMCNC: 29.4 PG — SIGNIFICANT CHANGE UP (ref 27–31)
MCHC RBC-ENTMCNC: 30.9 G/DL — LOW (ref 32–37)
MCV RBC AUTO: 95.2 FL — SIGNIFICANT CHANGE UP (ref 81–99)
NRBC # BLD: 0 /100 WBCS — SIGNIFICANT CHANGE UP (ref 0–0)
PLATELET # BLD AUTO: 330 K/UL — SIGNIFICANT CHANGE UP (ref 130–400)
POTASSIUM SERPL-MCNC: 4.7 MMOL/L — SIGNIFICANT CHANGE UP (ref 3.5–5)
POTASSIUM SERPL-SCNC: 4.7 MMOL/L — SIGNIFICANT CHANGE UP (ref 3.5–5)
PROCALCITONIN SERPL-MCNC: 0.07 NG/ML — SIGNIFICANT CHANGE UP (ref 0.02–0.1)
PROT SERPL-MCNC: 4.9 G/DL — LOW (ref 6–8)
PROT SERPL-MCNC: 5.3 G/DL — LOW (ref 6–8)
RBC # BLD: 3.57 M/UL — LOW (ref 4.2–5.4)
RBC # FLD: 19 % — HIGH (ref 11.5–14.5)
SARS-COV-2 RNA SPEC QL NAA+PROBE: SIGNIFICANT CHANGE UP
SODIUM SERPL-SCNC: 141 MMOL/L — SIGNIFICANT CHANGE UP (ref 135–146)
WBC # BLD: 16.83 K/UL — HIGH (ref 4.8–10.8)
WBC # FLD AUTO: 16.83 K/UL — HIGH (ref 4.8–10.8)

## 2021-02-24 PROCEDURE — 76705 ECHO EXAM OF ABDOMEN: CPT | Mod: 26

## 2021-02-24 PROCEDURE — 99223 1ST HOSP IP/OBS HIGH 75: CPT

## 2021-02-24 PROCEDURE — 71045 X-RAY EXAM CHEST 1 VIEW: CPT | Mod: 26

## 2021-02-24 RX ORDER — BUMETANIDE 0.25 MG/ML
1 INJECTION INTRAMUSCULAR; INTRAVENOUS DAILY
Refills: 0 | Status: DISCONTINUED | OUTPATIENT
Start: 2021-02-25 | End: 2021-03-01

## 2021-02-24 RX ORDER — ALPRAZOLAM 0.25 MG
0.25 TABLET ORAL ONCE
Refills: 0 | Status: DISCONTINUED | OUTPATIENT
Start: 2021-02-24 | End: 2021-02-24

## 2021-02-24 RX ADMIN — Medication 100 GRAM(S): at 05:03

## 2021-02-24 RX ADMIN — Medication 100 GRAM(S): at 17:17

## 2021-02-24 RX ADMIN — Medication 325 MILLIGRAM(S): at 11:40

## 2021-02-24 RX ADMIN — Medication 0.25 MILLIGRAM(S): at 08:07

## 2021-02-24 RX ADMIN — Medication 600 MILLIGRAM(S): at 17:17

## 2021-02-24 RX ADMIN — Medication 0.25 MILLIGRAM(S): at 20:26

## 2021-02-24 RX ADMIN — Medication 500 MICROGRAM(S): at 08:30

## 2021-02-24 RX ADMIN — HEPARIN SODIUM 5000 UNIT(S): 5000 INJECTION INTRAVENOUS; SUBCUTANEOUS at 19:50

## 2021-02-24 RX ADMIN — Medication 50 MILLIGRAM(S): at 17:23

## 2021-02-24 RX ADMIN — LISINOPRIL 5 MILLIGRAM(S): 2.5 TABLET ORAL at 05:04

## 2021-02-24 RX ADMIN — METFORMIN HYDROCHLORIDE 500 MILLIGRAM(S): 850 TABLET ORAL at 16:54

## 2021-02-24 RX ADMIN — SIMETHICONE 80 MILLIGRAM(S): 80 TABLET, CHEWABLE ORAL at 05:06

## 2021-02-24 RX ADMIN — SIMETHICONE 80 MILLIGRAM(S): 80 TABLET, CHEWABLE ORAL at 19:51

## 2021-02-24 RX ADMIN — AMIODARONE HYDROCHLORIDE 200 MILLIGRAM(S): 400 TABLET ORAL at 05:04

## 2021-02-24 RX ADMIN — AMLODIPINE BESYLATE 5 MILLIGRAM(S): 2.5 TABLET ORAL at 05:04

## 2021-02-24 RX ADMIN — Medication 40 MILLIGRAM(S): at 05:04

## 2021-02-24 RX ADMIN — PANTOPRAZOLE SODIUM 40 MILLIGRAM(S): 20 TABLET, DELAYED RELEASE ORAL at 05:07

## 2021-02-24 RX ADMIN — POLYETHYLENE GLYCOL 3350 17 GRAM(S): 17 POWDER, FOR SOLUTION ORAL at 14:46

## 2021-02-24 RX ADMIN — Medication 600 MILLIGRAM(S): at 05:04

## 2021-02-24 RX ADMIN — HEPARIN SODIUM 5000 UNIT(S): 5000 INJECTION INTRAVENOUS; SUBCUTANEOUS at 05:05

## 2021-02-24 RX ADMIN — SIMETHICONE 80 MILLIGRAM(S): 80 TABLET, CHEWABLE ORAL at 14:46

## 2021-02-24 RX ADMIN — CHLORHEXIDINE GLUCONATE 1 APPLICATION(S): 213 SOLUTION TOPICAL at 05:07

## 2021-02-24 RX ADMIN — ESCITALOPRAM OXALATE 20 MILLIGRAM(S): 10 TABLET, FILM COATED ORAL at 11:40

## 2021-02-24 RX ADMIN — Medication 50 MILLIGRAM(S): at 05:05

## 2021-02-24 RX ADMIN — ATORVASTATIN CALCIUM 40 MILLIGRAM(S): 80 TABLET, FILM COATED ORAL at 19:51

## 2021-02-24 RX ADMIN — Medication 20 MILLIEQUIVALENT(S): at 11:41

## 2021-02-24 NOTE — CONSULT NOTE ADULT - CONSULT REASON
Pre operative dental clearance
Right  chest tube placement
Elevated Lipase, Concern for pancreatitis
PNA

## 2021-02-24 NOTE — CONSULT NOTE ADULT - ASSESSMENT
70y female with CAD, s/p recent CABG and Mitral Valve Repair (2/12/21) with nausea and elevated lipase, concern for pancreatitis.    - Nausea currently improved  - Lipase 287, mild elevation. Recommend rehydration and okay for diet, recheck lipase this evening  - CT without changes suggestive of pancreatitis; also revealing of rectus sheath hematoma, recommend Trend CBC and monitor for drop in hemoglobin    Please call surgery with any questions or concerns 70y female with CAD, s/p recent CABG and Mitral Valve Repair (2/12/21) with nausea and elevated lipase, concern for pancreatitis.    - Nausea currently improved  - Lipase 287, mild elevation. Recommend rehydration and okay for diet  - CT without changes suggestive of pancreatitis; also revealing of rectus sheath hematoma, recommend Trend CBC and monitor for drop in hemoglobin    Please call surgery with any questions or concerns

## 2021-02-24 NOTE — CONSULT NOTE ADULT - ATTENDING COMMENTS
I examined the patient with the PA/Resident and discussed my plan with the Resident/PA.   I agree with the above resident/pa note unless directly contradicted below.     ADRIAN SAMUELS 70y Female consulted for elevated lipase, no abdominal pain, no signs of fluid around pancreas on US or CT-> no pancreatitis, no need to trend lipase, normal diet  Rectus sheath hematoma- non surgical management - trend cbc, correct coags, transfuse as needed, IR consult if hgb drops-> no  general surgery interventions.     General surgery will sign off

## 2021-02-24 NOTE — PROGRESS NOTE ADULT - SUBJECTIVE AND OBJECTIVE BOX
ADRIAN SAMUELS  70y, Female    All available historical data reviewed    OVERNIGHT EVENTS:  no fevers  feels well and has no complaints     ROS:  General: Denies rigors, nightsweats  HEENT: Denies headache, rhinorrhea, sore throat, eye pain  CV: Denies CP, palpitations  PULM: Denies wheezing, hemoptysis  GI: Denies hematemesis, hematochezia, melena  : Denies discharge, hematuria  MSK: Denies arthralgias, myalgias  SKIN: Denies rash, lesions  NEURO: Denies paresthesias, weakness  PSYCH: Denies depression, anxiety    VITALS:  T(F): 97.6, Max: 98.6 (02-23-21 @ 16:00)  HR: 58  BP: 101/53  RR: 22Vital Signs Last 24 Hrs  T(C): 36.4 (24 Feb 2021 05:00), Max: 37 (23 Feb 2021 16:00)  T(F): 97.6 (24 Feb 2021 05:00), Max: 98.6 (23 Feb 2021 16:00)  HR: 58 (24 Feb 2021 09:00) (58 - 76)  BP: 101/53 (24 Feb 2021 09:00) (101/53 - 150/71)  BP(mean): 74 (24 Feb 2021 09:00) (74 - 101)  RR: 22 (24 Feb 2021 09:00) (18 - 35)  SpO2: 100% (24 Feb 2021 09:00) (94% - 100%)    TESTS & MEASUREMENTS:                        10.5   16.83 )-----------( 330      ( 24 Feb 2021 03:02 )             34.0     02-24    141  |  101  |  26<H>  ----------------------------<  101<H>  4.7   |  32  |  0.9    Ca    8.8      24 Feb 2021 03:02  Mg     2.1     02-24    TPro  4.9<L>  /  Alb  3.3<L>  /  TBili  1.7<H>  /  DBili  x   /  AST  16  /  ALT  23  /  AlkPhos  70  02-24    LIVER FUNCTIONS - ( 24 Feb 2021 03:02 )  Alb: 3.3 g/dL / Pro: 4.9 g/dL / ALK PHOS: 70 U/L / ALT: 23 U/L / AST: 16 U/L / GGT: x                   RADIOLOGY & ADDITIONAL TESTS:  Personal review of radiological diagnostics performed  Echo and EKG results noted when applicable.     MEDICATIONS:  albumin human  5% IVPB 500 milliLiter(s) IV Intermittent once  aMIOdarone    Tablet 200 milliGRAM(s) Oral daily  aMIOdarone    Tablet   Oral   amLODIPine   Tablet 5 milliGRAM(s) Oral daily  aspirin 325 milliGRAM(s) Oral daily  atorvastatin 40 milliGRAM(s) Oral at bedtime  chlorhexidine 4% Liquid 1 Application(s) Topical <User Schedule>  dextrose 40% Gel 15 Gram(s) Oral once  dextrose 5%. 1000 milliLiter(s) IV Continuous <Continuous>  dextrose 5%. 1000 milliLiter(s) IV Continuous <Continuous>  dextrose 50% Injectable 25 Gram(s) IV Push once  dextrose 50% Injectable 12.5 Gram(s) IV Push once  dextrose 50% Injectable 25 Gram(s) IV Push once  escitalopram 20 milliGRAM(s) Oral daily  glucagon  Injectable 1 milliGRAM(s) IntraMuscular once  guaiFENesin  milliGRAM(s) Oral every 12 hours  heparin   Injectable 5000 Unit(s) SubCutaneous every 12 hours  hydrALAZINE Injectable 10 milliGRAM(s) IV Push every 4 hours PRN  insulin lispro (ADMELOG) corrective regimen sliding scale   SubCutaneous three times a day before meals  ipratropium    for Nebulization 500 MICROGram(s) Nebulizer every 6 hours  lisinopril 5 milliGRAM(s) Oral daily  magnesium sulfate  IVPB 1 Gram(s) IV Intermittent every 12 hours  metFORMIN 500 milliGRAM(s) Oral two times a day with meals  metoprolol tartrate 50 milliGRAM(s) Oral every 12 hours  pantoprazole    Tablet 40 milliGRAM(s) Oral before breakfast  polyethylene glycol 3350 17 Gram(s) Oral daily  potassium chloride    Tablet ER 20 milliEquivalent(s) Oral daily  simethicone 80 milliGRAM(s) Chew three times a day      ANTIBIOTICS:

## 2021-02-24 NOTE — CONSULT NOTE ADULT - SUBJECTIVE AND OBJECTIVE BOX
INTERVENTIONAL RADIOLOGY CONSULT:     Procedure Requested: right chest tube placement     HPI:  71 yo F h/o HTN, DLD, has JACOME on TTE Mod AS by gradient, and mod to severe MR here for DERRICK and RHC/LHC   (09 Feb 2021 09:38)      PAST MEDICAL & SURGICAL HISTORY:  Glaucoma    Chronic sciatica    H/O sleep apnea  on CPAP    Aortic stenosis    Type 2 diabetes mellitus without complication, without long-term current use of insulin    Hyperlipidemia, unspecified hyperlipidemia type    Hypertension, unspecified type    History of carpal tunnel surgery    History of hip surgery  bilateral hip        MEDICATIONS  (STANDING):  albumin human  5% IVPB 500 milliLiter(s) IV Intermittent once  aMIOdarone    Tablet 200 milliGRAM(s) Oral daily  aMIOdarone    Tablet   Oral   amLODIPine   Tablet 5 milliGRAM(s) Oral daily  aspirin 325 milliGRAM(s) Oral daily  atorvastatin 40 milliGRAM(s) Oral at bedtime  chlorhexidine 4% Liquid 1 Application(s) Topical <User Schedule>  dextrose 40% Gel 15 Gram(s) Oral once  dextrose 5%. 1000 milliLiter(s) (50 mL/Hr) IV Continuous <Continuous>  dextrose 5%. 1000 milliLiter(s) (100 mL/Hr) IV Continuous <Continuous>  dextrose 50% Injectable 25 Gram(s) IV Push once  dextrose 50% Injectable 12.5 Gram(s) IV Push once  dextrose 50% Injectable 25 Gram(s) IV Push once  escitalopram 20 milliGRAM(s) Oral daily  glucagon  Injectable 1 milliGRAM(s) IntraMuscular once  guaiFENesin  milliGRAM(s) Oral every 12 hours  heparin   Injectable 5000 Unit(s) SubCutaneous every 12 hours  insulin lispro (ADMELOG) corrective regimen sliding scale   SubCutaneous three times a day before meals  ipratropium    for Nebulization 500 MICROGram(s) Nebulizer every 6 hours  lisinopril 5 milliGRAM(s) Oral daily  magnesium sulfate  IVPB 1 Gram(s) IV Intermittent every 12 hours  metFORMIN 500 milliGRAM(s) Oral two times a day with meals  metoprolol tartrate 50 milliGRAM(s) Oral every 12 hours  pantoprazole    Tablet 40 milliGRAM(s) Oral before breakfast  polyethylene glycol 3350 17 Gram(s) Oral daily  potassium chloride    Tablet ER 20 milliEquivalent(s) Oral daily  simethicone 80 milliGRAM(s) Chew three times a day    MEDICATIONS  (PRN):  hydrALAZINE Injectable 10 milliGRAM(s) IV Push every 4 hours PRN SBP > 155      Allergies    No Known Allergies    Intolerances      Physical Exam:   Vital Signs Last 24 Hrs  T(C): 36.4 (24 Feb 2021 05:00), Max: 37 (23 Feb 2021 16:00)  T(F): 97.6 (24 Feb 2021 05:00), Max: 98.6 (23 Feb 2021 16:00)  HR: 58 (24 Feb 2021 09:00) (58 - 76)  BP: 101/53 (24 Feb 2021 09:00) (101/53 - 150/71)  BP(mean): 74 (24 Feb 2021 09:00) (74 - 101)  RR: 22 (24 Feb 2021 09:00) (18 - 35)  SpO2: 100% (24 Feb 2021 09:00) (94% - 100%)      Labs:                         10.5   16.83 )-----------( 330      ( 24 Feb 2021 03:02 )             34.0     02-24    141  |  101  |  26<H>  ----------------------------<  101<H>  4.7   |  32  |  0.9    Ca    8.8      24 Feb 2021 03:02  Mg     2.1     02-24    TPro  4.9<L>  /  Alb  3.3<L>  /  TBili  1.7<H>  /  DBili  x   /  AST  16  /  ALT  23  /  AlkPhos  70  02-24        Pertinent labs:                      10.5   16.83 )-----------( 330      ( 24 Feb 2021 03:02 )             34.0       02-24    141  |  101  |  26<H>  ----------------------------<  101<H>  4.7   |  32  |  0.9    Ca    8.8      24 Feb 2021 03:02  Mg     2.1     02-24    TPro  4.9<L>  /  Alb  3.3<L>  /  TBili  1.7<H>  /  DBili  x   /  AST  16  /  ALT  23  /  AlkPhos  70  02-24      Radiology & Additional Studies:     Impression:    Slight improvement in bilateral pleural effusion/opacities.Known right paramediastinal collection difficult to assess on x-ray.    Radiology imaging reviewed.     IMPRESSION:    Large ill-defined medial right paramediastinal mixed density collection, with adjacent near complete collapse of the right lung and marked mass effect on the right atrium. Findings suspicious for an underlying loculated hemothorax.    Moderate bilateral pleural effusions with adjacent atelectasis.     ASSESSMENT/ PLAN:   71 yo F h/o HTN, DLD, has JACOME on TTE Mod AS by gradient, and mod to severe MR here for DERRICK and RHC/LHC status-post CABG & MV repair   - consulted for image guided right sided chest tube placement d/t right pleural effusion  - imaging suspicious for underlying loculated hemothorax  - plan for right chest tube placement with CT guidance tomorrow 2/25  - draw CBC/CMP/coags prior to procedure  - keep NPO after midnight tonight  - hold anticoagulation until after procedure      Thank you for the courtesy of this consult, please call [phone] with any further questions.    INTERVENTIONAL RADIOLOGY CONSULT:     Procedure Requested: right chest tube placement     HPI:  69 yo F h/o HTN, DLD, has JACOME on TTE Mod AS by gradient, and mod to severe MR here for DERRICK and RHC/LHC   (09 Feb 2021 09:38)      PAST MEDICAL & SURGICAL HISTORY:  Glaucoma    Chronic sciatica    H/O sleep apnea  on CPAP    Aortic stenosis    Type 2 diabetes mellitus without complication, without long-term current use of insulin    Hyperlipidemia, unspecified hyperlipidemia type    Hypertension, unspecified type    History of carpal tunnel surgery    History of hip surgery  bilateral hip        MEDICATIONS  (STANDING):  albumin human  5% IVPB 500 milliLiter(s) IV Intermittent once  aMIOdarone    Tablet 200 milliGRAM(s) Oral daily  aMIOdarone    Tablet   Oral   amLODIPine   Tablet 5 milliGRAM(s) Oral daily  aspirin 325 milliGRAM(s) Oral daily  atorvastatin 40 milliGRAM(s) Oral at bedtime  chlorhexidine 4% Liquid 1 Application(s) Topical <User Schedule>  dextrose 40% Gel 15 Gram(s) Oral once  dextrose 5%. 1000 milliLiter(s) (50 mL/Hr) IV Continuous <Continuous>  dextrose 5%. 1000 milliLiter(s) (100 mL/Hr) IV Continuous <Continuous>  dextrose 50% Injectable 25 Gram(s) IV Push once  dextrose 50% Injectable 12.5 Gram(s) IV Push once  dextrose 50% Injectable 25 Gram(s) IV Push once  escitalopram 20 milliGRAM(s) Oral daily  glucagon  Injectable 1 milliGRAM(s) IntraMuscular once  guaiFENesin  milliGRAM(s) Oral every 12 hours  heparin   Injectable 5000 Unit(s) SubCutaneous every 12 hours  insulin lispro (ADMELOG) corrective regimen sliding scale   SubCutaneous three times a day before meals  ipratropium    for Nebulization 500 MICROGram(s) Nebulizer every 6 hours  lisinopril 5 milliGRAM(s) Oral daily  magnesium sulfate  IVPB 1 Gram(s) IV Intermittent every 12 hours  metFORMIN 500 milliGRAM(s) Oral two times a day with meals  metoprolol tartrate 50 milliGRAM(s) Oral every 12 hours  pantoprazole    Tablet 40 milliGRAM(s) Oral before breakfast  polyethylene glycol 3350 17 Gram(s) Oral daily  potassium chloride    Tablet ER 20 milliEquivalent(s) Oral daily  simethicone 80 milliGRAM(s) Chew three times a day    MEDICATIONS  (PRN):  hydrALAZINE Injectable 10 milliGRAM(s) IV Push every 4 hours PRN SBP > 155      Allergies    No Known Allergies    Intolerances      Physical Exam:   Vital Signs Last 24 Hrs  T(C): 36.4 (24 Feb 2021 05:00), Max: 37 (23 Feb 2021 16:00)  T(F): 97.6 (24 Feb 2021 05:00), Max: 98.6 (23 Feb 2021 16:00)  HR: 58 (24 Feb 2021 09:00) (58 - 76)  BP: 101/53 (24 Feb 2021 09:00) (101/53 - 150/71)  BP(mean): 74 (24 Feb 2021 09:00) (74 - 101)  RR: 22 (24 Feb 2021 09:00) (18 - 35)  SpO2: 100% (24 Feb 2021 09:00) (94% - 100%)      Labs:                         10.5   16.83 )-----------( 330      ( 24 Feb 2021 03:02 )             34.0     02-24    141  |  101  |  26<H>  ----------------------------<  101<H>  4.7   |  32  |  0.9    Ca    8.8      24 Feb 2021 03:02  Mg     2.1     02-24    TPro  4.9<L>  /  Alb  3.3<L>  /  TBili  1.7<H>  /  DBili  x   /  AST  16  /  ALT  23  /  AlkPhos  70  02-24        Pertinent labs:                      10.5   16.83 )-----------( 330      ( 24 Feb 2021 03:02 )             34.0       02-24    141  |  101  |  26<H>  ----------------------------<  101<H>  4.7   |  32  |  0.9    Ca    8.8      24 Feb 2021 03:02  Mg     2.1     02-24    TPro  4.9<L>  /  Alb  3.3<L>  /  TBili  1.7<H>  /  DBili  x   /  AST  16  /  ALT  23  /  AlkPhos  70  02-24      Radiology & Additional Studies:     Impression:    Slight improvement in bilateral pleural effusion/opacities.Known right paramediastinal collection difficult to assess on x-ray.    Radiology imaging reviewed.     IMPRESSION:    Large ill-defined medial right paramediastinal mixed density collection, with adjacent near complete collapse of the right lung and marked mass effect on the right atrium. Findings suspicious for an underlying loculated hemothorax.    Moderate bilateral pleural effusions with adjacent atelectasis.     ASSESSMENT/ PLAN:   69 yo F h/o HTN, DLD, has JACOME on TTE Mod AS by gradient, and mod to severe MR here for DERRICK and RHC/LHC status-post CABG & MV repair   - consulted for image guided right sided chest tube placement d/t right pleural effusion  - imaging suspicious for underlying loculated hemothorax  - plan for right chest tube placement with CT guidance tomorrow 2/25  - draw CBC/CMP/coags prior to procedure  - keep NPO after midnight tonight    Thank you for the courtesy of this consult, please call [phone] with any further questions.

## 2021-02-24 NOTE — PROGRESS NOTE ADULT - SUBJECTIVE AND OBJECTIVE BOX
OPERATIVE PROCEDURE(s):        CABG and MV repair        POD #    10                   70yFemale  SURGEON(s): FIONA Rojas  SUBJECTIVE ASSESSMENT:  Patient has no complaints at this time.    Vital Signs Last 24 Hrs  T(F): 97.6 (2021 05:00), Max: 98.9 (2021 08:00)  HR: 68 (2021 05:00) (59 - 76)  BP: 129/54 (2021 05:00) (111/53 - 162/76)  BP(mean): 78 (2021 05:00) (75 - 109)  RR: 19 (2021 05:00) (19 - 35)  SpO2: 100% (2021 05:00) (94% - 100%)      I&O's Detail    2021 07:01  -  2021 07:00  --------------------------------------------------------  IN:    IV PiggyBack: 450 mL    Oral Fluid: 980 mL  Total IN: 1430 mL    OUT:    Incontinent per Collection Bag (mL): 1200 mL    Voided (mL): 1100 mL  Total OUT: 2300 mL    Net:   I&O's Detail    2021 07:01  -  2021 07:00  --------------------------------------------------------  Total NET: -380 mL    2021 07:01  -  2021 07:00  --------------------------------------------------------  Total NET: -870 mL    CAPILLARY BLOOD GLUCOSE  POCT Blood Glucose.: 132 mg/dL (2021 21:25)  POCT Blood Glucose.: 110 mg/dL (2021 16:09)  POCT Blood Glucose.: 107 mg/dL (2021 11:39)  POCT Blood Glucose.: 148 mg/dL (2021 06:47)    Physical Exam:  General: NAD; A&Ox3  Cardiac: S1/S2, RRR, no murmur, no rubs  Lungs: unlabored respirations, CTA b/l, no wheeze, no rales, no crackles  Abdomen: Soft/NT/ND; positive bowel sounds x 4  Sternum: Intact, no click, incision healing well with no drainage  Incisions: Incisions clean/dry/intact  Extremities: No edema b/l lower extremities; good capillary refill; no cyanosis; palpable 1+ pedal pulses b/l    Central Venous Catheter: Yes[]  No[] , If Yes indication:           Day #  Spencer Catheter: Yes  [] , No  [] , If yes indication:                      Day #  NGT: Yes [] No [] ,    If Yes Placement:                                     Day #  EPICARDIAL WIRES:  [] YES [] NO                                              Day #  BOWEL MOVEMENT:  [] YES [] NO, If No, Timing since last BM:      Day #  CHEST TUBE(Left/Right):  [] YES [] NO, If yes -  AIR LEAKS:  [] YES [] NO        LABS:                        10.5<L>  16.83<H> )-----------( 330      ( 2021 03:02 )             34.0<L>                        10.1<L>  17.17<H> )-----------( 336      ( 2021 02:00 )             32.4<L>    02-24    141  |  101  |  26<H>  ----------------------------<  101<H>  4.7   |  32  |  0.9      136  |  99  |  29<H>  ----------------------------<  114<H>  4.5   |  28  |  0.9    Ca    8.8      2021 03:02  Mg     2.1         TPro  4.9<L> [6.0 - 8.0]  /  Alb  3.3<L> [3.5 - 5.2]  /  TBili  1.7<H> [0.2 - 1.2]  /  DBili  x   /  AST  16 [0 - 41]  /  ALT  23 [0 - 41]  /  AlkPhos  70 [30 - 115]  -    Urinalysis Basic - ( 2021 15:05 )  Color: Yellow / Appearance: Clear / S.021 / pH: x  Gluc: x / Ketone: Negative  / Bili: Negative / Urobili: <2 mg/dL   Blood: x / Protein: 30 mg/dL / Nitrite: Negative   Leuk Esterase: Negative / RBC: 9 /HPF / WBC 1 /HPF   Sq Epi: x / Non Sq Epi: 4 /HPF / Bacteria: Negative      RADIOLOGY & ADDITIONAL TESTS:  CXR:     EK Lead ECG:   Ventricular Rate 69 BPM  Atrial Rate 69 BPM  P-R Interval 146 ms  QRS Duration 106 ms  Q-T Interval 456 ms  QTC Calculation(Bazett) 488 ms  P Axis 26 degrees  R Axis -12 degrees  T Axis 81 degrees  Diagnosis Line Sinus rhythm withoccasional Premature ventricular complexes  Possible Left atrial enlargement  Left ventricular hypertrophy  T wave abnormality, consider anterolateral ischemia  Prolonged QT  Abnormal ECG  Confirmed by PARKER PACK MD [id number]) on 2021 10:24:55 AM (21 @ 08:58)    MEDICATIONS  (STANDING):  albumin human  5% IVPB 500 milliLiter(s) IV Intermittent once  aMIOdarone    Tablet 200 milliGRAM(s) Oral daily  aMIOdarone    Tablet   Oral   amLODIPine   Tablet 5 milliGRAM(s) Oral daily  aspirin 325 milliGRAM(s) Oral daily  atorvastatin 40 milliGRAM(s) Oral at bedtime  chlorhexidine 4% Liquid 1 Application(s) Topical <User Schedule>  dextrose 40% Gel 15 Gram(s) Oral once  dextrose 5%. 1000 milliLiter(s) (50 mL/Hr) IV Continuous <Continuous>  dextrose 5%. 1000 milliLiter(s) (100 mL/Hr) IV Continuous <Continuous>  dextrose 50% Injectable 12.5 Gram(s) IV Push once  dextrose 50% Injectable 25 Gram(s) IV Push once  dextrose 50% Injectable 25 Gram(s) IV Push once  escitalopram 20 milliGRAM(s) Oral daily  furosemide    Tablet 40 milliGRAM(s) Oral daily  glucagon  Injectable 1 milliGRAM(s) IntraMuscular once  guaiFENesin  milliGRAM(s) Oral every 12 hours  heparin   Injectable 5000 Unit(s) SubCutaneous every 12 hours  insulin lispro (ADMELOG) corrective regimen sliding scale   SubCutaneous three times a day before meals  ipratropium    for Nebulization 500 MICROGram(s) Nebulizer every 6 hours  lisinopril 5 milliGRAM(s) Oral daily  magnesium sulfate  IVPB 1 Gram(s) IV Intermittent every 12 hours  metFORMIN 500 milliGRAM(s) Oral two times a day with meals  metoprolol tartrate 50 milliGRAM(s) Oral every 12 hours  pantoprazole    Tablet 40 milliGRAM(s) Oral before breakfast  polyethylene glycol 3350 17 Gram(s) Oral daily  potassium chloride    Tablet ER 20 milliEquivalent(s) Oral daily  simethicone 80 milliGRAM(s) Chew three times a day  sodium chloride 0.9%. 1000 milliLiter(s) (20 mL/Hr) IV Continuous <Continuous>    MEDICATIONS  (PRN):  hydrALAZINE Injectable 10 milliGRAM(s) IV Push every 4 hours PRN SBP > 155    HEPARIN:  [] YES [] NO  Dose: XX UNITS/HR UNITS Q8H  LOVENOX:[] YES [] NO  Dose: XX mg Q24H  COUMADIN: []  YES [] NO  Dose: XX mg  Q24H  SCD's: YES b/l  GI Prophylaxis: Protonix [], Pepcid [], None [], (Contra-indication:.....)    Post-Op Aspirin: Yes [X],  No [], If No, then contra-indication:  Post-Op Statin: Yes [X], No[], If No, then contra-indication:  Post-Op Beta-Blockers: Yes [X], No [], If No, then contra-indication:    Allergies:  No Known Allergies      Ambulation/Activity Status: Ambulates several times daily without assistance.    Assessment/Plan:  70y Female status-post CABG and MV repair  - Case and plan discussed with CTU Intensivist and CT Surgeon - Dr. Rojas  - Continue CTU supportive care    - Continue DVT/GI prophylaxis  - Incentive Spirometry 10 times an hour  - Continue to advance physical activity as tolerated and continue PT/OT as directed  1. CAD: Continue ASA, statin, BB  2. HTN:   3. A. Fib:   4. COPD/Hypoxia:   5. DM/Glucose Control:     Social Service Disposition:     OPERATIVE PROCEDURE(s):        CABG and MV repair        POD #    10                   70yFemale  SURGEON(s): FIONA Rojas  SUBJECTIVE ASSESSMENT:  Patient has no complaints at this time.    Vital Signs Last 24 Hrs  T(F): 97.6 (2021 05:00), Max: 98.9 (2021 08:00)  HR: 68 (2021 05:00) (59 - 76)  BP: 129/54 (2021 05:00) (111/53 - 162/76)  BP(mean): 78 (2021 05:00) (75 - 109)  RR: 19 (2021 05:00) (19 - 35)  SpO2: 100% (2021 05:00) (94% - 100%)      I&O's Detail    2021 07:01  -  2021 07:00  --------------------------------------------------------  IN:    IV PiggyBack: 450 mL    Oral Fluid: 980 mL  Total IN: 1430 mL    OUT:    Incontinent per Collection Bag (mL): 1200 mL    Voided (mL): 1100 mL  Total OUT: 2300 mL    Net:   I&O's Detail    2021 07:01  -  2021 07:00  --------------------------------------------------------  Total NET: -380 mL    2021 07:01  -  2021 07:00  --------------------------------------------------------  Total NET: -870 mL    CAPILLARY BLOOD GLUCOSE  POCT Blood Glucose.: 132 mg/dL (2021 21:25)  POCT Blood Glucose.: 110 mg/dL (2021 16:09)  POCT Blood Glucose.: 107 mg/dL (2021 11:39)  POCT Blood Glucose.: 148 mg/dL (2021 06:47)    Physical Exam:  General: NAD; A&Ox3  Cardiac: S1/S2, RRR, no murmur, no rubs  Lungs: unlabored respirations, CTA b/l, no wheeze, no rales, no crackles  Abdomen: Soft/NT/ND; positive bowel sounds x 4  Sternum: Intact, no click, incision healing well with no drainage  Incisions: Incisions clean/dry/intact  Extremities: No edema b/l lower extremities; good capillary refill; no cyanosis; palpable 1+ pedal pulses b/l    Central Venous Catheter: Yes[]  No[] , If Yes indication:           Day #  Spencer Catheter: Yes  [] , No  [] , If yes indication:                      Day #  NGT: Yes [] No [] ,    If Yes Placement:                                     Day #  EPICARDIAL WIRES:  [] YES [] NO                                              Day #  BOWEL MOVEMENT:  [] YES [] NO, If No, Timing since last BM:      Day #  CHEST TUBE(Left/Right):  [] YES [] NO, If yes -  AIR LEAKS:  [] YES [] NO        LABS:                        10.5<L>  16.83<H> )-----------( 330      ( 2021 03:02 )             34.0<L>                        10.1<L>  17.17<H> )-----------( 336      ( 2021 02:00 )             32.4<L>    02-24    141  |  101  |  26<H>  ----------------------------<  101<H>  4.7   |  32  |  0.9      136  |  99  |  29<H>  ----------------------------<  114<H>  4.5   |  28  |  0.9    Ca    8.8      2021 03:02  Mg     2.1         TPro  4.9<L> [6.0 - 8.0]  /  Alb  3.3<L> [3.5 - 5.2]  /  TBili  1.7<H> [0.2 - 1.2]  /  DBili  x   /  AST  16 [0 - 41]  /  ALT  23 [0 - 41]  /  AlkPhos  70 [30 - 115]      Urinalysis Basic - ( 2021 15:05 )  Color: Yellow / Appearance: Clear / S.021 / pH: x  Gluc: x / Ketone: Negative  / Bili: Negative / Urobili: <2 mg/dL   Blood: x / Protein: 30 mg/dL / Nitrite: Negative   Leuk Esterase: Negative / RBC: 9 /HPF / WBC 1 /HPF   Sq Epi: x / Non Sq Epi: 4 /HPF / Bacteria: Negative      RADIOLOGY & ADDITIONAL TESTS:  CXR: < from: Xray Chest 1 View- PORTABLE-Routine (Xray Chest 1 View- PORTABLE-Routine in AM.) (21 @ 06:09) >  Findings:  Support devices: None.  Cardiac/mediastinum/hilum: Obscured. Mitral valve replacement.  Lung parenchyma/Pleura: Slight improvement in bilateral pleural effusion/opacities. Known right paramediastinal collection difficult to assess on x-ray.  Skeleton/soft tissues: Unchanged.  Impression:  Slight improvement in bilateral pleural effusion/opacities.Known right paramediastinal collection difficult to assess on x-ray.    EK Lead ECG:   Ventricular Rate 69 BPM  Atrial Rate 69 BPM  P-R Interval 146 ms  QRS Duration 106 ms  Q-T Interval 456 ms  QTC Calculation(Bazett) 488 ms  P Axis 26 degrees  R Axis -12 degrees  T Axis 81 degrees  Diagnosis Line Sinus rhythm withoccasional Premature ventricular complexes  Possible Left atrial enlargement  Left ventricular hypertrophy  T wave abnormality, consider anterolateral ischemia  Prolonged QT  Abnormal ECG  Confirmed by PARKER PACK MD ([id number]) on 2021 10:24:55 AM (21 @ 08:58)    MEDICATIONS  (STANDING):  albumin human  5% IVPB 500 milliLiter(s) IV Intermittent once  aMIOdarone    Tablet 200 milliGRAM(s) Oral daily  aMIOdarone    Tablet   Oral   amLODIPine   Tablet 5 milliGRAM(s) Oral daily  aspirin 325 milliGRAM(s) Oral daily  atorvastatin 40 milliGRAM(s) Oral at bedtime  chlorhexidine 4% Liquid 1 Application(s) Topical <User Schedule>  dextrose 40% Gel 15 Gram(s) Oral once  dextrose 5%. 1000 milliLiter(s) (50 mL/Hr) IV Continuous <Continuous>  dextrose 5%. 1000 milliLiter(s) (100 mL/Hr) IV Continuous <Continuous>  dextrose 50% Injectable 12.5 Gram(s) IV Push once  dextrose 50% Injectable 25 Gram(s) IV Push once  dextrose 50% Injectable 25 Gram(s) IV Push once  escitalopram 20 milliGRAM(s) Oral daily  furosemide    Tablet 40 milliGRAM(s) Oral daily  glucagon  Injectable 1 milliGRAM(s) IntraMuscular once  guaiFENesin  milliGRAM(s) Oral every 12 hours  heparin   Injectable 5000 Unit(s) SubCutaneous every 12 hours  insulin lispro (ADMELOG) corrective regimen sliding scale   SubCutaneous three times a day before meals  ipratropium    for Nebulization 500 MICROGram(s) Nebulizer every 6 hours  lisinopril 5 milliGRAM(s) Oral daily  magnesium sulfate  IVPB 1 Gram(s) IV Intermittent every 12 hours  metFORMIN 500 milliGRAM(s) Oral two times a day with meals  metoprolol tartrate 50 milliGRAM(s) Oral every 12 hours  pantoprazole    Tablet 40 milliGRAM(s) Oral before breakfast  polyethylene glycol 3350 17 Gram(s) Oral daily  potassium chloride    Tablet ER 20 milliEquivalent(s) Oral daily  simethicone 80 milliGRAM(s) Chew three times a day  sodium chloride 0.9%. 1000 milliLiter(s) (20 mL/Hr) IV Continuous <Continuous>    MEDICATIONS  (PRN):  hydrALAZINE Injectable 10 milliGRAM(s) IV Push every 4 hours PRN SBP > 155    HEPARIN:  [] YES [] NO  Dose: XX UNITS/HR UNITS Q8H  LOVENOX:[] YES [] NO  Dose: XX mg Q24H  COUMADIN: []  YES [] NO  Dose: XX mg  Q24H  SCD's: YES b/l  GI Prophylaxis: Protonix [], Pepcid [], None [], (Contra-indication:.....)    Post-Op Aspirin: Yes [X],  No [], If No, then contra-indication:  Post-Op Statin: Yes [X], No[], If No, then contra-indication:  Post-Op Beta-Blockers: Yes [X], No [], If No, then contra-indication:    Allergies:  No Known Allergies      Ambulation/Activity Status: Ambulates several times daily without assistance.    Assessment/Plan:  70y Female status-post CABG and MV repair  - Case and plan discussed with CTU Intensivist and CT Surgeon - Dr. Rojas  - Continue CTU supportive care    - Continue DVT/GI prophylaxis  - Incentive Spirometry 10 times an hour  - Continue to advance physical activity as tolerated and continue PT/OT as directed  1. CAD: Continue ASA, statin, BB  2. HTN:   3. A. Fib:   4. COPD/Hypoxia:   5. DM/Glucose Control:     Social Service Disposition:     OPERATIVE PROCEDURE(s):        CABG and MV repair        POD #    12                   70yFemale  SURGEON(s): FIONA Rojas  SUBJECTIVE ASSESSMENT:  Pt c/o of nausea   Vital Signs Last 24 Hrs  T(F): 97.6 (2021 05:00), Max: 98.9 (2021 08:00)  HR: 68 (2021 05:00) (59 - 76)  BP: 129/54 (2021 05:00) (111/53 - 162/76)  BP(mean): 78 (2021 05:00) (75 - 109)  RR: 19 (2021 05:00) (19 - 35)  SpO2: 100% (2021 05:00) (94% - 100%)      I&O's Detail    2021 07:01  -  2021 07:00  --------------------------------------------------------  IN:    IV PiggyBack: 450 mL    Oral Fluid: 980 mL  Total IN: 1430 mL    OUT:    Incontinent per Collection Bag (mL): 1200 mL    Voided (mL): 1100 mL  Total OUT: 2300 mL    Net:   I&O's Detail    2021 07:01  -  2021 07:00  --------------------------------------------------------  Total NET: -380 mL    2021 07:01  -  2021 07:00  --------------------------------------------------------  Total NET: -870 mL    CAPILLARY BLOOD GLUCOSE  POCT Blood Glucose.: 132 mg/dL (2021 21:25)  POCT Blood Glucose.: 110 mg/dL (2021 16:09)  POCT Blood Glucose.: 107 mg/dL (2021 11:39)  POCT Blood Glucose.: 148 mg/dL (2021 06:47)    Physical Exam:  General: NAD; A&Ox3  Cardiac: S1/S2, RRR, no murmur, no rubs  Lungs: unlabored respirations, CTA b/l, no wheeze, no rales, no crackles  Abdomen: Soft/NT/ND; positive bowel sounds x 4  Sternum: Intact, no click, incision healing well with no drainage  Incisions: Incisions clean/dry/intact  Extremities: mild edema b/l lower extremities; good capillary refill; no cyanosis; palpable 1+ pedal pulses b/l        LABS:                        10.5<L>  16.83<H> )-----------( 330      ( 2021 03:02 )             34.0<L>                        10.1<L>  17.17<H> )-----------( 336      ( 2021 02:00 )             32.4<L>    02    141  |  101  |  26<H>  ----------------------------<  101<H>  4.7   |  32  |  0.9      136  |  99  |  29<H>  ----------------------------<  114<H>  4.5   |  28  |  0.9    Ca    8.8      2021 03:02  Mg     2.1     -24  TPro  4.9<L> [6.0 - 8.0]  /  Alb  3.3<L> [3.5 - 5.2]  /  TBili  1.7<H> [0.2 - 1.2]  /  DBili  x   /  AST  16 [0 - 41]  /  ALT  23 [0 - 41]  /  AlkPhos  70 [30 - 115]      Urinalysis Basic - ( 2021 15:05 )  Color: Yellow / Appearance: Clear / S.021 / pH: x  Gluc: x / Ketone: Negative  / Bili: Negative / Urobili: <2 mg/dL   Blood: x / Protein: 30 mg/dL / Nitrite: Negative   Leuk Esterase: Negative / RBC: 9 /HPF / WBC 1 /HPF   Sq Epi: x / Non Sq Epi: 4 /HPF / Bacteria: Negative      RADIOLOGY & ADDITIONAL TESTS:  CT:    CXR: < from: Xray Chest 1 View- PORTABLE-Routine (Xray Chest 1 View- PORTABLE-Routine in AM.) (21 @ 06:09) >  Findings:  Support devices: None.  Cardiac/mediastinum/hilum: Obscured. Mitral valve replacement.  Lung parenchyma/Pleura: Slight improvement in bilateral pleural effusion/opacities. Known right paramediastinal collection difficult to assess on x-ray.  Skeleton/soft tissues: Unchanged.  Impression:  Slight improvement in bilateral pleural effusion/opacities.Known right paramediastinal collection difficult to assess on x-ray.    EK Lead ECG:   Ventricular Rate 69 BPM  Atrial Rate 69 BPM  P-R Interval 146 ms  QRS Duration 106 ms  Q-T Interval 456 ms  QTC Calculation(Bazett) 488 ms  P Axis 26 degrees  R Axis -12 degrees  T Axis 81 degrees  Diagnosis Line Sinus rhythm withoccasional Premature ventricular complexes  Possible Left atrial enlargement  Left ventricular hypertrophy  T wave abnormality, consider anterolateral ischemia  Prolonged QT  Abnormal ECG  Confirmed by PARKER PACK MD ([id number]) on 2021 10:24:55 AM (21 @ 08:58)    MEDICATIONS  (STANDING):  albumin human  5% IVPB 500 milliLiter(s) IV Intermittent once  aMIOdarone    Tablet 200 milliGRAM(s) Oral daily  aMIOdarone    Tablet   Oral   amLODIPine   Tablet 5 milliGRAM(s) Oral daily  aspirin 325 milliGRAM(s) Oral daily  atorvastatin 40 milliGRAM(s) Oral at bedtime  chlorhexidine 4% Liquid 1 Application(s) Topical <User Schedule>  dextrose 40% Gel 15 Gram(s) Oral once  dextrose 5%. 1000 milliLiter(s) (50 mL/Hr) IV Continuous <Continuous>  dextrose 5%. 1000 milliLiter(s) (100 mL/Hr) IV Continuous <Continuous>  dextrose 50% Injectable 12.5 Gram(s) IV Push once  dextrose 50% Injectable 25 Gram(s) IV Push once  dextrose 50% Injectable 25 Gram(s) IV Push once  escitalopram 20 milliGRAM(s) Oral daily  furosemide    Tablet 40 milliGRAM(s) Oral daily  glucagon  Injectable 1 milliGRAM(s) IntraMuscular once  guaiFENesin  milliGRAM(s) Oral every 12 hours  heparin   Injectable 5000 Unit(s) SubCutaneous every 12 hours  insulin lispro (ADMELOG) corrective regimen sliding scale   SubCutaneous three times a day before meals  ipratropium    for Nebulization 500 MICROGram(s) Nebulizer every 6 hours  lisinopril 5 milliGRAM(s) Oral daily  magnesium sulfate  IVPB 1 Gram(s) IV Intermittent every 12 hours  metFORMIN 500 milliGRAM(s) Oral two times a day with meals  metoprolol tartrate 50 milliGRAM(s) Oral every 12 hours  pantoprazole    Tablet 40 milliGRAM(s) Oral before breakfast  polyethylene glycol 3350 17 Gram(s) Oral daily  potassium chloride    Tablet ER 20 milliEquivalent(s) Oral daily  simethicone 80 milliGRAM(s) Chew three times a day  sodium chloride 0.9%. 1000 milliLiter(s) (20 mL/Hr) IV Continuous <Continuous>    MEDICATIONS  (PRN):  hydrALAZINE Injectable 10 milliGRAM(s) IV Push every 4 hours PRN SBP > 155    HEPARIN:  [X] YES [] NO  Dose: 5000 UNITS/HR UNITS Q12H  SCD's: YES b/l  GI Prophylaxis: Protonix [X], Pepcid [], None [], (Contra-indication:.....)    Post-Op Aspirin: Yes [X],  No [], If No, then contra-indication:  Post-Op Statin: Yes [X], No[], If No, then contra-indication:  Post-Op Beta-Blockers: Yes [X], No [], If No, then contra-indication:    Allergies:  No Known Allergies    Ambulation/Activity Status: Ambulates several times daily without assistance.    Assessment/Plan:  70y Female status-post CABG and MV repair  - Case and plan discussed with CTU Intensivist and CT Surgeon - Dr. Rojas  - Continue CTU supportive care    - Continue DVT/GI prophylaxis  - Incentive Spirometry 10 times an hour  - Continue to advance physical activity as tolerated and continue PT/OT as directed  s/p CABG & MCV repair   -General surgery consult for suspected pancreatitis  -Keep NPO  -Awaiting abdominal CT report findings   -IR consult for possible right pigtail insertion due to right pleural effusion   -D/C Lasix   -Start Bumex       Social Service Disposition:     OPERATIVE PROCEDURE(s):        CABG and MV repair        POD #    12                   70yFemale  SURGEON(s): FIONA Rojas  SUBJECTIVE ASSESSMENT:  Pt states not complaints at this time  Vital Signs Last 24 Hrs  T(F): 97.6 (2021 05:00), Max: 98.9 (2021 08:00)  HR: 68 (2021 05:00) (59 - 76)  BP: 129/54 (2021 05:00) (111/53 - 162/76)  BP(mean): 78 (2021 05:00) (75 - 109)  RR: 19 (2021 05:00) (19 - 35)  SpO2: 100% (2021 05:00) (94% - 100%)      I&O's Detail    2021 07:01  -  2021 07:00  --------------------------------------------------------  IN:    IV PiggyBack: 450 mL    Oral Fluid: 980 mL  Total IN: 1430 mL    OUT:    Incontinent per Collection Bag (mL): 1200 mL    Voided (mL): 1100 mL  Total OUT: 2300 mL    Net:   I&O's Detail    2021 07:01  -  2021 07:00  --------------------------------------------------------  Total NET: -380 mL    2021 07:01  -  2021 07:00  --------------------------------------------------------  Total NET: -870 mL    CAPILLARY BLOOD GLUCOSE  POCT Blood Glucose.: 132 mg/dL (2021 21:25)  POCT Blood Glucose.: 110 mg/dL (2021 16:09)  POCT Blood Glucose.: 107 mg/dL (2021 11:39)  POCT Blood Glucose.: 148 mg/dL (2021 06:47)    Physical Exam:  General: NAD; A&Ox3  Cardiac: S1/S2, RRR, no murmur, no rubs  Lungs: unlabored respirations, CTA b/l, no wheeze, no rales, no crackles  Abdomen: Soft/NT/ND; positive bowel sounds x 4  Sternum: Intact, no click, incision healing well with no drainage  Incisions: Incisions clean/dry/intact  Extremities: mild edema b/l lower extremities; good capillary refill; no cyanosis; palpable 1+ pedal pulses b/l        LABS:                        10.5<L>  16.83<H> )-----------( 330      ( 2021 03:02 )             34.0<L>                        10.1<L>  17.17<H> )-----------( 336      ( 2021 02:00 )             32.4<L>    02    141  |  101  |  26<H>  ----------------------------<  101<H>  4.7   |  32  |  0.9      136  |  99  |  29<H>  ----------------------------<  114<H>  4.5   |  28  |  0.9    Ca    8.8      2021 03:02  Mg     2.1     -24  TPro  4.9<L> [6.0 - 8.0]  /  Alb  3.3<L> [3.5 - 5.2]  /  TBili  1.7<H> [0.2 - 1.2]  /  DBili  x   /  AST  16 [0 - 41]  /  ALT  23 [0 - 41]  /  AlkPhos  70 [30 - 115]      Urinalysis Basic - ( 2021 15:05 )  Color: Yellow / Appearance: Clear / S.021 / pH: x  Gluc: x / Ketone: Negative  / Bili: Negative / Urobili: <2 mg/dL   Blood: x / Protein: 30 mg/dL / Nitrite: Negative   Leuk Esterase: Negative / RBC: 9 /HPF / WBC 1 /HPF   Sq Epi: x / Non Sq Epi: 4 /HPF / Bacteria: Negative      RADIOLOGY & ADDITIONAL TESTS:  CT:    CXR: < from: Xray Chest 1 View- PORTABLE-Routine (Xray Chest 1 View- PORTABLE-Routine in AM.) (21 @ 06:09) >  Findings:  Support devices: None.  Cardiac/mediastinum/hilum: Obscured. Mitral valve replacement.  Lung parenchyma/Pleura: Slight improvement in bilateral pleural effusion/opacities. Known right paramediastinal collection difficult to assess on x-ray.  Skeleton/soft tissues: Unchanged.  Impression:  Slight improvement in bilateral pleural effusion/opacities.Known right paramediastinal collection difficult to assess on x-ray.    EK Lead ECG:   Ventricular Rate 69 BPM  Atrial Rate 69 BPM  P-R Interval 146 ms  QRS Duration 106 ms  Q-T Interval 456 ms  QTC Calculation(Bazett) 488 ms  P Axis 26 degrees  R Axis -12 degrees  T Axis 81 degrees  Diagnosis Line Sinus rhythm withoccasional Premature ventricular complexes  Possible Left atrial enlargement  Left ventricular hypertrophy  T wave abnormality, consider anterolateral ischemia  Prolonged QT  Abnormal ECG  Confirmed by PARKER PACK MD ([id number]) on 2021 10:24:55 AM (21 @ 08:58)    MEDICATIONS  (STANDING):  albumin human  5% IVPB 500 milliLiter(s) IV Intermittent once  aMIOdarone    Tablet 200 milliGRAM(s) Oral daily  aMIOdarone    Tablet   Oral   amLODIPine   Tablet 5 milliGRAM(s) Oral daily  aspirin 325 milliGRAM(s) Oral daily  atorvastatin 40 milliGRAM(s) Oral at bedtime  chlorhexidine 4% Liquid 1 Application(s) Topical <User Schedule>  dextrose 40% Gel 15 Gram(s) Oral once  dextrose 5%. 1000 milliLiter(s) (50 mL/Hr) IV Continuous <Continuous>  dextrose 5%. 1000 milliLiter(s) (100 mL/Hr) IV Continuous <Continuous>  dextrose 50% Injectable 12.5 Gram(s) IV Push once  dextrose 50% Injectable 25 Gram(s) IV Push once  dextrose 50% Injectable 25 Gram(s) IV Push once  escitalopram 20 milliGRAM(s) Oral daily  furosemide    Tablet 40 milliGRAM(s) Oral daily  glucagon  Injectable 1 milliGRAM(s) IntraMuscular once  guaiFENesin  milliGRAM(s) Oral every 12 hours  heparin   Injectable 5000 Unit(s) SubCutaneous every 12 hours  insulin lispro (ADMELOG) corrective regimen sliding scale   SubCutaneous three times a day before meals  ipratropium    for Nebulization 500 MICROGram(s) Nebulizer every 6 hours  lisinopril 5 milliGRAM(s) Oral daily  magnesium sulfate  IVPB 1 Gram(s) IV Intermittent every 12 hours  metFORMIN 500 milliGRAM(s) Oral two times a day with meals  metoprolol tartrate 50 milliGRAM(s) Oral every 12 hours  pantoprazole    Tablet 40 milliGRAM(s) Oral before breakfast  polyethylene glycol 3350 17 Gram(s) Oral daily  potassium chloride    Tablet ER 20 milliEquivalent(s) Oral daily  simethicone 80 milliGRAM(s) Chew three times a day  sodium chloride 0.9%. 1000 milliLiter(s) (20 mL/Hr) IV Continuous <Continuous>    MEDICATIONS  (PRN):  hydrALAZINE Injectable 10 milliGRAM(s) IV Push every 4 hours PRN SBP > 155    HEPARIN:  [X] YES [] NO  Dose: 5000 UNITS/HR UNITS Q12H  SCD's: YES b/l  GI Prophylaxis: Protonix [X], Pepcid [], None [], (Contra-indication:.....)    Post-Op Aspirin: Yes [X],  No [], If No, then contra-indication:  Post-Op Statin: Yes [X], No[], If No, then contra-indication:  Post-Op Beta-Blockers: Yes [X], No [], If No, then contra-indication:    Allergies:  No Known Allergies    Ambulation/Activity Status: Ambulates several times daily without assistance.    Assessment/Plan:  70y Female status-post CABG and MV repair  - Case and plan discussed with CTU Intensivist and CT Surgeon - Dr. Rojas  - Continue CTU supportive care    - Continue DVT/GI prophylaxis  - Incentive Spirometry 10 times an hour  - Continue to advance physical activity as tolerated and continue PT/OT as directed  s/p CABG & MCV repair   -General surgery consult for suspected pancreatitis  -Keep NPO  -Awaiting abdominal CT report findings   -IR consult for possible right pigtail insertion due to right pleural effusion   -D/C Lasix   -Start Bumex       Social Service Disposition:     OPERATIVE PROCEDURE(s):        CABG and MV repair        POD #    12                   70yFemale  SURGEON(s): FIONA Rojas  SUBJECTIVE ASSESSMENT:  Pt states not complaints at this time  Vital Signs Last 24 Hrs  T(F): 97.6 (2021 05:00), Max: 98.9 (2021 08:00)  HR: 68 (2021 05:00) (59 - 76)  BP: 129/54 (2021 05:00) (111/53 - 162/76)  BP(mean): 78 (2021 05:00) (75 - 109)  RR: 19 (2021 05:00) (19 - 35)  SpO2: 100% (2021 05:00) (94% - 100%)      I&O's Detail    2021 07:01  -  2021 07:00  --------------------------------------------------------  IN:    IV PiggyBack: 450 mL    Oral Fluid: 980 mL  Total IN: 1430 mL    OUT:    Incontinent per Collection Bag (mL): 1200 mL    Voided (mL): 1100 mL  Total OUT: 2300 mL    Net:   I&O's Detail    2021 07:01  -  2021 07:00  --------------------------------------------------------  Total NET: -380 mL    2021 07:01  -  2021 07:00  --------------------------------------------------------  Total NET: -870 mL    CAPILLARY BLOOD GLUCOSE  POCT Blood Glucose.: 132 mg/dL (2021 21:25)  POCT Blood Glucose.: 110 mg/dL (2021 16:09)  POCT Blood Glucose.: 107 mg/dL (2021 11:39)  POCT Blood Glucose.: 148 mg/dL (2021 06:47)    Physical Exam:  General: NAD; A&Ox3  Cardiac: S1/S2, RRR, no murmur, no rubs  Lungs: unlabored respirations, CTA b/l, no wheeze, no rales, no crackles  Abdomen: Soft/NT/ND; positive bowel sounds x 4  Sternum: Intact, no click, incision healing well with no drainage  Incisions: Incisions clean/dry/intact  Extremities: mild edema b/l lower extremities; good capillary refill; no cyanosis; palpable 1+ pedal pulses b/l        LABS:                        10.5<L>  16.83<H> )-----------( 330      ( 2021 03:02 )             34.0<L>                        10.1<L>  17.17<H> )-----------( 336      ( 2021 02:00 )             32.4<L>    02    141  |  101  |  26<H>  ----------------------------<  101<H>  4.7   |  32  |  0.9      136  |  99  |  29<H>  ----------------------------<  114<H>  4.5   |  28  |  0.9    Ca    8.8      2021 03:02  Mg     2.1     -24  TPro  4.9<L> [6.0 - 8.0]  /  Alb  3.3<L> [3.5 - 5.2]  /  TBili  1.7<H> [0.2 - 1.2]  /  DBili  x   /  AST  16 [0 - 41]  /  ALT  23 [0 - 41]  /  AlkPhos  70 [30 - 115]      Urinalysis Basic - ( 2021 15:05 )  Color: Yellow / Appearance: Clear / S.021 / pH: x  Gluc: x / Ketone: Negative  / Bili: Negative / Urobili: <2 mg/dL   Blood: x / Protein: 30 mg/dL / Nitrite: Negative   Leuk Esterase: Negative / RBC: 9 /HPF / WBC 1 /HPF   Sq Epi: x / Non Sq Epi: 4 /HPF / Bacteria: Negative      RADIOLOGY & ADDITIONAL TESTS:  EXAM:  CT CHEST            PROCEDURE DATE:  2021      INTERPRETATION:  CLINICAL INDICATION: fever    TECHNIQUE:  A noncontrast CT of the thorax was performed. Sagittal and coronal reformats were performed as well as MIP reconstructions.    COMPARISON: CT chest dated 2021.    INTERPRETATION:    AIRWAYS, LUNGS AND PLEURA: The central tracheobronchial tree is patent. Complex loculated right pleural collection containing foci of gas, decreased since 2021. There is improved aeration of the right lung, with residual subsegmental scarring/atelectasis present. Slightly decreased moderate left pleural effusion with adjacent near complete atelectasis of the left lower lobe.    MEDIASTINUM: There are no enlarged mediastinal or axillary lymph nodes.    HEART AND VESSELS: Prior CABG and mitral annuloplasty. The heart is normal in size. There are aortic and coronary artery calcifications. The thoracic aorta has a normal caliber. There is no pericardial effusion.    BONES AND SOFT TISSUES: Median sternotomy. The bones are unremarkable.  The soft tissues are unremarkable.    TUBES AND LINES: None.    IMPRESSION:    1. Decreased complex loculated right air and fluid pleural collection, with improved aeration of the right lung.    2. Decreased moderate left pleural effusion with adjacent near complete atelectasis of the lower lower lobe.    Please see separate report for concurrent evaluation of the abdomen and pelvis.      CT:  EXAM:  CT ABDOMEN AND PELVIS            PROCEDURE DATE:  2021      INTERPRETATION:  CLINICAL HISTORY: Leukocytosis and nausea. Status post CABG..    TECHNIQUE: Contiguous axial CT images were obtained from the lower chest to the pubic symphysis without intravenous contrast. Oral contrast was not administered. Reformatted images in the coronal and sagittal planes were acquired.    COMPARISON: None.      FINDINGS: Evaluation of intra-abdominal organs is limited due to lack of intravenous contrast.    LOWER CHEST: See separately dictated CT chest report for thoracic findings.    HEPATOBILIARY: Gallbladder sludge.    SPLEEN: Unremarkable.    PANCREAS: Unremarkable.    ADRENAL GLANDS: Unremarkable.    KIDNEYS: Bilateral renal scarring.    ABDOMINOPELVIC NODES: No definite adenopathy noting limited evaluation the pelvis.    PELVIC ORGANS: Obscured secondary to extensive artifact from bilateral hip prostheses.    PERITONEUM/MESENTERY/BOWEL: No evidence of bowel obstruction or free air..    BONES/SOFT TISSUES: Ovoid hyperdense 7.8 x 3.5 x 10.3 cm structure along the right rectus, compatible with hematoma in the appropriate clinical setting (3/86). Small fat-containing umbilical hernia. Degenerative changes of the spine. Status post bilateral hip arthroplasties.    OTHER: Arteriosclerotic calcifications of the abdominal aorta.      IMPRESSION:    Ovoid hyperdense 7.8 x 3.5 x 10.3 cm structure along the right rectus, compatible with hematoma in the appropriate clinical setting. Correlate with exam.    See separately dictated CT chest report for thoracic findings.    EXAM:  US ABDOMEN LIMITED          PROCEDURE DATE:  2021      INTERPRETATION:  CLINICAL INFORMATION: Elevated lipase.    COMPARISON: CT scan performed the prior day.    TECHNIQUE: Sonography of the right upper quadrant.    FINDINGS:    Liver: Within normal limits.  Bile ducts: Normal caliber. Common bile duct measures 5 mm.  Gallbladder: Sludge is seen within the gallbladder.  Pancreas: Poorly visualized.  Right kidney: 10.1 cm. No hydronephrosis.  Ascites: None.  IVC: Visualized portions are within normal limits.    IMPRESSION:    Sludge within the gallbladder. Otherwise unremarkable.    CXR  Findings:  Support devices: None.  Cardiac/mediastinum/hilum: Obscured. Mitral valve replacement.  Lung parenchyma/Pleura: Slight improvement in bilateral pleural effusion/opacities. Known right paramediastinal collection difficult to assess on x-ray.  Skeleton/soft tissues: Unchanged.  Impression:  Slight improvement in bilateral pleural effusion/opacities.Known right paramediastinal collection difficult to assess on x-ray.    EK Lead ECG:   Ventricular Rate 69 BPM  Atrial Rate 69 BPM  P-R Interval 146 ms  QRS Duration 106 ms  Q-T Interval 456 ms  QTC Calculation(Bazett) 488 ms  P Axis 26 degrees  R Axis -12 degrees  T Axis 81 degrees  Diagnosis Line Sinus rhythm withoccasional Premature ventricular complexes  Possible Left atrial enlargement  Left ventricular hypertrophy  T wave abnormality, consider anterolateral ischemia  Prolonged QT  Abnormal ECG  Confirmed by PARKER PACK MD ([id number]) on 2021 10:24:55 AM (21 @ 08:58)    MEDICATIONS  (STANDING):  albumin human  5% IVPB 500 milliLiter(s) IV Intermittent once  aMIOdarone    Tablet 200 milliGRAM(s) Oral daily  aMIOdarone    Tablet   Oral   amLODIPine   Tablet 5 milliGRAM(s) Oral daily  aspirin 325 milliGRAM(s) Oral daily  atorvastatin 40 milliGRAM(s) Oral at bedtime  chlorhexidine 4% Liquid 1 Application(s) Topical <User Schedule>  dextrose 40% Gel 15 Gram(s) Oral once  dextrose 5%. 1000 milliLiter(s) (50 mL/Hr) IV Continuous <Continuous>  dextrose 5%. 1000 milliLiter(s) (100 mL/Hr) IV Continuous <Continuous>  dextrose 50% Injectable 12.5 Gram(s) IV Push once  dextrose 50% Injectable 25 Gram(s) IV Push once  dextrose 50% Injectable 25 Gram(s) IV Push once  escitalopram 20 milliGRAM(s) Oral daily  furosemide    Tablet 40 milliGRAM(s) Oral daily  glucagon  Injectable 1 milliGRAM(s) IntraMuscular once  guaiFENesin  milliGRAM(s) Oral every 12 hours  heparin   Injectable 5000 Unit(s) SubCutaneous every 12 hours  insulin lispro (ADMELOG) corrective regimen sliding scale   SubCutaneous three times a day before meals  ipratropium    for Nebulization 500 MICROGram(s) Nebulizer every 6 hours  lisinopril 5 milliGRAM(s) Oral daily  magnesium sulfate  IVPB 1 Gram(s) IV Intermittent every 12 hours  metFORMIN 500 milliGRAM(s) Oral two times a day with meals  metoprolol tartrate 50 milliGRAM(s) Oral every 12 hours  pantoprazole    Tablet 40 milliGRAM(s) Oral before breakfast  polyethylene glycol 3350 17 Gram(s) Oral daily  potassium chloride    Tablet ER 20 milliEquivalent(s) Oral daily  simethicone 80 milliGRAM(s) Chew three times a day  sodium chloride 0.9%. 1000 milliLiter(s) (20 mL/Hr) IV Continuous <Continuous>    MEDICATIONS  (PRN):  hydrALAZINE Injectable 10 milliGRAM(s) IV Push every 4 hours PRN SBP > 155    HEPARIN:  [X] YES [] NO  Dose: 5000 UNITS/HR UNITS Q12H  SCD's: YES b/l  GI Prophylaxis: Protonix [X], Pepcid [], None [], (Contra-indication:.....)    Post-Op Aspirin: Yes [X],  No [], If No, then contra-indication:  Post-Op Statin: Yes [X], No[], If No, then contra-indication:  Post-Op Beta-Blockers: Yes [X], No [], If No, then contra-indication:    Allergies:  No Known Allergies    Ambulation/Activity Status: Ambulates several times daily without assistance.    Assessment/Plan:  70y Female status-post CABG and MV repair  - Case and plan discussed with CTU Intensivist and CT Surgeon - Dr. Rojas  - Continue CTU supportive care    - Continue DVT/GI prophylaxis  - Incentive Spirometry 10 times an hour  - Continue to advance physical activity as tolerated and continue PT/OT as directed  s/p CABG & MCV repair   -General surgery consult for suspected pancreatitis; although Abd US and CT scan are unremarkable  -Keep NPO  -Awaiting abdominal CT report findings   -IR consult for possible right pigtail insertion due to right pleural effusion   -D/C Lasix   -Start Bumex       Social Service Disposition:  [facility]   OPERATIVE PROCEDURE(s):        CABG and MV repair        POD #    12                   70yFemale  SURGEON(s): FIONA Rojas  SUBJECTIVE ASSESSMENT:  Pt states not complaints at this time  Vital Signs Last 24 Hrs  T(F): 97.6 (2021 05:00), Max: 98.9 (2021 08:00)  HR: 68 (2021 05:00) (59 - 76)  BP: 129/54 (2021 05:00) (111/53 - 162/76)  BP(mean): 78 (2021 05:00) (75 - 109)  RR: 19 (2021 05:00) (19 - 35)  SpO2: 100% (2021 05:00) (94% - 100%)      I&O's Detail    2021 07:01  -  2021 07:00  --------------------------------------------------------  IN:    IV PiggyBack: 450 mL    Oral Fluid: 980 mL  Total IN: 1430 mL    OUT:    Incontinent per Collection Bag (mL): 1200 mL    Voided (mL): 1100 mL  Total OUT: 2300 mL    Net:   I&O's Detail    2021 07:01  -  2021 07:00  --------------------------------------------------------  Total NET: -380 mL    2021 07:01  -  2021 07:00  --------------------------------------------------------  Total NET: -870 mL    CAPILLARY BLOOD GLUCOSE  POCT Blood Glucose.: 132 mg/dL (2021 21:25)  POCT Blood Glucose.: 110 mg/dL (2021 16:09)  POCT Blood Glucose.: 107 mg/dL (2021 11:39)  POCT Blood Glucose.: 148 mg/dL (2021 06:47)    Physical Exam:  General: NAD; A&Ox3  Cardiac: S1/S2, RRR, no murmur, no rubs  Lungs: unlabored respirations, CTA b/l, no wheeze, no rales, no crackles  Abdomen: Soft/NT/ND; positive bowel sounds x 4  Sternum: Intact, no click, incision healing well with no drainage  Incisions: Incisions clean/dry/intact  Extremities: mild edema b/l lower extremities; good capillary refill; no cyanosis; palpable 1+ pedal pulses b/l        LABS:                        10.5<L>  16.83<H> )-----------( 330      ( 2021 03:02 )             34.0<L>                        10.1<L>  17.17<H> )-----------( 336      ( 2021 02:00 )             32.4<L>    02    141  |  101  |  26<H>  ----------------------------<  101<H>  4.7   |  32  |  0.9      136  |  99  |  29<H>  ----------------------------<  114<H>  4.5   |  28  |  0.9    Ca    8.8      2021 03:02  Mg     2.1     -24  TPro  4.9<L> [6.0 - 8.0]  /  Alb  3.3<L> [3.5 - 5.2]  /  TBili  1.7<H> [0.2 - 1.2]  /  DBili  x   /  AST  16 [0 - 41]  /  ALT  23 [0 - 41]  /  AlkPhos  70 [30 - 115]      Urinalysis Basic - ( 2021 15:05 )  Color: Yellow / Appearance: Clear / S.021 / pH: x  Gluc: x / Ketone: Negative  / Bili: Negative / Urobili: <2 mg/dL   Blood: x / Protein: 30 mg/dL / Nitrite: Negative   Leuk Esterase: Negative / RBC: 9 /HPF / WBC 1 /HPF   Sq Epi: x / Non Sq Epi: 4 /HPF / Bacteria: Negative      RADIOLOGY & ADDITIONAL TESTS:  EXAM:  CT CHEST            PROCEDURE DATE:  2021      INTERPRETATION:  CLINICAL INDICATION: fever    TECHNIQUE:  A noncontrast CT of the thorax was performed. Sagittal and coronal reformats were performed as well as MIP reconstructions.    COMPARISON: CT chest dated 2021.    INTERPRETATION:    AIRWAYS, LUNGS AND PLEURA: The central tracheobronchial tree is patent. Complex loculated right pleural collection containing foci of gas, decreased since 2021. There is improved aeration of the right lung, with residual subsegmental scarring/atelectasis present. Slightly decreased moderate left pleural effusion with adjacent near complete atelectasis of the left lower lobe.    MEDIASTINUM: There are no enlarged mediastinal or axillary lymph nodes.    HEART AND VESSELS: Prior CABG and mitral annuloplasty. The heart is normal in size. There are aortic and coronary artery calcifications. The thoracic aorta has a normal caliber. There is no pericardial effusion.    BONES AND SOFT TISSUES: Median sternotomy. The bones are unremarkable.  The soft tissues are unremarkable.    TUBES AND LINES: None.    IMPRESSION:    1. Decreased complex loculated right air and fluid pleural collection, with improved aeration of the right lung.    2. Decreased moderate left pleural effusion with adjacent near complete atelectasis of the lower lower lobe.    Please see separate report for concurrent evaluation of the abdomen and pelvis.      CT:  EXAM:  CT ABDOMEN AND PELVIS            PROCEDURE DATE:  2021      INTERPRETATION:  CLINICAL HISTORY: Leukocytosis and nausea. Status post CABG..    TECHNIQUE: Contiguous axial CT images were obtained from the lower chest to the pubic symphysis without intravenous contrast. Oral contrast was not administered. Reformatted images in the coronal and sagittal planes were acquired.    COMPARISON: None.      FINDINGS: Evaluation of intra-abdominal organs is limited due to lack of intravenous contrast.    LOWER CHEST: See separately dictated CT chest report for thoracic findings.    HEPATOBILIARY: Gallbladder sludge.    SPLEEN: Unremarkable.    PANCREAS: Unremarkable.    ADRENAL GLANDS: Unremarkable.    KIDNEYS: Bilateral renal scarring.    ABDOMINOPELVIC NODES: No definite adenopathy noting limited evaluation the pelvis.    PELVIC ORGANS: Obscured secondary to extensive artifact from bilateral hip prostheses.    PERITONEUM/MESENTERY/BOWEL: No evidence of bowel obstruction or free air..    BONES/SOFT TISSUES: Ovoid hyperdense 7.8 x 3.5 x 10.3 cm structure along the right rectus, compatible with hematoma in the appropriate clinical setting (3/86). Small fat-containing umbilical hernia. Degenerative changes of the spine. Status post bilateral hip arthroplasties.    OTHER: Arteriosclerotic calcifications of the abdominal aorta.      IMPRESSION:    Ovoid hyperdense 7.8 x 3.5 x 10.3 cm structure along the right rectus, compatible with hematoma in the appropriate clinical setting. Correlate with exam.    See separately dictated CT chest report for thoracic findings.    EXAM:  US ABDOMEN LIMITED          PROCEDURE DATE:  2021      INTERPRETATION:  CLINICAL INFORMATION: Elevated lipase.    COMPARISON: CT scan performed the prior day.    TECHNIQUE: Sonography of the right upper quadrant.    FINDINGS:    Liver: Within normal limits.  Bile ducts: Normal caliber. Common bile duct measures 5 mm.  Gallbladder: Sludge is seen within the gallbladder.  Pancreas: Poorly visualized.  Right kidney: 10.1 cm. No hydronephrosis.  Ascites: None.  IVC: Visualized portions are within normal limits.    IMPRESSION:    Sludge within the gallbladder. Otherwise unremarkable.    CXR  Findings:  Support devices: None.  Cardiac/mediastinum/hilum: Obscured. Mitral valve replacement.  Lung parenchyma/Pleura: Slight improvement in bilateral pleural effusion/opacities. Known right paramediastinal collection difficult to assess on x-ray.  Skeleton/soft tissues: Unchanged.  Impression:  Slight improvement in bilateral pleural effusion/opacities.Known right paramediastinal collection difficult to assess on x-ray.    EK Lead ECG:   Ventricular Rate 69 BPM  Atrial Rate 69 BPM  P-R Interval 146 ms  QRS Duration 106 ms  Q-T Interval 456 ms  QTC Calculation(Bazett) 488 ms  P Axis 26 degrees  R Axis -12 degrees  T Axis 81 degrees  Diagnosis Line Sinus rhythm withoccasional Premature ventricular complexes  Possible Left atrial enlargement  Left ventricular hypertrophy  T wave abnormality, consider anterolateral ischemia  Prolonged QT  Abnormal ECG  Confirmed by PARKER PACK MD ([id number]) on 2021 10:24:55 AM (21 @ 08:58)    MEDICATIONS  (STANDING):  albumin human  5% IVPB 500 milliLiter(s) IV Intermittent once  aMIOdarone    Tablet 200 milliGRAM(s) Oral daily  aMIOdarone    Tablet   Oral   amLODIPine   Tablet 5 milliGRAM(s) Oral daily  aspirin 325 milliGRAM(s) Oral daily  atorvastatin 40 milliGRAM(s) Oral at bedtime  chlorhexidine 4% Liquid 1 Application(s) Topical <User Schedule>  dextrose 40% Gel 15 Gram(s) Oral once  dextrose 5%. 1000 milliLiter(s) (50 mL/Hr) IV Continuous <Continuous>  dextrose 5%. 1000 milliLiter(s) (100 mL/Hr) IV Continuous <Continuous>  dextrose 50% Injectable 12.5 Gram(s) IV Push once  dextrose 50% Injectable 25 Gram(s) IV Push once  dextrose 50% Injectable 25 Gram(s) IV Push once  escitalopram 20 milliGRAM(s) Oral daily  furosemide    Tablet 40 milliGRAM(s) Oral daily  glucagon  Injectable 1 milliGRAM(s) IntraMuscular once  guaiFENesin  milliGRAM(s) Oral every 12 hours  heparin   Injectable 5000 Unit(s) SubCutaneous every 12 hours  insulin lispro (ADMELOG) corrective regimen sliding scale   SubCutaneous three times a day before meals  ipratropium    for Nebulization 500 MICROGram(s) Nebulizer every 6 hours  lisinopril 5 milliGRAM(s) Oral daily  magnesium sulfate  IVPB 1 Gram(s) IV Intermittent every 12 hours  metFORMIN 500 milliGRAM(s) Oral two times a day with meals  metoprolol tartrate 50 milliGRAM(s) Oral every 12 hours  pantoprazole    Tablet 40 milliGRAM(s) Oral before breakfast  polyethylene glycol 3350 17 Gram(s) Oral daily  potassium chloride    Tablet ER 20 milliEquivalent(s) Oral daily  simethicone 80 milliGRAM(s) Chew three times a day  sodium chloride 0.9%. 1000 milliLiter(s) (20 mL/Hr) IV Continuous <Continuous>    MEDICATIONS  (PRN):  hydrALAZINE Injectable 10 milliGRAM(s) IV Push every 4 hours PRN SBP > 155    HEPARIN:  [X] YES [] NO  Dose: 5000 UNITS/HR UNITS Q12H  SCD's: YES b/l  GI Prophylaxis: Protonix [X], Pepcid [], None [], (Contra-indication:.....)    Post-Op Aspirin: Yes [X],  No [], If No, then contra-indication:  Post-Op Statin: Yes [X], No[], If No, then contra-indication:  Post-Op Beta-Blockers: Yes [X], No [], If No, then contra-indication:    Allergies:  No Known Allergies    Ambulation/Activity Status: Ambulates several times daily with assistance.    Assessment/Plan:  70y Female status-post CABG and MV repair  - Case and plan discussed with CTU Intensivist and CT Surgeon - Dr. Rojas  - Continue CTU supportive care    - Continue DVT/GI prophylaxis  - Incentive Spirometry 10 times an hour  - Continue to advance physical activity as tolerated and continue PT/OT as directed  s/p CABG & MCV repair   -General surgery consult for suspected pancreatitis; although Abd US and CT scan are unremarkable  -Keep NPO  -Awaiting abdominal CT report findings   -IR consult for possible right pigtail insertion due to right pleural effusion   -D/C Lasix   -Start Bumex       Social Service Disposition:  [facility]

## 2021-02-24 NOTE — CONSULT NOTE ADULT - SUBJECTIVE AND OBJECTIVE BOX
ADRIAN SAMUELS [id number]  70y Female  15d    HPI:  69 yo F h/o HTN, DLD, has JACOME on TTE Mod AS by gradient, and mod to severe MR here for DERRICK and RHC/LHC   (09 Feb 2021 09:38)    Patient s/p recent CABG/Mitral valve repair on 2/12, post operative course complicated by atrial fibrillation, and right pleural effusion. Yesterday, patients was increasingly nauseated, and Lipase today was elevated. General surgery consulted for evaluation of pancreatitis. Patient currently does not endorse recent history of abdominal pain, but does endorse nausea, increased with meals. Denies personal or family history of pancreatitis, history of gallstones. Patient does endorse history of social EtOH use, but states has not consumed glass of wine since before christmas.         PAST MEDICAL & SURGICAL HISTORY:  Glaucoma    Chronic sciatica    H/O sleep apnea  on CPAP    Aortic stenosis    Type 2 diabetes mellitus without complication, without long-term current use of insulin    Hyperlipidemia, unspecified hyperlipidemia type    Hypertension, unspecified type    History of carpal tunnel surgery    History of hip surgery  bilateral hip          MEDICATIONS  (STANDING):  albumin human  5% IVPB 500 milliLiter(s) IV Intermittent once  aMIOdarone    Tablet 200 milliGRAM(s) Oral daily  aMIOdarone    Tablet   Oral   amLODIPine   Tablet 5 milliGRAM(s) Oral daily  aspirin 325 milliGRAM(s) Oral daily  atorvastatin 40 milliGRAM(s) Oral at bedtime  chlorhexidine 4% Liquid 1 Application(s) Topical <User Schedule>  dextrose 40% Gel 15 Gram(s) Oral once  dextrose 5%. 1000 milliLiter(s) (50 mL/Hr) IV Continuous <Continuous>  dextrose 5%. 1000 milliLiter(s) (100 mL/Hr) IV Continuous <Continuous>  dextrose 50% Injectable 25 Gram(s) IV Push once  dextrose 50% Injectable 12.5 Gram(s) IV Push once  dextrose 50% Injectable 25 Gram(s) IV Push once  escitalopram 20 milliGRAM(s) Oral daily  glucagon  Injectable 1 milliGRAM(s) IntraMuscular once  guaiFENesin  milliGRAM(s) Oral every 12 hours  heparin   Injectable 5000 Unit(s) SubCutaneous every 12 hours  insulin lispro (ADMELOG) corrective regimen sliding scale   SubCutaneous three times a day before meals  ipratropium    for Nebulization 500 MICROGram(s) Nebulizer every 6 hours  lisinopril 5 milliGRAM(s) Oral daily  magnesium sulfate  IVPB 1 Gram(s) IV Intermittent every 12 hours  metFORMIN 500 milliGRAM(s) Oral two times a day with meals  metoprolol tartrate 50 milliGRAM(s) Oral every 12 hours  pantoprazole    Tablet 40 milliGRAM(s) Oral before breakfast  polyethylene glycol 3350 17 Gram(s) Oral daily  potassium chloride    Tablet ER 20 milliEquivalent(s) Oral daily  simethicone 80 milliGRAM(s) Chew three times a day    MEDICATIONS  (PRN):  hydrALAZINE Injectable 10 milliGRAM(s) IV Push every 4 hours PRN SBP > 155      Allergies    No Known Allergies    Intolerances        REVIEW OF SYSTEMS    [x] A ten-point review of systems was otherwise negative except as noted.  [ ] Due to altered mental status/intubation, subjective information were not able to be obtained from the patient. History was obtained, to the extent possible, from review of the chart and collateral sources of information.      Vital Signs Last 24 Hrs  T(C): 36.6 (24 Feb 2021 08:00), Max: 37 (23 Feb 2021 16:00)  T(F): 97.9 (24 Feb 2021 08:00), Max: 98.6 (23 Feb 2021 16:00)  HR: 59 (24 Feb 2021 10:00) (58 - 76)  BP: 114/57 (24 Feb 2021 10:00) (101/53 - 148/70)  BP(mean): 82 (24 Feb 2021 10:00) (74 - 100)  RR: 16 (24 Feb 2021 10:00) (16 - 35)  SpO2: 100% (24 Feb 2021 10:00) (98% - 100%)    PHYSICAL EXAM:  GENERAL: NAD, well-appearing  CHEST/LUNG: Clear to auscultation bilaterally, + sternotomy incision  HEART: Regular rate and rhythm by radial pulse  ABDOMEN: Soft, Nontender, Nondistended;   EXTREMITIES:  No clubbing, cyanosis, or edema      LABS:  Labs:  CAPILLARY BLOOD GLUCOSE      POCT Blood Glucose.: 101 mg/dL (24 Feb 2021 11:11)  POCT Blood Glucose.: 133 mg/dL (24 Feb 2021 06:39)  POCT Blood Glucose.: 132 mg/dL (23 Feb 2021 21:25)  POCT Blood Glucose.: 110 mg/dL (23 Feb 2021 16:09)                          10.5   16.83 )-----------( 330      ( 24 Feb 2021 03:02 )             34.0         02-24    141  |  101  |  26<H>  ----------------------------<  101<H>  4.7   |  32  |  0.9      Calcium, Total Serum: 8.8 mg/dL (02-24-21 @ 03:02)      LFTs:             4.9  | 1.7  | 16       ------------------[70      ( 24 Feb 2021 03:02 )  3.3  | x    | 23          Lipase:x      Amylase:x         Lactate, Blood: 0.8 mmol/L (02-24-21 @ 03:02)    ABG - ( 20 Feb 2021 09:13 )  pH: 7.43  /  pCO2: 43    /  pO2: 98    / HCO3: 29    / Base Excess: 3.8   /  SaO2: 98              ABG - ( 20 Feb 2021 02:32 )  pH: 7.45  /  pCO2: 42    /  pO2: 111   / HCO3: 29    / Base Excess: 4.8   /  SaO2: 99              ABG - ( 19 Feb 2021 02:48 )  pH: 7.45  /  pCO2: 42    /  pO2: 76    / HCO3: 29    / Base Excess: 4.3   /  SaO2: 96                Coags:                    RADIOLOGY & ADDITIONAL STUDIES:  < from: CT Abdomen and Pelvis No Cont (02.23.21 @ 21:00) >  IMPRESSION:    Ovoid hyperdense 7.8 x 3.5 x 10.3 cm structure along the right rectus, compatible with hematoma in the appropriate clinical setting. Correlate with exam.    See separately dictated CT chest report for thoracic findings.    < end of copied text >

## 2021-02-25 LAB
AMYLASE P1 CFR SERPL: 287 U/L — HIGH (ref 25–115)
B PERT IGG+IGM PNL SER: ABNORMAL
COLOR FLD: SIGNIFICANT CHANGE UP
FLUID INTAKE SUBSTANCE CLASS: SIGNIFICANT CHANGE UP
FLUID SEGMENTED GRANULOCYTES: 49 % — SIGNIFICANT CHANGE UP
FOLATE+VIT B12 SERBLD-IMP: 8 % — SIGNIFICANT CHANGE UP
GLUCOSE BLDC GLUCOMTR-MCNC: 119 MG/DL — HIGH (ref 70–99)
GLUCOSE BLDC GLUCOMTR-MCNC: 121 MG/DL — HIGH (ref 70–99)
GLUCOSE BLDC GLUCOMTR-MCNC: 143 MG/DL — HIGH (ref 70–99)
GLUCOSE BLDC GLUCOMTR-MCNC: 94 MG/DL — SIGNIFICANT CHANGE UP (ref 70–99)
HCT VFR BLD CALC: 33.9 % — LOW (ref 37–47)
HGB BLD-MCNC: 10.1 G/DL — LOW (ref 12–16)
INR BLD: 1.12 RATIO — SIGNIFICANT CHANGE UP (ref 0.65–1.3)
LIDOCAIN IGE QN: 290 U/L — HIGH (ref 7–60)
LYMPHOCYTES # FLD: 23 — SIGNIFICANT CHANGE UP
MAGNESIUM SERPL-MCNC: 2.2 MG/DL — SIGNIFICANT CHANGE UP (ref 1.8–2.4)
MCHC RBC-ENTMCNC: 29.2 PG — SIGNIFICANT CHANGE UP (ref 27–31)
MCHC RBC-ENTMCNC: 29.8 G/DL — LOW (ref 32–37)
MCV RBC AUTO: 98 FL — SIGNIFICANT CHANGE UP (ref 81–99)
MONOS+MACROS # FLD: 19 % — SIGNIFICANT CHANGE UP
NRBC # BLD: 0 /100 WBCS — SIGNIFICANT CHANGE UP (ref 0–0)
PLATELET # BLD AUTO: 353 K/UL — SIGNIFICANT CHANGE UP (ref 130–400)
PROTHROM AB SERPL-ACNC: 12.9 SEC — HIGH (ref 9.95–12.87)
RBC # BLD: 3.46 M/UL — LOW (ref 4.2–5.4)
RBC # FLD: 18.7 % — HIGH (ref 11.5–14.5)
RCV VOL RI: HIGH /UL (ref 0–0)
TOTAL NUCLEATED CELL COUNT, BODY FLUID: 4112 /UL — SIGNIFICANT CHANGE UP
TUBE TYPE: SIGNIFICANT CHANGE UP
WBC # BLD: 16.91 K/UL — HIGH (ref 4.8–10.8)
WBC # FLD AUTO: 16.91 K/UL — HIGH (ref 4.8–10.8)

## 2021-02-25 PROCEDURE — 77012 CT SCAN FOR NEEDLE BIOPSY: CPT | Mod: 26

## 2021-02-25 PROCEDURE — 32557 INSERT CATH PLEURA W/ IMAGE: CPT | Mod: RT

## 2021-02-25 PROCEDURE — 99152 MOD SED SAME PHYS/QHP 5/>YRS: CPT

## 2021-02-25 PROCEDURE — 71045 X-RAY EXAM CHEST 1 VIEW: CPT | Mod: 26

## 2021-02-25 RX ORDER — OXYCODONE HYDROCHLORIDE 5 MG/1
5 TABLET ORAL ONCE
Refills: 0 | Status: DISCONTINUED | OUTPATIENT
Start: 2021-02-25 | End: 2021-02-25

## 2021-02-25 RX ORDER — ALPRAZOLAM 0.25 MG
0.25 TABLET ORAL ONCE
Refills: 0 | Status: DISCONTINUED | OUTPATIENT
Start: 2021-02-25 | End: 2021-02-25

## 2021-02-25 RX ADMIN — Medication 0.25 MILLIGRAM(S): at 00:46

## 2021-02-25 RX ADMIN — Medication 600 MILLIGRAM(S): at 18:17

## 2021-02-25 RX ADMIN — Medication 325 MILLIGRAM(S): at 16:12

## 2021-02-25 RX ADMIN — SIMETHICONE 80 MILLIGRAM(S): 80 TABLET, CHEWABLE ORAL at 05:59

## 2021-02-25 RX ADMIN — Medication 500 MICROGRAM(S): at 08:00

## 2021-02-25 RX ADMIN — CHLORHEXIDINE GLUCONATE 1 APPLICATION(S): 213 SOLUTION TOPICAL at 05:58

## 2021-02-25 RX ADMIN — AMLODIPINE BESYLATE 5 MILLIGRAM(S): 2.5 TABLET ORAL at 05:58

## 2021-02-25 RX ADMIN — METFORMIN HYDROCHLORIDE 500 MILLIGRAM(S): 850 TABLET ORAL at 10:28

## 2021-02-25 RX ADMIN — AMIODARONE HYDROCHLORIDE 200 MILLIGRAM(S): 400 TABLET ORAL at 05:58

## 2021-02-25 RX ADMIN — Medication 50 MILLIGRAM(S): at 18:18

## 2021-02-25 RX ADMIN — Medication 100 GRAM(S): at 18:19

## 2021-02-25 RX ADMIN — SIMETHICONE 80 MILLIGRAM(S): 80 TABLET, CHEWABLE ORAL at 16:12

## 2021-02-25 RX ADMIN — OXYCODONE HYDROCHLORIDE 5 MILLIGRAM(S): 5 TABLET ORAL at 00:51

## 2021-02-25 RX ADMIN — SIMETHICONE 80 MILLIGRAM(S): 80 TABLET, CHEWABLE ORAL at 20:54

## 2021-02-25 RX ADMIN — Medication 600 MILLIGRAM(S): at 05:58

## 2021-02-25 RX ADMIN — ATORVASTATIN CALCIUM 40 MILLIGRAM(S): 80 TABLET, FILM COATED ORAL at 20:53

## 2021-02-25 RX ADMIN — POLYETHYLENE GLYCOL 3350 17 GRAM(S): 17 POWDER, FOR SOLUTION ORAL at 16:12

## 2021-02-25 RX ADMIN — BUMETANIDE 1 MILLIGRAM(S): 0.25 INJECTION INTRAMUSCULAR; INTRAVENOUS at 05:58

## 2021-02-25 RX ADMIN — HEPARIN SODIUM 5000 UNIT(S): 5000 INJECTION INTRAVENOUS; SUBCUTANEOUS at 18:18

## 2021-02-25 RX ADMIN — HEPARIN SODIUM 5000 UNIT(S): 5000 INJECTION INTRAVENOUS; SUBCUTANEOUS at 05:59

## 2021-02-25 RX ADMIN — PANTOPRAZOLE SODIUM 40 MILLIGRAM(S): 20 TABLET, DELAYED RELEASE ORAL at 05:59

## 2021-02-25 RX ADMIN — Medication 50 MILLIGRAM(S): at 05:59

## 2021-02-25 RX ADMIN — Medication 100 GRAM(S): at 05:59

## 2021-02-25 RX ADMIN — ESCITALOPRAM OXALATE 20 MILLIGRAM(S): 10 TABLET, FILM COATED ORAL at 16:12

## 2021-02-25 RX ADMIN — Medication 20 MILLIEQUIVALENT(S): at 16:12

## 2021-02-25 RX ADMIN — LISINOPRIL 5 MILLIGRAM(S): 2.5 TABLET ORAL at 05:59

## 2021-02-25 NOTE — PROGRESS NOTE ADULT - SUBJECTIVE AND OBJECTIVE BOX
OPERATIVE PROCEDURE(s):   CABG and MV repair             POD #       13                70yFemale  SURGEON(s): FIONA Rojas  SUBJECTIVE ASSESSMENT:  Patient has no complaints at this time.    Vital Signs Last 24 Hrs  T(F): 98.8 (24 Feb 2021 16:00), Max: 98.9 (24 Feb 2021 13:00)  HR: 81 (25 Feb 2021 05:00) (58 - 89)  BP: 143/65 (25 Feb 2021 05:00) (87/50 - 143/65)  BP(mean): 94 (25 Feb 2021 05:00) (63 - 94)  RR: 23 (25 Feb 2021 05:00) (15 - 39)  SpO2: 100% (25 Feb 2021 05:00) (98% - 100%)    I&O's Detail    23 Feb 2021 07:01  -  24 Feb 2021 07:00  --------------------------------------------------------  IN:    IV PiggyBack: 100 mL    Oral Fluid: 1074 mL  Total IN: 1174 mL    OUT:    Incontinent per Collection Bag (mL): 1400 mL    Voided (mL): 100 mL  Total OUT: 1500 mL    Net:   I&O's Detail    22 Feb 2021 07:01  -  23 Feb 2021 07:00  --------------------------------------------------------  Total NET: -870 mL    23 Feb 2021 07:01  -  24 Feb 2021 07:00  --------------------------------------------------------  Total NET: -326 mL        CAPILLARY BLOOD GLUCOSE    POCT Blood Glucose.: 143 mg/dL (25 Feb 2021 06:03)  POCT Blood Glucose.: 142 mg/dL (24 Feb 2021 16:12)  POCT Blood Glucose.: 101 mg/dL (24 Feb 2021 11:11)  POCT Blood Glucose.: 133 mg/dL (24 Feb 2021 06:39)    Physical Exam:  General: NAD; A&Ox3  Cardiac: S1/S2, RRR, no murmur, no rubs  Lungs: unlabored respirations, CTA b/l, no wheeze, no rales, no crackles  Abdomen: Soft/NT/ND; positive bowel sounds x 4  Sternum: Intact, no click, incision healing well with no drainage  Incisions: Incisions clean/dry/intact  Extremities: No edema b/l lower extremities; good capillary refill; no cyanosis; palpable 1+ pedal pulses b/l    Central Venous Catheter: Yes[]  No[] , If Yes indication:           Day #  Spencer Catheter: Yes  [] , No  [] , If yes indication:                      Day #  NGT: Yes [] No [] ,    If Yes Placement:                                     Day #  EPICARDIAL WIRES:  [] YES [] NO                                              Day #  BOWEL MOVEMENT:  [] YES [] NO, If No, Timing since last BM:      Day #  CHEST TUBE(Left/Right):  [] YES [] NO, If yes -  AIR LEAKS:  [] YES [] NO        LABS:                        10.5<L>  16.83<H> )-----------( 330      ( 24 Feb 2021 03:02 )             34.0<L>                        10.1<L>  17.17<H> )-----------( 336      ( 23 Feb 2021 02:00 )             32.4<L>    02-24    141  |  101  |  26<H>  ----------------------------<  101<H>  4.7   |  32  |  0.9  02-23    136  |  99  |  29<H>  ----------------------------<  114<H>  4.5   |  28  |  0.9    Ca    8.8      24 Feb 2021 03:02  Mg     2.2     02-25  TPro  4.9<L> [6.0 - 8.0]  /  Alb  3.3<L> [3.5 - 5.2]  /  TBili  1.7<H> [0.2 - 1.2]  /  DBili  x   /  AST  16 [0 - 41]  /  ALT  23 [0 - 41]  /  AlkPhos  70 [30 - 115]  02-24  PT/INR - ( 25 Feb 2021 05:15 )   PT: ;   INR: 1.12 ratio        RADIOLOGY & ADDITIONAL TESTS:  CXR:   Findings:  Support devices: None.  Cardiac/mediastinum/hilum: Partially obscured enlarged cardiac silhouette. Post median sternotomy and cardiac valve replacement.  Lung parenchyma/Pleura: Unchanged bilateral pleural effusions and bilateral opacities. No pneumothorax. Unchanged paramediastinal collection, better appreciated on prior CT chest.  Skeleton/soft tissues: Unchanged.  Impression:  Unchanged bilateral pleural effusions and bilateral opacities.    EKG:  Ventricular Rate 69 BPM  Atrial Rate 69 BPM  P-R Interval 146 ms  QRS Duration 106 ms  Q-T Interval 456 ms  QTC Calculation(Bazett) 488 ms  P Axis 26 degrees  R Axis -12 degrees  T Axis 81 degrees  Diagnosis Line Sinus rhythm withoccasional Premature ventricular complexes  Possible Left atrial enlargement  Left ventricular hypertrophy  T wave abnormality, consider anterolateral ischemia  Prolonged QT  Abnormal ECG    MEDICATIONS  (STANDING):  albumin human  5% IVPB 500 milliLiter(s) IV Intermittent once  aMIOdarone    Tablet 200 milliGRAM(s) Oral daily  aMIOdarone    Tablet   Oral   amLODIPine   Tablet 5 milliGRAM(s) Oral daily  aspirin 325 milliGRAM(s) Oral daily  atorvastatin 40 milliGRAM(s) Oral at bedtime  buMETAnide 1 milliGRAM(s) Oral daily  chlorhexidine 4% Liquid 1 Application(s) Topical <User Schedule>  dextrose 40% Gel 15 Gram(s) Oral once  dextrose 5%. 1000 milliLiter(s) (100 mL/Hr) IV Continuous <Continuous>  dextrose 5%. 1000 milliLiter(s) (50 mL/Hr) IV Continuous <Continuous>  dextrose 50% Injectable 25 Gram(s) IV Push once  dextrose 50% Injectable 12.5 Gram(s) IV Push once  dextrose 50% Injectable 25 Gram(s) IV Push once  escitalopram 20 milliGRAM(s) Oral daily  glucagon  Injectable 1 milliGRAM(s) IntraMuscular once  guaiFENesin  milliGRAM(s) Oral every 12 hours  heparin   Injectable 5000 Unit(s) SubCutaneous every 12 hours  insulin lispro (ADMELOG) corrective regimen sliding scale   SubCutaneous three times a day before meals  ipratropium    for Nebulization 500 MICROGram(s) Nebulizer every 6 hours  lisinopril 5 milliGRAM(s) Oral daily  magnesium sulfate  IVPB 1 Gram(s) IV Intermittent every 12 hours  metFORMIN 500 milliGRAM(s) Oral two times a day with meals  metoprolol tartrate 50 milliGRAM(s) Oral every 12 hours  pantoprazole    Tablet 40 milliGRAM(s) Oral before breakfast  polyethylene glycol 3350 17 Gram(s) Oral daily  potassium chloride    Tablet ER 20 milliEquivalent(s) Oral daily  simethicone 80 milliGRAM(s) Chew three times a day    MEDICATIONS  (PRN):  hydrALAZINE Injectable 10 milliGRAM(s) IV Push every 4 hours PRN SBP > 155    HEPARIN:  [] YES [] NO  Dose: XX UNITS/HR UNITS Q8H  LOVENOX:[] YES [] NO  Dose: XX mg Q24H  COUMADIN: []  YES [] NO  Dose: XX mg  Q24H  SCD's: YES b/l  GI Prophylaxis: Protonix [], Pepcid [], None [], (Contra-indication:.....)    Post-Op Aspirin: Yes [],  No [], If No, then contra-indication:  Post-Op Statin: Yes [], No[], If No, then contra-indication:  Post-Op Beta-Blockers: Yes [], No [], If No, then contra-indication:    Allergies:  No Known Allergies      Ambulation/Activity Status: Ambulates several times daily without assistance.    Assessment/Plan:  70y Female status-post CABG and MV repair  - Case and plan discussed with CTU Intensivist and CT Surgeon - Dr. Rojas  - Continue CTU supportive care    - Continue DVT/GI prophylaxis  - Incentive Spirometry 10 times an hour  - Continue to advance physical activity as tolerated and continue PT/OT as directed  1. CAD: Continue ASA, statin, BB  2. HTN:   3. A. Fib:   4. COPD/Hypoxia:   5. DM/Glucose Control:     Social Service Disposition:     OPERATIVE PROCEDURE(s):   CABG and MV repair             POD #       13                70yFemale  SURGEON(s): FIONA Rjoas  SUBJECTIVE ASSESSMENT:  Patient has no complaints at this time.    Vital Signs Last 24 Hrs  T(F): 98.8 (24 Feb 2021 16:00), Max: 98.9 (24 Feb 2021 13:00)  HR: 81 (25 Feb 2021 05:00) (58 - 89)  BP: 143/65 (25 Feb 2021 05:00) (87/50 - 143/65)  BP(mean): 94 (25 Feb 2021 05:00) (63 - 94)  RR: 23 (25 Feb 2021 05:00) (15 - 39)  SpO2: 100% (25 Feb 2021 05:00) (98% - 100%)    I&O's Detail    23 Feb 2021 07:01  -  24 Feb 2021 07:00  --------------------------------------------------------  IN:    IV PiggyBack: 100 mL    Oral Fluid: 1074 mL  Total IN: 1174 mL    OUT:    Incontinent per Collection Bag (mL): 1400 mL    Voided (mL): 100 mL  Total OUT: 1500 mL    Net:   I&O's Detail    22 Feb 2021 07:01  -  23 Feb 2021 07:00  --------------------------------------------------------  Total NET: -870 mL    23 Feb 2021 07:01  -  24 Feb 2021 07:00  --------------------------------------------------------  Total NET: -326 mL        CAPILLARY BLOOD GLUCOSE    POCT Blood Glucose.: 143 mg/dL (25 Feb 2021 06:03)  POCT Blood Glucose.: 142 mg/dL (24 Feb 2021 16:12)  POCT Blood Glucose.: 101 mg/dL (24 Feb 2021 11:11)  POCT Blood Glucose.: 133 mg/dL (24 Feb 2021 06:39)    Physical Exam:  General: NAD; A&Ox3  Cardiac: S1/S2, RRR, no murmur, no rubs  Lungs: unlabored respirations, CTA b/l, no wheeze, no rales, no crackles  Abdomen: Soft/NT/ND; positive bowel sounds x 4  Sternum: Intact, no click, incision healing well with no drainage  Incisions: Incisions clean/dry/intact  Extremities: mild edema b/l lower extremities; good capillary refill; no cyanosis; palpable 1+ pedal pulses b/l        LABS:                        10.5<L>  16.83<H> )-----------( 330      ( 24 Feb 2021 03:02 )             34.0<L>                        10.1<L>  17.17<H> )-----------( 336      ( 23 Feb 2021 02:00 )             32.4<L>    02-24    141  |  101  |  26<H>  ----------------------------<  101<H>  4.7   |  32  |  0.9  02-23    136  |  99  |  29<H>  ----------------------------<  114<H>  4.5   |  28  |  0.9    Ca    8.8      24 Feb 2021 03:02  Mg     2.2     02-25  TPro  4.9<L> [6.0 - 8.0]  /  Alb  3.3<L> [3.5 - 5.2]  /  TBili  1.7<H> [0.2 - 1.2]  /  DBili  x   /  AST  16 [0 - 41]  /  ALT  23 [0 - 41]  /  AlkPhos  70 [30 - 115]  02-24  PT/INR - ( 25 Feb 2021 05:15 )   PT: ;   INR: 1.12 ratio        RADIOLOGY & ADDITIONAL TESTS:  CXR:   Findings:  Support devices: None.  Cardiac/mediastinum/hilum: Partially obscured enlarged cardiac silhouette. Post median sternotomy and cardiac valve replacement.  Lung parenchyma/Pleura: Unchanged bilateral pleural effusions and bilateral opacities. No pneumothorax. Unchanged paramediastinal collection, better appreciated on prior CT chest.  Skeleton/soft tissues: Unchanged.  Impression:  Unchanged bilateral pleural effusions and bilateral opacities.    EKG:  Ventricular Rate 69 BPM  Atrial Rate 69 BPM  P-R Interval 146 ms  QRS Duration 106 ms  Q-T Interval 456 ms  QTC Calculation(Bazett) 488 ms  P Axis 26 degrees  R Axis -12 degrees  T Axis 81 degrees  Diagnosis Line Sinus rhythm with occasional Premature ventricular complexes  Possible Left atrial enlargement  Left ventricular hypertrophy  T wave abnormality, consider anterolateral ischemia  Prolonged QT  Abnormal ECG    US:  INTERPRETATION:  CLINICAL INFORMATION: Elevated lipase.  COMPARISON: CT scan performed the prior day.  TECHNIQUE: Sonography of the right upper quadrant.  FINDINGS:  Liver: Within normal limits.  Bile ducts: Normal caliber. Common bile duct measures 5 mm.  Gallbladder: Sludge is seen within the gallbladder.  Pancreas: Poorly visualized.  Right kidney: 10.1 cm. No hydronephrosis.  Ascites: None.  IVC: Visualized portions are within normal limits.  IMPRESSION:  Sludge within the gallbladder. Otherwise unremarkable.    CT ABD:  FINDINGS: Evaluation of intra-abdominal organs is limited due to lack of intravenous contrast.  LOWER CHEST: See separately dictated CT chest report for thoracic findings.  HEPATOBILIARY: Gallbladder sludge.  SPLEEN: Unremarkable.  PANCREAS: Unremarkable.  ADRENAL GLANDS: Unremarkable.  KIDNEYS: Bilateral renal scarring.  ABDOMINOPELVIC NODES: No definite adenopathy noting limited evaluation the pelvis.  PELVIC ORGANS: Obscured secondary to extensive artifact from bilateral hip prostheses.  PERITONEUM/MESENTERY/BOWEL: No evidence of bowel obstruction or free air..  BONES/SOFT TISSUES: Ovoid hyperdense 7.8 x 3.5 x 10.3 cm structure along the right rectus, compatible with hematoma in the appropriate clinical setting (3/86). Small fat-containing umbilical hernia. Degenerative changes of the spine. Status post bilateral hip arthroplasties.  OTHER: Arteriosclerotic calcifications of the abdominal aorta.  IMPRESSION:  Ovoid hyperdense 7.8 x 3.5 x 10.3 cm structure along the right rectus, compatible with hematoma in the appropriate clinical setting. Correlate with exam.    CT CHEST:  INTERPRETATION:  AIRWAYS, LUNGS AND PLEURA: The central tracheobronchial tree is patent. Complex loculated right pleural collection containing foci of gas, decreased since 2/14/2021. There is improved aeration of the right lung, with residual subsegmental scarring/atelectasis present. Slightly decreased moderate left pleural effusion with adjacent near complete atelectasis of the left lower lobe.  MEDIASTINUM: There are no enlarged mediastinal or axillary lymph nodes.  HEART AND VESSELS: Prior CABG and mitral annuloplasty. The heart is normal in size. There are aortic and coronary artery calcifications. The thoracic aorta has a normal caliber. There is no pericardial effusion  BONES AND SOFT TISSUES: Median sternotomy. The bones are unremarkable.  The soft tissues are unremarkable.  TUBES AND LINES: None.  IMPRESSION:  1. Decreased complex loculated right air and fluid pleural collection, with improved aeration of the right lung.  2. Decreased moderate left pleural effusion with adjacent near complete atelectasis of the lower lower lobe.    MEDICATIONS  (STANDING):  albumin human  5% IVPB 500 milliLiter(s) IV Intermittent once  aMIOdarone    Tablet 200 milliGRAM(s) Oral daily  aMIOdarone    Tablet   Oral   amLODIPine   Tablet 5 milliGRAM(s) Oral daily  aspirin 325 milliGRAM(s) Oral daily  atorvastatin 40 milliGRAM(s) Oral at bedtime  buMETAnide 1 milliGRAM(s) Oral daily  chlorhexidine 4% Liquid 1 Application(s) Topical <User Schedule>  dextrose 40% Gel 15 Gram(s) Oral once  dextrose 5%. 1000 milliLiter(s) (100 mL/Hr) IV Continuous <Continuous>  dextrose 5%. 1000 milliLiter(s) (50 mL/Hr) IV Continuous <Continuous>  dextrose 50% Injectable 25 Gram(s) IV Push once  dextrose 50% Injectable 12.5 Gram(s) IV Push once  dextrose 50% Injectable 25 Gram(s) IV Push once  escitalopram 20 milliGRAM(s) Oral daily  glucagon  Injectable 1 milliGRAM(s) IntraMuscular once  guaiFENesin  milliGRAM(s) Oral every 12 hours  heparin   Injectable 5000 Unit(s) SubCutaneous every 12 hours  insulin lispro (ADMELOG) corrective regimen sliding scale   SubCutaneous three times a day before meals  ipratropium    for Nebulization 500 MICROGram(s) Nebulizer every 6 hours  lisinopril 5 milliGRAM(s) Oral daily  magnesium sulfate  IVPB 1 Gram(s) IV Intermittent every 12 hours  metFORMIN 500 milliGRAM(s) Oral two times a day with meals  metoprolol tartrate 50 milliGRAM(s) Oral every 12 hours  pantoprazole    Tablet 40 milliGRAM(s) Oral before breakfast  polyethylene glycol 3350 17 Gram(s) Oral daily  potassium chloride    Tablet ER 20 milliEquivalent(s) Oral daily  simethicone 80 milliGRAM(s) Chew three times a day    MEDICATIONS  (PRN):  hydrALAZINE Injectable 10 milliGRAM(s) IV Push every 4 hours PRN SBP > 155    HEPARIN:  [X] YES [] NO  Dose: 5000 UNITS/HR UNITS Q12H  SCD's: YES b/l  GI Prophylaxis: Protonix [X], Pepcid [], None [], (Contra-indication:.....)    Post-Op Aspirin: Yes [X],  No [], If No, then contra-indication:  Post-Op Statin: Yes [X], No[], If No, then contra-indication:  Post-Op Beta-Blockers: Yes [X], No [], If No, then contra-indication:    Allergies:  No Known Allergies    Ambulation/Activity Status: Ambulates several times daily without assistance.    Assessment/Plan:  70y Female status-post CABG and MV repair  - Case and plan discussed with CTU Intensivist and CT Surgeon - Dr. Rojas  - Continue CTU supportive care    - Continue DVT/GI prophylaxis  - Incentive Spirometry 10 times an hour  - Continue to advance physical activity as tolerated and continue PT/OT as directed  s/p CABG & MV repair  1. CAD: Continue ASA, statin, BB  2. HTN:   3. A. Fib:   4. COPD/Hypoxia:   5. DM/Glucose Control:     Social Service Disposition:     OPERATIVE PROCEDURE(s):   CABG and MV repair             POD #       13                70yFemale  SURGEON(s): FIONA Rojas  SUBJECTIVE ASSESSMENT:  Patient has no complaints at this time.    Vital Signs Last 24 Hrs  T(F): 98.8 (24 Feb 2021 16:00), Max: 98.9 (24 Feb 2021 13:00)  HR: 81 (25 Feb 2021 05:00) (58 - 89)  BP: 143/65 (25 Feb 2021 05:00) (87/50 - 143/65)  BP(mean): 94 (25 Feb 2021 05:00) (63 - 94)  RR: 23 (25 Feb 2021 05:00) (15 - 39)  SpO2: 100% (25 Feb 2021 05:00) (98% - 100%)    I&O's Detail    23 Feb 2021 07:01  -  24 Feb 2021 07:00  --------------------------------------------------------  IN:    IV PiggyBack: 100 mL    Oral Fluid: 1074 mL  Total IN: 1174 mL    OUT:    Incontinent per Collection Bag (mL): 1400 mL    Voided (mL): 100 mL  Total OUT: 1500 mL    Net:   I&O's Detail    22 Feb 2021 07:01  -  23 Feb 2021 07:00  --------------------------------------------------------  Total NET: -870 mL    23 Feb 2021 07:01  -  24 Feb 2021 07:00  --------------------------------------------------------  Total NET: -326 mL        CAPILLARY BLOOD GLUCOSE    POCT Blood Glucose.: 143 mg/dL (25 Feb 2021 06:03)  POCT Blood Glucose.: 142 mg/dL (24 Feb 2021 16:12)  POCT Blood Glucose.: 101 mg/dL (24 Feb 2021 11:11)  POCT Blood Glucose.: 133 mg/dL (24 Feb 2021 06:39)    Physical Exam:  General: NAD; A&Ox3  Cardiac: S1/S2, RRR, no murmur, no rubs  Lungs: unlabored respirations, CTA b/l, no wheeze, no rales, no crackles  Abdomen: Soft/NT/ND; positive bowel sounds x 4  Sternum: Intact, no click, incision healing well with no drainage  Incisions: Incisions clean/dry/intact  Extremities: mild edema b/l lower extremities; good capillary refill; no cyanosis; palpable 1+ pedal pulses b/l        LABS:                        10.5<L>  16.83<H> )-----------( 330      ( 24 Feb 2021 03:02 )             34.0<L>                        10.1<L>  17.17<H> )-----------( 336      ( 23 Feb 2021 02:00 )             32.4<L>    02-24    141  |  101  |  26<H>  ----------------------------<  101<H>  4.7   |  32  |  0.9  02-23    136  |  99  |  29<H>  ----------------------------<  114<H>  4.5   |  28  |  0.9    Ca    8.8      24 Feb 2021 03:02  Mg     2.2     02-25  TPro  4.9<L> [6.0 - 8.0]  /  Alb  3.3<L> [3.5 - 5.2]  /  TBili  1.7<H> [0.2 - 1.2]  /  DBili  x   /  AST  16 [0 - 41]  /  ALT  23 [0 - 41]  /  AlkPhos  70 [30 - 115]  02-24  PT/INR - ( 25 Feb 2021 05:15 )   PT: ;   INR: 1.12 ratio        RADIOLOGY & ADDITIONAL TESTS:  CXR:   Findings:  Support devices: None.  Cardiac/mediastinum/hilum: Partially obscured enlarged cardiac silhouette. Post median sternotomy and cardiac valve replacement.  Lung parenchyma/Pleura: Unchanged bilateral pleural effusions and bilateral opacities. No pneumothorax. Unchanged paramediastinal collection, better appreciated on prior CT chest.  Skeleton/soft tissues: Unchanged.  Impression:  Unchanged bilateral pleural effusions and bilateral opacities.    EKG:  Ventricular Rate 69 BPM  Atrial Rate 69 BPM  P-R Interval 146 ms  QRS Duration 106 ms  Q-T Interval 456 ms  QTC Calculation(Bazett) 488 ms  P Axis 26 degrees  R Axis -12 degrees  T Axis 81 degrees  Diagnosis Line Sinus rhythm with occasional Premature ventricular complexes  Possible Left atrial enlargement  Left ventricular hypertrophy  T wave abnormality, consider anterolateral ischemia  Prolonged QT  Abnormal ECG    US:  INTERPRETATION:  CLINICAL INFORMATION: Elevated lipase.  COMPARISON: CT scan performed the prior day.  TECHNIQUE: Sonography of the right upper quadrant.  FINDINGS:  Liver: Within normal limits.  Bile ducts: Normal caliber. Common bile duct measures 5 mm.  Gallbladder: Sludge is seen within the gallbladder.  Pancreas: Poorly visualized.  Right kidney: 10.1 cm. No hydronephrosis.  Ascites: None.  IVC: Visualized portions are within normal limits.  IMPRESSION:  Sludge within the gallbladder. Otherwise unremarkable.    CT ABD:  FINDINGS: Evaluation of intra-abdominal organs is limited due to lack of intravenous contrast.  LOWER CHEST: See separately dictated CT chest report for thoracic findings.  HEPATOBILIARY: Gallbladder sludge.  SPLEEN: Unremarkable.  PANCREAS: Unremarkable.  ADRENAL GLANDS: Unremarkable.  KIDNEYS: Bilateral renal scarring.  ABDOMINOPELVIC NODES: No definite adenopathy noting limited evaluation the pelvis.  PELVIC ORGANS: Obscured secondary to extensive artifact from bilateral hip prostheses.  PERITONEUM/MESENTERY/BOWEL: No evidence of bowel obstruction or free air..  BONES/SOFT TISSUES: Ovoid hyperdense 7.8 x 3.5 x 10.3 cm structure along the right rectus, compatible with hematoma in the appropriate clinical setting (3/86). Small fat-containing umbilical hernia. Degenerative changes of the spine. Status post bilateral hip arthroplasties.  OTHER: Arteriosclerotic calcifications of the abdominal aorta.  IMPRESSION:  Ovoid hyperdense 7.8 x 3.5 x 10.3 cm structure along the right rectus, compatible with hematoma in the appropriate clinical setting. Correlate with exam.    CT CHEST:  INTERPRETATION:  AIRWAYS, LUNGS AND PLEURA: The central tracheobronchial tree is patent. Complex loculated right pleural collection containing foci of gas, decreased since 2/14/2021. There is improved aeration of the right lung, with residual subsegmental scarring/atelectasis present. Slightly decreased moderate left pleural effusion with adjacent near complete atelectasis of the left lower lobe.  MEDIASTINUM: There are no enlarged mediastinal or axillary lymph nodes.  HEART AND VESSELS: Prior CABG and mitral annuloplasty. The heart is normal in size. There are aortic and coronary artery calcifications. The thoracic aorta has a normal caliber. There is no pericardial effusion  BONES AND SOFT TISSUES: Median sternotomy. The bones are unremarkable.  The soft tissues are unremarkable.  TUBES AND LINES: None.  IMPRESSION:  1. Decreased complex loculated right air and fluid pleural collection, with improved aeration of the right lung.  2. Decreased moderate left pleural effusion with adjacent near complete atelectasis of the lower lower lobe.    MEDICATIONS  (STANDING):  albumin human  5% IVPB 500 milliLiter(s) IV Intermittent once  aMIOdarone    Tablet 200 milliGRAM(s) Oral daily  aMIOdarone    Tablet   Oral   amLODIPine   Tablet 5 milliGRAM(s) Oral daily  aspirin 325 milliGRAM(s) Oral daily  atorvastatin 40 milliGRAM(s) Oral at bedtime  buMETAnide 1 milliGRAM(s) Oral daily  chlorhexidine 4% Liquid 1 Application(s) Topical <User Schedule>  dextrose 40% Gel 15 Gram(s) Oral once  dextrose 5%. 1000 milliLiter(s) (100 mL/Hr) IV Continuous <Continuous>  dextrose 5%. 1000 milliLiter(s) (50 mL/Hr) IV Continuous <Continuous>  dextrose 50% Injectable 25 Gram(s) IV Push once  dextrose 50% Injectable 12.5 Gram(s) IV Push once  dextrose 50% Injectable 25 Gram(s) IV Push once  escitalopram 20 milliGRAM(s) Oral daily  glucagon  Injectable 1 milliGRAM(s) IntraMuscular once  guaiFENesin  milliGRAM(s) Oral every 12 hours  heparin   Injectable 5000 Unit(s) SubCutaneous every 12 hours  insulin lispro (ADMELOG) corrective regimen sliding scale   SubCutaneous three times a day before meals  ipratropium    for Nebulization 500 MICROGram(s) Nebulizer every 6 hours  lisinopril 5 milliGRAM(s) Oral daily  magnesium sulfate  IVPB 1 Gram(s) IV Intermittent every 12 hours  metFORMIN 500 milliGRAM(s) Oral two times a day with meals  metoprolol tartrate 50 milliGRAM(s) Oral every 12 hours  pantoprazole    Tablet 40 milliGRAM(s) Oral before breakfast  polyethylene glycol 3350 17 Gram(s) Oral daily  potassium chloride    Tablet ER 20 milliEquivalent(s) Oral daily  simethicone 80 milliGRAM(s) Chew three times a day    MEDICATIONS  (PRN):  hydrALAZINE Injectable 10 milliGRAM(s) IV Push every 4 hours PRN SBP > 155    HEPARIN:  [X] YES [] NO  Dose: 5000 UNITS/HR UNITS Q12H  SCD's: YES b/l  GI Prophylaxis: Protonix [X], Pepcid [], None [], (Contra-indication:.....)    Post-Op Aspirin: Yes [X],  No [], If No, then contra-indication:  Post-Op Statin: Yes [X], No[], If No, then contra-indication:  Post-Op Beta-Blockers: Yes [X], No [], If No, then contra-indication:    Allergies:  No Known Allergies    Ambulation/Activity Status: Ambulates several times daily without assistance.    Assessment/Plan:  70y Female status-post CABG and MV repair POD#13  - Case and plan discussed with CTU Intensivist and CT Surgeon - Dr. Rojas  - Continue CTU supportive care    - Continue DVT/GI prophylaxis  - Incentive Spirometry 10 times an hour  - Continue to advance physical activity as tolerated and continue PT/OT as directed  s/p CABG & MV repair  1. CAD: Continue ASA, statin, BB, pain control as needed  2. HTN: cont norvasc, lopressor  3. A. Fib: cont lopressor, amio, mag 1gmb id  4. COPD/Hypoxia: cont nebs, mucinex, wean o2 as tolerated, encourage incentive spirometry  5. DM/Glucose Control: insulin sliding scale  6. right hemothorax- IR for CT guided drainage   7. pancreatitis- trend amylase and lipase, gen surgery following    Social Service Disposition:  [facility] progress   OPERATIVE PROCEDURE(s):   CABG and MV repair             POD #       13                70yFemale  SURGEON(s): FIONA Rojas  SUBJECTIVE ASSESSMENT:  Patient has no complaints at this time.    Vital Signs Last 24 Hrs  T(F): 98.8 (24 Feb 2021 16:00), Max: 98.9 (24 Feb 2021 13:00)  HR: 81 (25 Feb 2021 05:00) (58 - 89)  BP: 143/65 (25 Feb 2021 05:00) (87/50 - 143/65)  BP(mean): 94 (25 Feb 2021 05:00) (63 - 94)  RR: 23 (25 Feb 2021 05:00) (15 - 39)  SpO2: 100% (25 Feb 2021 05:00) (98% - 100%)    I&O's Detail    23 Feb 2021 07:01  -  24 Feb 2021 07:00  --------------------------------------------------------  IN:    IV PiggyBack: 100 mL    Oral Fluid: 1074 mL  Total IN: 1174 mL    OUT:    Incontinent per Collection Bag (mL): 1400 mL    Voided (mL): 100 mL  Total OUT: 1500 mL    Net:   I&O's Detail    22 Feb 2021 07:01  -  23 Feb 2021 07:00  --------------------------------------------------------  Total NET: -870 mL    23 Feb 2021 07:01  -  24 Feb 2021 07:00  --------------------------------------------------------  Total NET: -326 mL        CAPILLARY BLOOD GLUCOSE    POCT Blood Glucose.: 143 mg/dL (25 Feb 2021 06:03)  POCT Blood Glucose.: 142 mg/dL (24 Feb 2021 16:12)  POCT Blood Glucose.: 101 mg/dL (24 Feb 2021 11:11)  POCT Blood Glucose.: 133 mg/dL (24 Feb 2021 06:39)    Physical Exam:  General: NAD; A&Ox3  Cardiac: S1/S2, RRR, no murmur, no rubs  Lungs: unlabored respirations, CTA b/l, no wheeze, no rales, no crackles  Abdomen: Soft/NT/ND; positive bowel sounds x 4  Sternum: Intact, no click, incision healing well with no drainage  Incisions: Incisions clean/dry/intact  Extremities: mild edema b/l lower extremities; good capillary refill; no cyanosis; palpable 1+ pedal pulses b/l        LABS:                        10.5<L>  16.83<H> )-----------( 330      ( 24 Feb 2021 03:02 )             34.0<L>                        10.1<L>  17.17<H> )-----------( 336      ( 23 Feb 2021 02:00 )             32.4<L>    02-24    141  |  101  |  26<H>  ----------------------------<  101<H>  4.7   |  32  |  0.9  02-23    136  |  99  |  29<H>  ----------------------------<  114<H>  4.5   |  28  |  0.9    Ca    8.8      24 Feb 2021 03:02  Mg     2.2     02-25  TPro  4.9<L> [6.0 - 8.0]  /  Alb  3.3<L> [3.5 - 5.2]  /  TBili  1.7<H> [0.2 - 1.2]  /  DBili  x   /  AST  16 [0 - 41]  /  ALT  23 [0 - 41]  /  AlkPhos  70 [30 - 115]  02-24  PT/INR - ( 25 Feb 2021 05:15 )   PT: ;   INR: 1.12 ratio        RADIOLOGY & ADDITIONAL TESTS:  CXR:   Findings:  Support devices: None.  Cardiac/mediastinum/hilum: Partially obscured enlarged cardiac silhouette. Post median sternotomy and cardiac valve replacement.  Lung parenchyma/Pleura: Unchanged bilateral pleural effusions and bilateral opacities. No pneumothorax. Unchanged paramediastinal collection, better appreciated on prior CT chest.  Skeleton/soft tissues: Unchanged.  Impression:  Unchanged bilateral pleural effusions and bilateral opacities.    EKG:  Ventricular Rate 69 BPM  Atrial Rate 69 BPM  P-R Interval 146 ms  QRS Duration 106 ms  Q-T Interval 456 ms  QTC Calculation(Bazett) 488 ms  P Axis 26 degrees  R Axis -12 degrees  T Axis 81 degrees  Diagnosis Line Sinus rhythm with occasional Premature ventricular complexes  Possible Left atrial enlargement  Left ventricular hypertrophy  T wave abnormality, consider anterolateral ischemia  Prolonged QT  Abnormal ECG    US:  INTERPRETATION:  CLINICAL INFORMATION: Elevated lipase.  COMPARISON: CT scan performed the prior day.  TECHNIQUE: Sonography of the right upper quadrant.  FINDINGS:  Liver: Within normal limits.  Bile ducts: Normal caliber. Common bile duct measures 5 mm.  Gallbladder: Sludge is seen within the gallbladder.  Pancreas: Poorly visualized.  Right kidney: 10.1 cm. No hydronephrosis.  Ascites: None.  IVC: Visualized portions are within normal limits.  IMPRESSION:  Sludge within the gallbladder. Otherwise unremarkable.    CT ABD:  FINDINGS: Evaluation of intra-abdominal organs is limited due to lack of intravenous contrast.  LOWER CHEST: See separately dictated CT chest report for thoracic findings.  HEPATOBILIARY: Gallbladder sludge.  SPLEEN: Unremarkable.  PANCREAS: Unremarkable.  ADRENAL GLANDS: Unremarkable.  KIDNEYS: Bilateral renal scarring.  ABDOMINOPELVIC NODES: No definite adenopathy noting limited evaluation the pelvis.  PELVIC ORGANS: Obscured secondary to extensive artifact from bilateral hip prostheses.  PERITONEUM/MESENTERY/BOWEL: No evidence of bowel obstruction or free air..  BONES/SOFT TISSUES: Ovoid hyperdense 7.8 x 3.5 x 10.3 cm structure along the right rectus, compatible with hematoma in the appropriate clinical setting (3/86). Small fat-containing umbilical hernia. Degenerative changes of the spine. Status post bilateral hip arthroplasties.  OTHER: Arteriosclerotic calcifications of the abdominal aorta.  IMPRESSION:  Ovoid hyperdense 7.8 x 3.5 x 10.3 cm structure along the right rectus, compatible with hematoma in the appropriate clinical setting. Correlate with exam.    CT CHEST:  INTERPRETATION:  AIRWAYS, LUNGS AND PLEURA: The central tracheobronchial tree is patent. Complex loculated right pleural collection containing foci of gas, decreased since 2/14/2021. There is improved aeration of the right lung, with residual subsegmental scarring/atelectasis present. Slightly decreased moderate left pleural effusion with adjacent near complete atelectasis of the left lower lobe.  MEDIASTINUM: There are no enlarged mediastinal or axillary lymph nodes.  HEART AND VESSELS: Prior CABG and mitral annuloplasty. The heart is normal in size. There are aortic and coronary artery calcifications. The thoracic aorta has a normal caliber. There is no pericardial effusion  BONES AND SOFT TISSUES: Median sternotomy. The bones are unremarkable.  The soft tissues are unremarkable.  TUBES AND LINES: None.  IMPRESSION:  1. Decreased complex loculated right air and fluid pleural collection, with improved aeration of the right lung.  2. Decreased moderate left pleural effusion with adjacent near complete atelectasis of the lower lower lobe.    MEDICATIONS  (STANDING):  albumin human  5% IVPB 500 milliLiter(s) IV Intermittent once  aMIOdarone    Tablet 200 milliGRAM(s) Oral daily  aMIOdarone    Tablet   Oral   amLODIPine   Tablet 5 milliGRAM(s) Oral daily  aspirin 325 milliGRAM(s) Oral daily  atorvastatin 40 milliGRAM(s) Oral at bedtime  buMETAnide 1 milliGRAM(s) Oral daily  chlorhexidine 4% Liquid 1 Application(s) Topical <User Schedule>  dextrose 40% Gel 15 Gram(s) Oral once  dextrose 5%. 1000 milliLiter(s) (100 mL/Hr) IV Continuous <Continuous>  dextrose 5%. 1000 milliLiter(s) (50 mL/Hr) IV Continuous <Continuous>  dextrose 50% Injectable 25 Gram(s) IV Push once  dextrose 50% Injectable 12.5 Gram(s) IV Push once  dextrose 50% Injectable 25 Gram(s) IV Push once  escitalopram 20 milliGRAM(s) Oral daily  glucagon  Injectable 1 milliGRAM(s) IntraMuscular once  guaiFENesin  milliGRAM(s) Oral every 12 hours  heparin   Injectable 5000 Unit(s) SubCutaneous every 12 hours  insulin lispro (ADMELOG) corrective regimen sliding scale   SubCutaneous three times a day before meals  ipratropium    for Nebulization 500 MICROGram(s) Nebulizer every 6 hours  lisinopril 5 milliGRAM(s) Oral daily  magnesium sulfate  IVPB 1 Gram(s) IV Intermittent every 12 hours  metFORMIN 500 milliGRAM(s) Oral two times a day with meals  metoprolol tartrate 50 milliGRAM(s) Oral every 12 hours  pantoprazole    Tablet 40 milliGRAM(s) Oral before breakfast  polyethylene glycol 3350 17 Gram(s) Oral daily  potassium chloride    Tablet ER 20 milliEquivalent(s) Oral daily  simethicone 80 milliGRAM(s) Chew three times a day    MEDICATIONS  (PRN):  hydrALAZINE Injectable 10 milliGRAM(s) IV Push every 4 hours PRN SBP > 155    HEPARIN:  [X] YES [] NO  Dose: 5000 UNITS/HR UNITS Q12H  SCD's: YES b/l  GI Prophylaxis: Protonix [X], Pepcid [], None [], (Contra-indication:.....)    Post-Op Aspirin: Yes [X],  No [], If No, then contra-indication:  Post-Op Statin: Yes [X], No[], If No, then contra-indication:  Post-Op Beta-Blockers: Yes [X], No [], If No, then contra-indication:    Allergies:  No Known Allergies    Ambulation/Activity Status: Ambulates several times daily with assistance.    Assessment/Plan:  70y Female status-post CABG and MV repair POD#13  - Case and plan discussed with CTU Intensivist and CT Surgeon - Dr. Rojas  - Continue CTU supportive care    - Continue DVT/GI prophylaxis  - Incentive Spirometry 10 times an hour  - Continue to advance physical activity as tolerated and continue PT/OT as directed  s/p CABG & MV repair  1. CAD: Continue ASA, statin, BB, pain control as needed  2. HTN: cont norvasc, lopressor  3. A. Fib: cont lopressor, amio, mag 1gmb id  4. COPD/Hypoxia: cont nebs, mucinex, wean o2 as tolerated, encourage incentive spirometry  5. DM/Glucose Control: insulin sliding scale  6. right hemothorax- IR for CT guided drainage   7. pancreatitis- trend amylase and lipase, gen surgery following    Social Service Disposition:  [facility] progress

## 2021-02-25 NOTE — PROGRESS NOTE ADULT - SUBJECTIVE AND OBJECTIVE BOX
ADRIAN SAMUELS  70y, Female    All available historical data reviewed    OVERNIGHT EVENTS:  no fevers  feels well and has no complaints      ROS:  General: Denies rigors, nightsweats  HEENT: Denies headache, rhinorrhea, sore throat, eye pain  CV: Denies CP, palpitations  PULM: Denies wheezing, hemoptysis  GI: Denies hematemesis, hematochezia, melena  : Denies discharge, hematuria  MSK: Denies arthralgias, myalgias  SKIN: Denies rash, lesions  NEURO: Denies paresthesias, weakness  PSYCH: Denies depression, anxiety    VITALS:  T(F): 98.8, Max: 98.9 (02-24-21 @ 13:00)  HR: 80  BP: 122/58  RR: 14Vital Signs Last 24 Hrs  T(C): 37.1 (24 Feb 2021 16:00), Max: 37.2 (24 Feb 2021 13:00)  T(F): 98.8 (24 Feb 2021 16:00), Max: 98.9 (24 Feb 2021 13:00)  HR: 80 (25 Feb 2021 06:00) (59 - 89)  BP: 122/58 (25 Feb 2021 06:00) (87/50 - 143/65)  BP(mean): 84 (25 Feb 2021 06:00) (63 - 94)  RR: 14 (25 Feb 2021 06:00) (14 - 39)  SpO2: 100% (25 Feb 2021 06:00) (98% - 100%)    TESTS & MEASUREMENTS:                        10.1   16.91 )-----------( 353      ( 25 Feb 2021 06:12 )             33.9     02-24    141  |  101  |  26<H>  ----------------------------<  101<H>  4.7   |  32  |  0.9    Ca    8.8      24 Feb 2021 03:02  Mg     2.2     02-25    TPro  4.9<L>  /  Alb  3.3<L>  /  TBili  1.7<H>  /  DBili  x   /  AST  16  /  ALT  23  /  AlkPhos  70  02-24    LIVER FUNCTIONS - ( 24 Feb 2021 03:02 )  Alb: 3.3 g/dL / Pro: 4.9 g/dL / ALK PHOS: 70 U/L / ALT: 23 U/L / AST: 16 U/L / GGT: x             Culture - Blood (collected 02-24-21 @ 02:00)  Source: .Blood Blood  Preliminary Report (02-25-21 @ 07:02):    No growth to date.            RADIOLOGY & ADDITIONAL TESTS:  Personal review of radiological diagnostics performed  Echo and EKG results noted when applicable.     MEDICATIONS:  albumin human  5% IVPB 500 milliLiter(s) IV Intermittent once  aMIOdarone    Tablet 200 milliGRAM(s) Oral daily  aMIOdarone    Tablet   Oral   amLODIPine   Tablet 5 milliGRAM(s) Oral daily  aspirin 325 milliGRAM(s) Oral daily  atorvastatin 40 milliGRAM(s) Oral at bedtime  buMETAnide 1 milliGRAM(s) Oral daily  chlorhexidine 4% Liquid 1 Application(s) Topical <User Schedule>  dextrose 40% Gel 15 Gram(s) Oral once  dextrose 5%. 1000 milliLiter(s) IV Continuous <Continuous>  dextrose 5%. 1000 milliLiter(s) IV Continuous <Continuous>  dextrose 50% Injectable 25 Gram(s) IV Push once  dextrose 50% Injectable 12.5 Gram(s) IV Push once  dextrose 50% Injectable 25 Gram(s) IV Push once  escitalopram 20 milliGRAM(s) Oral daily  glucagon  Injectable 1 milliGRAM(s) IntraMuscular once  guaiFENesin  milliGRAM(s) Oral every 12 hours  heparin   Injectable 5000 Unit(s) SubCutaneous every 12 hours  hydrALAZINE Injectable 10 milliGRAM(s) IV Push every 4 hours PRN  insulin lispro (ADMELOG) corrective regimen sliding scale   SubCutaneous three times a day before meals  ipratropium    for Nebulization 500 MICROGram(s) Nebulizer every 6 hours  lisinopril 5 milliGRAM(s) Oral daily  magnesium sulfate  IVPB 1 Gram(s) IV Intermittent every 12 hours  metFORMIN 500 milliGRAM(s) Oral two times a day with meals  metoprolol tartrate 50 milliGRAM(s) Oral every 12 hours  pantoprazole    Tablet 40 milliGRAM(s) Oral before breakfast  polyethylene glycol 3350 17 Gram(s) Oral daily  potassium chloride    Tablet ER 20 milliEquivalent(s) Oral daily  simethicone 80 milliGRAM(s) Chew three times a day      ANTIBIOTICS:

## 2021-02-25 NOTE — PROGRESS NOTE ADULT - SUBJECTIVE AND OBJECTIVE BOX
INTERVENTIONAL RADIOLOGY BRIEF-OPERATIVE NOTE    Procedure: Right sided chest tube placement    Pre-Op Diagnosis: Loculated right hemorrhagic effusion    Post-Op Diagnosis: same    Attending: Desmond  Resident: Jeana    Anesthesia (type):  [ ] General Anesthesia  [x] Sedation 25mcg Fentanyl, 1mg Versed  [ ] Spinal Anesthesia  [x] Local/Regional 10cc 1% lidocaine subq    Total face to face sedation time: 34 minutes     Contrast: none    Estimated Blood Loss: 5cc    Condition:   [ ] Critical  [ ] Serious  [ ] Fair   [x] Good    Findings/Follow up Plan of Care: Initial CT demonstrating mild increase in size of loculated collection. s/p placement of 10fr pigtail drainage catheter. Pigtail formed and position confirmed. 100cc dark blood aspirated. Catheter attached to pleura vac on suction.    Specimens Removed: 50cc dark blood.    Implants: none    Complications: none    Disposition:  Recovery then CTU. Monitor tube output      Please call Interventional Radiology [phone] with any questions, concerns, or issues.

## 2021-02-26 LAB
AMYLASE P1 CFR SERPL: 178 U/L — HIGH (ref 25–115)
ANION GAP SERPL CALC-SCNC: 8 MMOL/L — SIGNIFICANT CHANGE UP (ref 7–14)
BUN SERPL-MCNC: 25 MG/DL — HIGH (ref 10–20)
CALCIUM SERPL-MCNC: 9 MG/DL — SIGNIFICANT CHANGE UP (ref 8.5–10.1)
CHLORIDE SERPL-SCNC: 97 MMOL/L — LOW (ref 98–110)
CO2 SERPL-SCNC: 34 MMOL/L — HIGH (ref 17–32)
CREAT SERPL-MCNC: 0.9 MG/DL — SIGNIFICANT CHANGE UP (ref 0.7–1.5)
GLUCOSE BLDC GLUCOMTR-MCNC: 125 MG/DL — HIGH (ref 70–99)
GLUCOSE BLDC GLUCOMTR-MCNC: 137 MG/DL — HIGH (ref 70–99)
GLUCOSE BLDC GLUCOMTR-MCNC: 169 MG/DL — HIGH (ref 70–99)
GLUCOSE FLD-MCNC: 46 MG/DL — SIGNIFICANT CHANGE UP
GLUCOSE SERPL-MCNC: 136 MG/DL — HIGH (ref 70–99)
GRAM STN FLD: SIGNIFICANT CHANGE UP
HCT VFR BLD CALC: 35.4 % — LOW (ref 37–47)
HGB BLD-MCNC: 10.5 G/DL — LOW (ref 12–16)
LDH SERPL L TO P-CCNC: 9325 U/L — SIGNIFICANT CHANGE UP
LIDOCAIN IGE QN: 168 U/L — HIGH (ref 7–60)
MAGNESIUM SERPL-MCNC: 1.8 MG/DL — SIGNIFICANT CHANGE UP (ref 1.8–2.4)
MCHC RBC-ENTMCNC: 29.1 PG — SIGNIFICANT CHANGE UP (ref 27–31)
MCHC RBC-ENTMCNC: 29.7 G/DL — LOW (ref 32–37)
MCV RBC AUTO: 98.1 FL — SIGNIFICANT CHANGE UP (ref 81–99)
NRBC # BLD: 0 /100 WBCS — SIGNIFICANT CHANGE UP (ref 0–0)
PLATELET # BLD AUTO: 306 K/UL — SIGNIFICANT CHANGE UP (ref 130–400)
POTASSIUM SERPL-MCNC: 4.5 MMOL/L — SIGNIFICANT CHANGE UP (ref 3.5–5)
POTASSIUM SERPL-SCNC: 4.5 MMOL/L — SIGNIFICANT CHANGE UP (ref 3.5–5)
PROT FLD-MCNC: 6.4 G/DL — SIGNIFICANT CHANGE UP
RBC # BLD: 3.61 M/UL — LOW (ref 4.2–5.4)
RBC # FLD: 18.5 % — HIGH (ref 11.5–14.5)
SODIUM SERPL-SCNC: 139 MMOL/L — SIGNIFICANT CHANGE UP (ref 135–146)
SPECIMEN SOURCE: SIGNIFICANT CHANGE UP
WBC # BLD: 14.63 K/UL — HIGH (ref 4.8–10.8)
WBC # FLD AUTO: 14.63 K/UL — HIGH (ref 4.8–10.8)

## 2021-02-26 PROCEDURE — 71045 X-RAY EXAM CHEST 1 VIEW: CPT | Mod: 26,77

## 2021-02-26 PROCEDURE — 71045 X-RAY EXAM CHEST 1 VIEW: CPT | Mod: 26

## 2021-02-26 RX ORDER — MAGNESIUM SULFATE 500 MG/ML
2 VIAL (ML) INJECTION ONCE
Refills: 0 | Status: DISCONTINUED | OUTPATIENT
Start: 2021-02-26 | End: 2021-03-01

## 2021-02-26 RX ORDER — ALPRAZOLAM 0.25 MG
0.25 TABLET ORAL ONCE
Refills: 0 | Status: DISCONTINUED | OUTPATIENT
Start: 2021-02-26 | End: 2021-02-26

## 2021-02-26 RX ORDER — FENTANYL CITRATE 50 UG/ML
25 INJECTION INTRAVENOUS ONCE
Refills: 0 | Status: DISCONTINUED | OUTPATIENT
Start: 2021-02-26 | End: 2021-02-26

## 2021-02-26 RX ADMIN — Medication 325 MILLIGRAM(S): at 11:43

## 2021-02-26 RX ADMIN — Medication 100 GRAM(S): at 05:12

## 2021-02-26 RX ADMIN — Medication 600 MILLIGRAM(S): at 17:59

## 2021-02-26 RX ADMIN — METFORMIN HYDROCHLORIDE 500 MILLIGRAM(S): 850 TABLET ORAL at 16:01

## 2021-02-26 RX ADMIN — Medication 2: at 07:53

## 2021-02-26 RX ADMIN — HEPARIN SODIUM 5000 UNIT(S): 5000 INJECTION INTRAVENOUS; SUBCUTANEOUS at 05:12

## 2021-02-26 RX ADMIN — Medication 600 MILLIGRAM(S): at 05:13

## 2021-02-26 RX ADMIN — AMIODARONE HYDROCHLORIDE 200 MILLIGRAM(S): 400 TABLET ORAL at 05:13

## 2021-02-26 RX ADMIN — Medication 0.25 MILLIGRAM(S): at 03:50

## 2021-02-26 RX ADMIN — POLYETHYLENE GLYCOL 3350 17 GRAM(S): 17 POWDER, FOR SOLUTION ORAL at 11:44

## 2021-02-26 RX ADMIN — Medication 50 MILLIGRAM(S): at 05:13

## 2021-02-26 RX ADMIN — Medication 0.25 MILLIGRAM(S): at 21:45

## 2021-02-26 RX ADMIN — Medication 50 MILLIGRAM(S): at 17:59

## 2021-02-26 RX ADMIN — FENTANYL CITRATE 25 MICROGRAM(S): 50 INJECTION INTRAVENOUS at 00:00

## 2021-02-26 RX ADMIN — ESCITALOPRAM OXALATE 20 MILLIGRAM(S): 10 TABLET, FILM COATED ORAL at 11:43

## 2021-02-26 RX ADMIN — Medication 20 MILLIEQUIVALENT(S): at 11:43

## 2021-02-26 RX ADMIN — SIMETHICONE 80 MILLIGRAM(S): 80 TABLET, CHEWABLE ORAL at 05:13

## 2021-02-26 RX ADMIN — ATORVASTATIN CALCIUM 40 MILLIGRAM(S): 80 TABLET, FILM COATED ORAL at 21:43

## 2021-02-26 RX ADMIN — SIMETHICONE 80 MILLIGRAM(S): 80 TABLET, CHEWABLE ORAL at 13:12

## 2021-02-26 RX ADMIN — LISINOPRIL 5 MILLIGRAM(S): 2.5 TABLET ORAL at 05:13

## 2021-02-26 RX ADMIN — Medication 500 MICROGRAM(S): at 21:45

## 2021-02-26 RX ADMIN — Medication 100 GRAM(S): at 18:00

## 2021-02-26 RX ADMIN — SIMETHICONE 80 MILLIGRAM(S): 80 TABLET, CHEWABLE ORAL at 21:43

## 2021-02-26 RX ADMIN — METFORMIN HYDROCHLORIDE 500 MILLIGRAM(S): 850 TABLET ORAL at 07:55

## 2021-02-26 RX ADMIN — AMLODIPINE BESYLATE 5 MILLIGRAM(S): 2.5 TABLET ORAL at 05:13

## 2021-02-26 RX ADMIN — BUMETANIDE 1 MILLIGRAM(S): 0.25 INJECTION INTRAMUSCULAR; INTRAVENOUS at 05:13

## 2021-02-26 RX ADMIN — PANTOPRAZOLE SODIUM 40 MILLIGRAM(S): 20 TABLET, DELAYED RELEASE ORAL at 05:13

## 2021-02-26 RX ADMIN — HEPARIN SODIUM 5000 UNIT(S): 5000 INJECTION INTRAVENOUS; SUBCUTANEOUS at 18:00

## 2021-02-26 NOTE — CHART NOTE - NSCHARTNOTEFT_GEN_A_CORE
Registered Dietitian Follow-Up     Patient Profile Reviewed                           Yes [x]   No []     Nutrition History Previously Obtained        Yes []  No [x]       Pertinent Subjective Information: This note was charted remotely nO answer from pt's room phone. Noted with variable PO intake at this time. Refusing meals at times. No need for renal restr modifier at this time.      Pertinent Medical Interventions: status-post CABG & MV Repair POD#14. CTU supportive care.  COPD/Hypoxia: cont nebs. Pancreatitis- trend amylase and lipase- trending down, gen surgery following, ID following.      Diet order: consistent carb renal NS     Anthropometrics:  - Ht. 149.9cm  - Wt. 102.1kg on 2/26 vs. 102.4kg on 2/22 vs. 103.8kg on 2/19 vs. 97kg on 2/12 pt. with edema, will continue to monitor wt trends  - %wt change  - BMI 43.2  - IBW 44.2kg     Pertinent Lab Data: (2/26) WBC 14.63, RBC 3.61, Hg 10.5, Hct 35.4, BUN 28, glu 136, amylase 178, lipase 168     Pertinent Meds: Bumex, Lisinopril, Metoprolol, Atorvastatin, Miralax     Physical Findings:  - Appearance: alert per EMR, 2_ L, R foot edema noted  - GI function: abd noted soft/nontender per EMR, last BM 2/24  - Tubes: none noted  - Oral/Mouth cavity: no symptoms noted  - Skin: incision     Nutrition Requirements (from RD note on 2/23)   Weight Used: 97kg dosing, ideal 44.2kg       Estimated Energy Needs    Continue [x]  Adjust [] [id number] (MSJ x 1-1.1 AF) obese BMI considered  Adjusted Energy Recommendations:   kcal/day        Estimated Protein Needs    Continue [x]  Adjust []  53-62 g (1.2-1.4 g/kg IBW) same as above  Adjusted Protein Recommendations:   gm/day        Estimated Fluid Needs        Continue []  Adjust []  per CTU team  Adjusted Fluid Recommendations:   mL/day     Nutrient Intake: variable, 0-100% PO         [] Previous Nutrition Diagnosis: Inadequate Oral Intake            [x] Ongoing          [] Resolved       Nutrition Intervention: meals and snacks, medical food supplement    Rec:  Provide DASH/TLC, consistent carb w/snack diet order, add Glucerna BID     Goal/Expected Outcome:  In 4 days pt. to consistently consume at least 50-75% PO and supplement      Indicator/Monitoring: diet order, energy intake, body composition, NFPF, glucose/renal/electrolyte profiles.

## 2021-02-26 NOTE — PROGRESS NOTE ADULT - SUBJECTIVE AND OBJECTIVE BOX
ADRIAN SAMUELS  70y, Female    All available historical data reviewed    OVERNIGHT EVENTS:  no fevers  R CT in place  IVR 2/25 : s/p placement of 10fr pigtail drainage catheter. Pigtail formed and position confirmed. 100cc dark blood aspirated. Catheter attached to pleura vac on suction.    ROS:  General: Denies rigors, nightsweats  HEENT: Denies headache, rhinorrhea, sore throat, eye pain  CV: Denies CP, palpitations  PULM: Denies wheezing, hemoptysis  GI: Denies hematemesis, hematochezia, melena  : Denies discharge, hematuria  MSK: Denies arthralgias, myalgias  SKIN: Denies rash, lesions  NEURO: Denies paresthesias, weakness  PSYCH: Denies depression, anxiety    VITALS:  T(F): 98.5, Max: 98.9 (02-25-21 @ 16:00)  HR: 66  BP: 133/61  RR: 30Vital Signs Last 24 Hrs  T(C): 36.9 (26 Feb 2021 05:00), Max: 37.2 (25 Feb 2021 16:00)  T(F): 98.5 (26 Feb 2021 05:00), Max: 98.9 (25 Feb 2021 16:00)  HR: 66 (26 Feb 2021 07:00) (60 - 80)  BP: 133/61 (26 Feb 2021 07:00) (103/51 - 156/69)  BP(mean): 88 (26 Feb 2021 07:00) (73 - 99)  RR: 30 (26 Feb 2021 07:00) (10 - 31)  SpO2: 98% (26 Feb 2021 07:00) (95% - 100%)    TESTS & MEASUREMENTS:                        10.5   14.63 )-----------( 306      ( 26 Feb 2021 05:10 )             35.4     02-26    139  |  97<L>  |  25<H>  ----------------------------<  136<H>  4.5   |  34<H>  |  0.9    Ca    9.0      26 Feb 2021 05:10  Mg     1.8     02-26          Culture - Body Fluid with Gram Stain (collected 02-25-21 @ 16:10)  Source: .Body Fluid Pleural Fluid  Gram Stain (02-26-21 @ 02:56):    polymorphonuclear leukocytes seen    No organisms seen    by cytocentrifuge    Culture - Blood (collected 02-24-21 @ 02:00)  Source: .Blood Blood  Preliminary Report (02-25-21 @ 07:02):    No growth to date.            RADIOLOGY & ADDITIONAL TESTS:  Personal review of radiological diagnostics performed  Echo and EKG results noted when applicable.     MEDICATIONS:  albumin human  5% IVPB 500 milliLiter(s) IV Intermittent once  aMIOdarone    Tablet 200 milliGRAM(s) Oral daily  aMIOdarone    Tablet   Oral   amLODIPine   Tablet 5 milliGRAM(s) Oral daily  aspirin 325 milliGRAM(s) Oral daily  atorvastatin 40 milliGRAM(s) Oral at bedtime  buMETAnide 1 milliGRAM(s) Oral daily  chlorhexidine 4% Liquid 1 Application(s) Topical <User Schedule>  dextrose 40% Gel 15 Gram(s) Oral once  dextrose 5%. 1000 milliLiter(s) IV Continuous <Continuous>  dextrose 5%. 1000 milliLiter(s) IV Continuous <Continuous>  dextrose 50% Injectable 12.5 Gram(s) IV Push once  dextrose 50% Injectable 25 Gram(s) IV Push once  dextrose 50% Injectable 25 Gram(s) IV Push once  escitalopram 20 milliGRAM(s) Oral daily  glucagon  Injectable 1 milliGRAM(s) IntraMuscular once  guaiFENesin  milliGRAM(s) Oral every 12 hours  heparin   Injectable 5000 Unit(s) SubCutaneous every 12 hours  hydrALAZINE Injectable 10 milliGRAM(s) IV Push every 4 hours PRN  insulin lispro (ADMELOG) corrective regimen sliding scale   SubCutaneous three times a day before meals  ipratropium    for Nebulization 500 MICROGram(s) Nebulizer every 6 hours  lisinopril 5 milliGRAM(s) Oral daily  magnesium sulfate  IVPB 2 Gram(s) IV Intermittent once  magnesium sulfate  IVPB 1 Gram(s) IV Intermittent every 12 hours  metFORMIN 500 milliGRAM(s) Oral two times a day with meals  metoprolol tartrate 50 milliGRAM(s) Oral every 12 hours  pantoprazole    Tablet 40 milliGRAM(s) Oral before breakfast  polyethylene glycol 3350 17 Gram(s) Oral daily  potassium chloride    Tablet ER 20 milliEquivalent(s) Oral daily  simethicone 80 milliGRAM(s) Chew three times a day      ANTIBIOTICS:

## 2021-02-26 NOTE — PROGRESS NOTE ADULT - RS GEN PE MLT RESP DETAILS PC
diminished breath sounds, L/diminished breath sounds, R
rhonchi
diminished breath sounds, L/diminished breath sounds, R

## 2021-02-27 LAB
AMYLASE P1 CFR SERPL: 154 U/L — HIGH (ref 25–115)
ANION GAP SERPL CALC-SCNC: 4 MMOL/L — LOW (ref 7–14)
BUN SERPL-MCNC: 22 MG/DL — HIGH (ref 10–20)
CALCIUM SERPL-MCNC: 9.2 MG/DL — SIGNIFICANT CHANGE UP (ref 8.5–10.1)
CHLORIDE SERPL-SCNC: 99 MMOL/L — SIGNIFICANT CHANGE UP (ref 98–110)
CO2 SERPL-SCNC: 37 MMOL/L — HIGH (ref 17–32)
CREAT SERPL-MCNC: 0.7 MG/DL — SIGNIFICANT CHANGE UP (ref 0.7–1.5)
GLUCOSE BLDC GLUCOMTR-MCNC: 112 MG/DL — HIGH (ref 70–99)
GLUCOSE BLDC GLUCOMTR-MCNC: 121 MG/DL — HIGH (ref 70–99)
GLUCOSE BLDC GLUCOMTR-MCNC: 139 MG/DL — HIGH (ref 70–99)
GLUCOSE BLDC GLUCOMTR-MCNC: 139 MG/DL — HIGH (ref 70–99)
GLUCOSE SERPL-MCNC: 102 MG/DL — HIGH (ref 70–99)
HCT VFR BLD CALC: 33.8 % — LOW (ref 37–47)
HGB BLD-MCNC: 10.3 G/DL — LOW (ref 12–16)
MAGNESIUM SERPL-MCNC: 2.2 MG/DL — SIGNIFICANT CHANGE UP (ref 1.8–2.4)
MCHC RBC-ENTMCNC: 29.3 PG — SIGNIFICANT CHANGE UP (ref 27–31)
MCHC RBC-ENTMCNC: 30.5 G/DL — LOW (ref 32–37)
MCV RBC AUTO: 96 FL — SIGNIFICANT CHANGE UP (ref 81–99)
NRBC # BLD: 0 /100 WBCS — SIGNIFICANT CHANGE UP (ref 0–0)
PLATELET # BLD AUTO: 315 K/UL — SIGNIFICANT CHANGE UP (ref 130–400)
POTASSIUM SERPL-MCNC: 4.3 MMOL/L — SIGNIFICANT CHANGE UP (ref 3.5–5)
POTASSIUM SERPL-SCNC: 4.3 MMOL/L — SIGNIFICANT CHANGE UP (ref 3.5–5)
RBC # BLD: 3.52 M/UL — LOW (ref 4.2–5.4)
RBC # FLD: 18.3 % — HIGH (ref 11.5–14.5)
SODIUM SERPL-SCNC: 140 MMOL/L — SIGNIFICANT CHANGE UP (ref 135–146)
WBC # BLD: 12.63 K/UL — HIGH (ref 4.8–10.8)
WBC # FLD AUTO: 12.63 K/UL — HIGH (ref 4.8–10.8)

## 2021-02-27 PROCEDURE — 71045 X-RAY EXAM CHEST 1 VIEW: CPT | Mod: 26

## 2021-02-27 PROCEDURE — 99233 SBSQ HOSP IP/OBS HIGH 50: CPT

## 2021-02-27 RX ORDER — ALPRAZOLAM 0.25 MG
0.25 TABLET ORAL ONCE
Refills: 0 | Status: DISCONTINUED | OUTPATIENT
Start: 2021-02-27 | End: 2021-02-27

## 2021-02-27 RX ORDER — ACETAMINOPHEN 500 MG
650 TABLET ORAL EVERY 6 HOURS
Refills: 0 | Status: DISCONTINUED | OUTPATIENT
Start: 2021-02-27 | End: 2021-03-01

## 2021-02-27 RX ADMIN — METFORMIN HYDROCHLORIDE 500 MILLIGRAM(S): 850 TABLET ORAL at 17:40

## 2021-02-27 RX ADMIN — Medication 325 MILLIGRAM(S): at 11:50

## 2021-02-27 RX ADMIN — Medication 50 MILLIGRAM(S): at 17:40

## 2021-02-27 RX ADMIN — SIMETHICONE 80 MILLIGRAM(S): 80 TABLET, CHEWABLE ORAL at 05:21

## 2021-02-27 RX ADMIN — ESCITALOPRAM OXALATE 20 MILLIGRAM(S): 10 TABLET, FILM COATED ORAL at 11:50

## 2021-02-27 RX ADMIN — BUMETANIDE 1 MILLIGRAM(S): 0.25 INJECTION INTRAMUSCULAR; INTRAVENOUS at 05:20

## 2021-02-27 RX ADMIN — AMLODIPINE BESYLATE 5 MILLIGRAM(S): 2.5 TABLET ORAL at 05:20

## 2021-02-27 RX ADMIN — POLYETHYLENE GLYCOL 3350 17 GRAM(S): 17 POWDER, FOR SOLUTION ORAL at 11:51

## 2021-02-27 RX ADMIN — METFORMIN HYDROCHLORIDE 500 MILLIGRAM(S): 850 TABLET ORAL at 08:32

## 2021-02-27 RX ADMIN — Medication 500 MICROGRAM(S): at 20:05

## 2021-02-27 RX ADMIN — PANTOPRAZOLE SODIUM 40 MILLIGRAM(S): 20 TABLET, DELAYED RELEASE ORAL at 05:21

## 2021-02-27 RX ADMIN — Medication 600 MILLIGRAM(S): at 17:40

## 2021-02-27 RX ADMIN — Medication 50 MILLIGRAM(S): at 05:21

## 2021-02-27 RX ADMIN — Medication 20 MILLIEQUIVALENT(S): at 11:50

## 2021-02-27 RX ADMIN — SIMETHICONE 80 MILLIGRAM(S): 80 TABLET, CHEWABLE ORAL at 22:10

## 2021-02-27 RX ADMIN — Medication 100 GRAM(S): at 17:41

## 2021-02-27 RX ADMIN — CHLORHEXIDINE GLUCONATE 1 APPLICATION(S): 213 SOLUTION TOPICAL at 05:20

## 2021-02-27 RX ADMIN — LISINOPRIL 5 MILLIGRAM(S): 2.5 TABLET ORAL at 05:21

## 2021-02-27 RX ADMIN — Medication 0.25 MILLIGRAM(S): at 22:10

## 2021-02-27 RX ADMIN — Medication 500 MICROGRAM(S): at 14:32

## 2021-02-27 RX ADMIN — HEPARIN SODIUM 5000 UNIT(S): 5000 INJECTION INTRAVENOUS; SUBCUTANEOUS at 17:40

## 2021-02-27 RX ADMIN — Medication 500 MICROGRAM(S): at 08:44

## 2021-02-27 RX ADMIN — ATORVASTATIN CALCIUM 40 MILLIGRAM(S): 80 TABLET, FILM COATED ORAL at 22:10

## 2021-02-27 RX ADMIN — Medication 600 MILLIGRAM(S): at 05:21

## 2021-02-27 RX ADMIN — SIMETHICONE 80 MILLIGRAM(S): 80 TABLET, CHEWABLE ORAL at 14:46

## 2021-02-27 RX ADMIN — AMIODARONE HYDROCHLORIDE 200 MILLIGRAM(S): 400 TABLET ORAL at 05:20

## 2021-02-27 RX ADMIN — HEPARIN SODIUM 5000 UNIT(S): 5000 INJECTION INTRAVENOUS; SUBCUTANEOUS at 05:21

## 2021-02-27 RX ADMIN — Medication 100 GRAM(S): at 05:21

## 2021-02-27 NOTE — PROGRESS NOTE ADULT - ASSESSMENT
Assessment:  CAD SP CABG MV reapir POD#6   Afib now RSR on amio drip 0.5 mg/m  will zavaleta Beta blockers   Acute respiratory failure - BIPAP dependent  Acute blood loss anemia    PAST MEDICAL & SURGICAL HISTORY:  Glaucoma    Chronic sciatica    H/O sleep apnea  on CPAP    Aortic stenosis    Type 2 diabetes mellitus without complication, without long-term current use of insulin    Hyperlipidemia, unspecified hyperlipidemia type    Hypertension, unspecified type    History of carpal tunnel surgery    History of hip surgery  bilateral hip        PLAN:  Neuro: Pain control  Pulm: Encourage coughing, deep breathing and use of incentive spirometry. Wean off supplemental oxygen as able. Daily CXR.   Cardio: Monitor telemetry/alarms. Continue cardiac meds lopressor 25 mg q12 started   GI: Tolerating diet. Continue stool softeners. Continue GI prophylaxis  Renal: monitor urine output, supplement electrolytes as needed  Vasc: Heparin SC/SCDs for DVT prophylaxis  Heme: Monitor H/H.   ID: Off antibiotics. Stable.  Endocrine: Monitor finger stick blood sugar and control hyperglycemia with insulin  Physical Therapy: OOB/ambulate  Tubes: Monitor Chest tube output remove CT  # 1      Discussed with Cardiothoracic Team at AM rounds.    45 minutes of critical care time spent providing medical care for patient's acute illness/conditions that impairs at least one vital organ system and/or poses a high risk of imminent or life threatening deterioration in the patient's condition. It includes time spent evaluating and treating the patient's acute illness as well as time spent reviewing labs, radiology, discussing goals of care with patient and/or patient's family, and discussing the case with a multidisciplinary team in an effort to prevent further life threatening deterioration or end organ damage. This time is independent of any procedures performed.
PROBLEMS:  I spent 45 minutes of time examining patient, reviewing vitals, labs, medications, imaging and discussing with the team goals of care to prevent life-threatening in this patient who is at high risk for deterioration or death due to:      CAD  - S/p CABG - Lopressor 50 q 12, , Lipitor 40, d/c A. Line   HTN - lopressor to 50 q 12, amlodpine 5   s/p hemothorax - stable improving, right plural drain removed   A. Fib - amiodarone load and lopressor PO  thransaminazemia - improving   Amylase / Lipase are improving - patient not symptomatic   Anemia - stable  fluid overload - Bumex 1 mg PO with 20 KCl - daily  Hyperglycamia -Metformin 500 q 12      Case and plan discussed with CT Surgeons and CTU PAs.  Continue CTU supportive care and ongoing plan of care as per continuing CTU rounds.   Continue DVT/GI prophylaxis.  Incentive Spirometry 10 times an hour.  Continue to advance physical activity as tolerated and continue PT/OT as directed.    PLAN  Neuro: move all 4 extremities. no sensory or motor deficits  Pain management.   aspirin 325 milliGRAM(s) Oral daily  escitalopram 20 milliGRAM(s) Oral daily    Pulm: Wean off supplemental oxygen as able. Daily CXR. Encourage coughing, deep breathing and use of incentive spirometry.   guaiFENesin  milliGRAM(s) Oral every 12 hours  ipratropium    for Nebulization 500 MICROGram(s) Nebulizer every 6 hours    Cardio: Monitor telemetry/alarms. Continue supportive care   aMIOdarone    Tablet 200 milliGRAM(s) Oral daily  aMIOdarone    Tablet   Oral   amLODIPine   Tablet 5 milliGRAM(s) Oral daily  buMETAnide 1 milliGRAM(s) Oral daily  hydrALAZINE Injectable 10 milliGRAM(s) IV Push every 4 hours PRN  lisinopril 5 milliGRAM(s) Oral daily  metoprolol tartrate 50 milliGRAM(s) Oral every 12 hours    GI: Continue stool softeners.    pantoprazole    Tablet 40 milliGRAM(s) Oral before breakfast  simethicone 80 milliGRAM(s) Chew three times a day    Nutrition: Continue present diet  Endocrine and glucose control:   atorvastatin 40 milliGRAM(s) Oral at bedtime  dextrose 40% Gel 15 Gram(s) Oral once  dextrose 50% Injectable 25 Gram(s) IV Push once  dextrose 50% Injectable 25 Gram(s) IV Push once  dextrose 50% Injectable 12.5 Gram(s) IV Push once  glucagon  Injectable 1 milliGRAM(s) IntraMuscular once  insulin lispro (ADMELOG) corrective regimen sliding scale   SubCutaneous three times a day before meals  metFORMIN 500 milliGRAM(s) Oral two times a day with meals    Renal: monitor urine output, supplement electrolytes as needed,     Vasc: Heparin SC and/or SCDs for DVT prophylaxis  heparin   Injectable 5000 Unit(s) SubCutaneous every 12 hours    ID: Stable, no fever , no chills. Off antibiotics.    Therapy: OOB/ambulate  Disposition: start planing discharge home or placement    Pertinent clinical, laboratory, radiographic, hemodynamic, echocardiographic, respiratory data, microbiologic data and chart were reviewed and analyzed frequently throughout the course of the day and night. GI and DVT prophylaxis, glycemic control, head of bed elevation and skin care issues were addressed.  Patient seen, examined and plan discussed with CT Surgery / CTICU team during rounds.    [ ] The patient remains in critical and unstable condition, and requires ICU care and monitoring  [x ] The patient is improving but requires continued monitoring and adjustment of therapy
Assessment/Plan:  CAD/Mitral Valve Insufficiency-s/p CABG x 1/MV Repair-POD #2  1-BP control-metoprolol, amlodipine  2-serum glucose control-insulin sliding scale correction regimen  3-fluid overload-diuresis  7-qctrjfhvhjn-nylmwop potassium  5-acute blood loss anemia/thrombocytopenia-stable, monitor Hb/Hct/plts daily  6-arrhythmia/Y-Sdx-bssygajd amiodarone
PROBLEMS:  I spent 45 minutes of time examining patient, reviewing vitals, labs, medications, imaging and discussing with the team goals of care to prevent life-threatening in this patient who is at high risk for deterioration or death due to:      MR and CAD - s/p CABG and a ring, no BB due to bradycardia, A paced, add lipitor 40, , d/c S-G cath  ventricular arrhythmias - now stable after amiodarone - observe   HTN wean off NTG and Cardene drips, Norvasc 5 mg PO  hyperglycemia - switch to sliding scale   Anemia - observe  thrombocytopenia - observe       Case and plan discussed with CT Surgeons and CTU PAs.  Continue CTU supportive care and ongoing plan of care as per continuing CTU rounds.   Continue DVT/GI prophylaxis.  Incentive Spirometry 10 times an hour.  Continue to advance physical activity as tolerated and continue PT/OT as directed.    PLAN  Neuro: move all 4 extremities. no sensory or motor deficits  Pain management.     Pulm: Wean off supplemental oxygen as able. Daily CXR. Encourage coughing, deep breathing and use of incentive spirometry.     Cardio: Monitor telemetry/alarms. Continue supportive care   amLODIPine   Tablet 5 milliGRAM(s) Oral daily    GI: Continue stool softeners.    famotidine    Tablet 20 milliGRAM(s) Oral two times a day    Nutrition: Continue present diet  Endocrine and glucose control:   atorvastatin 40 milliGRAM(s) Oral at bedtime    Renal: monitor urine output, supplement electrolytes as needed,   oxybutynin 10 milliGRAM(s) Oral daily    Vasc: Heparin SC and/or SCDs for DVT prophylaxis  aspirin enteric coated 325 milliGRAM(s) Oral daily  heparin   Injectable 5000 Unit(s) SubCutaneous every 8 hours    ID: Stable, no fever , no chills. Off antibiotics.    Tubes: Monitor Chest tube output - when it slow then will removed it  Therapy: OOB/ambulate  Disposition: start planing discharge home or placement    Pertinent clinical, laboratory, radiographic, hemodynamic, echocardiographic, respiratory data, microbiologic data and chart were reviewed and analyzed frequently throughout the course of the day and night. GI and DVT prophylaxis, glycemic control, head of bed elevation and skin care issues were addressed.  Patient seen, examined and plan discussed with CT Surgery / CTICU team during rounds.    [ ] The patient remains in critical and unstable condition, and requires ICU care and monitoring  [x ] The patient is improving but requires continued monitoring and adjustment of therapy
PROBLEMS:  I spent 45 minutes of time examining patient, reviewing vitals, labs, medications, imaging and discussing with the team goals of care to prevent life-threatening in this patient who is at high risk for deterioration or death due to:      CAD  - S/p CABG - Lopressor 50 q 12, , Lipitor 40, d/c A. Line   HTN - increase lopressor to 50 q 12, amlodpine 5   s/p hemothorax - stable improving   A. Fib - amiodarone load and lopressor PO  thransaminazemia - improving   Anemia - stable  fluid overload - add lasix 40 PO with 20 KCl - daily  Hyperglycamia - stop lantus and add metformin 500 q 12      Case and plan discussed with CT Surgeons and CTU PAs.  Continue CTU supportive care and ongoing plan of care as per continuing CTU rounds.   Continue DVT/GI prophylaxis.  Incentive Spirometry 10 times an hour.  Continue to advance physical activity as tolerated and continue PT/OT as directed.    PLAN  Neuro: move all 4 extremities. no sensory or motor deficits  Pain management.   aspirin 325 milliGRAM(s) Oral daily  escitalopram 20 milliGRAM(s) Oral daily    Pulm: Wean off supplemental oxygen as able. Daily CXR. Encourage coughing, deep breathing and use of incentive spirometry.   guaiFENesin  milliGRAM(s) Oral every 12 hours  ipratropium    for Nebulization 500 MICROGram(s) Nebulizer every 6 hours    Cardio: Monitor telemetry/alarms. Continue supportive care   aMIOdarone    Tablet 400 milliGRAM(s) Oral every 8 hours  aMIOdarone    Tablet   Oral   amLODIPine   Tablet 5 milliGRAM(s) Oral daily  furosemide    Tablet 40 milliGRAM(s) Oral daily  hydrALAZINE Injectable 10 milliGRAM(s) IV Push every 4 hours PRN  metoprolol tartrate 50 milliGRAM(s) Oral every 12 hours  norepinephrine Infusion 0.02 MICROgram(s)/kG/Min IV Continuous <Continuous>    GI: Continue stool softeners.    pantoprazole    Tablet 40 milliGRAM(s) Oral before breakfast  simethicone 80 milliGRAM(s) Chew three times a day    Nutrition: Continue present diet  Endocrine and glucose control:   atorvastatin 40 milliGRAM(s) Oral at bedtime  dextrose 40% Gel 15 Gram(s) Oral once  dextrose 50% Injectable 12.5 Gram(s) IV Push once  dextrose 50% Injectable 25 Gram(s) IV Push once  dextrose 50% Injectable 25 Gram(s) IV Push once  glucagon  Injectable 1 milliGRAM(s) IntraMuscular once  insulin glargine Injectable (LANTUS) 20 Unit(s) SubCutaneous every morning  insulin lispro (ADMELOG) corrective regimen sliding scale   SubCutaneous three times a day before meals    Renal: monitor urine output, supplement electrolytes as needed,     Vasc: Heparin SC and/or SCDs for DVT prophylaxis  heparin   Injectable 5000 Unit(s) SubCutaneous every 8 hours    ID: Stable, no fever , no chills. Off antibiotics.    Therapy: OOB/ambulate  Disposition: start planing discharge home or placement    Pertinent clinical, laboratory, radiographic, hemodynamic, echocardiographic, respiratory data, microbiologic data and chart were reviewed and analyzed frequently throughout the course of the day and night. GI and DVT prophylaxis, glycemic control, head of bed elevation and skin care issues were addressed.  Patient seen, examined and plan discussed with CT Surgery / CTICU team during rounds.    [ ] The patient remains in critical and unstable condition, and requires ICU care and monitoring  [x ] The patient is improving but requires continued monitoring and adjustment of therapy
PROBLEMS:  I spent 45 minutes of time examining patient, reviewing vitals, labs, medications, imaging and discussing with the team goals of care to prevent life-threatening in this patient who is at high risk for deterioration or death due to:      CAD  - S/p CABG - continue   Lopressor 50 q 12, , Lipitor 40,  HTN - lopressor to 50 q 12, amlodpine 5   s/p hemothorax - stable improving   A. Fib - amiodarone load and lopressor PO  thransaminazemia - improving   Anemia - stable  fluid overload - add lasix 40 PO with 20 KCl - daily  Hyperglycamia - stop lantus and add metformin 500 q 12      Case and plan discussed with CT Surgeons and CTU PAs.  Continue CTU supportive care and ongoing plan of care as per continuing CTU rounds.   Continue DVT/GI prophylaxis.  Incentive Spirometry 10 times an hour.  Continue to advance physical activity as tolerated and continue PT/OT as directed.    PLAN  Neuro: move all 4 extremities. no sensory or motor deficits  Pain management.   aspirin 325 milliGRAM(s) Oral daily  escitalopram 20 milliGRAM(s) Oral daily    Pulm: Wean off supplemental oxygen as able. Daily CXR. Encourage coughing, deep breathing and use of incentive spirometry.   guaiFENesin  milliGRAM(s) Oral every 12 hours  ipratropium    for Nebulization 500 MICROGram(s) Nebulizer every 6 hours    Cardio: Monitor telemetry/alarms. Continue supportive care   aMIOdarone    Tablet 400 milliGRAM(s) Oral every 8 hours  aMIOdarone    Tablet   Oral   amLODIPine   Tablet 5 milliGRAM(s) Oral daily  furosemide    Tablet 40 milliGRAM(s) Oral daily  hydrALAZINE Injectable 10 milliGRAM(s) IV Push every 4 hours PRN  metoprolol tartrate 50 milliGRAM(s) Oral every 12 hours  norepinephrine Infusion 0.02 MICROgram(s)/kG/Min IV Continuous <Continuous>    GI: Continue stool softeners.    pantoprazole    Tablet 40 milliGRAM(s) Oral before breakfast  simethicone 80 milliGRAM(s) Chew three times a day    Nutrition: Continue present diet  Endocrine and glucose control:   atorvastatin 40 milliGRAM(s) Oral at bedtime  dextrose 40% Gel 15 Gram(s) Oral once  dextrose 50% Injectable 12.5 Gram(s) IV Push once  dextrose 50% Injectable 25 Gram(s) IV Push once  dextrose 50% Injectable 25 Gram(s) IV Push once  glucagon  Injectable 1 milliGRAM(s) IntraMuscular once  insulin glargine Injectable (LANTUS) 20 Unit(s) SubCutaneous every morning  insulin lispro (ADMELOG) corrective regimen sliding scale   SubCutaneous three times a day before meals    Renal: monitor urine output, supplement electrolytes as needed,     Vasc: Heparin SC and/or SCDs for DVT prophylaxis  heparin   Injectable 5000 Unit(s) SubCutaneous every 8 hours    ID: Stable, no fever , no chills. Off antibiotics.    Therapy: OOB/ambulate  Disposition: start planing discharge home or placement    Pertinent clinical, laboratory, radiographic, hemodynamic, echocardiographic, respiratory data, microbiologic data and chart were reviewed and analyzed frequently throughout the course of the day and night. GI and DVT prophylaxis, glycemic control, head of bed elevation and skin care issues were addressed.  Patient seen, examined and plan discussed with CT Surgery / CTICU team during rounds.    [ ] The patient remains in critical and unstable condition, and requires ICU care and monitoring  [x ] The patient is improving but requires continued monitoring and adjustment of therapy
· Assessment	  70y Female status-post MV repair and CABG     IMPRESSION;  CT chest 2/23 : official read p. Loculated RLL effusion with air > infected ?  Leucocytosis with no fevers  Clinically no /GI/SSTI  WBC 16.8    RECOMMENDATIONS;  Official read on  CT chest   procalcitonin  repeat kendra Andrew
70y Female status-post MV repair and CABG     IMPRESSION;  CT A/P 2/23:  7.8 x 3.5 x 10.3 cm  right rectus  hematoma.  CT Chest 2/23 :   Decreased complex loculated right air and fluid pleural collection. Decreased left pleural effusion with adjacent near complete atelectasis of the lower lower lobe.  Leucocytosis with no fevers  Clinically no /GI/SSTI  WBC 16.9  Procal 0.07 (goes against a bacterial PNA )  BCx 2/24 NGTD    RECOMMENDATIONS;  Off ABx  IVR for drainage of collection today
· Assessment	  70y Female status-post MV repair and CABG     IMPRESSION;  Right loculated hemothorax with no infection  IVR 2/25 : s/p placement of 10fr pigtail drainage catheter. Pigtail formed and position confirmed. 100cc dark blood aspirated. Catheter attached to pleura vac on suction.  Gm stain ; PMNs, no org  CXR improved  CT A/P 2/23:  7.8 x 3.5 x 10.3 cm  right rectus  hematoma.  CT Chest 2/23 :   Decreased complex loculated right air and fluid pleural collection. Decreased left pleural effusion with adjacent near complete atelectasis of the lower lower lobe.  WBC 14.6  Procal 0.07 (goes against a bacterial PNA )  BCx 2/24 NGTD    RECOMMENDATIONS;  Off ABx  recall prn please

## 2021-02-27 NOTE — PROGRESS NOTE ADULT - SUBJECTIVE AND OBJECTIVE BOX
OPERATIVE PROCEDURE(s):  CABG & MV repair              POD #     15                  70yFemale  SURGEON(s): FIONA Rojas  SUBJECTIVE ASSESSMENT:  Patient has no complaints at this time.    Vital Signs Last 24 Hrs  T(F): 98 (26 Feb 2021 20:00), Max: 98.5 (26 Feb 2021 08:00)  HR: 71 (27 Feb 2021 06:00) (69 - 90)  BP: 122/57 (27 Feb 2021 06:00) (104/50 - 147/65)  BP(mean): 82 (27 Feb 2021 06:00) (71 - 94)  RR: 25 (27 Feb 2021 06:00) (20 - 34)  SpO2: 99% (27 Feb 2021 06:00) (94% - 99%)    I&O's Detail    26 Feb 2021 07:01  -  27 Feb 2021 07:00  --------------------------------------------------------  IN:    IV PiggyBack: 100 mL    Oral Fluid: 900 mL  Total IN: 1000 mL    OUT:    Chest Tube (mL): 0 mL    Incontinent per Collection Bag (mL): 1050 mL  Total OUT: 1050 mL    Net:   I&O's Detail    25 Feb 2021 07:01  -  26 Feb 2021 07:00  --------------------------------------------------------  Total NET: -1076 mL    26 Feb 2021 07:01  -  27 Feb 2021 07:00  --------------------------------------------------------  Total NET: -50 mL    CAPILLARY BLOOD GLUCOSE  137 (26 Feb 2021 16:00)  125 (26 Feb 2021 11:00)  169 (26 Feb 2021 06:00)    POCT Blood Glucose.: 139 mg/dL (27 Feb 2021 06:39)  POCT Blood Glucose.: 137 mg/dL (26 Feb 2021 15:57)  POCT Blood Glucose.: 125 mg/dL (26 Feb 2021 11:29)    Physical Exam:  General: NAD; A&Ox3  Cardiac: S1/S2, RRR, no murmur, no rubs  Lungs: unlabored respirations, CTA b/l, no wheeze, no rales, no crackles  Abdomen: Soft/NT/ND; positive bowel sounds x 4  Sternum: Intact, no click, incision healing well with no drainage  Incisions: Incisions clean/dry/intact  Extremities: No edema b/l lower extremities; good capillary refill; no cyanosis; palpable 1+ pedal pulses b/l      LABS:                        10.3<L>  12.63<H> )-----------( 315      ( 27 Feb 2021 05:25 )             33.8<L>                        10.5<L>  14.63<H> )-----------( 306      ( 26 Feb 2021 05:10 )             35.4<L>    02-27    140  |  99  |  22<H>  ----------------------------<  102<H>  4.3   |  37<H>  |  0.7  02-26    139  |  97<L>  |  25<H>  ----------------------------<  136<H>  4.5   |  34<H>  |  0.9    Ca    9.2      27 Feb 2021 05:25  Mg     2.2     02-27    RADIOLOGY & ADDITIONAL TESTS:  CXR: < from: Xray Chest 1 View- PORTABLE-Urgent (Xray Chest 1 View- PORTABLE-Urgent .) (02.26.21 @ 11:17) >  Findings:  Support devices: Sternal wires and surgical clips overlie the mediastinum. There is a prosthetic mitral valve.  Cardiac/mediastinum/hilum: The overall heart size is not adequately evaluated as left heart border is obscured by the opacified left lung base.  Lung parenchyma/Pleura: Moderate right pleural effusion may be loculated. Opacification of the left lung base and left midlung field.  Skeleton/soft tissues: Degenerative changes of the thoracic spine.  Impression:  In comparison with the prior chest x-ray dated October 26, 2021 time 4:05 AM:  Interval removal of the right-sided chest tube.  Right pleural effusion and opacification of the left lung base and left midlung field, unchanged.    EKG:< from: 12 Lead ECG (02.23.21 @ 08:58) >  Ventricular Rate 69 BPM  Atrial Rate 69 BPM  P-R Interval 146 ms  QRS Duration 106 ms  Q-T Interval 456 ms  QTC Calculation(Bazett) 488 ms  P Axis 26 degrees  R Axis -12 degrees  T Axis 81 degrees  Diagnosis Line Sinus rhythm withoccasional Premature ventricular complexes  Possible Left atrial enlargement  Left ventricular hypertrophy  T wave abnormality, consider anterolateral ischemia  Prolonged QT  Abnormal ECG    MEDICATIONS  (STANDING):  albumin human  5% IVPB 500 milliLiter(s) IV Intermittent once  aMIOdarone    Tablet 200 milliGRAM(s) Oral daily  aMIOdarone    Tablet   Oral   amLODIPine   Tablet 5 milliGRAM(s) Oral daily  aspirin 325 milliGRAM(s) Oral daily  atorvastatin 40 milliGRAM(s) Oral at bedtime  buMETAnide 1 milliGRAM(s) Oral daily  chlorhexidine 4% Liquid 1 Application(s) Topical <User Schedule>  dextrose 40% Gel 15 Gram(s) Oral once  dextrose 5%. 1000 milliLiter(s) (100 mL/Hr) IV Continuous <Continuous>  dextrose 5%. 1000 milliLiter(s) (50 mL/Hr) IV Continuous <Continuous>  dextrose 50% Injectable 25 Gram(s) IV Push once  dextrose 50% Injectable 12.5 Gram(s) IV Push once  dextrose 50% Injectable 25 Gram(s) IV Push once  escitalopram 20 milliGRAM(s) Oral daily  glucagon  Injectable 1 milliGRAM(s) IntraMuscular once  guaiFENesin  milliGRAM(s) Oral every 12 hours  heparin   Injectable 5000 Unit(s) SubCutaneous every 12 hours  insulin lispro (ADMELOG) corrective regimen sliding scale   SubCutaneous three times a day before meals  ipratropium    for Nebulization 500 MICROGram(s) Nebulizer every 6 hours  lisinopril 5 milliGRAM(s) Oral daily  magnesium sulfate  IVPB 2 Gram(s) IV Intermittent once  magnesium sulfate  IVPB 1 Gram(s) IV Intermittent every 12 hours  metFORMIN 500 milliGRAM(s) Oral two times a day with meals  metoprolol tartrate 50 milliGRAM(s) Oral every 12 hours  pantoprazole    Tablet 40 milliGRAM(s) Oral before breakfast  polyethylene glycol 3350 17 Gram(s) Oral daily  potassium chloride    Tablet ER 20 milliEquivalent(s) Oral daily  simethicone 80 milliGRAM(s) Chew three times a day    MEDICATIONS  (PRN):  hydrALAZINE Injectable 10 milliGRAM(s) IV Push every 4 hours PRN SBP > 155    HEPARIN:  [] YES [] NO  Dose: XX UNITS/HR UNITS Q8H  LOVENOX:[] YES [] NO  Dose: XX mg Q24H  COUMADIN: []  YES [] NO  Dose: XX mg  Q24H  SCD's: YES b/l  GI Prophylaxis: Protonix [], Pepcid [], None [], (Contra-indication:.....)    Post-Op Aspirin: Yes [X],  No [], If No, then contra-indication:  Post-Op Statin: Yes [X], No[], If No, then contra-indication:  Post-Op Beta-Blockers: Yes [X], No [], If No, then contra-indication:    Allergies:  No Known Allergies    Ambulation/Activity Status: Ambulates several times daily without assistance.    Assessment/Plan:  70y Female status-post CABG & MV repair  - Case and plan discussed with CTU Intensivist and CT Surgeon - Dr. Rojas  - Continue CTU supportive care    - Continue DVT/GI prophylaxis  - Incentive Spirometry 10 times an hour  - Continue to advance physical activity as tolerated and continue PT/OT as directed  1. CAD: Continue ASA, statin, BB  2. HTN:   3. A. Fib:   4. COPD/Hypoxia:   5. DM/Glucose Control:     Social Service Disposition:     OPERATIVE PROCEDURE(s):  CABG & MV repair              POD #     15                  70yFemale  SURGEON(s): FIONA Rojas  SUBJECTIVE ASSESSMENT:  Patient has no complaints at this time.    Vital Signs Last 24 Hrs  T(F): 98 (26 Feb 2021 20:00), Max: 98.5 (26 Feb 2021 08:00)  HR: 71 (27 Feb 2021 06:00) (69 - 90)  BP: 122/57 (27 Feb 2021 06:00) (104/50 - 147/65)  BP(mean): 82 (27 Feb 2021 06:00) (71 - 94)  RR: 25 (27 Feb 2021 06:00) (20 - 34)  SpO2: 99% (27 Feb 2021 06:00) (94% - 99%)    I&O's Detail    26 Feb 2021 07:01  -  27 Feb 2021 07:00  --------------------------------------------------------  IN:    IV PiggyBack: 100 mL    Oral Fluid: 900 mL  Total IN: 1000 mL    OUT:    Incontinent per Collection Bag (mL): 1050 mL  Total OUT: 1050 mL    Net:   I&O's Detail    25 Feb 2021 07:01  -  26 Feb 2021 07:00  --------------------------------------------------------  Total NET: -1076 mL    26 Feb 2021 07:01  -  27 Feb 2021 07:00  --------------------------------------------------------  Total NET: -50 mL    CAPILLARY BLOOD GLUCOSE  137 (26 Feb 2021 16:00)  125 (26 Feb 2021 11:00)  169 (26 Feb 2021 06:00)    POCT Blood Glucose.: 139 mg/dL (27 Feb 2021 06:39)  POCT Blood Glucose.: 137 mg/dL (26 Feb 2021 15:57)  POCT Blood Glucose.: 125 mg/dL (26 Feb 2021 11:29)    Physical Exam:  General: NAD; A&Ox3  Cardiac: S1/S2, RRR, no murmur, no rubs  Lungs: unlabored respirations, CTA b/l, no wheeze, no rales, no crackles  Abdomen: Soft/NT/ND; positive bowel sounds x 4  Sternum: Intact, no click, incision healing well with no drainage  Incisions: Incisions clean/dry/intact  Extremities: No edema b/l lower extremities; good capillary refill; no cyanosis; palpable 1+ pedal pulses b/l      LABS:                        10.3<L>  12.63<H> )-----------( 315      ( 27 Feb 2021 05:25 )             33.8<L>                        10.5<L>  14.63<H> )-----------( 306      ( 26 Feb 2021 05:10 )             35.4<L>    02-27    140  |  99  |  22<H>  ----------------------------<  102<H>  4.3   |  37<H>  |  0.7  02-26    139  |  97<L>  |  25<H>  ----------------------------<  136<H>  4.5   |  34<H>  |  0.9    Ca    9.2      27 Feb 2021 05:25  Mg     2.2     02-27    A1C with Estimated Average Glucose Result: 6.1    RADIOLOGY & ADDITIONAL TESTS:  CXR: < from: Xray Chest 1 View- PORTABLE-Urgent (Xray Chest 1 View- PORTABLE-Urgent .) (02.26.21 @ 11:17) >  Findings:  Support devices: Sternal wires and surgical clips overlie the mediastinum. There is a prosthetic mitral valve.  Cardiac/mediastinum/hilum: The overall heart size is not adequately evaluated as left heart border is obscured by the opacified left lung base.  Lung parenchyma/Pleura: Moderate right pleural effusion may be loculated. Opacification of the left lung base and left midlung field.  Skeleton/soft tissues: Degenerative changes of the thoracic spine.  Impression:  In comparison with the prior chest x-ray dated October 26, 2021 time 4:05 AM:  Interval removal of the right-sided chest tube.  Right pleural effusion and opacification of the left lung base and left midlung field, unchanged.    EKG:< from: 12 Lead ECG (02.23.21 @ 08:58) >  Ventricular Rate 69 BPM  Atrial Rate 69 BPM  P-R Interval 146 ms  QRS Duration 106 ms  Q-T Interval 456 ms  QTC Calculation(Bazett) 488 ms  P Axis 26 degrees  R Axis -12 degrees  T Axis 81 degrees  Diagnosis Line Sinus rhythm withoccasional Premature ventricular complexes  Possible Left atrial enlargement  Left ventricular hypertrophy  T wave abnormality, consider anterolateral ischemia  Prolonged QT  Abnormal ECG    MEDICATIONS  (STANDING):  albumin human  5% IVPB 500 milliLiter(s) IV Intermittent once  aMIOdarone    Tablet 200 milliGRAM(s) Oral daily  aMIOdarone    Tablet   Oral   amLODIPine   Tablet 5 milliGRAM(s) Oral daily  aspirin 325 milliGRAM(s) Oral daily  atorvastatin 40 milliGRAM(s) Oral at bedtime  buMETAnide 1 milliGRAM(s) Oral daily  chlorhexidine 4% Liquid 1 Application(s) Topical <User Schedule>  dextrose 40% Gel 15 Gram(s) Oral once  dextrose 5%. 1000 milliLiter(s) (100 mL/Hr) IV Continuous <Continuous>  dextrose 5%. 1000 milliLiter(s) (50 mL/Hr) IV Continuous <Continuous>  dextrose 50% Injectable 25 Gram(s) IV Push once  dextrose 50% Injectable 12.5 Gram(s) IV Push once  dextrose 50% Injectable 25 Gram(s) IV Push once  escitalopram 20 milliGRAM(s) Oral daily  glucagon  Injectable 1 milliGRAM(s) IntraMuscular once  guaiFENesin  milliGRAM(s) Oral every 12 hours  heparin   Injectable 5000 Unit(s) SubCutaneous every 12 hours  insulin lispro (ADMELOG) corrective regimen sliding scale   SubCutaneous three times a day before meals  ipratropium    for Nebulization 500 MICROGram(s) Nebulizer every 6 hours  lisinopril 5 milliGRAM(s) Oral daily  magnesium sulfate  IVPB 2 Gram(s) IV Intermittent once  magnesium sulfate  IVPB 1 Gram(s) IV Intermittent every 12 hours  metFORMIN 500 milliGRAM(s) Oral two times a day with meals  metoprolol tartrate 50 milliGRAM(s) Oral every 12 hours  pantoprazole    Tablet 40 milliGRAM(s) Oral before breakfast  polyethylene glycol 3350 17 Gram(s) Oral daily  potassium chloride    Tablet ER 20 milliEquivalent(s) Oral daily  simethicone 80 milliGRAM(s) Chew three times a day    MEDICATIONS  (PRN):  hydrALAZINE Injectable 10 milliGRAM(s) IV Push every 4 hours PRN SBP > 155    HEPARIN:  [] YES [] NO  Dose: XX UNITS/HR UNITS Q8H  LOVENOX:[] YES [] NO  Dose: XX mg Q24H  COUMADIN: []  YES [] NO  Dose: XX mg  Q24H  SCD's: YES b/l  GI Prophylaxis: Protonix [], Pepcid [], None [], (Contra-indication:.....)    Post-Op Aspirin: Yes [X],  No [], If No, then contra-indication:  Post-Op Statin: Yes [X], No[], If No, then contra-indication:  Post-Op Beta-Blockers: Yes [X], No [], If No, then contra-indication:    Allergies:  No Known Allergies    Ambulation/Activity Status: Ambulates several times daily with assistance.    Assessment/Plan:  70y Female status-post CABG & MV repair  - Case and plan discussed with CTU Intensivist and CT Surgeon - Dr. Rojas  - Continue CTU supportive care    - Continue DVT/GI prophylaxis  - Incentive Spirometry 10 times an hour  - Continue to advance physical activity as tolerated and continue PT/OT as directed  1. CAD s/p CABGx1: Continue ASA, statin, BB  2. HTN: cont norvasc, lopressor  3. A. Fib: cont lopressor, amio, mag 1gmb id  4. COPD/Hypoxia: cont nebs, mucinex, wean o2 as tolerated, encourage incentive spirometry  5. DM/Glucose Control: FS well controlled, A1c 6.1; cont metFORMIN 500 milliGRAM(s) Oral two times a day with meals w/insulin sliding scale  6. Right hemothorax- s/p pigtail drain, remove yesterday: CXR improving  7. ID: Patient afebrile, leukocytosis improving off ABX. ID note appreciated; will cont off ABX.   8. Pancreatitis- trend amylase and lipase- trending down, gen surgery following.    Social Service Disposition:  [facility]   OPERATIVE PROCEDURE(s):  CABG & MV repair              POD #     15                  70yFemale  SURGEON(s): FIONA Rojas  SUBJECTIVE ASSESSMENT:  Patient has no complaints at this time.    Vital Signs Last 24 Hrs  T(F): 98 (26 Feb 2021 20:00), Max: 98.5 (26 Feb 2021 08:00)  HR: 71 (27 Feb 2021 06:00) (69 - 90)  BP: 122/57 (27 Feb 2021 06:00) (104/50 - 147/65)  BP(mean): 82 (27 Feb 2021 06:00) (71 - 94)  RR: 25 (27 Feb 2021 06:00) (20 - 34)  SpO2: 99% (27 Feb 2021 06:00) (94% - 99%) 3L NC     I&O's Detail    26 Feb 2021 07:01  -  27 Feb 2021 07:00  --------------------------------------------------------  IN:    IV PiggyBack: 100 mL    Oral Fluid: 900 mL  Total IN: 1000 mL    OUT:    Incontinent per Collection Bag (mL): 1050 mL  Total OUT: 1050 mL    Net:   I&O's Detail    25 Feb 2021 07:01  -  26 Feb 2021 07:00  --------------------------------------------------------  Total NET: -1076 mL    26 Feb 2021 07:01  -  27 Feb 2021 07:00  --------------------------------------------------------  Total NET: -50 mL    CAPILLARY BLOOD GLUCOSE  137 (26 Feb 2021 16:00)  125 (26 Feb 2021 11:00)  169 (26 Feb 2021 06:00)    POCT Blood Glucose.: 139 mg/dL (27 Feb 2021 06:39)  POCT Blood Glucose.: 137 mg/dL (26 Feb 2021 15:57)  POCT Blood Glucose.: 125 mg/dL (26 Feb 2021 11:29)    Physical Exam:  General: NAD; A&Ox3  Cardiac: S1/S2, RRR, no murmur, no rubs  Lungs: unlabored respirations, CTA b/l, no wheeze, no rales, no crackles  Abdomen: Soft/NT/ND; positive bowel sounds x 4  Sternum: Intact, no click, incision healing well with no drainage  Incisions: Incisions clean/dry/intact  Extremities: No edema b/l lower extremities; good capillary refill; no cyanosis; palpable 1+ pedal pulses b/l      LABS:                        10.3<L>  12.63<H> )-----------( 315      ( 27 Feb 2021 05:25 )             33.8<L>                        10.5<L>  14.63<H> )-----------( 306      ( 26 Feb 2021 05:10 )             35.4<L>    02-27    140  |  99  |  22<H>  ----------------------------<  102<H>  4.3   |  37<H>  |  0.7  02-26    139  |  97<L>  |  25<H>  ----------------------------<  136<H>  4.5   |  34<H>  |  0.9    Ca    9.2      27 Feb 2021 05:25  Mg     2.2     02-27    A1C with Estimated Average Glucose Result: 6.1    RADIOLOGY & ADDITIONAL TESTS:  CXR: < from: Xray Chest 1 View- PORTABLE-Urgent (Xray Chest 1 View- PORTABLE-Urgent .) (02.26.21 @ 11:17) >  Findings:  Support devices: Sternal wires and surgical clips overlie the mediastinum. There is a prosthetic mitral valve.  Cardiac/mediastinum/hilum: The overall heart size is not adequately evaluated as left heart border is obscured by the opacified left lung base.  Lung parenchyma/Pleura: Moderate right pleural effusion may be loculated. Opacification of the left lung base and left midlung field.  Skeleton/soft tissues: Degenerative changes of the thoracic spine.  Impression:  In comparison with the prior chest x-ray dated October 26, 2021 time 4:05 AM:  Interval removal of the right-sided chest tube.  Right pleural effusion and opacification of the left lung base and left midlung field, unchanged.    EKG:< from: 12 Lead ECG (02.23.21 @ 08:58) >  Ventricular Rate 69 BPM  Atrial Rate 69 BPM  P-R Interval 146 ms  QRS Duration 106 ms  Q-T Interval 456 ms  QTC Calculation(Bazett) 488 ms  P Axis 26 degrees  R Axis -12 degrees  T Axis 81 degrees  Diagnosis Line Sinus rhythm withoccasional Premature ventricular complexes  Possible Left atrial enlargement  Left ventricular hypertrophy  T wave abnormality, consider anterolateral ischemia  Prolonged QT  Abnormal ECG    MEDICATIONS  (STANDING):  albumin human  5% IVPB 500 milliLiter(s) IV Intermittent once  aMIOdarone    Tablet 200 milliGRAM(s) Oral daily  aMIOdarone    Tablet   Oral   amLODIPine   Tablet 5 milliGRAM(s) Oral daily  aspirin 325 milliGRAM(s) Oral daily  atorvastatin 40 milliGRAM(s) Oral at bedtime  buMETAnide 1 milliGRAM(s) Oral daily  chlorhexidine 4% Liquid 1 Application(s) Topical <User Schedule>  dextrose 40% Gel 15 Gram(s) Oral once  dextrose 5%. 1000 milliLiter(s) (100 mL/Hr) IV Continuous <Continuous>  dextrose 5%. 1000 milliLiter(s) (50 mL/Hr) IV Continuous <Continuous>  dextrose 50% Injectable 25 Gram(s) IV Push once  dextrose 50% Injectable 12.5 Gram(s) IV Push once  dextrose 50% Injectable 25 Gram(s) IV Push once  escitalopram 20 milliGRAM(s) Oral daily  glucagon  Injectable 1 milliGRAM(s) IntraMuscular once  guaiFENesin  milliGRAM(s) Oral every 12 hours  heparin   Injectable 5000 Unit(s) SubCutaneous every 12 hours  insulin lispro (ADMELOG) corrective regimen sliding scale   SubCutaneous three times a day before meals  ipratropium    for Nebulization 500 MICROGram(s) Nebulizer every 6 hours  lisinopril 5 milliGRAM(s) Oral daily  magnesium sulfate  IVPB 2 Gram(s) IV Intermittent once  magnesium sulfate  IVPB 1 Gram(s) IV Intermittent every 12 hours  metFORMIN 500 milliGRAM(s) Oral two times a day with meals  metoprolol tartrate 50 milliGRAM(s) Oral every 12 hours  pantoprazole    Tablet 40 milliGRAM(s) Oral before breakfast  polyethylene glycol 3350 17 Gram(s) Oral daily  potassium chloride    Tablet ER 20 milliEquivalent(s) Oral daily  simethicone 80 milliGRAM(s) Chew three times a day    MEDICATIONS  (PRN):  hydrALAZINE Injectable 10 milliGRAM(s) IV Push every 4 hours PRN SBP > 155      Post-Op Aspirin: Yes [X],  No [], If No, then contra-indication:  Post-Op Statin: Yes [X], No[], If No, then contra-indication:  Post-Op Beta-Blockers: Yes [X], No [], If No, then contra-indication:    Allergies:  No Known Allergies    Ambulation/Activity Status: Ambulates several times daily with assistance.    Assessment/Plan:  70y Female status-post CABG & MV repair  - Case and plan discussed with CTU Intensivist and CT Surgeon - Dr. Rojas  - Continue CTU supportive care    - Continue DVT/GI prophylaxis  - Incentive Spirometry 10 times an hour  - Continue to advance physical activity as tolerated and continue PT/OT as directed  1. CAD s/p CABGx1: Continue ASA, statin, BB  2. HTN: cont norvasc, lopressor and lisinopril  3. A. Fib: cont lopressor, amio, mag 1gmb id  4. COPD/Hypoxia: cont nebs, mucinex, wean o2 as tolerated, encourage incentive spirometry  5. DM/Glucose Control: FS well controlled, A1c 6.1; cont metFORMIN 500 milliGRAM(s) Oral two times a day with meals w/insulin sliding scale  6. Right hemothorax- s/p pigtail drain, remove yesterday: CXR improving  7. ID: Patient afebrile, leukocytosis improving off ABX. ID note appreciated; will cont off ABX.   8. Pancreatitis- trend amylase and lipase- trending down, gen surgery following.    Social Service Disposition:  [facility]   OPERATIVE PROCEDURE(s):  CABG & MV repair              POD #     15                  70yFemale  SURGEON(s): FIONA Rojas  SUBJECTIVE ASSESSMENT:  Patient has no complaints at this time.    Vital Signs Last 24 Hrs  T(F): 98 (26 Feb 2021 20:00), Max: 98.5 (26 Feb 2021 08:00)  HR: 71 (27 Feb 2021 06:00) (69 - 90)  BP: 122/57 (27 Feb 2021 06:00) (104/50 - 147/65)  BP(mean): 82 (27 Feb 2021 06:00) (71 - 94)  RR: 25 (27 Feb 2021 06:00) (20 - 34)  SpO2: 99% (27 Feb 2021 06:00) (94% - 99%) 3L NC     I&O's Detail    26 Feb 2021 07:01  -  27 Feb 2021 07:00  --------------------------------------------------------  IN:    IV PiggyBack: 100 mL    Oral Fluid: 900 mL  Total IN: 1000 mL    OUT:    Incontinent per Collection Bag (mL): 1050 mL  Total OUT: 1050 mL    Net:   I&O's Detail    25 Feb 2021 07:01  -  26 Feb 2021 07:00  --------------------------------------------------------  Total NET: -1076 mL    26 Feb 2021 07:01  -  27 Feb 2021 07:00  --------------------------------------------------------  Total NET: -50 mL    CAPILLARY BLOOD GLUCOSE  137 (26 Feb 2021 16:00)  125 (26 Feb 2021 11:00)  169 (26 Feb 2021 06:00)    POCT Blood Glucose.: 139 mg/dL (27 Feb 2021 06:39)  POCT Blood Glucose.: 137 mg/dL (26 Feb 2021 15:57)  POCT Blood Glucose.: 125 mg/dL (26 Feb 2021 11:29)    Physical Exam:  General: NAD; A&Ox3  Cardiac: S1/S2, RRR, no murmur, no rubs  Lungs: unlabored respirations, CTA b/l, no wheeze, no rales, no crackles  Abdomen: Soft/NT/ND; positive bowel sounds x 4  Sternum: Intact, no click, incision healing well with no drainage  Incisions: Incisions clean/dry/intact  Extremities: No edema b/l lower extremities; good capillary refill; no cyanosis; palpable 1+ pedal pulses b/l      LABS:                        10.3<L>  12.63<H> )-----------( 315      ( 27 Feb 2021 05:25 )             33.8<L>                        10.5<L>  14.63<H> )-----------( 306      ( 26 Feb 2021 05:10 )             35.4<L>    02-27    140  |  99  |  22<H>  ----------------------------<  102<H>  4.3   |  37<H>  |  0.7  02-26    139  |  97<L>  |  25<H>  ----------------------------<  136<H>  4.5   |  34<H>  |  0.9    Ca    9.2      27 Feb 2021 05:25  Mg     2.2     02-27    A1C with Estimated Average Glucose Result: 6.1    RADIOLOGY & ADDITIONAL TESTS:  CXR: < from: Xray Chest 1 View- PORTABLE-Urgent (Xray Chest 1 View- PORTABLE-Urgent .) (02.26.21 @ 11:17) >  Findings:  Support devices: Sternal wires and surgical clips overlie the mediastinum. There is a prosthetic mitral valve.  Cardiac/mediastinum/hilum: The overall heart size is not adequately evaluated as left heart border is obscured by the opacified left lung base.  Lung parenchyma/Pleura: Moderate right pleural effusion may be loculated. Opacification of the left lung base and left midlung field.  Skeleton/soft tissues: Degenerative changes of the thoracic spine.  Impression:  In comparison with the prior chest x-ray dated October 26, 2021 time 4:05 AM:  Interval removal of the right-sided chest tube.  Right pleural effusion and opacification of the left lung base and left midlung field, unchanged.    EKG:< from: 12 Lead ECG (02.23.21 @ 08:58) >  Ventricular Rate 69 BPM  Atrial Rate 69 BPM  P-R Interval 146 ms  QRS Duration 106 ms  Q-T Interval 456 ms  QTC Calculation(Bazett) 488 ms  P Axis 26 degrees  R Axis -12 degrees  T Axis 81 degrees  Diagnosis Line Sinus rhythm withoccasional Premature ventricular complexes  Possible Left atrial enlargement  Left ventricular hypertrophy  T wave abnormality, consider anterolateral ischemia  Prolonged QT  Abnormal ECG    MEDICATIONS  (STANDING):  albumin human  5% IVPB 500 milliLiter(s) IV Intermittent once  aMIOdarone    Tablet 200 milliGRAM(s) Oral daily  aMIOdarone    Tablet   Oral   amLODIPine   Tablet 5 milliGRAM(s) Oral daily  aspirin 325 milliGRAM(s) Oral daily  atorvastatin 40 milliGRAM(s) Oral at bedtime  buMETAnide 1 milliGRAM(s) Oral daily  chlorhexidine 4% Liquid 1 Application(s) Topical <User Schedule>  dextrose 40% Gel 15 Gram(s) Oral once  dextrose 5%. 1000 milliLiter(s) (100 mL/Hr) IV Continuous <Continuous>  dextrose 5%. 1000 milliLiter(s) (50 mL/Hr) IV Continuous <Continuous>  dextrose 50% Injectable 25 Gram(s) IV Push once  dextrose 50% Injectable 12.5 Gram(s) IV Push once  dextrose 50% Injectable 25 Gram(s) IV Push once  escitalopram 20 milliGRAM(s) Oral daily  glucagon  Injectable 1 milliGRAM(s) IntraMuscular once  guaiFENesin  milliGRAM(s) Oral every 12 hours  heparin   Injectable 5000 Unit(s) SubCutaneous every 12 hours  insulin lispro (ADMELOG) corrective regimen sliding scale   SubCutaneous three times a day before meals  ipratropium    for Nebulization 500 MICROGram(s) Nebulizer every 6 hours  lisinopril 5 milliGRAM(s) Oral daily  magnesium sulfate  IVPB 2 Gram(s) IV Intermittent once  magnesium sulfate  IVPB 1 Gram(s) IV Intermittent every 12 hours  metFORMIN 500 milliGRAM(s) Oral two times a day with meals  metoprolol tartrate 50 milliGRAM(s) Oral every 12 hours  pantoprazole    Tablet 40 milliGRAM(s) Oral before breakfast  polyethylene glycol 3350 17 Gram(s) Oral daily  potassium chloride    Tablet ER 20 milliEquivalent(s) Oral daily  simethicone 80 milliGRAM(s) Chew three times a day    MEDICATIONS  (PRN):  hydrALAZINE Injectable 10 milliGRAM(s) IV Push every 4 hours PRN SBP > 155      Post-Op Aspirin: Yes [X],  No [], If No, then contra-indication:  Post-Op Statin: Yes [X], No[], If No, then contra-indication:  Post-Op Beta-Blockers: Yes [X], No [], If No, then contra-indication:    Allergies:  No Known Allergies    Ambulation/Activity Status: Ambulates several times daily with assistance.    Assessment/Plan:  70y Female status-post CABG & MV repair  - Case and plan discussed with CTU Intensivist and CT Surgeon - Dr. Rojas  - Continue CTU supportive care    - Continue DVT/GI prophylaxis  - Incentive Spirometry 10 times an hour  - Continue to advance physical activity as tolerated and continue PT/OT as directed  1. CAD s/p CABGx1: Continue ASA, statin, BB  2. HTN: cont norvasc, lopressor and lisinopril  3. A. Fib: cont lopressor, amio, mag 1gmb id  4. COPD/Hypoxia: cont nebs, mucinex, wean o2 as tolerated, encourage incentive spirometry  5. DM/Glucose Control: FS well controlled, A1c 6.1; cont metFORMIN 500 milliGRAM(s) Oral two times a day with meals w/insulin sliding scale  6. Right hemothorax- s/p pigtail drain, CXR improving  7. ID: Patient afebrile, leukocytosis improving off ABX. ID note appreciated; will cont off ABX.   8. Pancreatitis- trend amylase and lipase- trending down, gen surgery following.    Social Service Disposition:  [facility]

## 2021-02-27 NOTE — PROGRESS NOTE ADULT - SUBJECTIVE AND OBJECTIVE BOX
CTU Attending Progress Daily Note     27 Feb 2021 11:00  Admited 02-09-21, Hospital Day 18d  POD# -  15    HPI:  71 yo F h/o HTN, DLD, has JACOME on TTE Mod AS by gradient, and mod to severe MR here for DERRICK and RHC/LHC   (09 Feb 2021 09:38)    Home Medications:  aspirin 325 mg oral tablet: orally once a day (09 Feb 2021 09:55)  lisinopril-hydrochlorothiazide 20 mg-12.5 mg oral tablet: 1 tab(s) orally once a day (09 Feb 2021 09:55)  meloxicam: orally once a day (09 Feb 2021 09:55)  metFORMIN 500 mg oral tablet: orally once a day (09 Feb 2021 09:55)  metoprolol tartrate 25 mg oral tablet: 1 tab(s) orally 2 times a day (09 Feb 2021 09:55)  oxybutynin 10 mg/24 hr oral tablet, extended release: 1 tab(s) orally once a day (09 Feb 2021 09:55)  pantoprazole 40 mg oral delayed release tablet: 1 tab(s) orally once a day (09 Feb 2021 09:55)    FAMILY HISTORY:    PAST MEDICAL & SURGICAL HISTORY:  Glaucoma    Chronic sciatica    H/O sleep apnea  on CPAP    Aortic stenosis    Type 2 diabetes mellitus without complication, without long-term current use of insulin    Hyperlipidemia, unspecified hyperlipidemia type    Hypertension, unspecified type    History of carpal tunnel surgery    History of hip surgery  bilateral hip      Interval event for past 24 hr:  ADRIAN SAMUELS  70y had no event.   Current Complains:  ADRIAN SAMUELS has no new complains  Allergies    No Known Allergies    Intolerances      OBJECTIVE:  Vitals last 24 hrs  ICU Vital Signs Last 24 Hrs  T(C): 36.8 (27 Feb 2021 08:00), Max: 36.9 (26 Feb 2021 12:00)  T(F): 98.2 (27 Feb 2021 08:00), Max: 98.5 (26 Feb 2021 12:00)  HR: 75 (27 Feb 2021 10:00) (66 - 90)  BP: 120/52 (27 Feb 2021 10:00) (110/59 - 141/62)  BP(mean): 75 (27 Feb 2021 10:00) (75 - 89)  ABP: --  ABP(mean): --  RR: 42 (27 Feb 2021 10:00) (22 - 42)  SpO2: 97% (27 Feb 2021 10:00) (94% - 99%)      02-26-21 @ 07:01  -  02-27-21 @ 07:00  --------------------------------------------------------  IN:    IV PiggyBack: 100 mL    Oral Fluid: 900 mL  Total IN: 1000 mL    OUT:    Chest Tube (mL): 0 mL    Incontinent per Collection Bag (mL): 1700 mL  Total OUT: 1700 mL    Total NET: -700 mL             CAPILLARY BLOOD GLUCOSE  137 (26 Feb 2021 16:00)      POCT Blood Glucose.: 139 mg/dL (27 Feb 2021 06:39)  POCT Blood Glucose.: 137 mg/dL (26 Feb 2021 15:57)  POCT Blood Glucose.: 125 mg/dL (26 Feb 2021 11:29)    LABS:                          10.3   12.63 )-----------( 315      ( 27 Feb 2021 05:25 )             33.8     Hemoglobin: 10.3 g/dL (02-27 @ 05:25)  Hemoglobin: 10.5 g/dL (02-26 @ 05:10)  Hemoglobin: 10.1 g/dL (02-25 @ 06:12)    02-27    140  |  99  |  22<H>  ----------------------------<  102<H>  4.3   |  37<H>  |  0.7    Ca    9.2      27 Feb 2021 05:25  Mg     2.2     02-27      Creatinine, Serum: 0.7 mg/dL (02-27 @ 05:25)  Creatinine, Serum: 0.9 mg/dL (02-26 @ 05:10)  Creatinine, Serum: 0.9 mg/dL (02-24 @ 03:02)          Culture - Body Fluid with Gram Stain (collected 25 Feb 2021 16:10)  Source: .Body Fluid Pleural Fluid  Gram Stain (26 Feb 2021 02:56):    polymorphonuclear leukocytes seen    No organisms seen    by cytocentrifuge  Preliminary Report (26 Feb 2021 21:16):    No growth      HOSPITAL MEDICATIONS:  MEDICATIONS  (STANDING):  aMIOdarone    Tablet 200 milliGRAM(s) Oral daily  aMIOdarone    Tablet   Oral   amLODIPine   Tablet 5 milliGRAM(s) Oral daily  aspirin 325 milliGRAM(s) Oral daily  atorvastatin 40 milliGRAM(s) Oral at bedtime  buMETAnide 1 milliGRAM(s) Oral daily  chlorhexidine 4% Liquid 1 Application(s) Topical <User Schedule>  dextrose 40% Gel 15 Gram(s) Oral once  dextrose 5%. 1000 milliLiter(s) (100 mL/Hr) IV Continuous <Continuous>  dextrose 5%. 1000 milliLiter(s) (50 mL/Hr) IV Continuous <Continuous>  dextrose 50% Injectable 25 Gram(s) IV Push once  dextrose 50% Injectable 12.5 Gram(s) IV Push once  dextrose 50% Injectable 25 Gram(s) IV Push once  escitalopram 20 milliGRAM(s) Oral daily  glucagon  Injectable 1 milliGRAM(s) IntraMuscular once  guaiFENesin  milliGRAM(s) Oral every 12 hours  heparin   Injectable 5000 Unit(s) SubCutaneous every 12 hours  insulin lispro (ADMELOG) corrective regimen sliding scale   SubCutaneous three times a day before meals  ipratropium    for Nebulization 500 MICROGram(s) Nebulizer every 6 hours  lisinopril 5 milliGRAM(s) Oral daily  magnesium sulfate  IVPB 2 Gram(s) IV Intermittent once  magnesium sulfate  IVPB 1 Gram(s) IV Intermittent every 12 hours  metFORMIN 500 milliGRAM(s) Oral two times a day with meals  metoprolol tartrate 50 milliGRAM(s) Oral every 12 hours  pantoprazole    Tablet 40 milliGRAM(s) Oral before breakfast  polyethylene glycol 3350 17 Gram(s) Oral daily  potassium chloride    Tablet ER 20 milliEquivalent(s) Oral daily  simethicone 80 milliGRAM(s) Chew three times a day    MEDICATIONS  (PRN):  hydrALAZINE Injectable 10 milliGRAM(s) IV Push every 4 hours PRN SBP > 155      REVIEW OF SYSTEMS:  CONSTITUTIONAL: [ ] all negative; [x ] weakness, [ ] fevers, [ ] chills  EYES/ENT: [X] all negative; [ ] visual changes, [ ] vertigo, [ ] throat pain, [ ] eye pain  NECK: [X] all negative; [ ] pain, [ ] stiffness  RESPIRATORY: [ ] all negative, [ ] cough, [ ] wheezing, [ ] hemoptysis, [x ] shortness of breath, [  ] chest pain  CARDIOVASCULAR: [X] all negative; [ ] anginal chest pain, [ ] palpitations, [ ] orthopnea  GASTROINTESTINAL: [X] all negative; [ ]abdominal pain, [ ] nausea, [ ] vomiting, [ ] hematemesis, [ ] diarrhea, [ ] constipation, [ ] melena, [ ] hematochezia.  GENITOURINARY: [X] all negative; [ ] dysuria, [ ] frequency, [ ] hematuria  NEUROLOGICAL: [X] all negative; [ ] numbness, [ ] weakness, [ ] paresthesias  MUSCULOSKELETAL: [X] all negative, [ ] joint pain, [ ] joint swelling, [ ] joint redness, [ ] bone pain  SKIN: [X] all negative; [ ] itching, [ ] burning, [ ] rashes, [ ] lesions   All other review of systems is negative unless indicated above.    [  ] Unable to assess ROS because     PHYSICAL EXAM:          CONSTITUTIONAL: Well-developed; well-nourished; in no acute distress.   	SKIN: warm, dry, no rashes or lesions  	HEENT: Atraumatic. Normocephalic. PERRL. Moist membranes, no conjunctival injection, sclera clear  	NECK: Supple; non tender.  No JVD. No lymphadenopathy.  	CARD: Normal S1, S2. Rate and Rhythm are regular. No murmurs.  	RESP: absent air entry left base , no wheezes, + rales no rhonchi.  	ABD: Soft, not tender, not distended, no CVA tenderness, no rebound no guarding, bowel sounds present  	EXT: Normal ROM.  No clubbing, no cyanosis, + pedal edema, no calf pain b/l, Peripheral pulses intact.  	LYMPH: No acute cervical adenopathy.  	NEURO: Alert, awake, motor 5/5 R, 5/5 L, sensation intact bilat, CN 2-12 intact,          PSYCH: Cooperative, appropriate. Alert & oriented x 3    RADIOLOGY:  X Reviewed and interpreted by me  CxR from 02-27-21 shows mild congestion, no pneumothorax, no effusion, no cardiomegaly, high left hemidiaphragm.       ECG:

## 2021-02-28 LAB
AMYLASE P1 CFR SERPL: 147 U/L — HIGH (ref 25–115)
ANION GAP SERPL CALC-SCNC: 7 MMOL/L — SIGNIFICANT CHANGE UP (ref 7–14)
BUN SERPL-MCNC: 22 MG/DL — HIGH (ref 10–20)
CALCIUM SERPL-MCNC: 8.9 MG/DL — SIGNIFICANT CHANGE UP (ref 8.5–10.1)
CHLORIDE SERPL-SCNC: 94 MMOL/L — LOW (ref 98–110)
CO2 SERPL-SCNC: 34 MMOL/L — HIGH (ref 17–32)
CREAT SERPL-MCNC: 0.9 MG/DL — SIGNIFICANT CHANGE UP (ref 0.7–1.5)
GLUCOSE BLDC GLUCOMTR-MCNC: 106 MG/DL — HIGH (ref 70–99)
GLUCOSE BLDC GLUCOMTR-MCNC: 115 MG/DL — HIGH (ref 70–99)
GLUCOSE BLDC GLUCOMTR-MCNC: 225 MG/DL — HIGH (ref 70–99)
GLUCOSE BLDC GLUCOMTR-MCNC: 74 MG/DL — SIGNIFICANT CHANGE UP (ref 70–99)
GLUCOSE SERPL-MCNC: 102 MG/DL — HIGH (ref 70–99)
HCT VFR BLD CALC: 32.4 % — LOW (ref 37–47)
HGB BLD-MCNC: 10.2 G/DL — LOW (ref 12–16)
LIDOCAIN IGE QN: 125 U/L — HIGH (ref 7–60)
MAGNESIUM SERPL-MCNC: 2 MG/DL — SIGNIFICANT CHANGE UP (ref 1.8–2.4)
MCHC RBC-ENTMCNC: 29.7 PG — SIGNIFICANT CHANGE UP (ref 27–31)
MCHC RBC-ENTMCNC: 31.5 G/DL — LOW (ref 32–37)
MCV RBC AUTO: 94.2 FL — SIGNIFICANT CHANGE UP (ref 81–99)
NRBC # BLD: 0 /100 WBCS — SIGNIFICANT CHANGE UP (ref 0–0)
PLATELET # BLD AUTO: 297 K/UL — SIGNIFICANT CHANGE UP (ref 130–400)
POTASSIUM SERPL-MCNC: 4.5 MMOL/L — SIGNIFICANT CHANGE UP (ref 3.5–5)
POTASSIUM SERPL-SCNC: 4.5 MMOL/L — SIGNIFICANT CHANGE UP (ref 3.5–5)
RBC # BLD: 3.44 M/UL — LOW (ref 4.2–5.4)
RBC # FLD: 18.4 % — HIGH (ref 11.5–14.5)
SODIUM SERPL-SCNC: 135 MMOL/L — SIGNIFICANT CHANGE UP (ref 135–146)
WBC # BLD: 11.43 K/UL — HIGH (ref 4.8–10.8)
WBC # FLD AUTO: 11.43 K/UL — HIGH (ref 4.8–10.8)

## 2021-02-28 PROCEDURE — 93010 ELECTROCARDIOGRAM REPORT: CPT

## 2021-02-28 PROCEDURE — 71046 X-RAY EXAM CHEST 2 VIEWS: CPT | Mod: 26

## 2021-02-28 PROCEDURE — 71045 X-RAY EXAM CHEST 1 VIEW: CPT | Mod: 26,59

## 2021-02-28 RX ORDER — ALPRAZOLAM 0.25 MG
0.25 TABLET ORAL ONCE
Refills: 0 | Status: DISCONTINUED | OUTPATIENT
Start: 2021-02-28 | End: 2021-02-28

## 2021-02-28 RX ADMIN — Medication 20 MILLIEQUIVALENT(S): at 11:22

## 2021-02-28 RX ADMIN — ESCITALOPRAM OXALATE 20 MILLIGRAM(S): 10 TABLET, FILM COATED ORAL at 11:22

## 2021-02-28 RX ADMIN — Medication 100 GRAM(S): at 17:13

## 2021-02-28 RX ADMIN — Medication 650 MILLIGRAM(S): at 00:00

## 2021-02-28 RX ADMIN — CHLORHEXIDINE GLUCONATE 1 APPLICATION(S): 213 SOLUTION TOPICAL at 05:35

## 2021-02-28 RX ADMIN — AMIODARONE HYDROCHLORIDE 200 MILLIGRAM(S): 400 TABLET ORAL at 05:34

## 2021-02-28 RX ADMIN — METFORMIN HYDROCHLORIDE 500 MILLIGRAM(S): 850 TABLET ORAL at 17:08

## 2021-02-28 RX ADMIN — Medication 50 MILLIGRAM(S): at 05:34

## 2021-02-28 RX ADMIN — Medication 0.25 MILLIGRAM(S): at 22:13

## 2021-02-28 RX ADMIN — PANTOPRAZOLE SODIUM 40 MILLIGRAM(S): 20 TABLET, DELAYED RELEASE ORAL at 05:34

## 2021-02-28 RX ADMIN — ATORVASTATIN CALCIUM 40 MILLIGRAM(S): 80 TABLET, FILM COATED ORAL at 22:14

## 2021-02-28 RX ADMIN — LISINOPRIL 5 MILLIGRAM(S): 2.5 TABLET ORAL at 05:34

## 2021-02-28 RX ADMIN — METFORMIN HYDROCHLORIDE 500 MILLIGRAM(S): 850 TABLET ORAL at 11:37

## 2021-02-28 RX ADMIN — HEPARIN SODIUM 5000 UNIT(S): 5000 INJECTION INTRAVENOUS; SUBCUTANEOUS at 05:34

## 2021-02-28 RX ADMIN — SIMETHICONE 80 MILLIGRAM(S): 80 TABLET, CHEWABLE ORAL at 05:34

## 2021-02-28 RX ADMIN — Medication 4: at 11:33

## 2021-02-28 RX ADMIN — Medication 500 MICROGRAM(S): at 08:32

## 2021-02-28 RX ADMIN — Medication 500 MICROGRAM(S): at 20:07

## 2021-02-28 RX ADMIN — Medication 600 MILLIGRAM(S): at 05:34

## 2021-02-28 RX ADMIN — SIMETHICONE 80 MILLIGRAM(S): 80 TABLET, CHEWABLE ORAL at 17:10

## 2021-02-28 RX ADMIN — AMLODIPINE BESYLATE 5 MILLIGRAM(S): 2.5 TABLET ORAL at 05:34

## 2021-02-28 RX ADMIN — Medication 600 MILLIGRAM(S): at 17:08

## 2021-02-28 RX ADMIN — BUMETANIDE 1 MILLIGRAM(S): 0.25 INJECTION INTRAMUSCULAR; INTRAVENOUS at 05:34

## 2021-02-28 RX ADMIN — Medication 500 MICROGRAM(S): at 14:42

## 2021-02-28 RX ADMIN — Medication 325 MILLIGRAM(S): at 11:22

## 2021-02-28 RX ADMIN — POLYETHYLENE GLYCOL 3350 17 GRAM(S): 17 POWDER, FOR SOLUTION ORAL at 11:22

## 2021-02-28 RX ADMIN — Medication 100 GRAM(S): at 05:34

## 2021-02-28 RX ADMIN — HEPARIN SODIUM 5000 UNIT(S): 5000 INJECTION INTRAVENOUS; SUBCUTANEOUS at 17:08

## 2021-02-28 RX ADMIN — Medication 500 MICROGRAM(S): at 04:20

## 2021-02-28 RX ADMIN — SIMETHICONE 80 MILLIGRAM(S): 80 TABLET, CHEWABLE ORAL at 22:14

## 2021-02-28 NOTE — PROGRESS NOTE ADULT - SUBJECTIVE AND OBJECTIVE BOX
OPERATIVE PROCEDURE(s): CABG & MV repair               POD #  16                     SURGEON(s): FIONA Rojas  SUBJECTIVE ASSESSMENT: 70y Female patient seen and examined at bedside. Patient denies any acute complaints at this time.     Vital Signs Last 24 Hrs  T(F): 98.5 (28 Feb 2021 06:00), Max: 98.6 (27 Feb 2021 20:00)  HR: 79 (28 Feb 2021 12:00) (63 - 82)  BP: 133/60 (28 Feb 2021 12:00) (100/53 - 133/60)  BP(mean): 86 (28 Feb 2021 12:00) (73 - 86)  RR: 20 (28 Feb 2021 12:00) (20 - 33)  SpO2: 98% (28 Feb 2021 12:00) (95% - 100%)    I&O's Detail    27 Feb 2021 07:01  -  28 Feb 2021 07:00  --------------------------------------------------------  IN:    IV PiggyBack: 100 mL    Oral Fluid: 1440 mL  Total IN: 1540 mL    OUT:    Incontinent per Collection Bag (mL): 1500 mL    Voided (mL): 200 mL  Total OUT: 1700 mL    Net: I&O's Detail    26 Feb 2021 07:01  -  27 Feb 2021 07:00  --------------------------------------------------------  Total NET: -700 mL    27 Feb 2021 07:01  -  28 Feb 2021 07:00  --------------------------------------------------------  Total NET: -160 mL    CAPILLARY BLOOD GLUCOSE  106 (28 Feb 2021 06:00)  121 (27 Feb 2021 22:00)    POCT Blood Glucose.: 225 mg/dL (28 Feb 2021 11:30)  POCT Blood Glucose.: 106 mg/dL (28 Feb 2021 06:39)  POCT Blood Glucose.: 121 mg/dL (27 Feb 2021 22:22)  POCT Blood Glucose.: 112 mg/dL (27 Feb 2021 16:13)      Physical Exam:  General: NAD; A&Ox3  Cardiac: S1/S2, RRR, no murmur, no rubs  Lungs: unlabored respirations, CTA b/l, no wheeze, no rales, no crackles  Abdomen: Soft/NT/ND; positive bowel sounds x 4  Sternum: Intact, no click, incision healing well with no drainage  Incisions: Incisions clean/dry/intact  Extremities: + edema b/l lower extremities; good capillary refill; no cyanosis; palpable 1+ pedal pulses b/l       LABS:                        10.2<L>  11.43<H> )-----------( 297      ( 28 Feb 2021 01:15 )             32.4<L>                        10.3<L>  12.63<H> )-----------( 315      ( 27 Feb 2021 05:25 )             33.8<L>    02-28  135  |  94<L>  |  22<H>  ----------------------------<  102<H>  4.5   |  34<H>  |  0.9    02-27  140  |  99  |  22<H>  ----------------------------<  102<H>  4.3   |  37<H>  |  0.7    Ca    8.9      28 Feb 2021 01:15  Mg     2.0     02-28      RADIOLOGY & ADDITIONAL TESTS:  CXR: < from: Xray Chest 1 View- PORTABLE-Routine (Xray Chest 1 View- PORTABLE-Routine in AM.) (02.27.21 @ 05:00) >  Impression: Bilateral opacities and left pleural effusion unchanged. No air leak.  < end of copied text >    EKG: < from: 12 Lead ECG (02.23.21 @ 08:58) >  Ventricular Rate 69 BPM  Atrial Rate 69 BPM  P-R Interval 146 ms  QRS Duration 106 ms  Q-T Interval 456 ms  QTC Calculation(Bazett) 488 ms  P Axis 26 degrees  R Axis -12 degrees  T Axis 81 degrees  Diagnosis Line Sinus rhythm withoccasional Premature ventricular complexes  Possible Left atrial enlargement  Left ventricular hypertrophy  T wave abnormality, consider anterolateral ischemia  Prolonged QT  Abnormal ECG  Confirmed by PARKER PACK MD ([id number]) on 2/23/2021 10:24:55 AM  < end of copied text >      Allergies  No Known Allergies  Intolerances      MEDICATIONS  (STANDING):  aMIOdarone    Tablet 200 milliGRAM(s) Oral daily  aMIOdarone    Tablet   Oral   amLODIPine   Tablet 5 milliGRAM(s) Oral daily  aspirin 325 milliGRAM(s) Oral daily  atorvastatin 40 milliGRAM(s) Oral at bedtime  buMETAnide 1 milliGRAM(s) Oral daily  chlorhexidine 4% Liquid 1 Application(s) Topical <User Schedule>  dextrose 40% Gel 15 Gram(s) Oral once  dextrose 5%. 1000 milliLiter(s) (50 mL/Hr) IV Continuous <Continuous>  dextrose 5%. 1000 milliLiter(s) (100 mL/Hr) IV Continuous <Continuous>  dextrose 50% Injectable 25 Gram(s) IV Push once  dextrose 50% Injectable 12.5 Gram(s) IV Push once  dextrose 50% Injectable 25 Gram(s) IV Push once  escitalopram 20 milliGRAM(s) Oral daily  glucagon  Injectable 1 milliGRAM(s) IntraMuscular once  guaiFENesin  milliGRAM(s) Oral every 12 hours  heparin   Injectable 5000 Unit(s) SubCutaneous every 12 hours  insulin lispro (ADMELOG) corrective regimen sliding scale   SubCutaneous three times a day before meals  ipratropium    for Nebulization 500 MICROGram(s) Nebulizer every 6 hours  lisinopril 5 milliGRAM(s) Oral daily  magnesium sulfate  IVPB 2 Gram(s) IV Intermittent once  magnesium sulfate  IVPB 1 Gram(s) IV Intermittent every 12 hours  metFORMIN 500 milliGRAM(s) Oral two times a day with meals  metoprolol tartrate 50 milliGRAM(s) Oral every 12 hours  pantoprazole    Tablet 40 milliGRAM(s) Oral before breakfast  polyethylene glycol 3350 17 Gram(s) Oral daily  potassium chloride    Tablet ER 20 milliEquivalent(s) Oral daily  simethicone 80 milliGRAM(s) Chew three times a day    MEDICATIONS  (PRN):  acetaminophen   Tablet .. 650 milliGRAM(s) Oral every 6 hours PRN Temp greater or equal to 38C (100.4F), Moderate Pain (4 - 6)  hydrALAZINE Injectable 10 milliGRAM(s) IV Push every 4 hours PRN SBP > 155      Post-Op Beta-Blockers: [x]Yes, []No: contraindication:  Post-Op Aspirin: [x]Yes,  []No: contraindication:  Post-Op Statin: [x]Yes, []No: contraindication:      70y Female status-post CABG & MV repair  - Case and plan discussed with CTU Intensivist and CT Surgeon - Dr. Rojas  - Continue CTU supportive care    - Continue DVT/GI prophylaxis  - Incentive Spirometry 10 times an hour  - Continue to advance physical activity as tolerated and continue PT/OT as directed  1. CAD s/p CABGx1: Continue ASA, statin, BB  2. HTN: cont norvasc, lopressor and lisinopril  3. A. Fib: cont lopressor, amio, mag 1gmb id  4. COPD/Hypoxia: cont nebs, mucinex, wean o2 as tolerated, encourage incentive spirometry  5. DM/Glucose Control: FS well controlled, A1c 6.1; cont metFORMIN 500 milliGRAM(s) Oral two times a day with meals w/insulin sliding scale  6. Right hemothorax- s/p pigtail drain, remove yesterday: CXR improving  7. ID: Patient afebrile, leukocytosis improving off ABX. ID note appreciated; will cont off ABX.   8. Pancreatitis- trend amylase and lipase- trending down, gen surgery following.    Social Service Disposition:  [facility]       OPERATIVE PROCEDURE(s): CABG & MV repair               POD #  16                     SURGEON(s): FIONA Rojas  SUBJECTIVE ASSESSMENT: 70y Female patient seen and examined at bedside. Patient denies any acute complaints at this time.     Vital Signs Last 24 Hrs  T(F): 98.5 (28 Feb 2021 06:00), Max: 98.6 (27 Feb 2021 20:00)  HR: 79 (28 Feb 2021 12:00) (63 - 82)  BP: 133/60 (28 Feb 2021 12:00) (100/53 - 133/60)  BP(mean): 86 (28 Feb 2021 12:00) (73 - 86)  RR: 20 (28 Feb 2021 12:00) (20 - 33)  SpO2: 98% (28 Feb 2021 12:00) (95% - 100%)    I&O's Detail    27 Feb 2021 07:01  -  28 Feb 2021 07:00  --------------------------------------------------------  IN:    IV PiggyBack: 100 mL    Oral Fluid: 1440 mL  Total IN: 1540 mL    OUT:    Incontinent per Collection Bag (mL): 1500 mL    Voided (mL): 200 mL  Total OUT: 1700 mL    Net: I&O's Detail    26 Feb 2021 07:01  -  27 Feb 2021 07:00  --------------------------------------------------------  Total NET: -700 mL    27 Feb 2021 07:01  -  28 Feb 2021 07:00  --------------------------------------------------------  Total NET: -160 mL    CAPILLARY BLOOD GLUCOSE  106 (28 Feb 2021 06:00)  121 (27 Feb 2021 22:00)    POCT Blood Glucose.: 225 mg/dL (28 Feb 2021 11:30)  POCT Blood Glucose.: 106 mg/dL (28 Feb 2021 06:39)  POCT Blood Glucose.: 121 mg/dL (27 Feb 2021 22:22)  POCT Blood Glucose.: 112 mg/dL (27 Feb 2021 16:13)      Physical Exam:  General: NAD; A&Ox3  Cardiac: S1/S2, RRR, no murmur, no rubs  Lungs: unlabored respirations, CTA b/l, no wheeze, no rales, no crackles  Abdomen: Soft/NT/ND; positive bowel sounds x 4  Sternum: Intact, no click, incision healing well with no drainage  Incisions: Incisions clean/dry/intact  Extremities: + edema b/l lower extremities; good capillary refill; no cyanosis; palpable 1+ pedal pulses b/l       LABS:                        10.2<L>  11.43<H> )-----------( 297      ( 28 Feb 2021 01:15 )             32.4<L>                        10.3<L>  12.63<H> )-----------( 315      ( 27 Feb 2021 05:25 )             33.8<L>    02-28  135  |  94<L>  |  22<H>  ----------------------------<  102<H>  4.5   |  34<H>  |  0.9    02-27  140  |  99  |  22<H>  ----------------------------<  102<H>  4.3   |  37<H>  |  0.7    Ca    8.9      28 Feb 2021 01:15  Mg     2.0     02-28      RADIOLOGY & ADDITIONAL TESTS:  CXR: < from: Xray Chest 1 View- PORTABLE-Routine (Xray Chest 1 View- PORTABLE-Routine in AM.) (02.27.21 @ 05:00) >  Impression: Bilateral opacities and left pleural effusion unchanged. No air leak.  < end of copied text >    EKG: < from: 12 Lead ECG (02.23.21 @ 08:58) >  Ventricular Rate 69 BPM  Atrial Rate 69 BPM  P-R Interval 146 ms  QRS Duration 106 ms  Q-T Interval 456 ms  QTC Calculation(Bazett) 488 ms  P Axis 26 degrees  R Axis -12 degrees  T Axis 81 degrees  Diagnosis Line Sinus rhythm withoccasional Premature ventricular complexes  Possible Left atrial enlargement  Left ventricular hypertrophy  T wave abnormality, consider anterolateral ischemia  Prolonged QT  Abnormal ECG  Confirmed by PARKER PACK MD ([id number]) on 2/23/2021 10:24:55 AM  < end of copied text >      Allergies  No Known Allergies  Intolerances      MEDICATIONS  (STANDING):  aMIOdarone    Tablet 200 milliGRAM(s) Oral daily  aMIOdarone    Tablet   Oral   amLODIPine   Tablet 5 milliGRAM(s) Oral daily  aspirin 325 milliGRAM(s) Oral daily  atorvastatin 40 milliGRAM(s) Oral at bedtime  buMETAnide 1 milliGRAM(s) Oral daily  chlorhexidine 4% Liquid 1 Application(s) Topical <User Schedule>  dextrose 40% Gel 15 Gram(s) Oral once  dextrose 5%. 1000 milliLiter(s) (50 mL/Hr) IV Continuous <Continuous>  dextrose 5%. 1000 milliLiter(s) (100 mL/Hr) IV Continuous <Continuous>  dextrose 50% Injectable 25 Gram(s) IV Push once  dextrose 50% Injectable 12.5 Gram(s) IV Push once  dextrose 50% Injectable 25 Gram(s) IV Push once  escitalopram 20 milliGRAM(s) Oral daily  glucagon  Injectable 1 milliGRAM(s) IntraMuscular once  guaiFENesin  milliGRAM(s) Oral every 12 hours  heparin   Injectable 5000 Unit(s) SubCutaneous every 12 hours  insulin lispro (ADMELOG) corrective regimen sliding scale   SubCutaneous three times a day before meals  ipratropium    for Nebulization 500 MICROGram(s) Nebulizer every 6 hours  lisinopril 5 milliGRAM(s) Oral daily  magnesium sulfate  IVPB 2 Gram(s) IV Intermittent once  magnesium sulfate  IVPB 1 Gram(s) IV Intermittent every 12 hours  metFORMIN 500 milliGRAM(s) Oral two times a day with meals  metoprolol tartrate 50 milliGRAM(s) Oral every 12 hours  pantoprazole    Tablet 40 milliGRAM(s) Oral before breakfast  polyethylene glycol 3350 17 Gram(s) Oral daily  potassium chloride    Tablet ER 20 milliEquivalent(s) Oral daily  simethicone 80 milliGRAM(s) Chew three times a day    MEDICATIONS  (PRN):  acetaminophen   Tablet .. 650 milliGRAM(s) Oral every 6 hours PRN Temp greater or equal to 38C (100.4F), Moderate Pain (4 - 6)  hydrALAZINE Injectable 10 milliGRAM(s) IV Push every 4 hours PRN SBP > 155      Post-Op Beta-Blockers: [x]Yes, []No: contraindication:  Post-Op Aspirin: [x]Yes,  []No: contraindication:  Post-Op Statin: [x]Yes, []No: contraindication:      70y Female status-post CABG & MV repair  - Case and plan discussed with CTU Intensivist and CT Surgeon - Dr. Rojas  - Continue CTU supportive care    - Continue DVT/GI prophylaxis  - Incentive Spirometry 10 times an hour  - Continue to advance physical activity as tolerated and continue PT/OT as directed  1. CAD s/p CABGx1: Continue ASA, statin, BB  2. HTN: cont norvasc, lopressor and lisinopril  3. A. Fib: cont lopressor, amio, mag 1gmb id  4. COPD/Hypoxia: cont nebs, mucinex, wean o2 as tolerated, encourage incentive spirometry  5. DM/Glucose Control: FS well controlled, A1c 6.1; cont metFORMIN 500 milliGRAM(s) Oral two times a day with meals w/insulin sliding scale  6. Right hemothorax- s/p pigtail drain, CXR improving  7. ID: Patient afebrile, leukocytosis improving off ABX. ID note appreciated; will cont off ABX.   8. Pancreatitis- trend amylase and lipase- trending down, gen surgery following.    Social Service Disposition:  [facility]/c to [facility] progress

## 2021-03-01 VITALS
HEART RATE: 85 BPM | RESPIRATION RATE: 22 BRPM | DIASTOLIC BLOOD PRESSURE: 56 MMHG | OXYGEN SATURATION: 94 % | SYSTOLIC BLOOD PRESSURE: 115 MMHG

## 2021-03-01 PROBLEM — M54.30 SCIATICA, UNSPECIFIED SIDE: Chronic | Status: ACTIVE | Noted: 2021-02-09

## 2021-03-01 PROBLEM — H40.9 UNSPECIFIED GLAUCOMA: Chronic | Status: ACTIVE | Noted: 2021-02-09

## 2021-03-01 LAB
AMYLASE P1 CFR SERPL: 115 U/L — SIGNIFICANT CHANGE UP (ref 25–115)
ANION GAP SERPL CALC-SCNC: 10 MMOL/L — SIGNIFICANT CHANGE UP (ref 7–14)
BUN SERPL-MCNC: 24 MG/DL — HIGH (ref 10–20)
CALCIUM SERPL-MCNC: 8.9 MG/DL — SIGNIFICANT CHANGE UP (ref 8.5–10.1)
CHLORIDE SERPL-SCNC: 93 MMOL/L — LOW (ref 98–110)
CO2 SERPL-SCNC: 34 MMOL/L — HIGH (ref 17–32)
CREAT SERPL-MCNC: 0.9 MG/DL — SIGNIFICANT CHANGE UP (ref 0.7–1.5)
CULTURE RESULTS: SIGNIFICANT CHANGE UP
GLUCOSE BLDC GLUCOMTR-MCNC: 151 MG/DL — HIGH (ref 70–99)
GLUCOSE SERPL-MCNC: 119 MG/DL — HIGH (ref 70–99)
LIDOCAIN IGE QN: 86 U/L — HIGH (ref 7–60)
MAGNESIUM SERPL-MCNC: 2.2 MG/DL — SIGNIFICANT CHANGE UP (ref 1.8–2.4)
POTASSIUM SERPL-MCNC: 4.6 MMOL/L — SIGNIFICANT CHANGE UP (ref 3.5–5)
POTASSIUM SERPL-SCNC: 4.6 MMOL/L — SIGNIFICANT CHANGE UP (ref 3.5–5)
SARS-COV-2 RNA SPEC QL NAA+PROBE: SIGNIFICANT CHANGE UP
SODIUM SERPL-SCNC: 137 MMOL/L — SIGNIFICANT CHANGE UP (ref 135–146)
SPECIMEN SOURCE: SIGNIFICANT CHANGE UP

## 2021-03-01 PROCEDURE — 93010 ELECTROCARDIOGRAM REPORT: CPT

## 2021-03-01 PROCEDURE — 71045 X-RAY EXAM CHEST 1 VIEW: CPT | Mod: 26

## 2021-03-01 RX ORDER — LISINOPRIL 2.5 MG/1
1 TABLET ORAL
Qty: 0 | Refills: 0 | DISCHARGE
Start: 2021-03-01

## 2021-03-01 RX ORDER — ACETAZOLAMIDE 250 MG/1
1 TABLET ORAL
Qty: 0 | Refills: 0 | DISCHARGE
Start: 2021-03-01 | End: 2021-03-06

## 2021-03-01 RX ORDER — POTASSIUM CHLORIDE 20 MEQ
1 PACKET (EA) ORAL
Qty: 0 | Refills: 0 | DISCHARGE
Start: 2021-03-01

## 2021-03-01 RX ORDER — METOPROLOL TARTRATE 50 MG
1 TABLET ORAL
Qty: 0 | Refills: 0 | DISCHARGE
Start: 2021-03-01

## 2021-03-01 RX ORDER — METFORMIN HYDROCHLORIDE 850 MG/1
1 TABLET ORAL
Qty: 0 | Refills: 0 | DISCHARGE
Start: 2021-03-01

## 2021-03-01 RX ORDER — IPRATROPIUM BROMIDE 0.2 MG/ML
2.5 SOLUTION, NON-ORAL INHALATION
Qty: 0 | Refills: 0 | DISCHARGE
Start: 2021-03-01

## 2021-03-01 RX ORDER — AMLODIPINE BESYLATE 2.5 MG/1
1 TABLET ORAL
Qty: 0 | Refills: 0 | DISCHARGE
Start: 2021-03-01

## 2021-03-01 RX ORDER — ESCITALOPRAM OXALATE 10 MG/1
1 TABLET, FILM COATED ORAL
Qty: 0 | Refills: 0 | DISCHARGE
Start: 2021-03-01

## 2021-03-01 RX ORDER — AMIODARONE HYDROCHLORIDE 400 MG/1
200 TABLET ORAL
Qty: 0 | Refills: 0 | DISCHARGE
Start: 2021-03-01

## 2021-03-01 RX ORDER — ATORVASTATIN CALCIUM 80 MG/1
1 TABLET, FILM COATED ORAL
Qty: 0 | Refills: 0 | DISCHARGE
Start: 2021-03-01

## 2021-03-01 RX ORDER — HEPARIN SODIUM 5000 [USP'U]/ML
5000 INJECTION INTRAVENOUS; SUBCUTANEOUS
Qty: 0 | Refills: 0 | DISCHARGE
Start: 2021-03-01

## 2021-03-01 RX ORDER — POLYETHYLENE GLYCOL 3350 17 G/17G
17 POWDER, FOR SOLUTION ORAL
Qty: 0 | Refills: 0 | DISCHARGE
Start: 2021-03-01

## 2021-03-01 RX ORDER — BUMETANIDE 0.25 MG/ML
1 INJECTION INTRAMUSCULAR; INTRAVENOUS
Qty: 0 | Refills: 0 | DISCHARGE
Start: 2021-03-01

## 2021-03-01 RX ORDER — ACETAZOLAMIDE 250 MG/1
250 TABLET ORAL DAILY
Refills: 0 | Status: DISCONTINUED | OUTPATIENT
Start: 2021-03-01 | End: 2021-03-01

## 2021-03-01 RX ORDER — ASPIRIN/CALCIUM CARB/MAGNESIUM 324 MG
1 TABLET ORAL
Qty: 0 | Refills: 0 | DISCHARGE
Start: 2021-03-01

## 2021-03-01 RX ORDER — PANTOPRAZOLE SODIUM 20 MG/1
1 TABLET, DELAYED RELEASE ORAL
Qty: 0 | Refills: 0 | DISCHARGE
Start: 2021-03-01

## 2021-03-01 RX ADMIN — SIMETHICONE 80 MILLIGRAM(S): 80 TABLET, CHEWABLE ORAL at 05:32

## 2021-03-01 RX ADMIN — BUMETANIDE 1 MILLIGRAM(S): 0.25 INJECTION INTRAMUSCULAR; INTRAVENOUS at 05:32

## 2021-03-01 RX ADMIN — LISINOPRIL 5 MILLIGRAM(S): 2.5 TABLET ORAL at 05:32

## 2021-03-01 RX ADMIN — Medication 325 MILLIGRAM(S): at 11:51

## 2021-03-01 RX ADMIN — ESCITALOPRAM OXALATE 20 MILLIGRAM(S): 10 TABLET, FILM COATED ORAL at 11:51

## 2021-03-01 RX ADMIN — HEPARIN SODIUM 5000 UNIT(S): 5000 INJECTION INTRAVENOUS; SUBCUTANEOUS at 05:31

## 2021-03-01 RX ADMIN — Medication 500 MICROGRAM(S): at 11:59

## 2021-03-01 RX ADMIN — AMLODIPINE BESYLATE 5 MILLIGRAM(S): 2.5 TABLET ORAL at 05:32

## 2021-03-01 RX ADMIN — Medication 500 MICROGRAM(S): at 03:45

## 2021-03-01 RX ADMIN — METFORMIN HYDROCHLORIDE 500 MILLIGRAM(S): 850 TABLET ORAL at 11:50

## 2021-03-01 RX ADMIN — POLYETHYLENE GLYCOL 3350 17 GRAM(S): 17 POWDER, FOR SOLUTION ORAL at 11:51

## 2021-03-01 RX ADMIN — CHLORHEXIDINE GLUCONATE 1 APPLICATION(S): 213 SOLUTION TOPICAL at 05:32

## 2021-03-01 RX ADMIN — Medication 50 MILLIGRAM(S): at 05:32

## 2021-03-01 RX ADMIN — Medication 100 GRAM(S): at 05:32

## 2021-03-01 RX ADMIN — Medication 600 MILLIGRAM(S): at 05:32

## 2021-03-01 RX ADMIN — PANTOPRAZOLE SODIUM 40 MILLIGRAM(S): 20 TABLET, DELAYED RELEASE ORAL at 05:32

## 2021-03-01 RX ADMIN — AMIODARONE HYDROCHLORIDE 200 MILLIGRAM(S): 400 TABLET ORAL at 05:32

## 2021-03-01 NOTE — PROGRESS NOTE ADULT - SUBJECTIVE AND OBJECTIVE BOX
OPERATIVE PROCEDURE(s):  CABG & MV repair              POD #    17                   70yFemale  SURGEON(s): FIONA Rojas  SUBJECTIVE ASSESSMENT:  Patient has no complaints at this time.    Vital Signs Last 24 Hrs  T(F): 98.5 (01 Mar 2021 04:00), Max: 98.9 (2021 12:00)  HR: 80 (01 Mar 2021 06:00) (63 - 84)  BP: 123/58 (01 Mar 2021 06:00) (99/54 - 133/60)  BP(mean): 84 (01 Mar 2021 06:00) (73 - 86)  RR: 25 (01 Mar 2021 06:00) (20 - 28)  SpO2: 97% (01 Mar 2021 06:00) (95% - 98%)    I&O's Detail    2021 07:01  -  2021 07:00  --------------------------------------------------------  IN:    IV PiggyBack: 100 mL    Oral Fluid: 1440 mL  Total IN: 1540 mL    OUT:    Incontinent per Collection Bag (mL): 1500 mL    Voided (mL): 200 mL  Total OUT: 1700 mL    Net:   I&O's Detail    2021 07:01  -  2021 07:00  --------------------------------------------------------  Total NET: -700 mL    2021 07:01  -  2021 07:00  --------------------------------------------------------  Total NET: -160 mL    CAPILLARY BLOOD GLUCOSE  106 (2021 06:00)    POCT Blood Glucose.: 115 mg/dL (2021 22:08)  POCT Blood Glucose.: 74 mg/dL (2021 16:58)  POCT Blood Glucose.: 225 mg/dL (2021 11:30)  POCT Blood Glucose.: 106 mg/dL (2021 06:39)    Physical Exam:  General: NAD; A&Ox3  Cardiac: S1/S2, RRR, no murmur, no rubs  Lungs: unlabored respirations, CTA b/l, no wheeze, no rales, no crackles  Abdomen: Soft/NT/ND; positive bowel sounds x 4  Sternum: Intact, no click, incision healing well with no drainage  Incisions: Incisions clean/dry/intact  Extremities: No edema b/l lower extremities; good capillary refill; no cyanosis; palpable 1+ pedal pulses b/l    Central Venous Catheter: Yes[]  No[] , If Yes indication:           Day #  Spencer Catheter: Yes  [] , No  [] , If yes indication:                      Day #  NGT: Yes [] No [] ,    If Yes Placement:                                     Day #  EPICARDIAL WIRES:  [] YES [] NO                                              Day #  BOWEL MOVEMENT:  [] YES [] NO, If No, Timing since last BM:      Day #  CHEST TUBE(Left/Right):  [] YES [] NO, If yes -  AIR LEAKS:  [] YES [] NO        LABS:                        10.2<L>  11.43<H> )-----------( 297      ( 2021 01:15 )             32.4<L>                        10.3<L>  12.63<H> )-----------( 315      ( 2021 05:25 )             33.8<L>        137  |  93<L>  |  24<H>  ----------------------------<  119<H>  4.6   |  34<H>  |  0.9      135  |  94<L>  |  22<H>  ----------------------------<  102<H>  4.5   |  34<H>  |  0.9    Ca    8.9      2021 23:27  Mg     2.2         RADIOLOGY & ADDITIONAL TESTS:  CXR: < from: Xray Chest 2 Views PA/Lat (21 @ 12:37) >  Findings:  Support devices: None.  Cardiac/mediastinum/hilum: Indeterminate  Lung parenchyma/Pleura: Bilateral pleural effusions unchanged. No air leak.  Skeleton/soft tissues: Unremarkable.  Impression:  Bilateral pleural effusions unchanged. No air leak.    EK Lead ECG:   Ventricular Rate 66 BPM  Atrial Rate 66 BPM  P-R Interval 164 ms  QRS Duration 104 ms  Q-T Interval 420 ms  QTC Calculation(Bazett) 440 ms  P Axis 21 degrees  R Axis -15 degrees  T Axis 38 degrees  Diagnosis Line Normal sinus rhythm  Possible Left atrial enlargement  Left ventricular hypertrophy  Inferior infarct , age undetermined  Nonspecific T wave abnormality  Abnormal ECG  Confirmed by PARKER PACK MD ([id number]) on 2021 10:24:16 PM (21 @ 08:25)    MEDICATIONS  (STANDING):  aMIOdarone    Tablet 200 milliGRAM(s) Oral daily  aMIOdarone    Tablet   Oral   amLODIPine   Tablet 5 milliGRAM(s) Oral daily  aspirin 325 milliGRAM(s) Oral daily  atorvastatin 40 milliGRAM(s) Oral at bedtime  buMETAnide 1 milliGRAM(s) Oral daily  chlorhexidine 4% Liquid 1 Application(s) Topical <User Schedule>  dextrose 40% Gel 15 Gram(s) Oral once  dextrose 5%. 1000 milliLiter(s) (50 mL/Hr) IV Continuous <Continuous>  dextrose 5%. 1000 milliLiter(s) (100 mL/Hr) IV Continuous <Continuous>  dextrose 50% Injectable 25 Gram(s) IV Push once  dextrose 50% Injectable 12.5 Gram(s) IV Push once  dextrose 50% Injectable 25 Gram(s) IV Push once  escitalopram 20 milliGRAM(s) Oral daily  glucagon  Injectable 1 milliGRAM(s) IntraMuscular once  guaiFENesin  milliGRAM(s) Oral every 12 hours  heparin   Injectable 5000 Unit(s) SubCutaneous every 12 hours  insulin lispro (ADMELOG) corrective regimen sliding scale   SubCutaneous three times a day before meals  ipratropium    for Nebulization 500 MICROGram(s) Nebulizer every 6 hours  lisinopril 5 milliGRAM(s) Oral daily  magnesium sulfate  IVPB 2 Gram(s) IV Intermittent once  magnesium sulfate  IVPB 1 Gram(s) IV Intermittent every 12 hours  metFORMIN 500 milliGRAM(s) Oral two times a day with meals  metoprolol tartrate 50 milliGRAM(s) Oral every 12 hours  pantoprazole    Tablet 40 milliGRAM(s) Oral before breakfast  polyethylene glycol 3350 17 Gram(s) Oral daily  potassium chloride    Tablet ER 20 milliEquivalent(s) Oral daily  simethicone 80 milliGRAM(s) Chew three times a day    MEDICATIONS  (PRN):  acetaminophen   Tablet .. 650 milliGRAM(s) Oral every 6 hours PRN Temp greater or equal to 38C (100.4F), Moderate Pain (4 - 6)  hydrALAZINE Injectable 10 milliGRAM(s) IV Push every 4 hours PRN SBP > 155    HEPARIN:  [] YES [] NO  Dose: XX UNITS/HR UNITS Q8H  LOVENOX:[] YES [] NO  Dose: XX mg Q24H  COUMADIN: []  YES [] NO  Dose: XX mg  Q24H  SCD's: YES b/l  GI Prophylaxis: Protonix [], Pepcid [], None [], (Contra-indication:.....)    Post-Op Aspirin: Yes [X],  No [], If No, then contra-indication:  Post-Op Statin: Yes [X], No[], If No, then contra-indication:  Post-Op Beta-Blockers: Yes [X], No [], If No, then contra-indication:    Allergies:  No Known Allergies    Ambulation/Activity Status: Ambulates several times daily without assistance.    Assessment/Plan:  70y Female status-post CABG & MV repair  - Case and plan discussed with CTU Intensivist and CT Surgeon - Dr. Rojas  - Continue CTU supportive care    - Continue DVT/GI prophylaxis  - Incentive Spirometry 10 times an hour  - Continue to advance physical activity as tolerated and continue PT/OT as directed  1. CAD: Continue ASA, statin, BB  2. HTN:   3. A. Fib:   4. COPD/Hypoxia:   5. DM/Glucose Control:     Social Service Disposition:     OPERATIVE PROCEDURE(s):  CABG & MV repair              POD #    17                   70yFemale  SURGEON(s): FIONA Rojas  SUBJECTIVE ASSESSMENT:  Patient has no complaints at this time.    Vital Signs Last 24 Hrs  T(F): 98.5 (01 Mar 2021 04:00), Max: 98.9 (2021 12:00)  HR: 80 (01 Mar 2021 06:00) (63 - 84)  BP: 123/58 (01 Mar 2021 06:00) (99/54 - 133/60)  BP(mean): 84 (01 Mar 2021 06:00) (73 - 86)  RR: 25 (01 Mar 2021 06:00) (20 - 28)  SpO2: 97% (01 Mar 2021 06:00) (95% - 98%)    I&O's Detail    2021 07:01  -  2021 07:00  --------------------------------------------------------  IN:    IV PiggyBack: 100 mL    Oral Fluid: 1440 mL  Total IN: 1540 mL    OUT:    Incontinent per Collection Bag (mL): 1500 mL    Voided (mL): 200 mL  Total OUT: 1700 mL    Net:   I&O's Detail    2021 07:01  -  2021 07:00  --------------------------------------------------------  Total NET: -700 mL    2021 07:01  -  2021 07:00  --------------------------------------------------------  Total NET: -160 mL    CAPILLARY BLOOD GLUCOSE  106 (2021 06:00)    POCT Blood Glucose.: 115 mg/dL (2021 22:08)  POCT Blood Glucose.: 74 mg/dL (2021 16:58)  POCT Blood Glucose.: 225 mg/dL (2021 11:30)  POCT Blood Glucose.: 106 mg/dL (2021 06:39)    Physical Exam:  General: NAD; A&Ox3  Cardiac: S1/S2, RRR, no murmur, no rubs  Lungs: unlabored respirations, CTA b/l, no wheeze, no rales, no crackles  Abdomen: Soft/NT/ND; positive bowel sounds x 4  Sternum: Intact, no click, incision healing well with no drainage  Incisions: Incisions clean/dry/intact  Extremities: + edema b/l lower extremities; good capillary refill; no cyanosis; palpable 1+ pedal pulses b/l        LABS:                        10.2<L>  11.43<H> )-----------( 297      ( 2021 01:15 )             32.4<L>                        10.3<L>  12.63<H> )-----------( 315      ( 2021 05:25 )             33.8<L>        137  |  93<L>  |  24<H>  ----------------------------<  119<H>  4.6   |  34<H>  |  0.9      135  |  94<L>  |  22<H>  ----------------------------<  102<H>  4.5   |  34<H>  |  0.9    Ca    8.9      2021 23:27  Mg     2.2         RADIOLOGY & ADDITIONAL TESTS:  CXR: < from: Xray Chest 2 Views PA/Lat (21 @ 12:37) >  Findings:  Support devices: None.  Cardiac/mediastinum/hilum: Indeterminate  Lung parenchyma/Pleura: Bilateral pleural effusions unchanged. No air leak.  Skeleton/soft tissues: Unremarkable.  Impression:  Bilateral pleural effusions unchanged. No air leak.    EK Lead ECG:   Ventricular Rate 66 BPM  Atrial Rate 66 BPM  P-R Interval 164 ms  QRS Duration 104 ms  Q-T Interval 420 ms  QTC Calculation(Bazett) 440 ms  P Axis 21 degrees  R Axis -15 degrees  T Axis 38 degrees  Diagnosis Line Normal sinus rhythm  Possible Left atrial enlargement  Left ventricular hypertrophy  Inferior infarct , age undetermined  Nonspecific T wave abnormality  Abnormal ECG  Confirmed by PARKER PACK MD ([id number]) on 2021 10:24:16 PM (21 @ 08:25)    MEDICATIONS  (STANDING):  aMIOdarone    Tablet 200 milliGRAM(s) Oral daily  aMIOdarone    Tablet   Oral   amLODIPine   Tablet 5 milliGRAM(s) Oral daily  aspirin 325 milliGRAM(s) Oral daily  atorvastatin 40 milliGRAM(s) Oral at bedtime  buMETAnide 1 milliGRAM(s) Oral daily  chlorhexidine 4% Liquid 1 Application(s) Topical <User Schedule>  dextrose 40% Gel 15 Gram(s) Oral once  dextrose 5%. 1000 milliLiter(s) (50 mL/Hr) IV Continuous <Continuous>  dextrose 5%. 1000 milliLiter(s) (100 mL/Hr) IV Continuous <Continuous>  dextrose 50% Injectable 25 Gram(s) IV Push once  dextrose 50% Injectable 12.5 Gram(s) IV Push once  dextrose 50% Injectable 25 Gram(s) IV Push once  escitalopram 20 milliGRAM(s) Oral daily  glucagon  Injectable 1 milliGRAM(s) IntraMuscular once  guaiFENesin  milliGRAM(s) Oral every 12 hours  heparin   Injectable 5000 Unit(s) SubCutaneous every 12 hours  insulin lispro (ADMELOG) corrective regimen sliding scale   SubCutaneous three times a day before meals  ipratropium    for Nebulization 500 MICROGram(s) Nebulizer every 6 hours  lisinopril 5 milliGRAM(s) Oral daily  magnesium sulfate  IVPB 2 Gram(s) IV Intermittent once  magnesium sulfate  IVPB 1 Gram(s) IV Intermittent every 12 hours  metFORMIN 500 milliGRAM(s) Oral two times a day with meals  metoprolol tartrate 50 milliGRAM(s) Oral every 12 hours  pantoprazole    Tablet 40 milliGRAM(s) Oral before breakfast  polyethylene glycol 3350 17 Gram(s) Oral daily  potassium chloride    Tablet ER 20 milliEquivalent(s) Oral daily  simethicone 80 milliGRAM(s) Chew three times a day    MEDICATIONS  (PRN):  acetaminophen   Tablet .. 650 milliGRAM(s) Oral every 6 hours PRN Temp greater or equal to 38C (100.4F), Moderate Pain (4 - 6)  hydrALAZINE Injectable 10 milliGRAM(s) IV Push every 4 hours PRN SBP > 155    Post-Op Beta-Blockers: [x]Yes, []No: contraindication:  Post-Op Aspirin: [x]Yes,  []No: contraindication:  Post-Op Statin: [x]Yes, []No: contraindication:      70y Female status-post CABG & MV repair  - Case and plan discussed with CTU Intensivist and CT Surgeon - Dr. Rojas  - Continue CTU supportive care    - Continue DVT/GI prophylaxis  - Incentive Spirometry 10 times an hour  - Continue to advance physical activity as tolerated and continue PT/OT as directed  1. CAD s/p CABGx1: Continue ASA, statin, BB  2. HTN: cont norvasc, lopressor and lisinopril  3. A. Fib: cont lopressor, amio, mag 1gmb id  4. COPD/Hypoxia: cont nebs, mucinex, wean o2 as tolerated, encourage incentive spirometry  5. DM/Glucose Control: FS well controlled, A1c 6.1; cont metFORMIN 500 milliGRAM(s) Oral two times a day with meals w/insulin sliding scale  6. ID: Patient afebrile, leukocytosis improving off ABX. ID note appreciated; will cont off ABX.   7. Pancreatitis- trend amylase and lipase- trending down; cont to trend.    Social Service Disposition:  [facility] to [facility] in progress OPERATIVE PROCEDURE(s):  CABG & MV repair              POD #    17                   70yFemale  SURGEON(s): FIONA Rojas  SUBJECTIVE ASSESSMENT:  Patient has no complaints at this time.    Vital Signs Last 24 Hrs  T(F): 98.5 (01 Mar 2021 04:00), Max: 98.9 (2021 12:00)  HR: 80 (01 Mar 2021 06:00) (63 - 84)  BP: 123/58 (01 Mar 2021 06:00) (99/54 - 133/60)  BP(mean): 84 (01 Mar 2021 06:00) (73 - 86)  RR: 25 (01 Mar 2021 06:00) (20 - 28)  SpO2: 97% (01 Mar 2021 06:00) (95% - 98%)    I&O's Detail    2021 07:01  -  2021 07:00  --------------------------------------------------------  IN:    IV PiggyBack: 100 mL    Oral Fluid: 1440 mL  Total IN: 1540 mL    OUT:    Incontinent per Collection Bag (mL): 1500 mL    Voided (mL): 200 mL  Total OUT: 1700 mL    Net:   I&O's Detail    2021 07:01  -  2021 07:00  --------------------------------------------------------  Total NET: -700 mL    2021 07:01  -  2021 07:00  --------------------------------------------------------  Total NET: -160 mL    CAPILLARY BLOOD GLUCOSE  106 (2021 06:00)    POCT Blood Glucose.: 115 mg/dL (2021 22:08)  POCT Blood Glucose.: 74 mg/dL (2021 16:58)  POCT Blood Glucose.: 225 mg/dL (2021 11:30)  POCT Blood Glucose.: 106 mg/dL (2021 06:39)    Physical Exam:  General: NAD; A&Ox3  Cardiac: S1/S2, RRR, no murmur, no rubs  Lungs: unlabored respirations, CTA b/l, no wheeze, no rales, no crackles  Abdomen: Soft/NT/ND; positive bowel sounds x 4  Sternum: Intact, no click, incision healing well with no drainage  Incisions: Incisions clean/dry/intact  Extremities: + edema b/l lower extremities; good capillary refill; no cyanosis; palpable 1+ pedal pulses b/l        LABS:                        10.2<L>  11.43<H> )-----------( 297      ( 2021 01:15 )             32.4<L>                        10.3<L>  12.63<H> )-----------( 315      ( 2021 05:25 )             33.8<L>        137  |  93<L>  |  24<H>  ----------------------------<  119<H>  4.6   |  34<H>  |  0.9      135  |  94<L>  |  22<H>  ----------------------------<  102<H>  4.5   |  34<H>  |  0.9    Ca    8.9      2021 23:27  Mg     2.2         RADIOLOGY & ADDITIONAL TESTS:  CXR: < from: Xray Chest 2 Views PA/Lat (21 @ 12:37) >  Findings:  Support devices: None.  Cardiac/mediastinum/hilum: Indeterminate  Lung parenchyma/Pleura: Bilateral pleural effusions unchanged. No air leak.  Skeleton/soft tissues: Unremarkable.  Impression:  Bilateral pleural effusions unchanged. No air leak.    EK Lead ECG:   Ventricular Rate 66 BPM  Atrial Rate 66 BPM  P-R Interval 164 ms  QRS Duration 104 ms  Q-T Interval 420 ms  QTC Calculation(Bazett) 440 ms  P Axis 21 degrees  R Axis -15 degrees  T Axis 38 degrees  Diagnosis Line Normal sinus rhythm  Possible Left atrial enlargement  Left ventricular hypertrophy  Inferior infarct , age undetermined  Nonspecific T wave abnormality  Abnormal ECG  Confirmed by PARKER PACK MD ([id number]) on 2021 10:24:16 PM (21 @ 08:25)    MEDICATIONS  (STANDING):  aMIOdarone    Tablet 200 milliGRAM(s) Oral daily  aMIOdarone    Tablet   Oral   amLODIPine   Tablet 5 milliGRAM(s) Oral daily  aspirin 325 milliGRAM(s) Oral daily  atorvastatin 40 milliGRAM(s) Oral at bedtime  buMETAnide 1 milliGRAM(s) Oral daily  chlorhexidine 4% Liquid 1 Application(s) Topical <User Schedule>  dextrose 40% Gel 15 Gram(s) Oral once  dextrose 5%. 1000 milliLiter(s) (50 mL/Hr) IV Continuous <Continuous>  dextrose 5%. 1000 milliLiter(s) (100 mL/Hr) IV Continuous <Continuous>  dextrose 50% Injectable 25 Gram(s) IV Push once  dextrose 50% Injectable 12.5 Gram(s) IV Push once  dextrose 50% Injectable 25 Gram(s) IV Push once  escitalopram 20 milliGRAM(s) Oral daily  glucagon  Injectable 1 milliGRAM(s) IntraMuscular once  guaiFENesin  milliGRAM(s) Oral every 12 hours  heparin   Injectable 5000 Unit(s) SubCutaneous every 12 hours  insulin lispro (ADMELOG) corrective regimen sliding scale   SubCutaneous three times a day before meals  ipratropium    for Nebulization 500 MICROGram(s) Nebulizer every 6 hours  lisinopril 5 milliGRAM(s) Oral daily  magnesium sulfate  IVPB 2 Gram(s) IV Intermittent once  magnesium sulfate  IVPB 1 Gram(s) IV Intermittent every 12 hours  metFORMIN 500 milliGRAM(s) Oral two times a day with meals  metoprolol tartrate 50 milliGRAM(s) Oral every 12 hours  pantoprazole    Tablet 40 milliGRAM(s) Oral before breakfast  polyethylene glycol 3350 17 Gram(s) Oral daily  potassium chloride    Tablet ER 20 milliEquivalent(s) Oral daily  simethicone 80 milliGRAM(s) Chew three times a day    MEDICATIONS  (PRN):  acetaminophen   Tablet .. 650 milliGRAM(s) Oral every 6 hours PRN Temp greater or equal to 38C (100.4F), Moderate Pain (4 - 6)  hydrALAZINE Injectable 10 milliGRAM(s) IV Push every 4 hours PRN SBP > 155    Post-Op Beta-Blockers: [x]Yes, []No: contraindication:  Post-Op Aspirin: [x]Yes,  []No: contraindication:  Post-Op Statin: [x]Yes, []No: contraindication:      70y Female status-post CABG & MV repair  - Case and plan discussed with CTU Intensivist and CT Surgeon - Dr. Rojas  - Continue CTU supportive care    - Continue DVT/GI prophylaxis  - Incentive Spirometry 10 times an hour  - Continue to advance physical activity as tolerated and continue PT/OT as directed  1. CAD s/p CABGx1: Continue ASA, statin, BB  2. HTN: cont norvasc, lopressor and lisinopril  3. A. Fib: cont lopressor, amio, mag 1gmb id  4. COPD/Hypoxia: cont nebs, mucinex, wean o2 as tolerated, encourage incentive spirometry  5. DM/Glucose Control: FS well controlled, A1c 6.1; cont metFORMIN 500 milliGRAM(s) Oral two times a day with meals w/insulin sliding scale  6. Right hemothorax- s/p pigtail drain, CXR improving   7. ID: Patient afebrile, leukocytosis improving off ABX. ID note appreciated; will cont off ABX.   8. Pancreatitis- trend amylase and lipase- trending down; cont to trend.    Social Service Disposition:  [facility]/c to [facility] in progress

## 2021-03-01 NOTE — PROGRESS NOTE ADULT - PROVIDER SPECIALTY LIST ADULT
CT Surgery
Critical Care
CT Surgery
Critical Care
Intervent Radiology
CT Surgery
Critical Care
Infectious Disease

## 2021-03-02 ENCOUNTER — TRANSCRIPTION ENCOUNTER (OUTPATIENT)
Age: 71
End: 2021-03-02

## 2021-03-02 LAB
CULTURE RESULTS: SIGNIFICANT CHANGE UP
SPECIMEN SOURCE: SIGNIFICANT CHANGE UP

## 2021-03-03 LAB
HCT VFR BLD CALC: 31.9 % — LOW (ref 37–47)
HGB BLD-MCNC: 10 G/DL — LOW (ref 12–16)
MCHC RBC-ENTMCNC: 29.6 PG — SIGNIFICANT CHANGE UP (ref 27–31)
MCHC RBC-ENTMCNC: 31.3 G/DL — LOW (ref 32–37)
MCV RBC AUTO: 94.4 FL — SIGNIFICANT CHANGE UP (ref 81–99)
NRBC # BLD: 0 /100 WBCS — SIGNIFICANT CHANGE UP (ref 0–0)
PLATELET # BLD AUTO: 276 K/UL — SIGNIFICANT CHANGE UP (ref 130–400)
RBC # BLD: 3.38 M/UL — LOW (ref 4.2–5.4)
RBC # FLD: 18.4 % — HIGH (ref 11.5–14.5)
WBC # BLD: 11.47 K/UL — HIGH (ref 4.8–10.8)
WBC # FLD AUTO: 11.47 K/UL — HIGH (ref 4.8–10.8)

## 2021-03-04 ENCOUNTER — APPOINTMENT (OUTPATIENT)
Dept: CARDIOTHORACIC SURGERY | Facility: CLINIC | Age: 71
End: 2021-03-04
Payer: MEDICARE

## 2021-03-04 ENCOUNTER — TRANSCRIPTION ENCOUNTER (OUTPATIENT)
Age: 71
End: 2021-03-04

## 2021-03-04 VITALS
WEIGHT: 207 LBS | RESPIRATION RATE: 18 BRPM | BODY MASS INDEX: 41.73 KG/M2 | HEART RATE: 74 BPM | DIASTOLIC BLOOD PRESSURE: 65 MMHG | TEMPERATURE: 97.5 F | OXYGEN SATURATION: 95 % | SYSTOLIC BLOOD PRESSURE: 96 MMHG | HEIGHT: 59 IN

## 2021-03-04 PROCEDURE — 99024 POSTOP FOLLOW-UP VISIT: CPT

## 2021-03-04 RX ORDER — ACETAMINOPHEN 325 MG/1
325 TABLET ORAL
Refills: 0 | Status: ACTIVE | COMMUNITY

## 2021-03-04 RX ORDER — ROSUVASTATIN CALCIUM 40 MG/1
40 TABLET, FILM COATED ORAL DAILY
Refills: 0 | Status: DISCONTINUED | COMMUNITY
End: 2021-03-04

## 2021-03-04 RX ORDER — LISINOPRIL AND HYDROCHLOROTHIAZIDE TABLETS 10; 12.5 MG/1; MG/1
10-12.5 TABLET ORAL
Refills: 0 | Status: DISCONTINUED | COMMUNITY
End: 2021-03-04

## 2021-03-05 ENCOUNTER — TRANSCRIPTION ENCOUNTER (OUTPATIENT)
Age: 71
End: 2021-03-05

## 2021-03-08 ENCOUNTER — TRANSCRIPTION ENCOUNTER (OUTPATIENT)
Age: 71
End: 2021-03-08

## 2021-03-09 DIAGNOSIS — I50.30 UNSPECIFIED DIASTOLIC (CONGESTIVE) HEART FAILURE: ICD-10-CM

## 2021-03-09 DIAGNOSIS — I95.9 HYPOTENSION, UNSPECIFIED: ICD-10-CM

## 2021-03-09 DIAGNOSIS — D62 ACUTE POSTHEMORRHAGIC ANEMIA: ICD-10-CM

## 2021-03-09 DIAGNOSIS — H40.9 UNSPECIFIED GLAUCOMA: ICD-10-CM

## 2021-03-09 DIAGNOSIS — K59.00 CONSTIPATION, UNSPECIFIED: ICD-10-CM

## 2021-03-09 DIAGNOSIS — R00.1 BRADYCARDIA, UNSPECIFIED: ICD-10-CM

## 2021-03-09 DIAGNOSIS — I48.91 UNSPECIFIED ATRIAL FIBRILLATION: ICD-10-CM

## 2021-03-09 DIAGNOSIS — I27.20 PULMONARY HYPERTENSION, UNSPECIFIED: ICD-10-CM

## 2021-03-09 DIAGNOSIS — E87.6 HYPOKALEMIA: ICD-10-CM

## 2021-03-09 DIAGNOSIS — E11.65 TYPE 2 DIABETES MELLITUS WITH HYPERGLYCEMIA: ICD-10-CM

## 2021-03-09 DIAGNOSIS — Z99.89 DEPENDENCE ON OTHER ENABLING MACHINES AND DEVICES: ICD-10-CM
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
## 2021-03-09 DIAGNOSIS — R11.0 NAUSEA: ICD-10-CM
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
## 2021-03-09 DIAGNOSIS — I11.0 HYPERTENSIVE HEART DISEASE WITH HEART FAILURE: ICD-10-CM

## 2021-03-09 DIAGNOSIS — I49.3 VENTRICULAR PREMATURE DEPOLARIZATION: ICD-10-CM

## 2021-03-09 DIAGNOSIS — K02.9 DENTAL CARIES, UNSPECIFIED: ICD-10-CM

## 2021-03-09 DIAGNOSIS — I97.190 OTHER POSTPROCEDURAL CARDIAC FUNCTIONAL DISTURBANCES FOLLOWING CARDIAC SURGERY: ICD-10-CM

## 2021-03-09 DIAGNOSIS — I25.10 ATHEROSCLEROTIC HEART DISEASE OF NATIVE CORONARY ARTERY WITHOUT ANGINA PECTORIS: ICD-10-CM

## 2021-03-09 DIAGNOSIS — J98.11 ATELECTASIS: ICD-10-CM

## 2021-03-09 DIAGNOSIS — D69.6 THROMBOCYTOPENIA, UNSPECIFIED: ICD-10-CM

## 2021-03-09 DIAGNOSIS — J96.90 RESPIRATORY FAILURE, UNSPECIFIED, UNSPECIFIED WHETHER WITH HYPOXIA OR HYPERCAPNIA: ICD-10-CM

## 2021-03-09 DIAGNOSIS — I34.0 NONRHEUMATIC MITRAL (VALVE) INSUFFICIENCY: ICD-10-CM

## 2021-03-09 DIAGNOSIS — E66.01 MORBID (SEVERE) OBESITY DUE TO EXCESS CALORIES: ICD-10-CM

## 2021-03-09 DIAGNOSIS — G47.33 OBSTRUCTIVE SLEEP APNEA (ADULT) (PEDIATRIC): ICD-10-CM

## 2021-03-09 DIAGNOSIS — K85.90 ACUTE PANCREATITIS WITHOUT NECROSIS OR INFECTION, UNSPECIFIED: ICD-10-CM

## 2021-03-09 DIAGNOSIS — J94.2 HEMOTHORAX: ICD-10-CM

## 2021-03-09 DIAGNOSIS — F41.9 ANXIETY DISORDER, UNSPECIFIED: ICD-10-CM

## 2021-03-09 DIAGNOSIS — E78.5 HYPERLIPIDEMIA, UNSPECIFIED: ICD-10-CM

## 2021-03-10 ENCOUNTER — TRANSCRIPTION ENCOUNTER (OUTPATIENT)
Age: 71
End: 2021-03-10

## 2021-03-17 ENCOUNTER — TRANSCRIPTION ENCOUNTER (OUTPATIENT)
Age: 71
End: 2021-03-17

## 2021-03-18 ENCOUNTER — NON-APPOINTMENT (OUTPATIENT)
Age: 71
End: 2021-03-18

## 2021-03-18 ENCOUNTER — APPOINTMENT (OUTPATIENT)
Dept: CARDIOTHORACIC SURGERY | Facility: CLINIC | Age: 71
End: 2021-03-18
Payer: MEDICARE

## 2021-03-18 VITALS
DIASTOLIC BLOOD PRESSURE: 57 MMHG | BODY MASS INDEX: 41.23 KG/M2 | TEMPERATURE: 98 F | RESPIRATION RATE: 12 BRPM | HEIGHT: 60 IN | OXYGEN SATURATION: 94 % | WEIGHT: 210 LBS | SYSTOLIC BLOOD PRESSURE: 97 MMHG | HEART RATE: 76 BPM

## 2021-03-18 PROCEDURE — 99024 POSTOP FOLLOW-UP VISIT: CPT

## 2021-03-18 RX ORDER — ACETAZOLAMIDE 250 MG/1
TABLET ORAL
Refills: 0 | Status: DISCONTINUED | COMMUNITY
End: 2021-03-18

## 2021-03-18 RX ORDER — BUMETANIDE 1 MG/1
1 TABLET ORAL
Refills: 0 | Status: DISCONTINUED | COMMUNITY
End: 2021-03-18

## 2021-03-22 ENCOUNTER — TRANSCRIPTION ENCOUNTER (OUTPATIENT)
Age: 71
End: 2021-03-22

## 2021-03-23 ENCOUNTER — NON-APPOINTMENT (OUTPATIENT)
Age: 71
End: 2021-03-23

## 2021-03-23 ENCOUNTER — APPOINTMENT (OUTPATIENT)
Dept: CARE COORDINATION | Facility: HOME HEALTH | Age: 71
End: 2021-03-23
Payer: MEDICARE

## 2021-03-23 PROCEDURE — 99024 POSTOP FOLLOW-UP VISIT: CPT

## 2021-03-23 RX ORDER — OXYBUTYNIN CHLORIDE 5 MG/1
5 TABLET ORAL
Refills: 0 | Status: DISCONTINUED | COMMUNITY
End: 2021-03-23

## 2021-03-23 RX ORDER — HEPARIN SODIUM 10000 [USP'U]/100ML
100-5 INJECTION, SOLUTION INTRAVENOUS
Refills: 0 | Status: DISCONTINUED | COMMUNITY
End: 2021-03-23

## 2021-03-23 RX ORDER — PANTOPRAZOLE SODIUM 40 MG/10ML
40 INJECTION, POWDER, FOR SOLUTION INTRAVENOUS
Refills: 0 | Status: ACTIVE | COMMUNITY

## 2021-03-23 RX ORDER — IPRATROPIUM BROMIDE 42 UG/1
SPRAY NASAL
Refills: 0 | Status: DISCONTINUED | COMMUNITY
End: 2021-03-23

## 2021-03-23 RX ORDER — OXYBUTYNIN CHLORIDE 10 MG/1
10 TABLET, EXTENDED RELEASE ORAL DAILY
Refills: 0 | Status: ACTIVE | COMMUNITY
Start: 2021-03-23

## 2021-03-23 RX ORDER — ACETAZOLAMIDE 250 MG/1
250 TABLET ORAL DAILY
Refills: 0 | Status: ACTIVE | COMMUNITY
Start: 2021-03-23

## 2021-03-23 RX ORDER — POTASSIUM CHLORIDE 10 MEQ
CAPSULE, EXTENDED RELEASE ORAL
Refills: 0 | Status: DISCONTINUED | COMMUNITY
End: 2021-03-23

## 2021-03-23 RX ORDER — MELOXICAM 15 MG/1
15 TABLET ORAL
Refills: 0 | Status: DISCONTINUED | COMMUNITY
End: 2021-03-23

## 2021-03-23 RX ORDER — AMLODIPINE BESYLATE 5 MG/1
5 TABLET ORAL
Refills: 0 | Status: ACTIVE | COMMUNITY

## 2021-03-23 RX ORDER — GUAIFENESIN 600 MG/1
600 TABLET, EXTENDED RELEASE ORAL
Refills: 0 | Status: DISCONTINUED | COMMUNITY
End: 2021-03-23

## 2021-03-23 NOTE — REVIEW OF SYSTEMS
[Nosebleeds] : no nosebleeds [Nasal Discharge] : no nasal discharge [Sore Throat] : no sore throat [Hoarseness] : no hoarseness

## 2021-03-23 NOTE — ASSESSMENT
[FreeTextEntry1] : Overweight, older female doing well s/p CABG/MVR.\par \par \par POC:\par 1. Daily weights and showers.\par 2. Continue with current meds. Pt. to obtain missing amiodarone, Bumex & Diamox today.\par 3. Keep legs elevated.\par 4. Incentive spirometry.\par 5. Homecare- [facility].\par 6. Diet- low salt, low fat.\par 7. f/u appts - CTS, Cardiology & PCP.\par 8. [facility] team will continue to f/u with pt status. \par Pt agrees to call with any questions, issues or concerns.\par

## 2021-03-23 NOTE — HISTORY OF PRESENT ILLNESS
[FreeTextEntry1] : As per EMR:\par \par Hospital Course: Ms. ADRIAN SAMUELS 70 year F, former smoker, with PMH include HTN, HLD, Lymphedema, Sciatica, Spinal Stenosis,JUAN PABLO on CPAP at night, glaucoma. Symptoms include SOB which has recently improved. Recent Echocardiogram showed peak/mean gradient of 20.2/9.68 with ELIAS 0.9 and moderate MR. NYHA class II. Her cardiac w/u revealed single vessel CAD and moderate to MR. On, 02/12/21, she underwent myocardial revascularization and Mitral valve repair. Post-operatively, hospital course was significant for post-op hemothorax progressing to hypoxic respiratory failure requiring ventilator support. Patient was extubated and underwent synchronized cardioversion for atrial fibrillation and maintained in NSR with amiodarone. Patient continued to have leukocytosis with elevated amylase/lipase now downtrending and remained stable off antibiotics as per ID. Patient remained hemodynamically stable there-after. Patient became physically deconditioned during her prolonged hospital course and was discharged to [facility] for further physical therapy.\par \par Pt. was discharged home from rehab yesterday. She reports not having all of her medications but is calling [facility] today to see if they can deliver them. HCS to start tomorrow.

## 2021-03-26 ENCOUNTER — TRANSCRIPTION ENCOUNTER (OUTPATIENT)
Age: 71
End: 2021-03-26

## 2021-03-31 ENCOUNTER — TRANSCRIPTION ENCOUNTER (OUTPATIENT)
Age: 71
End: 2021-03-31

## 2021-04-07 ENCOUNTER — APPOINTMENT (OUTPATIENT)
Dept: CARDIOLOGY | Facility: CLINIC | Age: 71
End: 2021-04-07
Payer: MEDICARE

## 2021-04-07 VITALS — RESPIRATION RATE: 18 BRPM | HEART RATE: 72 BPM

## 2021-04-07 VITALS — TEMPERATURE: 97.6 F | WEIGHT: 207 LBS | BODY MASS INDEX: 40.64 KG/M2 | HEIGHT: 60 IN

## 2021-04-07 VITALS — SYSTOLIC BLOOD PRESSURE: 118 MMHG | DIASTOLIC BLOOD PRESSURE: 70 MMHG

## 2021-04-07 PROCEDURE — 93000 ELECTROCARDIOGRAM COMPLETE: CPT

## 2021-04-07 PROCEDURE — 99214 OFFICE O/P EST MOD 30 MIN: CPT

## 2021-04-07 PROCEDURE — 99072 ADDL SUPL MATRL&STAF TM PHE: CPT

## 2021-04-07 RX ORDER — FUROSEMIDE 20 MG/1
20 TABLET ORAL
Refills: 0 | Status: ACTIVE | COMMUNITY

## 2021-04-07 RX ORDER — OMEPRAZOLE 20 MG/1
20 CAPSULE, DELAYED RELEASE ORAL
Refills: 0 | Status: ACTIVE | COMMUNITY

## 2021-04-15 ENCOUNTER — APPOINTMENT (OUTPATIENT)
Dept: PLASTIC SURGERY | Facility: CLINIC | Age: 71
End: 2021-04-15
Payer: MEDICARE

## 2021-04-15 VITALS — BODY MASS INDEX: 40.64 KG/M2 | HEIGHT: 60 IN | WEIGHT: 207 LBS

## 2021-04-15 PROCEDURE — 99203 OFFICE O/P NEW LOW 30 MIN: CPT

## 2021-04-15 PROCEDURE — 99072 ADDL SUPL MATRL&STAF TM PHE: CPT

## 2021-04-15 NOTE — ASSESSMENT
[FreeTextEntry1] : Regarding the reconstruction after skin cancer  surgery, we discussed scarring, risk of repeat procedure, poor wound healing, need for additional revisionary surgery,asymmetry, dissatisfaction with the outcome, and unplanned surgery in the future.  All questions were answered.  All risks were well understood by the patient.\par \par Regarding the reconstruction after Mohs surgery, we discussed scarring, risk of repeat procedure, poor wound healing, need for additional revisionary surgery,asymmetry, dissatisfaction with the outcome, and unplanned surgery in the future.  All questions were answered.  All risks were well understood by the patient.\par \par Due to COVID 19, pre-visit patient instructions were explained to the patient and their symptoms were checked upon arrival.  \par Masks were used by the health care providers and staff and the examination room was cleaned after the patient visit was completed.\par \par Photos taken with patient permission.\par \par Mohs prelim June 22\par \par

## 2021-04-15 NOTE — PHYSICAL EXAM
[NI] : Normal [de-identified] : right nasal sidewalllesion occupying most of ala.  will likely be larger Mohs defect and may require mutli-stage reconstruction\par well healed right chin biopsy site

## 2021-04-15 NOTE — HISTORY OF PRESENT ILLNESS
[FreeTextEntry1] : 70 yr old woman w right nasal BCC \par to have Mohs w [facility] June 21 and here for recon options\par \par also w right chin BCC on biopsy 1/25/2021 that [facility] in planning addressing June 8\par \par no prior h/o skin cancer\par nonsmoker\par \par using walker to help in recovery from B/ L hip replacments\par also had CABG and MV repair w Im[facility]  2/2021\par \par

## 2021-05-20 NOTE — ED PROVIDER NOTE - DISCHARGE DATE
Met with pt in preop. After consent was confirmed, pt was taken to [facility] for left sentinel node injections. Pt tolerated procedure well and taken back to preop. Dr Fuentes updated.   
18-Mar-2018

## 2021-06-03 ENCOUNTER — RESULT REVIEW (OUTPATIENT)
Age: 71
End: 2021-06-03

## 2021-06-03 ENCOUNTER — OUTPATIENT (OUTPATIENT)
Dept: OUTPATIENT SERVICES | Facility: HOSPITAL | Age: 71
LOS: 1 days | Discharge: HOME | End: 2021-06-03
Payer: MEDICARE

## 2021-06-03 VITALS
HEIGHT: 62 IN | RESPIRATION RATE: 17 BRPM | HEART RATE: 76 BPM | OXYGEN SATURATION: 96 % | SYSTOLIC BLOOD PRESSURE: 152 MMHG | DIASTOLIC BLOOD PRESSURE: 70 MMHG | TEMPERATURE: 99 F | WEIGHT: 242.07 LBS

## 2021-06-03 DIAGNOSIS — Z01.818 ENCOUNTER FOR OTHER PREPROCEDURAL EXAMINATION: ICD-10-CM

## 2021-06-03 DIAGNOSIS — Z98.890 OTHER SPECIFIED POSTPROCEDURAL STATES: Chronic | ICD-10-CM

## 2021-06-03 DIAGNOSIS — Z95.1 PRESENCE OF AORTOCORONARY BYPASS GRAFT: Chronic | ICD-10-CM

## 2021-06-03 DIAGNOSIS — C44.311 BASAL CELL CARCINOMA OF SKIN OF NOSE: ICD-10-CM

## 2021-06-03 LAB
A1C WITH ESTIMATED AVERAGE GLUCOSE RESULT: 5.6 % — SIGNIFICANT CHANGE UP (ref 4–5.6)
ALBUMIN SERPL ELPH-MCNC: 4.5 G/DL — SIGNIFICANT CHANGE UP (ref 3.5–5.2)
ALP SERPL-CCNC: 72 U/L — SIGNIFICANT CHANGE UP (ref 30–115)
ALT FLD-CCNC: 17 U/L — SIGNIFICANT CHANGE UP (ref 0–41)
ANION GAP SERPL CALC-SCNC: 10 MMOL/L — SIGNIFICANT CHANGE UP (ref 7–14)
APTT BLD: 31 SEC — SIGNIFICANT CHANGE UP (ref 27–39.2)
AST SERPL-CCNC: 17 U/L — SIGNIFICANT CHANGE UP (ref 0–41)
BASOPHILS # BLD AUTO: 0.04 K/UL — SIGNIFICANT CHANGE UP (ref 0–0.2)
BASOPHILS NFR BLD AUTO: 0.5 % — SIGNIFICANT CHANGE UP (ref 0–1)
BILIRUB SERPL-MCNC: 0.4 MG/DL — SIGNIFICANT CHANGE UP (ref 0.2–1.2)
BUN SERPL-MCNC: 23 MG/DL — HIGH (ref 10–20)
CALCIUM SERPL-MCNC: 9.6 MG/DL — SIGNIFICANT CHANGE UP (ref 8.5–10.1)
CHLORIDE SERPL-SCNC: 101 MMOL/L — SIGNIFICANT CHANGE UP (ref 98–110)
CO2 SERPL-SCNC: 28 MMOL/L — SIGNIFICANT CHANGE UP (ref 17–32)
CREAT SERPL-MCNC: 1 MG/DL — SIGNIFICANT CHANGE UP (ref 0.7–1.5)
EOSINOPHIL # BLD AUTO: 0.17 K/UL — SIGNIFICANT CHANGE UP (ref 0–0.7)
EOSINOPHIL NFR BLD AUTO: 2.3 % — SIGNIFICANT CHANGE UP (ref 0–8)
ESTIMATED AVERAGE GLUCOSE: 114 MG/DL — SIGNIFICANT CHANGE UP (ref 68–114)
GLUCOSE SERPL-MCNC: 105 MG/DL — HIGH (ref 70–99)
HCT VFR BLD CALC: 39.7 % — SIGNIFICANT CHANGE UP (ref 37–47)
HGB BLD-MCNC: 12.9 G/DL — SIGNIFICANT CHANGE UP (ref 12–16)
IMM GRANULOCYTES NFR BLD AUTO: 0.3 % — SIGNIFICANT CHANGE UP (ref 0.1–0.3)
INR BLD: 1.04 RATIO — SIGNIFICANT CHANGE UP (ref 0.65–1.3)
LYMPHOCYTES # BLD AUTO: 1.55 K/UL — SIGNIFICANT CHANGE UP (ref 1.2–3.4)
LYMPHOCYTES # BLD AUTO: 21.1 % — SIGNIFICANT CHANGE UP (ref 20.5–51.1)
MCHC RBC-ENTMCNC: 31.2 PG — HIGH (ref 27–31)
MCHC RBC-ENTMCNC: 32.5 G/DL — SIGNIFICANT CHANGE UP (ref 32–37)
MCV RBC AUTO: 96.1 FL — SIGNIFICANT CHANGE UP (ref 81–99)
MONOCYTES # BLD AUTO: 0.48 K/UL — SIGNIFICANT CHANGE UP (ref 0.1–0.6)
MONOCYTES NFR BLD AUTO: 6.5 % — SIGNIFICANT CHANGE UP (ref 1.7–9.3)
NEUTROPHILS # BLD AUTO: 5.09 K/UL — SIGNIFICANT CHANGE UP (ref 1.4–6.5)
NEUTROPHILS NFR BLD AUTO: 69.3 % — SIGNIFICANT CHANGE UP (ref 42.2–75.2)
NRBC # BLD: 0 /100 WBCS — SIGNIFICANT CHANGE UP (ref 0–0)
PLATELET # BLD AUTO: 243 K/UL — SIGNIFICANT CHANGE UP (ref 130–400)
POTASSIUM SERPL-MCNC: 4.2 MMOL/L — SIGNIFICANT CHANGE UP (ref 3.5–5)
POTASSIUM SERPL-SCNC: 4.2 MMOL/L — SIGNIFICANT CHANGE UP (ref 3.5–5)
PROT SERPL-MCNC: 6.4 G/DL — SIGNIFICANT CHANGE UP (ref 6–8)
PROTHROM AB SERPL-ACNC: 12 SEC — SIGNIFICANT CHANGE UP (ref 9.95–12.87)
RBC # BLD: 4.13 M/UL — LOW (ref 4.2–5.4)
RBC # FLD: 13.4 % — SIGNIFICANT CHANGE UP (ref 11.5–14.5)
SODIUM SERPL-SCNC: 139 MMOL/L — SIGNIFICANT CHANGE UP (ref 135–146)
WBC # BLD: 7.35 K/UL — SIGNIFICANT CHANGE UP (ref 4.8–10.8)
WBC # FLD AUTO: 7.35 K/UL — SIGNIFICANT CHANGE UP (ref 4.8–10.8)

## 2021-06-03 PROCEDURE — 71046 X-RAY EXAM CHEST 2 VIEWS: CPT | Mod: 26

## 2021-06-03 PROCEDURE — 93010 ELECTROCARDIOGRAM REPORT: CPT

## 2021-06-03 NOTE — H&P PST ADULT - NSICDXPASTMEDICALHX_GEN_ALL_CORE_FT
PAST MEDICAL HISTORY:  CAD (coronary artery disease)     Chronic sciatica     Glaucoma     H/O mitral valve stenosis     H/O sleep apnea on CPAP    Hemothorax s/p CABG X1 AND MITRAL REPAIR POST-OP    Hyperlipidemia, unspecified hyperlipidemia type     Hypertension, unspecified type     Lymphedema, limb LLE    OA (osteoarthritis)     Skin cancer, basal cell     Type 2 diabetes mellitus without complication, without long-term current use of insulin

## 2021-06-03 NOTE — H&P PST ADULT - NSICDXPASTSURGICALHX_GEN_ALL_CORE_FT
PAST SURGICAL HISTORY:  History of carpal tunnel surgery     History of hip surgery bilateral hip    S/P CABG (coronary artery bypass graft) CABG X1 and repair of mitral valve 2/9/21

## 2021-06-03 NOTE — H&P PST ADULT - NSICDXFAMILYHX_GEN_ALL_CORE_FT
FAMILY HISTORY:  Father  Still living? No  FH: heart disease, Age at diagnosis: Age Unknown    Mother  Still living? No  Family history of diabetes mellitus (DM), Age at diagnosis: Age Unknown

## 2021-06-03 NOTE — H&P PST ADULT - REASON FOR ADMISSION
70 Y/O FEMALE HERE FOR PRE-ADMISSION SURGICAL TESTING. PATIENT REPORTS SHE WAS WEARING HER CPAP MACHINE AND SHE IRRITATED AN AREA TO THE RIGHT SIDE OF HER NOSE. SHE WENT TO THE DERMATOLOGIST. BIOPSY REVEALED + BASAL CELL CA TO RIGHT SIDE OF THE NOSE. SHE'S GOING FOR A MOH'S PROCEDURE.  NOW FOR SCHEDULED PROCEDURE.

## 2021-06-03 NOTE — H&P PST ADULT - HISTORY OF PRESENT ILLNESS
PATIENT DENIES CHEST PAIN, SHORTNESS OF BREATH, PALPITATIONS, COUGHING, FEVER, DYSURIA.  CAN WALK UP 1 FLIGHTS OF STEPS WITHOUT SOB, AND REPORTS JACOME.    NO COUGH, FEVER, SORE THROAT, HEADACHE, LOSS OF TASTE OR SMELL. NO KNOWN EXPOSURE TO ANYONE WITH COVID. PATIENT WAS INSTRUCTED TO ISOLATE FROM NOW UNTIL THE SURGERY.  FULLY VACCINAYED    Anesthesia Alert  + Difficult Airway (CLASS IV)  NO--History of neck surgery or radiation  NO--Limited ROM of neck  NO--History of Malignant hyperthermia  NO--Personal or family history of Pseudocholinesterase deficiency  NO--Prior Anesthesia Complication  + Latex Allergy (OR BOOKING KISSY AWARE)  NO--Loose teeth  NO--History of Rheumatoid Arthritis  + JUAN PABLO (CPAP)  NO--BLEEDING RISK

## 2021-06-20 ENCOUNTER — LABORATORY RESULT (OUTPATIENT)
Age: 71
End: 2021-06-20

## 2021-06-20 ENCOUNTER — OUTPATIENT (OUTPATIENT)
Dept: OUTPATIENT SERVICES | Facility: HOSPITAL | Age: 71
LOS: 1 days | Discharge: HOME | End: 2021-06-20

## 2021-06-20 DIAGNOSIS — Z98.890 OTHER SPECIFIED POSTPROCEDURAL STATES: Chronic | ICD-10-CM

## 2021-06-20 DIAGNOSIS — Z95.1 PRESENCE OF AORTOCORONARY BYPASS GRAFT: Chronic | ICD-10-CM

## 2021-06-20 DIAGNOSIS — Z11.59 ENCOUNTER FOR SCREENING FOR OTHER VIRAL DISEASES: ICD-10-CM

## 2021-06-20 PROBLEM — I25.10 ATHEROSCLEROTIC HEART DISEASE OF NATIVE CORONARY ARTERY WITHOUT ANGINA PECTORIS: Chronic | Status: ACTIVE | Noted: 2021-06-03

## 2021-06-20 PROBLEM — I89.0 LYMPHEDEMA, NOT ELSEWHERE CLASSIFIED: Chronic | Status: ACTIVE | Noted: 2021-06-03

## 2021-06-20 PROBLEM — M19.90 UNSPECIFIED OSTEOARTHRITIS, UNSPECIFIED SITE: Chronic | Status: ACTIVE | Noted: 2021-06-03

## 2021-06-20 PROBLEM — J94.2 HEMOTHORAX: Chronic | Status: ACTIVE | Noted: 2021-06-03

## 2021-06-20 PROBLEM — C44.91 BASAL CELL CARCINOMA OF SKIN, UNSPECIFIED: Chronic | Status: ACTIVE | Noted: 2021-06-03

## 2021-06-20 PROBLEM — Z86.79 PERSONAL HISTORY OF OTHER DISEASES OF THE CIRCULATORY SYSTEM: Chronic | Status: ACTIVE | Noted: 2021-06-03

## 2021-06-22 ENCOUNTER — APPOINTMENT (OUTPATIENT)
Dept: CARDIOLOGY | Facility: CLINIC | Age: 71
End: 2021-06-22
Payer: MEDICARE

## 2021-06-22 VITALS — RESPIRATION RATE: 18 BRPM | SYSTOLIC BLOOD PRESSURE: 120 MMHG | HEART RATE: 72 BPM | DIASTOLIC BLOOD PRESSURE: 80 MMHG

## 2021-06-22 PROCEDURE — 99214 OFFICE O/P EST MOD 30 MIN: CPT

## 2021-06-22 PROCEDURE — 93000 ELECTROCARDIOGRAM COMPLETE: CPT | Mod: NC

## 2021-06-22 RX ORDER — ROSUVASTATIN CALCIUM 20 MG/1
20 TABLET, FILM COATED ORAL DAILY
Refills: 0 | Status: ACTIVE | COMMUNITY

## 2021-06-22 NOTE — PHYSICAL EXAM
[General Appearance - Well Developed] : well developed [Normal Appearance] : normal appearance [Well Groomed] : well groomed [General Appearance - Well Nourished] : well nourished [No Deformities] : no deformities [General Appearance - In No Acute Distress] : no acute distress [Normal Conjunctiva] : the conjunctiva exhibited no abnormalities [Eyelids - No Xanthelasma] : the eyelids demonstrated no xanthelasmas [Normal Oral Mucosa] : normal oral mucosa [No Oral Pallor] : no oral pallor [No Oral Cyanosis] : no oral cyanosis [Normal Jugular Venous A Waves Present] : normal jugular venous A waves present [Normal Jugular Venous V Waves Present] : normal jugular venous V waves present [No Jugular Venous Guaman A Waves] : no jugular venous guaman A waves [Heart Sounds] : normal S1 and S2 [Heart Rate And Rhythm] : heart rate and rhythm were normal [Murmurs] : no murmurs present [Respiration, Rhythm And Depth] : normal respiratory rhythm and effort [Exaggerated Use Of Accessory Muscles For Inspiration] : no accessory muscle use [Auscultation Breath Sounds / Voice Sounds] : lungs were clear to auscultation bilaterally [Bowel Sounds] : normal bowel sounds [Abdomen Soft] : soft [Abdomen Tenderness] : non-tender [Abdomen Mass (___ Cm)] : no abdominal mass palpated [Abnormal Walk] : normal gait [FreeTextEntry1] : walks with walker [Nail Clubbing] : no clubbing of the fingernails [Cyanosis, Localized] : no localized cyanosis [Petechial Hemorrhages (___cm)] : no petechial hemorrhages [Skin Color & Pigmentation] : normal skin color and pigmentation [] : no rash [No Venous Stasis] : no venous stasis [Skin Lesions] : no skin lesions [No Skin Ulcers] : no skin ulcer [No Xanthoma] : no  xanthoma was observed [Oriented To Time, Place, And Person] : oriented to person, place, and time

## 2021-06-23 ENCOUNTER — APPOINTMENT (OUTPATIENT)
Dept: PLASTIC SURGERY | Facility: AMBULATORY SURGERY CENTER | Age: 71
End: 2021-06-23
Payer: MEDICARE

## 2021-06-23 ENCOUNTER — OUTPATIENT (OUTPATIENT)
Dept: OUTPATIENT SERVICES | Facility: HOSPITAL | Age: 71
LOS: 1 days | Discharge: HOME | End: 2021-06-23

## 2021-06-23 VITALS
HEART RATE: 59 BPM | HEIGHT: 60 IN | WEIGHT: 203.93 LBS | RESPIRATION RATE: 18 BRPM | SYSTOLIC BLOOD PRESSURE: 135 MMHG | OXYGEN SATURATION: 96 % | DIASTOLIC BLOOD PRESSURE: 61 MMHG | TEMPERATURE: 99 F

## 2021-06-23 VITALS
OXYGEN SATURATION: 95 % | RESPIRATION RATE: 18 BRPM | HEART RATE: 65 BPM | SYSTOLIC BLOOD PRESSURE: 121 MMHG | DIASTOLIC BLOOD PRESSURE: 63 MMHG

## 2021-06-23 DIAGNOSIS — Z98.890 OTHER SPECIFIED POSTPROCEDURAL STATES: Chronic | ICD-10-CM

## 2021-06-23 DIAGNOSIS — Z95.1 PRESENCE OF AORTOCORONARY BYPASS GRAFT: Chronic | ICD-10-CM

## 2021-06-23 LAB — GLUCOSE BLDC GLUCOMTR-MCNC: 116 MG/DL — HIGH (ref 70–99)

## 2021-06-23 PROCEDURE — 14060 TIS TRNFR E/N/E/L 10 SQ CM/<: CPT

## 2021-06-23 PROCEDURE — 13151 CMPLX RPR E/N/E/L 1.1-2.5 CM: CPT | Mod: 59

## 2021-06-23 RX ORDER — TRAMADOL HYDROCHLORIDE 50 MG/1
1 TABLET ORAL
Qty: 10 | Refills: 0
Start: 2021-06-23

## 2021-06-23 RX ORDER — MORPHINE SULFATE 50 MG/1
2 CAPSULE, EXTENDED RELEASE ORAL
Refills: 0 | Status: DISCONTINUED | OUTPATIENT
Start: 2021-06-23 | End: 2021-06-23

## 2021-06-23 RX ORDER — ONDANSETRON 8 MG/1
4 TABLET, FILM COATED ORAL ONCE
Refills: 0 | Status: DISCONTINUED | OUTPATIENT
Start: 2021-06-23 | End: 2021-07-07

## 2021-06-23 RX ORDER — SODIUM CHLORIDE 9 MG/ML
1000 INJECTION, SOLUTION INTRAVENOUS
Refills: 0 | Status: DISCONTINUED | OUTPATIENT
Start: 2021-06-23 | End: 2021-07-07

## 2021-06-23 RX ORDER — HYDROMORPHONE HYDROCHLORIDE 2 MG/ML
0.5 INJECTION INTRAMUSCULAR; INTRAVENOUS; SUBCUTANEOUS
Refills: 0 | Status: DISCONTINUED | OUTPATIENT
Start: 2021-06-23 | End: 2021-06-23

## 2021-06-23 RX ORDER — ACETAMINOPHEN 500 MG
650 TABLET ORAL ONCE
Refills: 0 | Status: DISCONTINUED | OUTPATIENT
Start: 2021-06-23 | End: 2021-07-07

## 2021-06-23 RX ORDER — CEPHALEXIN 500 MG
1 CAPSULE ORAL
Qty: 20 | Refills: 0
Start: 2021-06-23 | End: 2021-06-27

## 2021-06-23 RX ADMIN — SODIUM CHLORIDE 100 MILLILITER(S): 9 INJECTION, SOLUTION INTRAVENOUS at 09:58

## 2021-06-23 NOTE — ASU PATIENT PROFILE, ADULT - PMH
CAD (coronary artery disease)    Chronic sciatica    Glaucoma    H/O mitral valve stenosis    H/O sleep apnea  on CPAP  Hemothorax  s/p CABG X1 AND MITRAL REPAIR POST-OP  Hyperlipidemia, unspecified hyperlipidemia type    Hypertension, unspecified type    Lymphedema, limb  LLE  OA (osteoarthritis)    Skin cancer, basal cell    Type 2 diabetes mellitus without complication, without long-term current use of insulin

## 2021-06-23 NOTE — ASU PATIENT PROFILE, ADULT - PSH
History of carpal tunnel surgery    History of hip surgery  bilateral hip  S/P CABG (coronary artery bypass graft)  CABG X1 and repair of mitral valve 2/9/21

## 2021-06-23 NOTE — ASU DISCHARGE PLAN (ADULT/PEDIATRIC) - ASU DC SPECIAL INSTRUCTIONSFT
-Keep dressing clean and dry. Do not remove.  -Take antibiotics for 5 days as prescribed.  -Take Tylenol as needed for pain. If not well controlled, take Tramadol as needed.  -Sleep with the head of the bed elevated using pillows to help prevent swelling.  -Apply ice 2-3x a day for the next 3 days, 10-15 minutes at a time.  -No driving if taking pain medication.  -Swelling, bruising, and numbness are all normal and are expected to improve over time.  -Call the office anytime for questions or concerns.  -Follow up Monday 6/28/21 for right nasal splint removal and Thursday 7/1/21 for suture removal. The office will call you tomorrow to confirm these appointments.

## 2021-06-23 NOTE — BRIEF OPERATIVE NOTE - NSICDXBRIEFPROCEDURE_GEN_ALL_CORE_FT
PROCEDURES:  Reconstruction of nose using local flap 23-Jun-2021 09:39:20 Reconstruction of right nasal Mohs defect with local flap from right cheek Galina Singh

## 2021-06-24 ENCOUNTER — APPOINTMENT (OUTPATIENT)
Dept: PLASTIC SURGERY | Facility: CLINIC | Age: 71
End: 2021-06-24

## 2021-06-24 ENCOUNTER — NON-APPOINTMENT (OUTPATIENT)
Age: 71
End: 2021-06-24

## 2021-06-26 DIAGNOSIS — Z48.3 AFTERCARE FOLLOWING SURGERY FOR NEOPLASM: ICD-10-CM

## 2021-06-26 DIAGNOSIS — G47.33 OBSTRUCTIVE SLEEP APNEA (ADULT) (PEDIATRIC): ICD-10-CM

## 2021-06-26 DIAGNOSIS — I34.2 NONRHEUMATIC MITRAL (VALVE) STENOSIS: ICD-10-CM

## 2021-06-26 DIAGNOSIS — M19.90 UNSPECIFIED OSTEOARTHRITIS, UNSPECIFIED SITE: ICD-10-CM

## 2021-06-26 DIAGNOSIS — Z79.84 LONG TERM (CURRENT) USE OF ORAL HYPOGLYCEMIC DRUGS: ICD-10-CM

## 2021-06-26 DIAGNOSIS — Z99.89 DEPENDENCE ON OTHER ENABLING MACHINES AND DEVICES: ICD-10-CM

## 2021-06-26 DIAGNOSIS — I25.10 ATHEROSCLEROTIC HEART DISEASE OF NATIVE CORONARY ARTERY WITHOUT ANGINA PECTORIS: ICD-10-CM

## 2021-06-26 DIAGNOSIS — Z96.643 PRESENCE OF ARTIFICIAL HIP JOINT, BILATERAL: ICD-10-CM

## 2021-06-26 DIAGNOSIS — Z95.1 PRESENCE OF AORTOCORONARY BYPASS GRAFT: ICD-10-CM

## 2021-06-26 DIAGNOSIS — H40.9 UNSPECIFIED GLAUCOMA: ICD-10-CM

## 2021-06-26 DIAGNOSIS — E78.5 HYPERLIPIDEMIA, UNSPECIFIED: ICD-10-CM

## 2021-06-26 DIAGNOSIS — E11.9 TYPE 2 DIABETES MELLITUS WITHOUT COMPLICATIONS: ICD-10-CM

## 2021-06-26 DIAGNOSIS — C44.311 BASAL CELL CARCINOMA OF SKIN OF NOSE: ICD-10-CM

## 2021-06-26 DIAGNOSIS — I10 ESSENTIAL (PRIMARY) HYPERTENSION: ICD-10-CM

## 2021-06-29 ENCOUNTER — APPOINTMENT (OUTPATIENT)
Dept: PLASTIC SURGERY | Facility: CLINIC | Age: 71
End: 2021-06-29

## 2021-07-01 ENCOUNTER — APPOINTMENT (OUTPATIENT)
Dept: PLASTIC SURGERY | Facility: CLINIC | Age: 71
End: 2021-07-01
Payer: MEDICARE

## 2021-07-01 PROCEDURE — 99024 POSTOP FOLLOW-UP VISIT: CPT

## 2021-07-01 NOTE — ASSESSMENT
[FreeTextEntry1] : 72 yo F with right nasal BCC now POD#8 s/p right nasal reconstruction with nasolabial flap. Doing well. \par \par - all sutures and nasal splint removed\par - daily Aquaphor\par - patient reassured\par - sleep with HOB elevation\par - f/u 2 weeks\par Seen with Dr. Tavera. \par \par Due to COVID 19, pre-visit patient instructions were explained to the patient and their symptoms were checked upon arrival.  \par Masks were used by the health care providers and staff and the examination room was cleaned after the patient visit was completed.\par \par \par \par

## 2021-07-01 NOTE — PHYSICAL EXAM
[de-identified] : right nasal sidewall flap viable and well perfused, nasolabial crease incision healing well, c/d/i, right nasal splint in place now removed, swelling as expected, no erythema, good overall contour  [de-identified] : NAD

## 2021-07-01 NOTE — HISTORY OF PRESENT ILLNESS
[FreeTextEntry1] : 70 yr old woman w right nasal BCC to have Mohs w Dr. Cole June 21 and here for recon options\par \par also w right chin BCC on biopsy 1/25/2021 that [facility] in planning addressing June 8\par \par no prior h/o skin cancer\par nonsmoker\par \par using walker to help in recovery from B/ L hip replacements\par also had CABG and MV repair w [facility]  2/2021\par \par Interval hx (7/1/21). Patient presents today POD#8 s/p right nasal reconstruction with nasolabial flap. Doing well but c/w facial swelling and bruising. Denies any f/c or bleeding. Completed oral antibiotics as prescribed. \par

## 2021-07-15 ENCOUNTER — APPOINTMENT (OUTPATIENT)
Dept: PLASTIC SURGERY | Facility: CLINIC | Age: 71
End: 2021-07-15
Payer: MEDICARE

## 2021-07-15 PROCEDURE — 99024 POSTOP FOLLOW-UP VISIT: CPT

## 2021-07-15 NOTE — PHYSICAL EXAM
[de-identified] : NAD [de-identified] : right nasal sidewall flap viable and well perfused, nasolabial crease incision healing well, c/d/i, right nasal splint in place now removed, swelling as expected, no erythema, good overall contour

## 2021-07-15 NOTE — ASSESSMENT
[FreeTextEntry1] : 72 yo F with right nasal BCC now POD#8 s/p right nasal reconstruction with nasolabial flap. Doing well. \par \par - all sutures and nasal splint removed\par - daily Aquaphor\par - patient reassured\par - sleep with HOB elevation\par - f/u 2 weeks\par Seen with Dr. Tavera. \par \par Due to COVID 19, pre-visit patient instructions were explained to the patient and their symptoms were checked upon arrival.  \par Masks were used by the health care providers and staff and the examination room was cleaned after the patient visit was completed.\par \par \par //\par \par doing well s/p Mohs recon w NL flap\par pt happy\par may require debulking\par f/u in Oct 2021\par \par Wound care instructions given.\par \par

## 2021-09-08 LAB
ALBUMIN SERPL ELPH-MCNC: 4.3 G/DL
ALP BLD-CCNC: 75 U/L
ALT SERPL-CCNC: 32 U/L
ANION GAP SERPL CALC-SCNC: 13 MMOL/L
AST SERPL-CCNC: 24 U/L
BILIRUB SERPL-MCNC: 0.5 MG/DL
BUN SERPL-MCNC: 31 MG/DL
CALCIUM SERPL-MCNC: 10.1 MG/DL
CHLORIDE SERPL-SCNC: 102 MMOL/L
CHOLEST SERPL-MCNC: 194 MG/DL
CO2 SERPL-SCNC: 28 MMOL/L
CREAT SERPL-MCNC: 1.2 MG/DL
ESTIMATED AVERAGE GLUCOSE: 108 MG/DL
GLUCOSE SERPL-MCNC: 101 MG/DL
HBA1C MFR BLD HPLC: 5.4 %
HDLC SERPL-MCNC: 59 MG/DL
LDLC SERPL CALC-MCNC: 115 MG/DL
NONHDLC SERPL-MCNC: 135 MG/DL
POTASSIUM SERPL-SCNC: 4.3 MMOL/L
PROT SERPL-MCNC: 6.8 G/DL
SODIUM SERPL-SCNC: 143 MMOL/L
TRIGL SERPL-MCNC: 104 MG/DL

## 2021-09-23 ENCOUNTER — APPOINTMENT (OUTPATIENT)
Dept: CARDIOLOGY | Facility: CLINIC | Age: 71
End: 2021-09-23
Payer: MEDICARE

## 2021-09-23 VITALS — WEIGHT: 210 LBS | HEIGHT: 60 IN | BODY MASS INDEX: 41.23 KG/M2

## 2021-09-23 VITALS — SYSTOLIC BLOOD PRESSURE: 110 MMHG | RESPIRATION RATE: 18 BRPM | HEART RATE: 68 BPM | DIASTOLIC BLOOD PRESSURE: 70 MMHG

## 2021-09-23 PROCEDURE — 93000 ELECTROCARDIOGRAM COMPLETE: CPT

## 2021-09-23 PROCEDURE — 99214 OFFICE O/P EST MOD 30 MIN: CPT

## 2021-09-23 RX ORDER — AMIODARONE HYDROCHLORIDE 200 MG/1
200 TABLET ORAL
Refills: 0 | Status: DISCONTINUED | COMMUNITY
End: 2021-09-23

## 2021-09-23 NOTE — PHYSICAL EXAM
[General Appearance - Well Developed] : well developed [Normal Appearance] : normal appearance [Well Groomed] : well groomed [General Appearance - Well Nourished] : well nourished [No Deformities] : no deformities [General Appearance - In No Acute Distress] : no acute distress [Normal Conjunctiva] : the conjunctiva exhibited no abnormalities [Eyelids - No Xanthelasma] : the eyelids demonstrated no xanthelasmas [Normal Oral Mucosa] : normal oral mucosa [No Oral Pallor] : no oral pallor [No Oral Cyanosis] : no oral cyanosis [Normal Jugular Venous A Waves Present] : normal jugular venous A waves present [Normal Jugular Venous V Waves Present] : normal jugular venous V waves present [No Jugular Venous Guaman A Waves] : no jugular venous guaman A waves [Heart Rate And Rhythm] : heart rate and rhythm were normal [Heart Sounds] : normal S1 and S2 [Murmurs] : no murmurs present [Respiration, Rhythm And Depth] : normal respiratory rhythm and effort [Exaggerated Use Of Accessory Muscles For Inspiration] : no accessory muscle use [Auscultation Breath Sounds / Voice Sounds] : lungs were clear to auscultation bilaterally [Bowel Sounds] : normal bowel sounds [Abdomen Soft] : soft [Abdomen Tenderness] : non-tender [Abdomen Mass (___ Cm)] : no abdominal mass palpated [Abnormal Walk] : normal gait [FreeTextEntry1] : walks with walker [Nail Clubbing] : no clubbing of the fingernails [Cyanosis, Localized] : no localized cyanosis [Petechial Hemorrhages (___cm)] : no petechial hemorrhages [Skin Color & Pigmentation] : normal skin color and pigmentation [] : no rash [No Venous Stasis] : no venous stasis [Skin Lesions] : no skin lesions [No Skin Ulcers] : no skin ulcer [No Xanthoma] : no  xanthoma was observed [Oriented To Time, Place, And Person] : oriented to person, place, and time

## 2021-09-28 ENCOUNTER — OUTPATIENT (OUTPATIENT)
Dept: OUTPATIENT SERVICES | Facility: HOSPITAL | Age: 71
LOS: 1 days | Discharge: HOME | End: 2021-09-28
Payer: MEDICARE

## 2021-09-28 DIAGNOSIS — R13.10 DYSPHAGIA, UNSPECIFIED: ICD-10-CM

## 2021-09-28 DIAGNOSIS — Z95.1 PRESENCE OF AORTOCORONARY BYPASS GRAFT: Chronic | ICD-10-CM

## 2021-09-28 DIAGNOSIS — Z98.890 OTHER SPECIFIED POSTPROCEDURAL STATES: Chronic | ICD-10-CM

## 2021-09-28 PROCEDURE — 74220 X-RAY XM ESOPHAGUS 1CNTRST: CPT | Mod: 26

## 2021-10-12 ENCOUNTER — INPATIENT (INPATIENT)
Facility: HOSPITAL | Age: 71
LOS: 1 days | Discharge: HOME | End: 2021-10-14
Attending: INTERNAL MEDICINE | Admitting: INTERNAL MEDICINE
Payer: MEDICARE

## 2021-10-12 ENCOUNTER — APPOINTMENT (OUTPATIENT)
Dept: PLASTIC SURGERY | Facility: CLINIC | Age: 71
End: 2021-10-12

## 2021-10-12 VITALS
OXYGEN SATURATION: 96 % | SYSTOLIC BLOOD PRESSURE: 149 MMHG | HEART RATE: 82 BPM | HEIGHT: 60 IN | RESPIRATION RATE: 20 BRPM | DIASTOLIC BLOOD PRESSURE: 65 MMHG | TEMPERATURE: 98 F | WEIGHT: 205.91 LBS

## 2021-10-12 DIAGNOSIS — Z98.890 OTHER SPECIFIED POSTPROCEDURAL STATES: Chronic | ICD-10-CM

## 2021-10-12 DIAGNOSIS — I26.99 OTHER PULMONARY EMBOLISM WITHOUT ACUTE COR PULMONALE: ICD-10-CM

## 2021-10-12 DIAGNOSIS — Z95.1 PRESENCE OF AORTOCORONARY BYPASS GRAFT: Chronic | ICD-10-CM

## 2021-10-12 LAB
ALBUMIN SERPL ELPH-MCNC: 4 G/DL — SIGNIFICANT CHANGE UP (ref 3.5–5.2)
ALP SERPL-CCNC: 61 U/L — SIGNIFICANT CHANGE UP (ref 30–115)
ALT FLD-CCNC: 82 U/L — HIGH (ref 0–41)
ANION GAP SERPL CALC-SCNC: 15 MMOL/L — HIGH (ref 7–14)
APTT BLD: 21.7 SEC — CRITICAL LOW (ref 27–39.2)
AST SERPL-CCNC: 26 U/L — SIGNIFICANT CHANGE UP (ref 0–41)
BASOPHILS # BLD AUTO: 0.03 K/UL — SIGNIFICANT CHANGE UP (ref 0–0.2)
BASOPHILS NFR BLD AUTO: 0.2 % — SIGNIFICANT CHANGE UP (ref 0–1)
BILIRUB SERPL-MCNC: 0.7 MG/DL — SIGNIFICANT CHANGE UP (ref 0.2–1.2)
BUN SERPL-MCNC: 34 MG/DL — HIGH (ref 10–20)
CALCIUM SERPL-MCNC: 9.2 MG/DL — SIGNIFICANT CHANGE UP (ref 8.5–10.1)
CHLORIDE SERPL-SCNC: 104 MMOL/L — SIGNIFICANT CHANGE UP (ref 98–110)
CO2 SERPL-SCNC: 24 MMOL/L — SIGNIFICANT CHANGE UP (ref 17–32)
CREAT SERPL-MCNC: 1.1 MG/DL — SIGNIFICANT CHANGE UP (ref 0.7–1.5)
D DIMER BLD IA.RAPID-MCNC: 1239 NG/ML DDU — HIGH (ref 0–230)
EOSINOPHIL # BLD AUTO: 0.03 K/UL — SIGNIFICANT CHANGE UP (ref 0–0.7)
EOSINOPHIL NFR BLD AUTO: 0.2 % — SIGNIFICANT CHANGE UP (ref 0–8)
GAS PNL BLDV: SIGNIFICANT CHANGE UP
GLUCOSE SERPL-MCNC: 146 MG/DL — HIGH (ref 70–99)
HCT VFR BLD CALC: 41.2 % — SIGNIFICANT CHANGE UP (ref 37–47)
HGB BLD-MCNC: 13.7 G/DL — SIGNIFICANT CHANGE UP (ref 12–16)
IMM GRANULOCYTES NFR BLD AUTO: 0.6 % — HIGH (ref 0.1–0.3)
LIDOCAIN IGE QN: 36 U/L — SIGNIFICANT CHANGE UP (ref 7–60)
LYMPHOCYTES # BLD AUTO: 0.99 K/UL — LOW (ref 1.2–3.4)
LYMPHOCYTES # BLD AUTO: 5.2 % — LOW (ref 20.5–51.1)
MAGNESIUM SERPL-MCNC: 2.2 MG/DL — SIGNIFICANT CHANGE UP (ref 1.8–2.4)
MCHC RBC-ENTMCNC: 31.9 PG — HIGH (ref 27–31)
MCHC RBC-ENTMCNC: 33.3 G/DL — SIGNIFICANT CHANGE UP (ref 32–37)
MCV RBC AUTO: 95.8 FL — SIGNIFICANT CHANGE UP (ref 81–99)
MONOCYTES # BLD AUTO: 0.46 K/UL — SIGNIFICANT CHANGE UP (ref 0.1–0.6)
MONOCYTES NFR BLD AUTO: 2.4 % — SIGNIFICANT CHANGE UP (ref 1.7–9.3)
NEUTROPHILS # BLD AUTO: 17.58 K/UL — HIGH (ref 1.4–6.5)
NEUTROPHILS NFR BLD AUTO: 91.4 % — HIGH (ref 42.2–75.2)
NRBC # BLD: 0 /100 WBCS — SIGNIFICANT CHANGE UP (ref 0–0)
NT-PROBNP SERPL-SCNC: 697 PG/ML — HIGH (ref 0–300)
PLATELET # BLD AUTO: 179 K/UL — SIGNIFICANT CHANGE UP (ref 130–400)
POTASSIUM SERPL-MCNC: 4.1 MMOL/L — SIGNIFICANT CHANGE UP (ref 3.5–5)
POTASSIUM SERPL-SCNC: 4.1 MMOL/L — SIGNIFICANT CHANGE UP (ref 3.5–5)
PROT SERPL-MCNC: 6.2 G/DL — SIGNIFICANT CHANGE UP (ref 6–8)
RBC # BLD: 4.3 M/UL — SIGNIFICANT CHANGE UP (ref 4.2–5.4)
RBC # FLD: 13.2 % — SIGNIFICANT CHANGE UP (ref 11.5–14.5)
SARS-COV-2 RNA SPEC QL NAA+PROBE: SIGNIFICANT CHANGE UP
SODIUM SERPL-SCNC: 143 MMOL/L — SIGNIFICANT CHANGE UP (ref 135–146)
TROPONIN T SERPL-MCNC: <0.01 NG/ML — SIGNIFICANT CHANGE UP
WBC # BLD: 19.2 K/UL — HIGH (ref 4.8–10.8)
WBC # FLD AUTO: 19.2 K/UL — HIGH (ref 4.8–10.8)

## 2021-10-12 PROCEDURE — 99221 1ST HOSP IP/OBS SF/LOW 40: CPT

## 2021-10-12 PROCEDURE — 93970 EXTREMITY STUDY: CPT | Mod: 26

## 2021-10-12 PROCEDURE — 71275 CT ANGIOGRAPHY CHEST: CPT | Mod: 26,MA

## 2021-10-12 PROCEDURE — 99285 EMERGENCY DEPT VISIT HI MDM: CPT

## 2021-10-12 PROCEDURE — 93010 ELECTROCARDIOGRAM REPORT: CPT

## 2021-10-12 PROCEDURE — 71046 X-RAY EXAM CHEST 2 VIEWS: CPT | Mod: 26

## 2021-10-12 PROCEDURE — 99223 1ST HOSP IP/OBS HIGH 75: CPT

## 2021-10-12 RX ORDER — PANTOPRAZOLE SODIUM 20 MG/1
40 TABLET, DELAYED RELEASE ORAL
Refills: 0 | Status: DISCONTINUED | OUTPATIENT
Start: 2021-10-12 | End: 2021-10-14

## 2021-10-12 RX ORDER — HEPARIN SODIUM 5000 [USP'U]/ML
7500 INJECTION INTRAVENOUS; SUBCUTANEOUS EVERY 6 HOURS
Refills: 0 | Status: DISCONTINUED | OUTPATIENT
Start: 2021-10-12 | End: 2021-10-13

## 2021-10-12 RX ORDER — METFORMIN HYDROCHLORIDE 850 MG/1
500 TABLET ORAL
Refills: 0 | Status: DISCONTINUED | OUTPATIENT
Start: 2021-10-12 | End: 2021-10-12

## 2021-10-12 RX ORDER — ESCITALOPRAM OXALATE 10 MG/1
20 TABLET, FILM COATED ORAL DAILY
Refills: 0 | Status: DISCONTINUED | OUTPATIENT
Start: 2021-10-12 | End: 2021-10-14

## 2021-10-12 RX ORDER — ASPIRIN/CALCIUM CARB/MAGNESIUM 324 MG
325 TABLET ORAL DAILY
Refills: 0 | Status: DISCONTINUED | OUTPATIENT
Start: 2021-10-12 | End: 2021-10-14

## 2021-10-12 RX ORDER — HEPARIN SODIUM 5000 [USP'U]/ML
7500 INJECTION INTRAVENOUS; SUBCUTANEOUS ONCE
Refills: 0 | Status: COMPLETED | OUTPATIENT
Start: 2021-10-12 | End: 2021-10-12

## 2021-10-12 RX ORDER — TRAMADOL HYDROCHLORIDE 50 MG/1
50 TABLET ORAL DAILY
Refills: 0 | Status: DISCONTINUED | OUTPATIENT
Start: 2021-10-12 | End: 2021-10-14

## 2021-10-12 RX ORDER — AMLODIPINE BESYLATE 2.5 MG/1
5 TABLET ORAL DAILY
Refills: 0 | Status: DISCONTINUED | OUTPATIENT
Start: 2021-10-12 | End: 2021-10-13

## 2021-10-12 RX ORDER — ATORVASTATIN CALCIUM 80 MG/1
40 TABLET, FILM COATED ORAL AT BEDTIME
Refills: 0 | Status: DISCONTINUED | OUTPATIENT
Start: 2021-10-12 | End: 2021-10-14

## 2021-10-12 RX ORDER — METOPROLOL TARTRATE 50 MG
50 TABLET ORAL EVERY 12 HOURS
Refills: 0 | Status: DISCONTINUED | OUTPATIENT
Start: 2021-10-12 | End: 2021-10-14

## 2021-10-12 RX ORDER — HEPARIN SODIUM 5000 [USP'U]/ML
INJECTION INTRAVENOUS; SUBCUTANEOUS
Qty: 25000 | Refills: 0 | Status: DISCONTINUED | OUTPATIENT
Start: 2021-10-12 | End: 2021-10-13

## 2021-10-12 RX ORDER — HEPARIN SODIUM 5000 [USP'U]/ML
3500 INJECTION INTRAVENOUS; SUBCUTANEOUS EVERY 6 HOURS
Refills: 0 | Status: DISCONTINUED | OUTPATIENT
Start: 2021-10-12 | End: 2021-10-13

## 2021-10-12 RX ORDER — FUROSEMIDE 40 MG
40 TABLET ORAL DAILY
Refills: 0 | Status: DISCONTINUED | OUTPATIENT
Start: 2021-10-12 | End: 2021-10-13

## 2021-10-12 RX ORDER — AMIODARONE HYDROCHLORIDE 400 MG/1
200 TABLET ORAL DAILY
Refills: 0 | Status: DISCONTINUED | OUTPATIENT
Start: 2021-10-12 | End: 2021-10-12

## 2021-10-12 RX ADMIN — HEPARIN SODIUM 7500 UNIT(S): 5000 INJECTION INTRAVENOUS; SUBCUTANEOUS at 22:48

## 2021-10-12 RX ADMIN — HEPARIN SODIUM 1700 UNIT(S)/HR: 5000 INJECTION INTRAVENOUS; SUBCUTANEOUS at 22:49

## 2021-10-12 NOTE — H&P ADULT - HISTORY OF PRESENT ILLNESS
Mrs. Erazo is a 72 y/o female w/ hx of CAD s/p CABG and MVR, glaucoma, HTN, HLD, DM presents to the ED for evaluation of dyspnea x 2 days.  States over past 2 days acute onset dyspnea, no edema or orthopnea, worse w/ ambulation, improvement w/ rest. Went to PMD today and was told to come to ED for further evaluation. Denies chest pain, increased edema of lower extremities, fever, chills, abd pain, flank pain.  No further complaints.    In the ED, vitals were BP: 149/65 HR: 82 RR: 20 Spo2 96% on RA, afebrile. Labs significant for leukocytosis 19k, d-dimer: 1239, troponin negative x 1,  VBG: ph 7.42, Bicarb 30, co2 46. EKG NSR, CTA chest: Findings are compatible with acute pulmonary emboli within the right main pulmonary artery and segmental and subsegmental branches of the right upper lobe, right middle lobe and right lower lobe pulmonary arteries; evidence of mild right heart strain is noted. Since February 23, 2021, near-complete resolution of previously noted complex loculated right pleural collection, with trace residual loculated fluid within the right major fissure; decreased trace left pleural effusion. IR consulted in ED: No intervention at this time. Patient started on heparin drip and transferred to SDU for further management.  Mrs. Erazo is a 72 y/o female w/ hx of CAD s/p CABG and MVR, glaucoma, HTN, HLD, DM presents to the ED for evaluation of dyspnea x 2 days.  Patient reports she went to her PCP to get medical clearance for cataract surgery in which she explained her complaints of dypnea and he advised to come to the ED. Patient reports feeling similar symptoms prior to her heart surgery in the past. Patient denies chest pain, no edema or orthopnea, leg pain/swelling, fever, chills, abd pain, flank pain. She reports being active and fully independent as she lives alone. No further complaints.    In the ED, vitals were BP: 149/65 HR: 82 RR: 20 Spo2 96% on RA, afebrile. Labs significant for leukocytosis 19k, d-dimer: 1239, troponin negative x 1,  VBG: ph 7.42, Bicarb 30, co2 46. EKG NSR, CTA chest: Findings are compatible with acute pulmonary emboli within the right main pulmonary artery and segmental and subsegmental branches of the right upper lobe, right middle lobe and right lower lobe pulmonary arteries; evidence of mild right heart strain is noted. Since February 23, 2021, near-complete resolution of previously noted complex loculated right pleural collection, with trace residual loculated fluid within the right major fissure; decreased trace left pleural effusion. IR consulted in ED: No intervention at this time. Patient started on heparin drip and transferred to SDU for further management.

## 2021-10-12 NOTE — ED PROVIDER NOTE - CLINICAL SUMMARY MEDICAL DECISION MAKING FREE TEXT BOX
I personally evaluated the patient. I reviewed the Resident’s or Physician Assistant’s note (as assigned above), and agree with the findings and plan except as documented in my note. Patient evaluated for sob. Labs, ekg, CXR, CT PE study performed. Pt noted to have PE with R heart strain on CT. Pulmonology/icu team consulted, PERT team activated. VS stable throughout ED stay, pt well appearing in no acute distress. Given heparin. ICU recommending SDU admission. Admitted to SDU for further evaluation and treatment.

## 2021-10-12 NOTE — ED PROVIDER NOTE - EKG ADDITIONAL QUESTION - PERFORMED INDEPENDENT VISUALIZATION
transfer training/stretching/ROM/gait training/balance training/bed mobility training/strengthening
Yes

## 2021-10-12 NOTE — H&P ADULT - ATTENDING COMMENTS
HPI:  Mrs. Erazo is a 70 y/o female w/ hx of CAD s/p CABG and MVR, glaucoma, HTN, HLD, DM presents to the ED for evaluation of dyspnea x 2 days.  Patient reports she went to her PCP to get medical clearance for cataract surgery in which she explained her complaints of dypnea and he advised to come to the ED. Patient reports feeling similar symptoms prior to her heart surgery in the past. Patient denies chest pain, no edema or orthopnea, leg pain/swelling, fever, chills, abd pain, flank pain. She reports being active and fully independent as she lives alone. No further complaints.    In the ED, vitals were BP: 149/65 HR: 82 RR: 20 Spo2 96% on RA, afebrile. Labs significant for leukocytosis 19k, d-dimer: 1239, troponin negative x 1,  VBG: ph 7.42, Bicarb 30, co2 46. EKG NSR, CTA chest: Findings are compatible with acute pulmonary emboli within the right main pulmonary artery and segmental and subsegmental branches of the right upper lobe, right middle lobe and right lower lobe pulmonary arteries; evidence of mild right heart strain is noted. Since February 23, 2021, near-complete resolution of previously noted complex loculated right pleural collection, with trace residual loculated fluid within the right major fissure; decreased trace left pleural effusion. IR consulted in ED: No intervention at this time. Patient started on heparin drip and transferred to SDU for further management.  (12 Oct 2021 22:53)    REVIEW OF SYSTEMS: see cc/HPI   CONSTITUTIONAL: No weakness, fevers or chills  EYES/ENT: No visual changes;  No vertigo or throat pain   NECK: No pain or stiffness  RESPIRATORY: No cough, wheezing, hemoptysis; (+) shortness of breath/dyspnea w/ exertion  CARDIOVASCULAR: No chest pain or palpitations  GASTROINTESTINAL: No abdominal or epigastric pain. No nausea, vomiting, or hematemesis; No diarrhea or constipation. No melena or hematochezia.  GENITOURINARY: No dysuria, frequency or hematuria  NEUROLOGICAL: No numbness or weakness  SKIN: No itching, rashes    Physical Exam:  General: WN/WD NAD  Neurology: A&Ox3, nonfocal, follows commands  Eyes: PERRLA/ EOMI  ENT/Neck: Neck supple, trachea midline, No JVD  Respiratory: CTA B/L, No wheezing, rales, rhonchi  CV: Normal rate regular rhythm, S1S2, no murmurs, rubs or gallops  Abdominal: Soft, NT, ND +BS,   Extremities: No edema, + peripheral pulses  Skin: No Rashes, Hematoma, Ecchymosis  Incisions: n/a  Tubes: n/a    A/P  Acute unprovoked PE w/o R heart strain  Near Resolution of bilateral pleural effusion    -Admit to SDU  -IV heparin for now w/ PTT monitoring     CAD / VHD s/p CABG and MV repair   H/o chronic A/ fib  HTN  -c/w statin / BBlocker / amlodipine     DM type II   -F/S monitoring and Lispro sliding scale HPI:  Mrs. Erazo is a 70 y/o female w/ hx of CAD s/p CABG and MVR, glaucoma, HTN, HLD, DM presents to the ED for evaluation of dyspnea x 2 days.  Patient reports she went to her PCP to get medical clearance for cataract surgery in which she explained her complaints of dypnea and he advised to come to the ED. Patient reports feeling similar symptoms prior to her heart surgery in the past. Patient denies chest pain, no edema or orthopnea, leg pain/swelling, fever, chills, abd pain, flank pain. She reports being active and fully independent as she lives alone. No further complaints.    In the ED, vitals were BP: 149/65 HR: 82 RR: 20 Spo2 96% on RA, afebrile. Labs significant for leukocytosis 19k, d-dimer: 1239, troponin negative x 1,  VBG: ph 7.42, Bicarb 30, co2 46. EKG NSR, CTA chest: Findings are compatible with acute pulmonary emboli within the right main pulmonary artery and segmental and subsegmental branches of the right upper lobe, right middle lobe and right lower lobe pulmonary arteries; evidence of mild right heart strain is noted. Since February 23, 2021, near-complete resolution of previously noted complex loculated right pleural collection, with trace residual loculated fluid within the right major fissure; decreased trace left pleural effusion. IR consulted in ED: No intervention at this time. Patient started on heparin drip and transferred to SDU for further management.  (12 Oct 2021 22:53)    REVIEW OF SYSTEMS: see cc/HPI   CONSTITUTIONAL: No weakness, fevers or chills  EYES/ENT: No visual changes;  No vertigo or throat pain   NECK: No pain or stiffness  RESPIRATORY: No cough, wheezing, hemoptysis; (+) shortness of breath/dyspnea w/ exertion  CARDIOVASCULAR: No chest pain or palpitations  GASTROINTESTINAL: No abdominal or epigastric pain. No nausea, vomiting, or hematemesis; No diarrhea or constipation. No melena or hematochezia.  GENITOURINARY: No dysuria, frequency or hematuria  NEUROLOGICAL: No numbness or weakness  SKIN: No itching, rashes    Physical Exam:  General: WN/WD NAD  Neurology: A&Ox3, nonfocal, follows commands  Eyes: PERRLA/ EOMI  ENT/Neck: Neck supple, trachea midline, No JVD  Respiratory: CTA B/L, No wheezing, rales, rhonchi  CV: Normal rate regular rhythm, S1S2, no murmurs, rubs or gallops  Abdominal: Soft, NT, ND +BS,   Extremities: No edema, + peripheral pulses  Skin: No Rashes, Hematoma, Ecchymosis  Incisions: n/a  Tubes: n/a    A/P  Acute unprovoked PE ( R main artery, upper/middle/lower segmental and subsegmental arteries ) w/ mild R heart strain  Near Resolution of bilateral pleural effusion    -Admit to SDU  -IV heparin for now w/ PTT monitoring   -Pulm critical care eval     CAD / VHD s/p CABG and MV repair   H/o chronic A/ fib  HTN  -c/w statin / BBlocker / amlodipine     DM type II   -F/S monitoring and Lispro sliding scale

## 2021-10-12 NOTE — H&P ADULT - ASSESSMENT
Mrs. Erazo is a 72 y/o female w/ hx of CAD s/p CABG and MVR, glaucoma, HTN, HLD, DM presents to the ED for evaluation of dyspnea x 2 days.    #dyspnea on admission due to pulmonary embolism  vitals were BP: 149/65 HR: 82 RR: 20 Spo2 96% on RA, afebrile.   Labs significant for leukocytosis 19k, d-dimer: 1239, troponin negative x 1,  VBG: ph 7.42, Bicarb 30, co2 46.   EKG NSR,   CTA chest: Findings are compatible with acute pulmonary emboli within the right main pulmonary artery and segmental and subsegmental branches of the right upper lobe, right middle lobe and right lower lobe pulmonary arteries; evidence of mild right heart strain is noted. Since February 23, 2021, near-complete resolution of previously noted complex loculated right pleural collection, with trace residual loculated fluid within the right major fissure; decreased trace left pleural effusion.   IR consulted in ED: No intervention at this time.  - Patient currently on heparin drip; monitor PTT around the clock  - f/u echo, blood cultures, urine cultures    #leukocytosis possible 2/2 to stress related   - not septic on admission   - f/u blood cultures, UA and CX  - trend CBC    -CAD/Mitral Valve Insufficiency-s/p CABG x 1/MV Repair 2/2021  - c/w with atorvastatin 40 mg, metoprolol and amlodipine  - patients cardiologist:       #hx right sided pleural effusion s/p pigtail cath in 2/2021  - Since February 23, 2021, near-complete resolution of previously noted complex loculated right pleural collection, with trace residual loculated fluid within the right major fissure; decreased trace left pleural effusion.   - continue to monitor     #HTN  -BP control- c/w metoprolol, amlodipine    #DM  - monitor blood sugar   - insulin sliding scale correction regimen    #A-Fib  -continue w. amiodarone    Diet: DASH/TLC  Activity: as tolerated  DVT Prophylaxis: heparin drip  GI Prophylaxis: protonix   CHG Order  Code Status: full   Disposition: acute   Mrs. Erazo is a 70 y/o female w/ hx of CAD s/p CABG and MVR, glaucoma, HTN, HLD, DM presents to the ED for evaluation of dyspnea x 2 days.    #dyspnea on admission due to unprovoked pulmonary embolism  vitals were BP: 149/65 HR: 82 RR: 20 Spo2 96% on RA, afebrile.   Labs significant for leukocytosis 19k, d-dimer: 1239, troponin negative x 1,  VBG: ph 7.42, Bicarb 30, co2 46.   EKG NSR,   CTA chest: Findings are compatible with acute pulmonary emboli within the right main pulmonary artery and segmental and subsegmental branches of the right upper lobe, right middle lobe and right lower lobe pulmonary arteries; evidence of mild right heart strain is noted. Since February 23, 2021, near-complete resolution of previously noted complex loculated right pleural collection, with trace residual loculated fluid within the right major fissure; decreased trace left pleural effusion.   IR consulted in ED: No intervention at this time.  - Patient currently on heparin drip; monitor PTT around the clock  - f/u echo, blood cultures, urine cultures    #leukocytosis possible 2/2 to stress related   - not septic on admission   - f/u blood cultures, UA and CX  - trend CBC    -CAD/Mitral Valve Insufficiency-s/p CABG x 1/MV Repair 2/2021  - c/w with atorvastatin 40 mg, metoprolol and amlodipine  - patients cardiologist: Dr. David      #hx right sided pleural effusion s/p pigtail cath in 2/2021  - Since February 23, 2021, near-complete resolution of previously noted complex loculated right pleural collection, with trace residual loculated fluid within the right major fissure; decreased trace left pleural effusion.   - continue to monitor     #HTN  -BP control- c/w metoprolol, amlodipine    #DM  - monitor blood sugar   - insulin sliding scale correction regimen    #hx of A-Fib  - Was on amiodarone which was discontinued 2 months ago as per patient and her cardiologist Dr. Winkler told her to  - Not on anticoagulation   - EKG on admission: NSR    Diet: DASH/TLC  Activity: as tolerated  DVT Prophylaxis: heparin drip  GI Prophylaxis: protonix   CHG Order  Code Status: full   Disposition: acute

## 2021-10-12 NOTE — ED ADULT TRIAGE NOTE - WEIGHT IN LBS
[facility]  Discharge Note    Pt discharged with followings belongings:   Dentures: None  Vision - Corrective Lenses: Glasses  Hearing Aid: None  Jewelry: None  Body Piercings Removed: N/A  Clothing: Undergarments (Comment)  Were All Patient Medications Collected?: Other (comment)  Other Valuables: Other (Comment)   Valuables sent home with [facility] or returned to patient. Patient education on aftercare instructions: yes  Information sent by EHR by discharge planner  at 5:46 PM .Patient verbalize understanding of AVS:  yes. Status EXAM upon discharge:  Status and Exam  Normal: Yes  Facial Expression: Brightened  Affect: Normal  Level of Consciousness: Alert  Mood:Normal: Yes  Mood: Labile  Motor Activity:Normal: Yes  Motor Activity: Increased  Interview Behavior: Cooperative  Preception: [place] to Person, [place] to Time, [place] to Place, [place] to Situation  Attention:Normal: Yes  Thought Processes:  (WDL)  Thought Content:Normal: Yes  Hallucinations: None  Delusions: No  Memory:Normal: Yes  Insight and Judgment: No  Insight and Judgment: Poor Judgment  Present Suicidal Ideation: No  Present Homicidal Ideation: No      Metabolic Screening:    Lab Results   Component Value Date    LABA1C 5.8 05/26/2021       Lab Results   Component Value Date    CHOL 186 05/26/2021    CHOL 175 10/01/2019    CHOL 148 08/06/2015     Lab Results   Component Value Date    TRIG 170 (H) 05/26/2021    TRIG 301 (H) 10/01/2019    TRIG 69 08/06/2015     Lab Results   Component Value Date    HDL 39 (L) 05/26/2021    HDL 42 10/01/2019    HDL 41 08/06/2015     No components found for: [facility]  Lab Results   Component Value Date    LABVLDL 34 05/26/2021    LABVLDL see below 10/01/2019    LABVLDL 14 08/06/2015       Bridge Appointment completed: Reviewed Discharge Instructions with patient. Patient verbalizes understanding and agreement with the discharge plan using the teachback method.      Referral for Outpatient Tobacco Cessation Counseling, upon discharge (mitchell X if applicable and completed):    ( )  Hospital staff assisted patient to call Quit Line or faxed referral                                   during hospitalization                  ( )  Recognizing danger situations (included triggers and roadblocks), if not completed on admission                    ( )  Coping skills (new ways to manage stress, exercise, relaxation techniques, changing routine, distraction), if not completed on admission                                                           ( )  Basic information about quitting (benefits of quitting, techniques in how to quit, available resources, if not completed on admission  ( ) Referral for counseling faxed to [facility]   ( ) Patient refused referral  ( ) Patient refused counseling  ( ) Patient refused smoking cessation medication upon discharge    Vaccinations (mitchell X if applicable and completed):  ( ) Patient states already received influenza vaccine elsewhere  ( ) Patient received influenza vaccine during this hospitalization  ( ) Patient refused influenza vaccine at this time            Sofie Myers RN 205.9

## 2021-10-12 NOTE — ED PROVIDER NOTE - NS ED ROS FT
Constitutional: See HPI.  Eyes: No visual changes, eye pain or discharge.   ENMT: No hearing changes, pain, discharge or infections. No neck pain or stiffness. No limited ROM  Cardiac: No SOB or edema. No chest pain with exertion.  Respiratory: + dyspnea. No cough or respiratory distress.  GI: No nausea, vomiting, diarrhea or abdominal pain.  : No dysuria, frequency or burning. No Discharge  MS: No myalgia, muscle weakness, joint pain or back pain.  Neuro: No headache or weakness.   Skin: No skin rash.  Except as documented in the HPI, all other systems are negative.

## 2021-10-12 NOTE — ED PROVIDER NOTE - ATTENDING CONTRIBUTION TO CARE
70 yo F pmh as stated in chart pw sob. SOB for the past 2 days, worse with exertion. Seen by pmd today and instructed to come to Ed for evaluation. NO cp, no abd pain, no back pain, no n/v/d, no f/c, no urinary sx, no LH, no dizziness.     CONSTITUTIONAL: Well-developed; well-nourished; in no acute distress. Sitting up and providing appropriate history and physical examination  SKIN: skin exam is warm and dry, no acute rash.  HEAD: Normocephalic; atraumatic.  EYES: PERRL, 3 mm bilateral, no nystagmus, EOM intact; conjunctiva and sclera clear.  ENT: No nasal discharge; airway clear.  NECK: Supple; non tender. + full passive ROM in all directions. No JVD  CARD: S1, S2 normal; no murmurs, gallops, or rubs. Regular rate and rhythm. + Symmetric Strong Pulses  RESP: No wheezes, rales or rhonchi. Good air movement bilaterally  ABD: soft; non-distended; non-tender. No Rebound, No Guarding, No signs of peritonitis, No CVA tenderness. No pulsatile abdominal mass. + Strong and Symmetric Pulses  EXT: Normal ROM. No clubbing, cyanosis or edema. Dp and Pt Pulses intact. Cap refill less than 3 seconds  NEURO: CN 2-12 intact, normal finger to nose, normal romberg, stable gait, no sensory or motor deficits, Alert, oriented, grossly unremarkable. No Focal deficits. GCS 15. NIH 0  PSYCH: Cooperative, appropriate.

## 2021-10-12 NOTE — ED PROVIDER NOTE - OBJECTIVE STATEMENT
71 y.o female w/ hx of CAD s/p CABG, glaucoma, HTN, HLD, DM presents to the ED for evaluation of dyspnea.  States over past 2 days acute onset dyspnea, no edema or orthopnea, worse w/ ambulation, improvement w/ rest. Went to PMD today and was told to come to ED for further evaluation. Denies chest pain, increased edema of lower extremities, fever, chills, abd pain, flank pain.  No further complaints.

## 2021-10-12 NOTE — ED PROVIDER NOTE - PHYSICAL EXAMINATION
CONST: Well appearing in NAD  EYES: Sclera and conjunctiva clear.  NECK: Non-tender, no meningeal signs, supple  CARD: Normal S1 S2; Normal rate and rhythm  RESP: Equal BS B/L, No wheezes, rhonchi or rales. No distress  GI: Soft, non-tender, non-distended.  MS: Normal ROM in all extremities. +LLE > RLE.  no calf pain, radial pulses 2+ bilaterally  SKIN: Warm, dry, no acute rashes. Good turgor  NEURO: A&Ox3, No focal deficits. Strength 5/5 with no sensory deficits

## 2021-10-12 NOTE — ED PROVIDER NOTE - PROGRESS NOTE DETAILS
prelim duplex negative for dvt Discussed case w/ ICU. will see if pt meets PERT criteria. icu recommends step down.  per interventional heparin drip and coags. per my echo no R heart strain mar aware of admission.

## 2021-10-13 ENCOUNTER — TRANSCRIPTION ENCOUNTER (OUTPATIENT)
Age: 71
End: 2021-10-13

## 2021-10-13 LAB
ALBUMIN SERPL ELPH-MCNC: 3.9 G/DL — SIGNIFICANT CHANGE UP (ref 3.5–5.2)
ALP SERPL-CCNC: 65 U/L — SIGNIFICANT CHANGE UP (ref 30–115)
ALT FLD-CCNC: 72 U/L — HIGH (ref 0–41)
ANION GAP SERPL CALC-SCNC: 16 MMOL/L — HIGH (ref 7–14)
APTT BLD: 123.4 SEC — CRITICAL HIGH (ref 27–39.2)
APTT BLD: >200 SEC — CRITICAL HIGH (ref 27–39.2)
AST SERPL-CCNC: 33 U/L — SIGNIFICANT CHANGE UP (ref 0–41)
BASOPHILS # BLD AUTO: 0.03 K/UL — SIGNIFICANT CHANGE UP (ref 0–0.2)
BASOPHILS NFR BLD AUTO: 0.2 % — SIGNIFICANT CHANGE UP (ref 0–1)
BILIRUB SERPL-MCNC: 0.8 MG/DL — SIGNIFICANT CHANGE UP (ref 0.2–1.2)
BUN SERPL-MCNC: 28 MG/DL — HIGH (ref 10–20)
CALCIUM SERPL-MCNC: 9 MG/DL — SIGNIFICANT CHANGE UP (ref 8.5–10.1)
CHLORIDE SERPL-SCNC: 102 MMOL/L — SIGNIFICANT CHANGE UP (ref 98–110)
CO2 SERPL-SCNC: 23 MMOL/L — SIGNIFICANT CHANGE UP (ref 17–32)
COVID-19 SPIKE DOMAIN AB INTERP: POSITIVE
COVID-19 SPIKE DOMAIN ANTIBODY RESULT: 88.2 U/ML — HIGH
CREAT SERPL-MCNC: 1 MG/DL — SIGNIFICANT CHANGE UP (ref 0.7–1.5)
EOSINOPHIL # BLD AUTO: 0.14 K/UL — SIGNIFICANT CHANGE UP (ref 0–0.7)
EOSINOPHIL NFR BLD AUTO: 0.8 % — SIGNIFICANT CHANGE UP (ref 0–8)
GLUCOSE BLDC GLUCOMTR-MCNC: 107 MG/DL — HIGH (ref 70–99)
GLUCOSE SERPL-MCNC: 103 MG/DL — HIGH (ref 70–99)
HCT VFR BLD CALC: 40.7 % — SIGNIFICANT CHANGE UP (ref 37–47)
HCV AB S/CO SERPL IA: 0.05 COI — SIGNIFICANT CHANGE UP
HCV AB SERPL-IMP: SIGNIFICANT CHANGE UP
HGB BLD-MCNC: 13.4 G/DL — SIGNIFICANT CHANGE UP (ref 12–16)
IMM GRANULOCYTES NFR BLD AUTO: 0.6 % — HIGH (ref 0.1–0.3)
INR BLD: 1.04 RATIO — SIGNIFICANT CHANGE UP (ref 0.65–1.3)
LYMPHOCYTES # BLD AUTO: 17.1 % — LOW (ref 20.5–51.1)
LYMPHOCYTES # BLD AUTO: 3.04 K/UL — SIGNIFICANT CHANGE UP (ref 1.2–3.4)
MAGNESIUM SERPL-MCNC: 2.1 MG/DL — SIGNIFICANT CHANGE UP (ref 1.8–2.4)
MCHC RBC-ENTMCNC: 31.5 PG — HIGH (ref 27–31)
MCHC RBC-ENTMCNC: 32.9 G/DL — SIGNIFICANT CHANGE UP (ref 32–37)
MCV RBC AUTO: 95.5 FL — SIGNIFICANT CHANGE UP (ref 81–99)
MONOCYTES # BLD AUTO: 1.03 K/UL — HIGH (ref 0.1–0.6)
MONOCYTES NFR BLD AUTO: 5.8 % — SIGNIFICANT CHANGE UP (ref 1.7–9.3)
NEUTROPHILS # BLD AUTO: 13.38 K/UL — HIGH (ref 1.4–6.5)
NEUTROPHILS NFR BLD AUTO: 75.5 % — HIGH (ref 42.2–75.2)
NRBC # BLD: 0 /100 WBCS — SIGNIFICANT CHANGE UP (ref 0–0)
PLATELET # BLD AUTO: 185 K/UL — SIGNIFICANT CHANGE UP (ref 130–400)
POTASSIUM SERPL-MCNC: 4 MMOL/L — SIGNIFICANT CHANGE UP (ref 3.5–5)
POTASSIUM SERPL-SCNC: 4 MMOL/L — SIGNIFICANT CHANGE UP (ref 3.5–5)
PROT SERPL-MCNC: 6.3 G/DL — SIGNIFICANT CHANGE UP (ref 6–8)
PROTHROM AB SERPL-ACNC: 12 SEC — SIGNIFICANT CHANGE UP (ref 9.95–12.87)
RBC # BLD: 4.26 M/UL — SIGNIFICANT CHANGE UP (ref 4.2–5.4)
RBC # FLD: 13.2 % — SIGNIFICANT CHANGE UP (ref 11.5–14.5)
SARS-COV-2 IGG+IGM SERPL QL IA: 88.2 U/ML — HIGH
SARS-COV-2 IGG+IGM SERPL QL IA: POSITIVE
SODIUM SERPL-SCNC: 141 MMOL/L — SIGNIFICANT CHANGE UP (ref 135–146)
TROPONIN T SERPL-MCNC: <0.01 NG/ML — SIGNIFICANT CHANGE UP
WBC # BLD: 17.73 K/UL — HIGH (ref 4.8–10.8)
WBC # FLD AUTO: 17.73 K/UL — HIGH (ref 4.8–10.8)

## 2021-10-13 PROCEDURE — 99222 1ST HOSP IP/OBS MODERATE 55: CPT

## 2021-10-13 PROCEDURE — 99233 SBSQ HOSP IP/OBS HIGH 50: CPT

## 2021-10-13 PROCEDURE — 93306 TTE W/DOPPLER COMPLETE: CPT | Mod: 26

## 2021-10-13 RX ORDER — APIXABAN 2.5 MG/1
1 TABLET, FILM COATED ORAL
Qty: 0 | Refills: 0 | DISCHARGE
Start: 2021-10-13 | End: 2021-10-19

## 2021-10-13 RX ORDER — APIXABAN 2.5 MG/1
10 TABLET, FILM COATED ORAL EVERY 12 HOURS
Refills: 0 | Status: DISCONTINUED | OUTPATIENT
Start: 2021-10-13 | End: 2021-10-14

## 2021-10-13 RX ORDER — AMLODIPINE BESYLATE 2.5 MG/1
10 TABLET ORAL DAILY
Refills: 0 | Status: DISCONTINUED | OUTPATIENT
Start: 2021-10-14 | End: 2021-10-14

## 2021-10-13 RX ORDER — APIXABAN 2.5 MG/1
2 TABLET, FILM COATED ORAL
Qty: 74 | Refills: 0
Start: 2021-10-13 | End: 2021-10-19

## 2021-10-13 RX ORDER — HEPARIN SODIUM 5000 [USP'U]/ML
1400 INJECTION INTRAVENOUS; SUBCUTANEOUS
Qty: 25000 | Refills: 0 | Status: DISCONTINUED | OUTPATIENT
Start: 2021-10-13 | End: 2021-10-13

## 2021-10-13 RX ADMIN — Medication 325 MILLIGRAM(S): at 12:42

## 2021-10-13 RX ADMIN — Medication 50 MILLIGRAM(S): at 06:35

## 2021-10-13 RX ADMIN — Medication 50 MILLIGRAM(S): at 17:51

## 2021-10-13 RX ADMIN — ESCITALOPRAM OXALATE 20 MILLIGRAM(S): 10 TABLET, FILM COATED ORAL at 12:42

## 2021-10-13 RX ADMIN — PANTOPRAZOLE SODIUM 40 MILLIGRAM(S): 20 TABLET, DELAYED RELEASE ORAL at 06:35

## 2021-10-13 RX ADMIN — Medication 40 MILLIGRAM(S): at 06:35

## 2021-10-13 RX ADMIN — APIXABAN 10 MILLIGRAM(S): 2.5 TABLET, FILM COATED ORAL at 17:50

## 2021-10-13 RX ADMIN — AMLODIPINE BESYLATE 5 MILLIGRAM(S): 2.5 TABLET ORAL at 06:35

## 2021-10-13 RX ADMIN — ATORVASTATIN CALCIUM 40 MILLIGRAM(S): 80 TABLET, FILM COATED ORAL at 22:08

## 2021-10-13 NOTE — DISCHARGE NOTE NURSING/CASE MANAGEMENT/SOCIAL WORK - NSSCCARECORD_GEN_ALL_CORE
[facility] - OB/GYN 5th Floor  [address]  [facility] [address]  Phone: [phone]                  Crys Peña   2017 10:45 AM   Routine Prenatal    Description:  Female : 1982   Provider:  Arlet Constantino MD   Department:  [facility] - OB/GYN 5th Floor           Reason for Visit     Routine Prenatal Visit                To Do List           Future Appointments        Provider Department Dept Phone    2017 9:40 AM ULTRASOUND, [facility] 4TH FLR CLINIC [facility] - Maternal Fetal Med [phone]      Goals (5 Years of Data)     None      [facility] On Call     [facility] On Call Nurse Care Line -  Assistance  Unless otherwise directed by your provider, please contact [facility] On-Call, our nurse care line that is available for  assistance.     Registered nurses in the [facility] On Call Center provide: appointment scheduling, clinical advisement, health education, and other advisory services.  Call: [phone] (toll free)               Medications           STOP taking these medications     promethazine (PHENERGAN) 25 MG suppository Place 1 suppository (25 mg total) rectally every 6 (six) hours as needed for Nausea.    ondansetron (ZOFRAN-ODT) 4 MG TbDL Take 1 tablet (4 mg total) by mouth every 6 (six) hours as needed (nausea or vomiting).    doxylamine-pyridoxine (DICLEGIS) 10-10 mg TbEC Take 2 tablets nightly.  If still nauseated, at 1 tablet in the morning, and one tablet in the afternoon.           Verify that the below list of medications is an accurate representation of the medications you are currently taking.  If none reported, the list may be blank. If incorrect, please contact your healthcare provider. Carry this list with you in case of emergency.           Current Medications     ferrous sulfate 325 mg (65 mg iron) Tab tablet Take 1 tablet (325 mg total) by mouth once daily.    prenatal #108-iron,carbonyl-FA 30-1 mg Tab Take by mouth once daily.    pyridoxine, vitamin  "B6, (B-6) 25 MG Tab Take 1 tablet (25 mg total) by mouth 3 (three) times daily.           Clinical Reference Information           Prenatal Vitals     Enc. Date GA Prenatal Vitals Prenatal Pulse Pain Level Urine Albumin/Glucose Edema Presentation Dilation/Effacement/Station    17 18w4d 110/78 / 91.2 kg (201 lb 1.6 oz)   0        3/15/17 14w4d 116/68 / 89.2 kg (196 lb 10.4 oz)  / +++ / Absent  0 Negative / Negative       2/15/17 10w4d 110/70 / 88 kg (194 lb 0.1 oz)           2/10/17 9w6d Admission Dx: Early stage of pregnancy Dept: [facility]       TW.035 kg (11 lb 1.6 oz)   Pregravid weight: 86.2 kg (190 lb)   Number of fetuses: 1   Height: 5' 4" (1.626 m)   BMI: 32.6       Your Vitals Were     BP Weight Last Period BMI       110/78 91.2 kg (201 lb 1.6 oz) 2016 34.52 kg/m2       Allergies as of 2017     No Known Allergies      Immunizations Administered on Date of Encounter - 2017     None      Language Assistance Services     ATTENTION: Language assistance services are available, free of charge. Please call [phone].      ATENCIÓN: Si habla español, tiene a posada disposición servicios gratuitos de asistencia lingüística. Llame al [phone].     CHÚ Ý: N?u b?n nói Ti?ng Vi?t, có các d?ch v? h? tr? ngôn ng? mi?n phí dành cho b?n. G?i s? [phone].         [facility] - [facility]N 5th Floor complies with applicable Federal civil rights laws and does not discriminate on the basis of race, color, national origin, age, disability, or sex.        " [facility]

## 2021-10-13 NOTE — DISCHARGE NOTE PROVIDER - HOSPITAL COURSE
Mrs. Erazo is a 72 y/o female w/ hx of CAD s/p CABG and MVR, glaucoma, HTN, HLD, DM presents to the ED for evaluation of dyspnea x 2 days.  Patient reports she went to her PCP to get medical clearance for cataract surgery in which she explained her complaints of dypnea and he advised to come to the ED. Patient reports feeling similar symptoms prior to her heart surgery in the past. Patient denies chest pain, no edema or orthopnea, leg pain/swelling, fever, chills, abd pain, flank pain. She reports being active and fully independent as she lives alone. No further complaints.    In the ED, vitals were BP: 149/65 HR: 82 RR: 20 Spo2 96% on RA, afebrile. Labs significant for leukocytosis 19k, d-dimer: 1239, troponin negative x 1,  VBG: ph 7.42, Bicarb 30, co2 46. EKG NSR, CTA chest: Findings are compatible with acute pulmonary emboli within the right main pulmonary artery and segmental and subsegmental branches of the right upper lobe, right middle lobe and right lower lobe pulmonary arteries; evidence of mild right heart strain is noted. Since February 23, 2021, near-complete resolution of previously noted complex loculated right pleural collection, with trace residual loculated fluid within the right major fissure; decreased trace left pleural effusion. IR consulted in ED: No intervention at this time. Patient started on heparin drip and transferred to SDU for further management.     #dyspnea on admission due to unprovoked pulmonary embolism  - ED vitals were BP: 149/65 HR: 82 RR: 20 Spo2 96% on RA, afebrile.  - ED Labs significant for leukocytosis 19k, d-dimer: 1239, troponin negative x 1,  VBG: ph 7.42, Bicarb 30, co2 46.   - EKG NSR  - CXR negative  - CTA chest: Findings are compatible with acute pulmonary emboli within the right main pulmonary artery and segmental and subsegmental branches of the right upper lobe, right middle lobe and right lower lobe pulmonary arteries; evidence of mild right heart strain is noted. Since February 23, 2021, near-complete resolution of previously noted complex loculated right pleural collection, with trace residual loculated fluid within the right major fissure; decreased trace left pleural effusion.   - IR consulted in ED: No intervention at this time  - duplex negative prelim read  - c/w heparin drip; monitor PTT around the clock  - can possibly transition over to Eliquis in the PM pending echo results  - f/u echo, blood cultures, urine cultures    #leukocytosis possible 2/2 to stress related - downtrending  - not septic on admission   - trend CBC  - f/u blood cultures, UA and CX, procalc       Mrs. Erazo is a 72 y/o female w/ hx of CAD s/p CABG and MVR, glaucoma, HTN, HLD, DM presents to the ED for evaluation of dyspnea x 2 days.  Patient reports she went to her PCP to get medical clearance for cataract surgery in which she explained her complaints of dypnea and he advised to come to the ED. Patient reports feeling similar symptoms prior to her heart surgery in the past. Patient denies chest pain, no edema or orthopnea, leg pain/swelling, fever, chills, abd pain, flank pain. She reports being active and fully independent as she lives alone. No further complaints.    In the ED, vitals were BP: 149/65 HR: 82 RR: 20 Spo2 96% on RA, afebrile. Labs significant for leukocytosis 19k, d-dimer: 1239, troponin negative x 1,  VBG: ph 7.42, Bicarb 30, co2 46. EKG NSR, CTA chest: Findings are compatible with acute pulmonary emboli within the right main pulmonary artery and segmental and subsegmental branches of the right upper lobe, right middle lobe and right lower lobe pulmonary arteries; evidence of mild right heart strain is noted. Since February 23, 2021, near-complete resolution of previously noted complex loculated right pleural collection, with trace residual loculated fluid within the right major fissure; decreased trace left pleural effusion. IR consulted in ED: No intervention at this time. Patient started on heparin drip and transferred to SDU for further management.     During her CEU stay, pt remained hemodynamically stable, and satting 95-96% on RA. Echo showed EF 60-65%, G1DD, and no indication of right heart strain. Pt was on heparin gtt, and then _______________. Pt presented with leukocytosis likely stress related and have been downtrending. blood cultures, urine cultures showed ________.    Pt is stable and cleared for discharge today _____.       Mrs. Erazo is a 72 y/o female w/ hx of CAD s/p CABG and MVR, glaucoma, HTN, HLD, DM presents to the ED for evaluation of dyspnea x 2 days.  Patient reports she went to her PCP to get medical clearance for cataract surgery in which she explained her complaints of dypnea and he advised to come to the ED. Patient reports feeling similar symptoms prior to her heart surgery in the past. Patient denies chest pain, no edema or orthopnea, leg pain/swelling, fever, chills, abd pain, flank pain. She reports being active and fully independent as she lives alone. No further complaints.    In the ED, vitals were BP: 149/65 HR: 82 RR: 20 Spo2 96% on RA, afebrile. Labs significant for leukocytosis 19k, d-dimer: 1239, troponin negative x 1,  VBG: ph 7.42, Bicarb 30, co2 46. EKG NSR, CTA chest: Findings are compatible with acute pulmonary emboli within the right main pulmonary artery and segmental and subsegmental branches of the right upper lobe, right middle lobe and right lower lobe pulmonary arteries; evidence of mild right heart strain is noted. Since February 23, 2021, near-complete resolution of previously noted complex loculated right pleural collection, with trace residual loculated fluid within the right major fissure; decreased trace left pleural effusion. IR consulted in ED: No intervention at this time. Patient started on heparin drip and transferred to SDU for further management.     During her CEU stay, pt remained hemodynamically stable, and satting 95-96% on RA. Never required nasal cannula. Echo showed EF 60-65%, G1DD, and no indication of right heart strain. Pt was on heparin gtt, and then transitioned over to Eliquis. Pt presented with leukocytosis likely stress related and have been downtrending. Pt tolerated PT well.    Pt is stable and cleared for discharge today 10/14.       Mrs. Erazo is a 70 y/o female w/ hx of CAD s/p CABG and MVR, glaucoma, HTN, HLD, DM presents to the ED for evaluation of dyspnea x 2 days.  Patient reports she went to her PCP to get medical clearance for cataract surgery in which she explained her complaints of dypnea and he advised to come to the ED. Patient reports feeling similar symptoms prior to her heart surgery in the past. Patient denies chest pain, no edema or orthopnea, leg pain/swelling, fever, chills, abd pain, flank pain. She reports being active and fully independent as she lives alone. No further complaints.    In the ED, vitals were BP: 149/65 HR: 82 RR: 20 Spo2 96% on RA, afebrile. Labs significant for leukocytosis 19k, d-dimer: 1239, troponin negative x 1,  VBG: ph 7.42, Bicarb 30, co2 46. EKG NSR, CTA chest: Findings are compatible with acute pulmonary emboli within the right main pulmonary artery and segmental and subsegmental branches of the right upper lobe, right middle lobe and right lower lobe pulmonary arteries; evidence of mild right heart strain is noted. Since February 23, 2021, near-complete resolution of previously noted complex loculated right pleural collection, with trace residual loculated fluid within the right major fissure; decreased trace left pleural effusion. IR consulted in ED: No intervention at this time. Patient started on heparin drip and transferred to SDU for further management.     During her CEU stay, pt remained hemodynamically stable, and satting 95-96% on RA. Never required nasal cannula. Echo showed EF 60-65%, G1DD, and no indication of right heart strain. Pt was on heparin gtt, and then transitioned over to Eliquis. Pt presented with leukocytosis likely stress related and have been downtrending. Pt tolerated PT well and will be going home today.    Patient seen and examined this morning.    Lying comfortably in bed  in nad    Vital Signs Last 24 Hrs  T(C): 36.6 (14 Oct 2021 08:00), Max: 36.6 (13 Oct 2021 15:00)  T(F): 97.9 (14 Oct 2021 08:00), Max: 97.9 (14 Oct 2021 08:00)  HR: 68 (14 Oct 2021 08:00) (66 - 68)  BP: 130/62 (14 Oct 2021 08:00) (130/60 - 172/76)  BP(mean): 89 (14 Oct 2021 08:00) (89 - 109)  RR: 17 (14 Oct 2021 08:00) (17 - 18)  SpO2: 96% (14 Oct 2021 08:00) (95% - 96%)    PHYSICAL EXAM:  GENERAL: NAD, well-groomed, well-developed  HEAD:  Atraumatic, Normocephalic  EYES: EOMI, PERRLA, conjunctiva and sclera clear  NERVOUS SYSTEM:  Alert & Oriented X 4, Good concentration; Motor Strength 5/5 B/L upper and lower extremities; DTRs 2+ intact and symmetric  CHEST/LUNG: Clear to percussion bilaterally; No rales, rhonchi, wheezing, or rubs  HEART: Regular rate and rhythm; No murmurs, rubs, or gallops  ABDOMEN: Soft, Nontender, Nondistended; Bowel sounds present  EXTREMITIES:  2+ Peripheral Pulses, No clubbing, cyanosis, or edema  SKIN: No rashes or lesions    D/C today; d/c planning took over 45 min  d/c papers edited by me  discussed d/c plan and all instructions in detail

## 2021-10-13 NOTE — DISCHARGE NOTE PROVIDER - NSDCMRMEDTOKEN_GEN_ALL_CORE_FT
amiodarone 200 mg oral tablet: 200 milligram(s) orally once a day  amLODIPine 5 mg oral tablet: 1 tab(s) orally once a day  aspirin 325 mg oral tablet: 1 tab(s) orally once a day  atorvastatin 40 mg oral tablet: 1 tab(s) orally once a day (at bedtime)  Eliquis 5 mg oral tablet: 2 tab(s) orally 2 times a day for 7 days then 1 tab(s) orally 2 times a day after   escitalopram 20 mg oral tablet: 1 tab(s) orally once a day  furosemide 40 mg oral tablet: 1 tab(s) orally once a day  Keflex 500 mg oral capsule: 1 cap(s) orally every 6 hours   metFORMIN 500 mg oral tablet: 1 tab(s) orally 2 times a day (with meals)  metoprolol tartrate 50 mg oral tablet: 1 tab(s) orally every 12 hours  oxybutynin 10 mg/24 hr oral tablet, extended release: 1 tab(s) orally once a day (at bedtime)  pantoprazole 40 mg oral delayed release tablet: 1 tab(s) orally once a day (before a meal)  traMADol 50 mg oral tablet: 1 tab(s) orally every 6 to 8 hours, As Needed for moderate to severe pain MDD:4 tablets   aspirin 325 mg oral tablet: 1 tab(s) orally once a day  Eliquis 5 mg oral tablet: 2 tab(s) orally 2 times a day for 7 days then 1 tab(s) orally 2 times a day after   escitalopram 20 mg oral tablet: 1 tab(s) orally once a day  furosemide 20 mg oral tablet: 1 tab(s) orally once a day  levothyroxine 50 mcg (0.05 mg) oral tablet: 1 tab(s) orally once a day  metoprolol tartrate 50 mg oral tablet: 1 tab(s) orally every 12 hours  oxybutynin 10 mg/24 hr oral tablet, extended release: 1 tab(s) orally once a day (at bedtime)  Ozempic 2 mg/1.5 mL (0.25 mg or 0.5 mg dose) subcutaneous solution: 0.25 milligram(s) subcutaneous every 7 days  rosuvastatin 40 mg oral tablet: 1 tab(s) orally once a day

## 2021-10-13 NOTE — PHYSICAL THERAPY INITIAL EVALUATION ADULT - PERTINENT HX OF CURRENT PROBLEM, REHAB EVAL
72 y/o female w/ hx of CAD s/p CABG and MVR, glaucoma, HTN, HLD, DM presents to the ED for evaluation of dyspnea x 2 days.

## 2021-10-13 NOTE — PROGRESS NOTE ADULT - ASSESSMENT
Acute PE with mild right heart strain  -on iv heparin  -start oral anticoagulant if no contraindication - [facility]  -ambulate and check sa02 on room air with ambulation  -pulm following  -check echo    Leukocytosis  -no signs of infection  -repeat in am    CAD / VHD s/p CABG and MV repair   H/o chronic A/ fib  HTN  -c/w statin / BBlocker / amlodipine     DM type II   -F/S monitoring and Lispro sliding scale     Progress Note Handoff  Pending Consults: pulm follow up  Pending Tests: echo  Pending Results: echo, labs  Family Discussion: discussed echo, anticoagulation and ambulation with pt - in agreement with plan of care   Disposition: Home_x____/SNF______/Other_____/Unknown at this time_____    Please call me with any questions at extension [phone]  spent 36 min on medical management

## 2021-10-13 NOTE — PHYSICAL THERAPY INITIAL EVALUATION ADULT - GAIT DEVIATIONS NOTED, PT EVAL
Pt demonstrated decreased gait speed however was steady using RW./decreased ernesto/decreased step length/decreased stride length

## 2021-10-13 NOTE — PATIENT PROFILE ADULT - DO YOU FEEL LIKE HURTING YOURSELF OR OTHERS?
Physician Documentation                                                                           

 [facility]                                                                

Name: Dipti Donovan                                                                               

Age: 27 yrs                                                                                       

Sex: Female                                                                                       

: 1991                                                                                   

MRN: [id number]                                                                                   

Arrival Date: 2018                                                                          

Time: 17:30                                                                                       

Account#: [id number]                                                                            

Bed 26                                                                                            

Private MD:                                                                                       

ED Physician Kiko Koch                                                                       

HPI:                                                                                              

                                                                                             

20:40 This 27 yrs old  Female presents to ER via Ambulatory with complaints of 5 wks  gs  

      pregnant, Fall Injury, Abdominal Cramping.                                                  

20:40 Details of fall: The patient fell from an upright position, while walking, fell 2       gs  

      steps. Onset: The symptoms/episode began/occurred acutely, yesterday. Associated            

      injuries: The patient sustained right iliac crest. Severity of symptoms: At their worst     

      the symptoms were mild, in the emergency department the symptoms are unchanged. The         

      patient has experienced similar episodes in the past, a few times. The patient has not      

      recently seen a physician.                                                                  

                                                                                                  

OB/GYN:                                                                                           

18:01 LMP 10/23/2018                                                                          aj  

                                                                                                  

Historical:                                                                                       

- Allergies:                                                                                      

18:01 No Known Allergies;                                                                     aj  

- Home Meds:                                                                                      

18:01 None [Active];                                                                          aj  

- PMHx:                                                                                           

18:01 None;                                                                                   aj  

- PSHx:                                                                                           

18:01 None;                                                                                   aj  

                                                                                                  

- Immunization history: Last tetanus immunization: - up to date.                                  

- Social history:: Smoking status: Patient/guardian denies using tobacco.                         

- Ebola Screening: : Patient negative for fever greater than or equal to 101.5 degrees            

  Fahrenheit, and additional compatible Ebola Virus Disease symptoms Patient denies               

  exposure to infectious person Patient denies travel to an Ebola-affected area in the            

  21 days before illness onset No symptoms or risks identified at this time.                      

                                                                                                  

                                                                                                  

ROS:                                                                                              

20:40 All other systems are negative.                                                         gs  

                                                                                                  

Exam:                                                                                             

20:40 Head/Face:  Normocephalic, atraumatic. Eyes:  Pupils equal round and reactive to light, gs  

      extra-ocular motions intact.  Lids and lashes normal.  Conjunctiva and sclera are           

      non-icteric and not injected.  Cornea within normal limits.  Periorbital areas with no      

      swelling, redness, or edema. ENT:  Nares patent. No nasal discharge, no septal              

      abnormalities noted.  Tympanic membranes are normal and external auditory canals are        

      clear.  Oropharynx with no redness, swelling, or masses, exudates, or evidence of           

      obstruction, uvula midline.  Mucous membranes moist. Neck:  Trachea midline, no             

      thyromegaly or masses palpated, and no cervical lymphadenopathy.  Supple, full range of     

      motion without nuchal rigidity, or vertebral point tenderness.  No Meningismus.             

      Chest/axilla:  Normal chest wall appearance and motion.  Nontender with no deformity.       

      No lesions are appreciated. Cardiovascular:  Regular rate and rhythm with a normal S1       

      and S2.  No gallops, murmurs, or rubs.  Normal PMI, no JVD.  No pulse deficits.             

      Respiratory:  Lungs have equal breath sounds bilaterally, clear to auscultation and         

      percussion.  No rales, rhonchi or wheezes noted.  No increased work of breathing, no        

      retractions or nasal flaring. Abdomen/GI:  Soft, non-tender, with normal bowel sounds.      

      No distension or tympany.  No guarding or rebound.  No evidence of tenderness               

      throughout. Back:  No spinal tenderness.  No costovertebral tenderness.  Full range of      

      motion. Skin:  Warm, dry with normal turgor.  Normal color with no rashes, no lesions,      

      and no evidence of cellulitis. MS/ Extremity:  Pulses equal, no cyanosis.                   

      Neurovascular intact.  Full, normal range of motion. Neuro:  Awake and alert, GCS 15,       

      oriented to person, place, time, and situation.  Cranial nerves II-XII grossly intact.      

      Motor strength 5/5 in all extremities.  Sensory grossly intact.  Cerebellar exam            

      normal.  Normal gait.                                                                       

20:40 Constitutional: The patient appears alert, awake.                                           

                                                                                                  

Vital Signs:                                                                                      

17:57  / 66; Pulse 94; Resp 20; Temp 97.5; Pulse Ox 100% on R/A; Weight 57.15 kg;       aj  

      Height 5 ft. 1 in. (154.94 cm);                                                             

19:59  / 90; Pulse 88; Resp 16; Pulse Ox 100% on R/A;                                   rv  

20:00 BP 98 / 61; Pulse 82; Resp 16; Pulse Ox 100% ;                                          rv  

20:00 Pulse 84; Resp 17; Pulse Ox 100% ;                                                      rv  

20:53  / 59; Pulse 82; Resp 15; Pulse Ox 100% on R/A;                                   rv  

17:57 Body Mass Index 23.81 (57.15 kg, 154.94 cm)                                             aj  

                                                                                                  

Conrad Coma Score:                                                                               

17:57 Eye Response: spontaneous(4). Verbal Response: oriented(5). Motor Response: obeys       aj  

      commands(6). Total: 15.                                                                     

                                                                                                  

Trauma Score (Adult):                                                                             

17:57 Eye Response: spontaneous(1); Verbal Response: oriented(1); Motor Response: obeys       aj  

      commands(2); Systolic BP: > 89 mm Hg(4); Respiratory Rate: 10 to 29 per min(4); Conrad     

      Score: 15; Trauma Score: 12                                                                 

                                                                                                  

MDM:                                                                                              

19:58 Patient medically screened.                                                             gs  

20:40 Differential diagnosis: contusion. Data reviewed: vital signs, nurses notes.              

      Counseling: I had a detailed discussion with the patient and/or guardian regarding: the     

      historical points, exam findings, and any diagnostic results supporting the                 

      discharge/admit diagnosis, lab results, radiology results, the need for outpatient          

      follow up. Response to treatment: the patient's symptoms have markedly improved after       

      treatment, and as a result, I will discharge patient.                                       

                                                                                                  

                                                                                             

17:42 Order name: Urine Culture                                                               snw 

                                                                                             

17:42 Order name: Urine Microscopic Only; Complete Time: 20:40                                snw 

                                                                                             

17:42 Order name: Urine Pregnancy Test (obtain specimen); Complete Time: 19:58                snw 

                                                                                             

19:59 Order name: US OB Limited                                                                 

                                                                                             

20:04 Order name: Urine Dipstick--Ancillary (enter results); Complete Time: 20:40             ar5 

                                                                                             

20:04 Order name: TRANSVAG OB                                                                 EDMS

                                                                                             

17:42 Order name: Urine Dipstick-Ancillary (obtain specimen); Complete Time: 19:58            snw 

                                                                                                  

Administered Medications:                                                                         

No medications were administered                                                                  

                                                                                                  

                                                                                                  

Disposition:                                                                                      

18 20:47 Discharged to Home. Impression: Contusion of abdominal wall, early pregnancy.      

- Condition is Stable.                                                                            

- Discharge Instructions: Contusion.                                                              

                                                                                                  

- Medication Reconciliation Form, Thank You Letter, Antibiotic Education, Prescription            

  Opioid Use form.                                                                                

- Follow up: Becki Beth MD; When: 2 - 3 days; Reason: Re-evaluation by your physician.          

                                                                                                  

                                                                                                  

                                                                                                  

Signatures:                                                                                       

Dispatcher [employer]                           Shaunna Rubin RN                       RN   Ayala Salvador, MACARENAC                 DALTONP-Sungw                                                  

Kiko Koch MD MD gs Vicente, Ronaldo, RN RN   rv                                                   

                                                                                                  

Corrections: (The following items were deleted from the chart)                                    

20:55 20:47 2018 20:47 Discharged to Home. Impression: Contusion of abdominal wall;     rv  

      early pregnancy. Condition is Stable. Forms are Medication Reconciliation Form, Thank       

      You Letter, Antibiotic Education, Prescription Opioid Use. Follow up: Mini Rekhi; When:     

      2 - 3 days; Reason: Re-evaluation by your physician. gs                                     

                                                                                                  

**************************************************************************************************
no

## 2021-10-13 NOTE — CHART NOTE - NSCHARTNOTEFT_GEN_A_CORE
Patient Eliquis pricing:     First 30 days: $20 copay  Afterwards will be able to get 90 day supplies for $40     Patient updated on the cost of medication and she is agreeable to paying this with no difficulty. Case management updated and informed about this.

## 2021-10-13 NOTE — PHYSICAL THERAPY INITIAL EVALUATION ADULT - GENERAL OBSERVATIONS, REHAB EVAL
Pt encountered sleeping in bed in NAD. Pt declined to participate in b/s PT IE and requested to get more rest despite PT encouragement and education regarding benefits of PT. PT explained role of b/s PT, benefits of OOB, and general rehab goals. Pt insists that she has no concerns regarding mobility, including navigation of home environment. Pt continue to request to rest in bed at this time. PT encouraged pt to notify PT/NSG should she change her mind. Pt verbalized understanding.
1500 - 1532 Pt encountered semifowler in bed +bed alarm, +IV heparin, +primafit, +tele in NAD. Pt agreeable for b/s PT IE.

## 2021-10-13 NOTE — DISCHARGE NOTE NURSING/CASE MANAGEMENT/SOCIAL WORK - PATIENT PORTAL LINK FT
You can access the FollowMyHealth Patient Portal offered by [facility] by registering at the following website: [email or web address]. By joining [employer]’s FollowMyHealth portal, you will also be able to view your health information using other applications (apps) compatible with our system.

## 2021-10-13 NOTE — DISCHARGE NOTE PROVIDER - NSDCCPCAREPLAN_GEN_ALL_CORE_FT
PRINCIPAL DISCHARGE DIAGNOSIS  Diagnosis: Acute pulmonary embolus  Assessment and Plan of Treatment: You came to the ED for shortness of breath  x 2 days. You had an elevated d-dimer and CT angio of your chest showed findings of an acute pulmonary embolism in your right lung. Interventional radiology was consulted and stated no intervention was needed. You were then started on a heparin drip and transferred to the CEU for further management.   During your CEU stay, your vitals and clinical picture remained stable and did not require any supplemental oxygen. Echo showed EF 60-65% and no indication of right heart strain. You were then transitioned from a heparin drip to Eliquis.  Pt is stable and cleared for discharge today _____.  Please follow up with your PCP and cardiologist within 2 weeks of discharge.          PRINCIPAL DISCHARGE DIAGNOSIS  Diagnosis: Acute pulmonary embolus  Assessment and Plan of Treatment: You came to the ED for shortness of breath  x 2 days. You had an elevated d-dimer and CT angio of your chest showed findings of an acute pulmonary embolism in your right lung. Interventional radiology was consulted and stated no intervention was needed. You were then started on a heparin drip and transferred to the CEU for further management.   During your CEU stay, your vitals and clinical picture remained stable and did not require any supplemental oxygen. Echo showed EF 60-65% and no indication of right heart strain. You were then transitioned from heparin drip to Eliquis.  You are stable to be discharged today 10/14.  Please follow up with your PCP and pulmonologist within 2 weeks of discharge.

## 2021-10-13 NOTE — CONSULT NOTE ADULT - ASSESSMENT
IMPRESSION:    SOB/ PE/ NL TROP/ ? HEART STRAIN SP IR EVAL no intervention  CAD SP CABG/ MVR  Chronic elevation of L hemidiaphragm  HTN  high WBC        PLAN:    CNS: avoid CNS depressant    HEENT:  Oral care    PULMONARY:  HOB @ 45 degrees, RA, aspiration precaution    CARDIOVASCULAR: trend CE, echo, hold lasix    GI: GI prophylaxis                                          Feeding po    RENAL:  F/u  lytes.  Correct as needed. accurate I/O    INFECTIOUS DISEASE: cx/ procal    HEMATOLOGICAL:  LE doppler, full AC ( can use LMWH)    ENDOCRINE:  Follow up FS.  Insulin protocol if needed.    CODE STATUS: FULL CODE    DISPOSITION: Floor

## 2021-10-13 NOTE — CHART NOTE - NSCHARTNOTEFT_GEN_A_CORE
Transfer Note  ---------------------------    Transfer from: CEU  Transfer to:  ( x ) Medicine    (  ) Telemetry    (  ) RCU    (  ) Palliative    (  ) Stroke Unit    (  ) _______________      H&P:  Mrs. Erazo is a 70 y/o female w/ hx of CAD s/p CABG and MVR, glaucoma, HTN, HLD, DM presents to the ED for evaluation of dyspnea x 2 days.  Patient reports she went to her PCP to get medical clearance for cataract surgery in which she explained her complaints of dypnea and he advised to come to the ED. Patient reports feeling similar symptoms prior to her heart surgery in the past. Patient denies chest pain, no edema or orthopnea, leg pain/swelling, fever, chills, abd pain, flank pain. She reports being active and fully independent as she lives alone. No further complaints.    In the ED, vitals were BP: 149/65 HR: 82 RR: 20 Spo2 96% on RA, afebrile. Labs significant for leukocytosis 19k, d-dimer: 1239, troponin negative x 1,  VBG: ph 7.42, Bicarb 30, co2 46. EKG NSR, CTA chest: Findings are compatible with acute pulmonary emboli within the right main pulmonary artery and segmental and subsegmental branches of the right upper lobe, right middle lobe and right lower lobe pulmonary arteries; evidence of mild right heart strain is noted. Since February 23, 2021, near-complete resolution of previously noted complex loculated right pleural collection, with trace residual loculated fluid within the right major fissure; decreased trace left pleural effusion. IR consulted in ED: No intervention at this time. Patient started on heparin drip and transferred to SDU for further management.       CEU COURSE:              To-Do:    [ ]   [ ]     OBJECTIVE --  Vital Signs Last 24 Hrs  T(C): 36.6 (13 Oct 2021 15:00), Max: 37 (13 Oct 2021 03:40)  T(F): 97.8 (13 Oct 2021 15:00), Max: 98.6 (13 Oct 2021 03:40)  HR: 68 (13 Oct 2021 18:00) (68 - 78)  BP: 172/76 (13 Oct 2021 18:00) (131/99 - 172/76)  BP(mean): 109 (13 Oct 2021 18:00) (96 - 109)  RR: 18 (13 Oct 2021 18:00) (18 - 18)  SpO2: 96% (13 Oct 2021 18:00) (95% - 96%)    I&O's Summary      MEDICATIONS  (STANDING):  amLODIPine   Tablet 5 milliGRAM(s) Oral daily  apixaban 10 milliGRAM(s) Oral every 12 hours  aspirin 325 milliGRAM(s) Oral daily  atorvastatin 40 milliGRAM(s) Oral at bedtime  escitalopram 20 milliGRAM(s) Oral daily  metoprolol tartrate 50 milliGRAM(s) Oral every 12 hours  pantoprazole    Tablet 40 milliGRAM(s) Oral before breakfast    MEDICATIONS  (PRN):  traMADol 50 milliGRAM(s) Oral daily PRN Severe Pain (7 - 10)        LABS                                            13.4                  Neurophils% (auto):   75.5   (10-13 @ 08:09):    17.73)-----------(185          Lymphocytes% (auto):  17.1                                          40.7                   Eosinphils% (auto):   0.8      Manual%: Neutrophils x    ; Lymphocytes x    ; Eosinophils x    ; Bands%: x    ; Blasts x                                    141    |  102    |  28                  Calcium: 9.0   / iCa: x      (10-13 @ 08:09)    ----------------------------<  103       Magnesium: 2.1                              4.0     |  23     |  1.0              Phosphorous: x        TPro  6.3    /  Alb  3.9    /  TBili  0.8    /  DBili  x      /  AST  33     /  ALT  72     /  AlkPhos  65     13 Oct 2021 08:09    ( 10-13 @ 11:00 )   PT: x    ;   INR: x      aPTT: 123.4 sec          ASSESSMENT & PLAN:   Mrs. Erazo is a 70 y/o female w/ hx of CAD s/p CABG and MVR, glaucoma, HTN, HLD, DM presents to the ED for evaluation of dyspnea x 2 days.    #dyspnea on admission due to unprovoked pulmonary embolism  - ED vitals were BP: 149/65 HR: 82 RR: 20 Spo2 96% on RA, afebrile.  - ED Labs significant for leukocytosis 19k, d-dimer: 1239, troponin negative x 1,  VBG: ph 7.42, Bicarb 30, co2 46.   - EKG NSR  - CXR negative  - CTA chest: Findings are compatible with acute pulmonary emboli within the right main pulmonary artery and segmental and subsegmental branches of the right upper lobe, right middle lobe and right lower lobe pulmonary arteries; evidence of mild right heart strain is noted. Since February 23, 2021, near-complete resolution of previously noted complex loculated right pleural collection, with trace residual loculated fluid within the right major fissure; decreased trace left pleural effusion.   - IR consulted in ED: No intervention at this time  - duplex negative prelim read  - c/w heparin drip; monitor PTT around the clock  - can possibly transition over to Eliquis in the PM pending echo results  - f/u echo, blood cultures, urine cultures    #leukocytosis possible 2/2 to stress related - downtrending  - not septic on admission   - trend CBC  - f/u blood cultures, UA and CX, procalc    #CAD/Mitral Valve Insufficiency-s/p CABG x 1/MV Repair 2/2021  - c/w with atorvastatin 40 mg, metoprolol and amlodipine  - patients cardiologist: Dr. David    #hx right sided pleural effusion s/p pigtail cath in 2/2021  - Since February 23, 2021, near-complete resolution of previously noted complex loculated right pleural collection, with trace residual loculated fluid within the right major fissure; decreased trace left pleural effusion.   - continue to monitor   - as per pulm recs: hold lasix    #hx of A-Fib  - Was on amiodarone which was discontinued 2 months ago as per patient and her cardiologist Dr. Winkler told her to  - Not on anticoagulation   - EKG on admission: NSR  - c/w metoprolol    #HTN  -BP control- c/w metoprolol, amlodipine    #DM  - monitor blood sugar   - insulin sliding scale correction regimen    #Misc:  Diet: DASH/TLC  Activity: as tolerated  DVT Prophylaxis: heparin drip  GI Prophylaxis: protonix   Code Status: full   Disposition: transition to Eliquis, echo, BCx, UCx, U/A. downgrade to floors.

## 2021-10-13 NOTE — DISCHARGE NOTE PROVIDER - CARE PROVIDERS DIRECT ADDRESSES
,[email or web address]

## 2021-10-13 NOTE — DISCHARGE NOTE PROVIDER - CARE PROVIDER_API CALL
Jesse Avila  [address] [place] [address]  Phone: [phone]  Fax: [phone]  Follow Up Time: 2 weeks    Hossein David  Cardiology  [address] [place] [address]  Phone: [phone]  Fax: [phone]  Follow Up Time: 2 weeks   Jesse Avila  FAMILY MEDICINE  [address] [place] [address]  Phone: [phone]  Fax: [phone]  Follow Up Time: 2 weeks    Wally Eli)  Critical Care Medicine; Internal Medicine; Pulmonary Disease; Sleep Medicine  [address] [place] [address]  Phone: [phone]  Fax: [phone]  Follow Up Time: 2 weeks    Hossein David  Cardiology  [address] [place] [address]  Phone: [phone]  Fax: [phone]  Follow Up Time: 2 weeks

## 2021-10-13 NOTE — PHYSICAL THERAPY INITIAL EVALUATION ADULT - LEVEL OF INDEPENDENCE: STAIR NEGOTIATION, REHAB EVAL
Not performed at this time 2* to IV pole. Pt has no concerns regarding stair negotiation at this time.

## 2021-10-13 NOTE — PHYSICAL THERAPY INITIAL EVALUATION ADULT - THERAPY FREQUENCY, PT EVAL
Pt demonstrated ability to perform transfers and amb with supervision and does not require skilled PT services at this time. Pt would benefit from continued amb with nursing supervision. Pleas reconsult if status changes.

## 2021-10-13 NOTE — PHYSICAL THERAPY INITIAL EVALUATION ADULT - SPECIFY REASON(S)
Pt demonstrated ability to perform transfers and amb with supervision at this time. Pt does not have any concerns regarding stair negotiation.

## 2021-10-13 NOTE — DISCHARGE NOTE PROVIDER - PROVIDER TOKENS
PROVIDER:[TOKEN:[[id number]],FOLLOWUP:[2 weeks]],PROVIDER:[TOKEN:[[id number]],FOLLOWUP:[2 weeks]] PROVIDER:[TOKEN:[[id number]],FOLLOWUP:[2 weeks]],PROVIDER:[TOKEN:[[id number]],FOLLOWUP:[2 weeks]],PROVIDER:[TOKEN:[[id number]],FOLLOWUP:[2 weeks]]

## 2021-10-13 NOTE — PROGRESS NOTE ADULT - ASSESSMENT
Mrs. Erazo is a 70 y/o female w/ hx of CAD s/p CABG and MVR, glaucoma, HTN, HLD, DM presents to the ED for evaluation of dyspnea x 2 days.    #dyspnea on admission due to unprovoked pulmonary embolism  - ED vitals were BP: 149/65 HR: 82 RR: 20 Spo2 96% on RA, afebrile.  - ED Labs significant for leukocytosis 19k, d-dimer: 1239, troponin negative x 1,  VBG: ph 7.42, Bicarb 30, co2 46.   - EKG NSR  - CTA chest: Findings are compatible with acute pulmonary emboli within the right main pulmonary artery and segmental and subsegmental branches of the right upper lobe, right middle lobe and right lower lobe pulmonary arteries; evidence of mild right heart strain is noted. Since February 23, 2021, near-complete resolution of previously noted complex loculated right pleural collection, with trace residual loculated fluid within the right major fissure; decreased trace left pleural effusion.   - IR consulted in ED: No intervention at this time  - c/w heparin drip; monitor PTT around the clock  - can possibly transition over to Eliquis in the PM pending echo results  - f/u echo, blood cultures, urine cultures    #leukocytosis possible 2/2 to stress related - downtrending  - not septic on admission   - trend CBC  - f/u blood cultures, UA and CX    #CAD/Mitral Valve Insufficiency-s/p CABG x 1/MV Repair 2/2021  - c/w with atorvastatin 40 mg, metoprolol and amlodipine  - patients cardiologist: Dr. David    #hx right sided pleural effusion s/p pigtail cath in 2/2021  - Since February 23, 2021, near-complete resolution of previously noted complex loculated right pleural collection, with trace residual loculated fluid within the right major fissure; decreased trace left pleural effusion.   - continue to monitor   - as per pulm recs: hold lasix    #hx of A-Fib  - Was on amiodarone which was discontinued 2 months ago as per patient and her cardiologist Dr. Winkler told her to  - Not on anticoagulation   - EKG on admission: NSR  - c/w metoprolol    #HTN  -BP control- c/w metoprolol, amlodipine    #DM  - monitor blood sugar   - insulin sliding scale correction regimen    #Misc:  Diet: DASH/TLC  Activity: as tolerated  DVT Prophylaxis: heparin drip  GI Prophylaxis: protonix   Code Status: full   Disposition: transition to Eliquis, echo, BCx, UCx, U/A. downgrade to floors.   Mrs. Erazo is a 70 y/o female w/ hx of CAD s/p CABG and MVR, glaucoma, HTN, HLD, DM presents to the ED for evaluation of dyspnea x 2 days.    #dyspnea on admission due to unprovoked pulmonary embolism  - ED vitals were BP: 149/65 HR: 82 RR: 20 Spo2 96% on RA, afebrile.  - ED Labs significant for leukocytosis 19k, d-dimer: 1239, troponin negative x 1,  VBG: ph 7.42, Bicarb 30, co2 46.   - EKG NSR  - CTA chest: Findings are compatible with acute pulmonary emboli within the right main pulmonary artery and segmental and subsegmental branches of the right upper lobe, right middle lobe and right lower lobe pulmonary arteries; evidence of mild right heart strain is noted. Since February 23, 2021, near-complete resolution of previously noted complex loculated right pleural collection, with trace residual loculated fluid within the right major fissure; decreased trace left pleural effusion.   - IR consulted in ED: No intervention at this time  - duplex negative prelim read  - c/w heparin drip; monitor PTT around the clock  - can possibly transition over to Eliquis in the PM pending echo results  - f/u echo, blood cultures, urine cultures    #leukocytosis possible 2/2 to stress related - downtrending  - not septic on admission   - trend CBC  - f/u blood cultures, UA and CX    #CAD/Mitral Valve Insufficiency-s/p CABG x 1/MV Repair 2/2021  - c/w with atorvastatin 40 mg, metoprolol and amlodipine  - patients cardiologist: Dr. David    #hx right sided pleural effusion s/p pigtail cath in 2/2021  - Since February 23, 2021, near-complete resolution of previously noted complex loculated right pleural collection, with trace residual loculated fluid within the right major fissure; decreased trace left pleural effusion.   - continue to monitor   - as per pulm recs: hold lasix    #hx of A-Fib  - Was on amiodarone which was discontinued 2 months ago as per patient and her cardiologist Dr. Winkler told her to  - Not on anticoagulation   - EKG on admission: NSR  - c/w metoprolol    #HTN  -BP control- c/w metoprolol, amlodipine    #DM  - monitor blood sugar   - insulin sliding scale correction regimen    #Misc:  Diet: DASH/TLC  Activity: as tolerated  DVT Prophylaxis: heparin drip  GI Prophylaxis: protonix   Code Status: full   Disposition: transition to Eliquis, echo, BCx, UCx, U/A. downgrade to floors.   Mrs. Erazo is a 70 y/o female w/ hx of CAD s/p CABG and MVR, glaucoma, HTN, HLD, DM presents to the ED for evaluation of dyspnea x 2 days.    #dyspnea on admission due to unprovoked pulmonary embolism  - ED vitals were BP: 149/65 HR: 82 RR: 20 Spo2 96% on RA, afebrile.  - ED Labs significant for leukocytosis 19k, d-dimer: 1239, troponin negative x 1,  VBG: ph 7.42, Bicarb 30, co2 46.   - EKG NSR  - CXR negative  - CTA chest: Findings are compatible with acute pulmonary emboli within the right main pulmonary artery and segmental and subsegmental branches of the right upper lobe, right middle lobe and right lower lobe pulmonary arteries; evidence of mild right heart strain is noted. Since February 23, 2021, near-complete resolution of previously noted complex loculated right pleural collection, with trace residual loculated fluid within the right major fissure; decreased trace left pleural effusion.   - IR consulted in ED: No intervention at this time  - duplex negative prelim read  - c/w heparin drip; monitor PTT around the clock  - can possibly transition over to Eliquis in the PM pending echo results  - f/u echo, blood cultures, urine cultures    #leukocytosis possible 2/2 to stress related - downtrending  - not septic on admission   - trend CBC  - f/u blood cultures, UA and CX    #CAD/Mitral Valve Insufficiency-s/p CABG x 1/MV Repair 2/2021  - c/w with atorvastatin 40 mg, metoprolol and amlodipine  - patients cardiologist: Dr. David    #hx right sided pleural effusion s/p pigtail cath in 2/2021  - Since February 23, 2021, near-complete resolution of previously noted complex loculated right pleural collection, with trace residual loculated fluid within the right major fissure; decreased trace left pleural effusion.   - continue to monitor   - as per pulm recs: hold lasix    #hx of A-Fib  - Was on amiodarone which was discontinued 2 months ago as per patient and her cardiologist Dr. Winkler told her to  - Not on anticoagulation   - EKG on admission: NSR  - c/w metoprolol    #HTN  -BP control- c/w metoprolol, amlodipine    #DM  - monitor blood sugar   - insulin sliding scale correction regimen    #Misc:  Diet: DASH/TLC  Activity: as tolerated  DVT Prophylaxis: heparin drip  GI Prophylaxis: protonix   Code Status: full   Disposition: transition to Eliquis, echo, BCx, UCx, U/A. downgrade to floors.   Mrs. Erazo is a 72 y/o female w/ hx of CAD s/p CABG and MVR, glaucoma, HTN, HLD, DM presents to the ED for evaluation of dyspnea x 2 days.    #dyspnea on admission due to unprovoked pulmonary embolism  - ED vitals were BP: 149/65 HR: 82 RR: 20 Spo2 96% on RA, afebrile.  - ED Labs significant for leukocytosis 19k, d-dimer: 1239, troponin negative x 1,  VBG: ph 7.42, Bicarb 30, co2 46.   - EKG NSR  - CXR negative  - CTA chest: Findings are compatible with acute pulmonary emboli within the right main pulmonary artery and segmental and subsegmental branches of the right upper lobe, right middle lobe and right lower lobe pulmonary arteries; evidence of mild right heart strain is noted. Since February 23, 2021, near-complete resolution of previously noted complex loculated right pleural collection, with trace residual loculated fluid within the right major fissure; decreased trace left pleural effusion.   - IR consulted in ED: No intervention at this time  - duplex negative prelim read  - c/w heparin drip; monitor PTT around the clock  - can possibly transition over to Eliquis in the PM pending echo results  - f/u echo, blood cultures, urine cultures    #leukocytosis possible 2/2 to stress related - downtrending  - not septic on admission   - trend CBC  - f/u blood cultures, UA and CX, procalc    #CAD/Mitral Valve Insufficiency-s/p CABG x 1/MV Repair 2/2021  - c/w with atorvastatin 40 mg, metoprolol and amlodipine  - patients cardiologist: Dr. David    #hx right sided pleural effusion s/p pigtail cath in 2/2021  - Since February 23, 2021, near-complete resolution of previously noted complex loculated right pleural collection, with trace residual loculated fluid within the right major fissure; decreased trace left pleural effusion.   - continue to monitor   - as per pulm recs: hold lasix    #hx of A-Fib  - Was on amiodarone which was discontinued 2 months ago as per patient and her cardiologist Dr. Winkler told her to  - Not on anticoagulation   - EKG on admission: NSR  - c/w metoprolol    #HTN  -BP control- c/w metoprolol, amlodipine    #DM  - monitor blood sugar   - insulin sliding scale correction regimen    #Misc:  Diet: DASH/TLC  Activity: as tolerated  DVT Prophylaxis: heparin drip  GI Prophylaxis: protonix   Code Status: full   Disposition: transition to Eliquis, echo, BCx, UCx, U/A. downgrade to floors.

## 2021-10-13 NOTE — CONSULT NOTE ADULT - SUBJECTIVE AND OBJECTIVE BOX
INTERVENTIONAL RADIOLOGY CONSULT:     Procedure Requested: Pulmonary angiography    HPI:  71F PMHx CAD (CABG), glaucoma, HTN, HLD, DM who presented with 2 days of dyspnea. No chest pain, worsening lower extremity edema, fever, chills, abdominal pain, or flank pain. CTPA shows PE in right main extending into right upper and lower lobe branches.      PAST MEDICAL & SURGICAL HISTORY:  Hypertension, unspecified type    Hyperlipidemia, unspecified hyperlipidemia type    Type 2 diabetes mellitus without complication, without long-term current use of insulin    H/O sleep apnea  on CPAP    Chronic sciatica    Glaucoma    CAD (coronary artery disease)    H/O mitral valve stenosis    Hemothorax  s/p CABG X1 AND MITRAL REPAIR POST-OP    Lymphedema, limb  LLE    Skin cancer, basal cell    OA (osteoarthritis)    History of hip surgery  bilateral hip    History of carpal tunnel surgery    S/P CABG (coronary artery bypass graft)  CABG X1 and repair of mitral valve 2/9/21        MEDICATIONS  (STANDING):    MEDICATIONS  (PRN):      Allergies    No Known Drug Allergies  sores in mouth (Blisters)    Intolerances    FAMILY HISTORY:  Family history of diabetes mellitus (DM) (Mother)    FH: heart disease (Father)        Physical Exam:   Vital Signs Last 24 Hrs  T(C): 36.9 (12 Oct 2021 14:12), Max: 36.9 (12 Oct 2021 14:12)  T(F): 98.5 (12 Oct 2021 14:12), Max: 98.5 (12 Oct 2021 14:12)  HR: 82 (12 Oct 2021 14:12) (82 - 82)  BP: 149/65 (12 Oct 2021 14:12) (149/65 - 149/65)  BP(mean): --  RR: 20 (12 Oct 2021 14:12) (20 - 20)  SpO2: 96% (12 Oct 2021 14:12) (96% - 96%)      Labs:                         13.7   19.20 )-----------( 179      ( 12 Oct 2021 17:03 )             41.2     10-12    143  |  104  |  34<H>  ----------------------------<  146<H>  4.1   |  24  |  1.1    Ca    9.2      12 Oct 2021 17:03  Mg     2.2     10-12    TPro  6.2  /  Alb  4.0  /  TBili  0.7  /  DBili  x   /  AST  26  /  ALT  82<H>  /  AlkPhos  61  10-12        Pertinent labs:                      13.7   19.20 )-----------( 179      ( 12 Oct 2021 17:03 )             41.2       10-12    143  |  104  |  34<H>  ----------------------------<  146<H>  4.1   |  24  |  1.1    Ca    9.2      12 Oct 2021 17:03  Mg     2.2     10-12    TPro  6.2  /  Alb  4.0  /  TBili  0.7  /  DBili  x   /  AST  26  /  ALT  82<H>  /  AlkPhos  61  10-12          Radiology & Additional Studies:     Radiology imaging reviewed.       ASSESSMENT AND PLAN:  71F with PE in right main extending into right upper and lower lobe branches with possible evidence of right heart strain. Normotensive and normal heart rate, O2 sat 96% on room air. , troponin negative. Venous duplex Case and plan as below discussed with pulmonology.   - No intervention at this time.   - Anticoagulation with heparin.   - Please obtain echocardiogram and coags.   - Monitoring and medical management per primary team appreciated. If the patient becomes unstable overnight, please call radiology at [phone].    Thank you for the courtesy of this consult, please call [phone] with any further questions.   
Patient is a 71y old  Female who presents with a chief complaint of sob    72 y/o female w/ hx of CAD s/p CABG and MVR, glaucoma, HTN, HLD, DM presents to the ED for evaluation of dyspnea x 2 days.  Patient reports she went to her PCP to get medical clearance for cataract surgery in which she explained her complaints of dypnea and he advised to come to the ED. Patient reports feeling similar symptoms prior to her heart surgery in the past. Patient denies chest pain, no edema or orthopnea, leg pain/swelling, fever, chills, abd pain, flank pain. She reports being active and fully independent as she lives alone. No further complaints.    In the ED, vitals were BP: 149/65 HR: 82 RR: 20 Spo2 96% on RA, afebrile. Labs significant for leukocytosis 19k, d-dimer: 1239, troponin negative x 1,  VBG: ph 7.42, Bicarb 30, co2 46. EKG NSR, CTA chest: Findings are compatible with acute pulmonary emboli within the right main pulmonary artery and segmental and subsegmental branches of the right upper lobe, right middle lobe and right lower lobe pulmonary arteries; evidence of mild right heart strain is noted. Since February 23, 2021, near-complete resolution of previously noted complex loculated right pleural collection, with trace residual loculated fluid within the right major fissure; decreased trace left pleural effusion. IR consulted in ED: No intervention at this time. Patient started on heparin drip and transferred to SDU , overnight events noted, no pain/ on RA      PAST MEDICAL & SURGICAL HISTORY:  Hypertension, unspecified type    Hyperlipidemia, unspecified hyperlipidemia type    Type 2 diabetes mellitus without complication, without long-term current use of insulin    H/O sleep apnea  on CPAP    Chronic sciatica    Glaucoma    CAD (coronary artery disease)    H/O mitral valve stenosis    Hemothorax  s/p CABG X1 AND MITRAL REPAIR POST-OP    Lymphedema, limb  LLE    Skin cancer, basal cell    OA (osteoarthritis)    History of hip surgery  bilateral hip    History of carpal tunnel surgery    S/P CABG (coronary artery bypass graft)  CABG X1 and repair of mitral valve 2/9/21        SOCIAL HX:   Smoking -    FAMILY HISTORY:  Family history of diabetes mellitus (DM) (Mother)    FH: heart disease (Father)        REVIEW OF SYSTEMS SEE HPI    Allergies    No Known Drug Allergies  sores in mouth (Blisters)    Intolerances        amLODIPine   Tablet 5 milliGRAM(s) Oral daily  aspirin 325 milliGRAM(s) Oral daily  atorvastatin 40 milliGRAM(s) Oral at bedtime  escitalopram 20 milliGRAM(s) Oral daily  furosemide    Tablet 40 milliGRAM(s) Oral daily  heparin   Injectable 7500 Unit(s) IV Push every 6 hours PRN  heparin   Injectable 3500 Unit(s) IV Push every 6 hours PRN  heparin  Infusion.  Unit(s)/Hr IV Continuous <Continuous>  metoprolol tartrate 50 milliGRAM(s) Oral every 12 hours  pantoprazole    Tablet 40 milliGRAM(s) Oral before breakfast  traMADol 50 milliGRAM(s) Oral daily PRN  : Home Meds:      PHYSICAL EXAM    ICU Vital Signs Last 24 Hrs  T(C): 37 (13 Oct 2021 03:40), Max: 37 (13 Oct 2021 03:40)  T(F): 98.6 (13 Oct 2021 03:40), Max: 98.6 (13 Oct 2021 03:40)  HR: 74 (13 Oct 2021 03:40) (74 - 82)  BP: 131/99 (13 Oct 2021 03:40) (131/99 - 149/65)  BP(mean): --  RR: 18 (13 Oct 2021 03:40) (18 - 20)  SpO2: 95% (13 Oct 2021 03:40) (95% - 96%)      General: comfortable  HEENT:  LEI              Lymph Nodes: No cervical LN   Lungs: Bilateral BS  Cardiovascular: LINN 2/6  Abdomen: Soft, Positive BS  Extremities: No clubbing  Skin: Warm  Neurological: Non focal         LABS:                          13.7   19.20 )-----------( 179      ( 12 Oct 2021 17:03 )             41.2                                               10-12    143  |  104  |  34<H>  ----------------------------<  146<H>  4.1   |  24  |  1.1    Ca    9.2      12 Oct 2021 17:03  Mg     2.2     10-12    TPro  6.2  /  Alb  4.0  /  TBili  0.7  /  DBili  x   /  AST  26  /  ALT  82<H>  /  AlkPhos  61  10-12      PT/INR - ( 12 Oct 2021 22:37 )   PT: 11.30 sec;   INR: 0.98 ratio         PTT - ( 12 Oct 2021 22:37 )  PTT:21.7 sec                                           CARDIAC MARKERS ( 12 Oct 2021 17:03 )  x     / <0.01 ng/mL / x     / x     / x                                                LIVER FUNCTIONS - ( 12 Oct 2021 17:03 )  Alb: 4.0 g/dL / Pro: 6.2 g/dL / ALK PHOS: 61 U/L / ALT: 82 U/L / AST: 26 U/L / GGT: x                                                                                                                                       X-Rays   REVIEWED    MEDICATIONS  (STANDING):  amLODIPine   Tablet 5 milliGRAM(s) Oral daily  aspirin 325 milliGRAM(s) Oral daily  atorvastatin 40 milliGRAM(s) Oral at bedtime  escitalopram 20 milliGRAM(s) Oral daily  furosemide    Tablet 40 milliGRAM(s) Oral daily  heparin  Infusion.  Unit(s)/Hr (17 mL/Hr) IV Continuous <Continuous>  metoprolol tartrate 50 milliGRAM(s) Oral every 12 hours  pantoprazole    Tablet 40 milliGRAM(s) Oral before breakfast    MEDICATIONS  (PRN):  heparin   Injectable 7500 Unit(s) IV Push every 6 hours PRN For aPTT less than 40  heparin   Injectable 3500 Unit(s) IV Push every 6 hours PRN For aPTT between 40 - 57  traMADol 50 milliGRAM(s) Oral daily PRN Severe Pain (7 - 10)

## 2021-10-13 NOTE — PROGRESS NOTE ADULT - SUBJECTIVE AND OBJECTIVE BOX
ADRIAN SAMUELS 71y Female  MRN#: [id number]   Hospital Day: 1d    SUBJECTIVE  Patient is a 71y old Female who presents with a chief complaint of Currently admitted to medicine with the primary diagnosis of Acute pulmonary embolus      INTERVAL HPI AND OVERNIGHT EVENTS:  Patient was examined and seen at bedside. This morning she is resting comfortably in bed and reports no issues or overnight events. Denies any chest pain, SOB.    OBJECTIVE  PAST MEDICAL & SURGICAL HISTORY  Hypertension, unspecified type    Hyperlipidemia, unspecified hyperlipidemia type    Type 2 diabetes mellitus without complication, without long-term current use of insulin    H/O sleep apnea  on CPAP    Chronic sciatica    Glaucoma    CAD (coronary artery disease)    H/O mitral valve stenosis    Hemothorax  s/p CABG X1 AND MITRAL REPAIR POST-OP    Lymphedema, limb  LLE    Skin cancer, basal cell    OA (osteoarthritis)    History of hip surgery  bilateral hip    History of carpal tunnel surgery    S/P CABG (coronary artery bypass graft)  CABG X1 and repair of mitral valve 2/9/21      ALLERGIES:  No Known Drug Allergies  sores in mouth (Blisters)    MEDICATIONS:  STANDING MEDICATIONS  amLODIPine   Tablet 5 milliGRAM(s) Oral daily  aspirin 325 milliGRAM(s) Oral daily  atorvastatin 40 milliGRAM(s) Oral at bedtime  escitalopram 20 milliGRAM(s) Oral daily  furosemide    Tablet 40 milliGRAM(s) Oral daily  heparin  Infusion 1400 Unit(s)/Hr IV Continuous <Continuous>  metoprolol tartrate 50 milliGRAM(s) Oral every 12 hours  pantoprazole    Tablet 40 milliGRAM(s) Oral before breakfast    PRN MEDICATIONS  traMADol 50 milliGRAM(s) Oral daily PRN      VITAL SIGNS: Last 24 Hours  T(C): 36.7 (13 Oct 2021 08:40), Max: 37 (13 Oct 2021 03:40)  T(F): 98 (13 Oct 2021 08:40), Max: 98.6 (13 Oct 2021 03:40)  HR: 71 (13 Oct 2021 08:40) (71 - 82)  BP: 160/96 (13 Oct 2021 08:40) (131/99 - 170/77)  BP(mean): 107 (13 Oct 2021 08:40) (107 - 107)  RR: 18 (13 Oct 2021 08:40) (18 - 20)  SpO2: 96% (13 Oct 2021 08:40) (95% - 96%)    LABS:                        13.4   17.73 )-----------( 185      ( 13 Oct 2021 08:09 )             40.7     10-12    143  |  104  |  34<H>  ----------------------------<  146<H>  4.1   |  24  |  1.1    Ca    9.2      12 Oct 2021 17:03  Mg     2.2     10-12    TPro  6.2  /  Alb  4.0  /  TBili  0.7  /  DBili  x   /  AST  26  /  ALT  82<H>  /  AlkPhos  61  10-12    PT/INR - ( 13 Oct 2021 08:09 )   PT: 12.00 sec;   INR: 1.04 ratio         PTT - ( 13 Oct 2021 08:09 )  PTT:>200.0 sec      Troponin T, Serum: <0.01 ng/mL (10-12-21 @ 17:03)      CARDIAC MARKERS ( 12 Oct 2021 17:03 )  x     / <0.01 ng/mL / x     / x     / x          RADIOLOGY:  < from: CT Angio Chest PE Protocol w/ IV Cont (10.12.21 @ 18:56) >    IMPRESSION:    *1. Findings are compatible with acute pulmonary emboli within the right main pulmonary artery and segmental and subsegmental branches of the right upper lobe, right middle lobe and right lower lobe pulmonary arteries; evidence of mild right heart strain is noted.    2. Since February 23, 2021, near-complete resolution of previously noted complex loculated rightpleural collection, with trace residual loculated fluid within the right major fissure; decreased trace left pleural effusion.    < end of copied text >      < from: [facility] Duplex Lower Ext Vein Scan, Bilat (10.12.21 @ 17:27) >    Impression:    No evidence of deep venous thrombosis or superficial thrombophlebitis in the bilateral lower extremities.    ICD-10:M79.89    < end of copied text >      PHYSICAL EXAM:  CONSTITUTIONAL: No acute distress  PULMONARY: Clear to auscultation bilaterally  CARDIOVASCULAR: Regular rate and rhythm  GASTROINTESTINAL: Soft, non-tender, non-distended; bowel sounds present  MUSCULOSKELETAL: no edema  NEUROLOGY: AAOx3  SKIN: No rashes or lesions; warm and dry  
  ADRIAN SAMUELS  71y  Female      Patient is a 71y old  Female who presents with a pulmonary embolism (13 Oct 2021 10:19)      INTERVAL HPI/OVERNIGHT EVENTS:  Patient seen and examined earlier this morning  lying comfortably in bed  in nad  denies being sob at rest       REVIEW OF SYSTEMS:  CONSTITUTIONAL: No fever, weight loss, or fatigue  EYES: No eye pain, visual disturbances, or discharge  ENMT:  No difficulty hearing, tinnitus, vertigo; No sinus or throat pain  NECK: No pain or stiffness  RESPIRATORY: No cough, wheezing, chills or hemoptysis; No shortness of breath  CARDIOVASCULAR: No chest pain, palpitations, dizziness, or leg swelling  GASTROINTESTINAL: No abdominal or epigastric pain. No nausea, vomiting, or hematemesis; No diarrhea or constipation. No melena or hematochezia.  GENITOURINARY: No dysuria, frequency, hematuria, or incontinence  NEUROLOGICAL: No headaches, memory loss, loss of strength, numbness, or tremors  SKIN: No itching, burning, rashes, or lesions   LYMPH NODES: No enlarged glands  ENDOCRINE: No heat or cold intolerance; No hair loss  MUSCULOSKELETAL: No joint pain or swelling; No muscle, back, or extremity pain  PSYCHIATRIC: No depression, anxiety, mood swings, or difficulty sleeping  HEME/LYMPH: No easy bruising, or bleeding gums  ALLERY AND IMMUNOLOGIC: No hives or eczema    T(C): 36.7 (10-13-21 @ 08:40), Max: 37 (10-13-21 @ 03:40)  HR: 71 (10-13-21 @ 08:40) (71 - 78)  BP: 160/96 (10-13-21 @ 08:40) (131/99 - 170/77)  RR: 18 (10-13-21 @ 08:40) (18 - 18)  SpO2: 96% (10-13-21 @ 08:40) (95% - 96%) on ra    PHYSICAL EXAM:  GENERAL: NAD, well-groomed, well-developed  HEAD:  Atraumatic, Normocephalic  EYES: EOMI, PERRLA, conjunctiva and sclera clear  ENMT: No tonsillar erythema, exudates, or enlargement; Moist mucous membranes, Good dentition, No lesions  NECK: Supple, No JVD, Normal thyroid  NERVOUS SYSTEM:  Alert & Oriented X3, Good concentration; moves all extremities   CHEST/LUNG: decreased bs at bases   HEART: Regular rate and rhythm; No murmurs, rubs, or gallops  ABDOMEN: Soft, Nontender, Nondistended; Bowel sounds present  EXTREMITIES:  2+ Peripheral Pulses, No clubbing, cyanosis, or edema  LYMPH: No lymphadenopathy noted  SKIN: No rashes or lesions    Consultant(s) Notes Reviewed:  [x ] YES  [ ] NO  Care Discussed with Consultants/Other Providers [ x] YES  [ ] NO    LAB:                        13.4   17.73 )-----------( 185      ( 13 Oct 2021 08:09 )             40.7     10-13    141  |  102  |  28<H>  ----------------------------<  103<H>  4.0   |  23  |  1.0    Ca    9.0      13 Oct 2021 08:09  Mg     2.1     10-13    TPro  6.3  /  Alb  3.9  /  TBili  0.8  /  DBili  x   /  AST  33  /  ALT  72<H>  /  AlkPhos  65  10-13    LIVER FUNCTIONS - ( 13 Oct 2021 08:09 )  Alb: 3.9 g/dL / Pro: 6.3 g/dL / ALK PHOS: 65 U/L / ALT: 72 U/L / AST: 33 U/L / GGT: x           CARDIAC MARKERS ( 13 Oct 2021 11:00 )  x     / <0.01 ng/mL / x     / x     / x      CARDIAC MARKERS ( 12 Oct 2021 17:03 )  x     / <0.01 ng/mL / x     / x     / x          Drug Dosing Weight  Height (cm): 152.4 (12 Oct 2021 14:12)  Weight (kg): 93.4 (12 Oct 2021 14:12)  BMI (kg/m2): 40.2 (12 Oct 2021 14:12)  BSA (m2): 1.89 (12 Oct 2021 14:12)    CAPILLARY BLOOD GLUCOSE  POCT Blood Glucose.: 107 mg/dL (13 Oct 2021 07:38)        RADIOLOGY & ADDITIONAL TESTS:  Imaging Personally Reviewed:  [x] YES  [ ] NO  < from: CT Angio Chest PE Protocol w/ IV Cont (10.12.21 @ 18:56) >  IMPRESSION:    *1. Findings are compatible with acute pulmonary emboli within the right main pulmonary artery and segmental and subsegmental branches of the right upper lobe, right middle lobe and right lower lobe pulmonary arteries; evidence of mild right heart strain is noted.    2. Since February 23, 2021, near-complete resolution of previously noted complex loculated rightpleural collection, with trace residual loculated fluid within the right major fissure; decreased trace left pleural effusion.    < end of copied text >          MEDS:  amLODIPine   Tablet 5 milliGRAM(s) Oral daily  aspirin 325 milliGRAM(s) Oral daily  atorvastatin 40 milliGRAM(s) Oral at bedtime  escitalopram 20 milliGRAM(s) Oral daily  heparin  Infusion 1400 Unit(s)/Hr IV Continuous <Continuous>  metoprolol tartrate 50 milliGRAM(s) Oral every 12 hours  pantoprazole    Tablet 40 milliGRAM(s) Oral before breakfast  traMADol 50 milliGRAM(s) Oral daily PRN

## 2021-10-14 VITALS
TEMPERATURE: 97 F | DIASTOLIC BLOOD PRESSURE: 62 MMHG | RESPIRATION RATE: 18 BRPM | HEART RATE: 69 BPM | OXYGEN SATURATION: 95 % | SYSTOLIC BLOOD PRESSURE: 137 MMHG

## 2021-10-14 LAB
ALBUMIN SERPL ELPH-MCNC: 3.8 G/DL — SIGNIFICANT CHANGE UP (ref 3.5–5.2)
ALP SERPL-CCNC: 60 U/L — SIGNIFICANT CHANGE UP (ref 30–115)
ALT FLD-CCNC: 52 U/L — HIGH (ref 0–41)
ANION GAP SERPL CALC-SCNC: 11 MMOL/L — SIGNIFICANT CHANGE UP (ref 7–14)
APPEARANCE UR: ABNORMAL
AST SERPL-CCNC: 17 U/L — SIGNIFICANT CHANGE UP (ref 0–41)
BACTERIA # UR AUTO: ABNORMAL
BASOPHILS # BLD AUTO: 0.03 K/UL — SIGNIFICANT CHANGE UP (ref 0–0.2)
BASOPHILS NFR BLD AUTO: 0.2 % — SIGNIFICANT CHANGE UP (ref 0–1)
BILIRUB SERPL-MCNC: 0.7 MG/DL — SIGNIFICANT CHANGE UP (ref 0.2–1.2)
BILIRUB UR-MCNC: NEGATIVE — SIGNIFICANT CHANGE UP
BUN SERPL-MCNC: 29 MG/DL — HIGH (ref 10–20)
CALCIUM SERPL-MCNC: 9 MG/DL — SIGNIFICANT CHANGE UP (ref 8.5–10.1)
CHLORIDE SERPL-SCNC: 100 MMOL/L — SIGNIFICANT CHANGE UP (ref 98–110)
CO2 SERPL-SCNC: 28 MMOL/L — SIGNIFICANT CHANGE UP (ref 17–32)
COLOR SPEC: YELLOW — SIGNIFICANT CHANGE UP
CREAT SERPL-MCNC: 1 MG/DL — SIGNIFICANT CHANGE UP (ref 0.7–1.5)
DIFF PNL FLD: ABNORMAL
EOSINOPHIL # BLD AUTO: 0.32 K/UL — SIGNIFICANT CHANGE UP (ref 0–0.7)
EOSINOPHIL NFR BLD AUTO: 2.2 % — SIGNIFICANT CHANGE UP (ref 0–8)
EPI CELLS # UR: 5 /HPF — SIGNIFICANT CHANGE UP (ref 0–5)
GLUCOSE BLDC GLUCOMTR-MCNC: 132 MG/DL — HIGH (ref 70–99)
GLUCOSE SERPL-MCNC: 106 MG/DL — HIGH (ref 70–99)
GLUCOSE UR QL: NEGATIVE — SIGNIFICANT CHANGE UP
HCT VFR BLD CALC: 42.1 % — SIGNIFICANT CHANGE UP (ref 37–47)
HGB BLD-MCNC: 13.8 G/DL — SIGNIFICANT CHANGE UP (ref 12–16)
HYALINE CASTS # UR AUTO: 0 /LPF — SIGNIFICANT CHANGE UP (ref 0–7)
IMM GRANULOCYTES NFR BLD AUTO: 0.6 % — HIGH (ref 0.1–0.3)
KETONES UR-MCNC: NEGATIVE — SIGNIFICANT CHANGE UP
LEUKOCYTE ESTERASE UR-ACNC: ABNORMAL
LYMPHOCYTES # BLD AUTO: 18.7 % — LOW (ref 20.5–51.1)
LYMPHOCYTES # BLD AUTO: 2.74 K/UL — SIGNIFICANT CHANGE UP (ref 1.2–3.4)
MAGNESIUM SERPL-MCNC: 2 MG/DL — SIGNIFICANT CHANGE UP (ref 1.8–2.4)
MCHC RBC-ENTMCNC: 31.4 PG — HIGH (ref 27–31)
MCHC RBC-ENTMCNC: 32.8 G/DL — SIGNIFICANT CHANGE UP (ref 32–37)
MCV RBC AUTO: 95.9 FL — SIGNIFICANT CHANGE UP (ref 81–99)
MONOCYTES # BLD AUTO: 1.1 K/UL — HIGH (ref 0.1–0.6)
MONOCYTES NFR BLD AUTO: 7.5 % — SIGNIFICANT CHANGE UP (ref 1.7–9.3)
NEUTROPHILS # BLD AUTO: 10.37 K/UL — HIGH (ref 1.4–6.5)
NEUTROPHILS NFR BLD AUTO: 70.8 % — SIGNIFICANT CHANGE UP (ref 42.2–75.2)
NITRITE UR-MCNC: POSITIVE
NRBC # BLD: 0 /100 WBCS — SIGNIFICANT CHANGE UP (ref 0–0)
PH UR: 6.5 — SIGNIFICANT CHANGE UP (ref 5–8)
PLATELET # BLD AUTO: 169 K/UL — SIGNIFICANT CHANGE UP (ref 130–400)
POTASSIUM SERPL-MCNC: 4 MMOL/L — SIGNIFICANT CHANGE UP (ref 3.5–5)
POTASSIUM SERPL-SCNC: 4 MMOL/L — SIGNIFICANT CHANGE UP (ref 3.5–5)
PROCALCITONIN SERPL-MCNC: 0.04 NG/ML — SIGNIFICANT CHANGE UP (ref 0.02–0.1)
PROT SERPL-MCNC: 5.8 G/DL — LOW (ref 6–8)
PROT UR-MCNC: ABNORMAL
RBC # BLD: 4.39 M/UL — SIGNIFICANT CHANGE UP (ref 4.2–5.4)
RBC # FLD: 13.2 % — SIGNIFICANT CHANGE UP (ref 11.5–14.5)
RBC CASTS # UR COMP ASSIST: 3 /HPF — SIGNIFICANT CHANGE UP (ref 0–4)
SODIUM SERPL-SCNC: 139 MMOL/L — SIGNIFICANT CHANGE UP (ref 135–146)
SP GR SPEC: 1.03 — SIGNIFICANT CHANGE UP (ref 1.01–1.03)
UROBILINOGEN FLD QL: ABNORMAL
WBC # BLD: 14.65 K/UL — HIGH (ref 4.8–10.8)
WBC # FLD AUTO: 14.65 K/UL — HIGH (ref 4.8–10.8)
WBC UR QL: 612 /HPF — HIGH (ref 0–5)

## 2021-10-14 PROCEDURE — 99239 HOSP IP/OBS DSCHRG MGMT >30: CPT

## 2021-10-14 RX ORDER — FUROSEMIDE 40 MG
1 TABLET ORAL
Qty: 0 | Refills: 0 | DISCHARGE

## 2021-10-14 RX ADMIN — PANTOPRAZOLE SODIUM 40 MILLIGRAM(S): 20 TABLET, DELAYED RELEASE ORAL at 06:05

## 2021-10-14 RX ADMIN — APIXABAN 10 MILLIGRAM(S): 2.5 TABLET, FILM COATED ORAL at 05:14

## 2021-10-14 RX ADMIN — Medication 325 MILLIGRAM(S): at 11:44

## 2021-10-14 RX ADMIN — ESCITALOPRAM OXALATE 20 MILLIGRAM(S): 10 TABLET, FILM COATED ORAL at 11:44

## 2021-10-14 RX ADMIN — AMLODIPINE BESYLATE 10 MILLIGRAM(S): 2.5 TABLET ORAL at 05:14

## 2021-10-14 RX ADMIN — Medication 50 MILLIGRAM(S): at 05:14

## 2021-10-14 NOTE — CHART NOTE - NSCHARTNOTEFT_GEN_A_CORE
<<<RESIDENT DISCHARGE NOTE>>>     ADRIAN SAMUELS  MRN-[id number]    VITAL SIGNS:  T(F): 97.2 (10-14-21 @ 13:04), Max: 97.9 (10-14-21 @ 08:00)  HR: 69 (10-14-21 @ 13:04)  BP: 137/62 (10-14-21 @ 13:04)  SpO2: 95% (10-14-21 @ 13:04)      PHYSICAL EXAMINATION:  General: in NAD  Pulmonary: CTAB  Cardiovascular: RRR  Gastrointestinal/Abdomen & Pelvis: soft, non-tender, non-distended  Neurologic/Motor: AAOx4    TEST RESULTS:                        13.8   14.65 )-----------( 169      ( 14 Oct 2021 05:53 )             42.1       10-14    139  |  100  |  29<H>  ----------------------------<  106<H>  4.0   |  28  |  1.0    Ca    9.0      14 Oct 2021 05:53  Mg     2.0     10-14    TPro  5.8<L>  /  Alb  3.8  /  TBili  0.7  /  DBili  x   /  AST  17  /  ALT  52<H>  /  AlkPhos  60  10-14          DISCHARGE MEDICATION RECONCILIATION REVIEWED WITH Dr. Barry    DISPOSITION:   [x  ] Home,    [  ] Home with Visiting Nursing Services,   [    ]  SNF/ NH,    [   ] Acute Rehab (4A),   [   ] Other (Specify:_________)

## 2021-10-18 LAB
CULTURE RESULTS: SIGNIFICANT CHANGE UP
SPECIMEN SOURCE: SIGNIFICANT CHANGE UP

## 2021-10-20 DIAGNOSIS — I26.99 OTHER PULMONARY EMBOLISM WITHOUT ACUTE COR PULMONALE: ICD-10-CM

## 2021-10-20 DIAGNOSIS — I10 ESSENTIAL (PRIMARY) HYPERTENSION: ICD-10-CM

## 2021-10-20 DIAGNOSIS — Z95.1 PRESENCE OF AORTOCORONARY BYPASS GRAFT: ICD-10-CM

## 2021-10-20 DIAGNOSIS — R06.00 DYSPNEA, UNSPECIFIED: ICD-10-CM

## 2021-10-20 DIAGNOSIS — E11.9 TYPE 2 DIABETES MELLITUS WITHOUT COMPLICATIONS: ICD-10-CM

## 2021-10-20 DIAGNOSIS — I25.10 ATHEROSCLEROTIC HEART DISEASE OF NATIVE CORONARY ARTERY WITHOUT ANGINA PECTORIS: ICD-10-CM

## 2021-10-20 DIAGNOSIS — Z20.822 CONTACT WITH AND (SUSPECTED) EXPOSURE TO COVID-19: ICD-10-CM

## 2021-10-20 DIAGNOSIS — E78.5 HYPERLIPIDEMIA, UNSPECIFIED: ICD-10-CM

## 2021-10-20 DIAGNOSIS — I48.91 UNSPECIFIED ATRIAL FIBRILLATION: ICD-10-CM

## 2021-11-17 NOTE — PHYSICAL EXAM
[General Appearance - Well Developed] : well developed [Normal Appearance] : normal appearance [Well Groomed] : well groomed [General Appearance - Well Nourished] : well nourished [No Deformities] : no deformities [General Appearance - In No Acute Distress] : no acute distress [Normal Conjunctiva] : the conjunctiva exhibited no abnormalities [Eyelids - No Xanthelasma] : the eyelids demonstrated no xanthelasmas [Normal Oral Mucosa] : normal oral mucosa [No Oral Pallor] : no oral pallor [No Oral Cyanosis] : no oral cyanosis [Normal Jugular Venous A Waves Present] : normal jugular venous A waves present [Normal Jugular Venous V Waves Present] : normal jugular venous V waves present [No Jugular Venous Guaman A Waves] : no jugular venous guaman A waves [Heart Rate And Rhythm] : heart rate and rhythm were normal [Heart Sounds] : normal S1 and S2 [Murmurs] : no murmurs present [Respiration, Rhythm And Depth] : normal respiratory rhythm and effort [Exaggerated Use Of Accessory Muscles For Inspiration] : no accessory muscle use [Auscultation Breath Sounds / Voice Sounds] : lungs were clear to auscultation bilaterally [Bowel Sounds] : normal bowel sounds [Abdomen Soft] : soft [Abdomen Tenderness] : non-tender [Abdomen Mass (___ Cm)] : no abdominal mass palpated [Abnormal Walk] : normal gait [FreeTextEntry1] : walks with walker [Nail Clubbing] : no clubbing of the fingernails [Cyanosis, Localized] : no localized cyanosis [Petechial Hemorrhages (___cm)] : no petechial hemorrhages [Skin Color & Pigmentation] : normal skin color and pigmentation [] : no rash [No Venous Stasis] : no venous stasis no [Skin Lesions] : no skin lesions [No Skin Ulcers] : no skin ulcer [No Xanthoma] : no  xanthoma was observed [Oriented To Time, Place, And Person] : oriented to person, place, and time

## 2021-11-24 NOTE — DIETITIAN INITIAL EVALUATION ADULT. - MALNUTRITION
unable to assess Paramedian Forehead Flap Text: A decision was made to reconstruct the defect utilizing an interpolation axial flap and a staged reconstruction.  A telfa template was made of the defect.  This telfa template was then used to outline the paramedian forehead pedicle flap.  The donor area for the pedicle flap was then injected with anesthesia.  The flap was excised through the skin and subcutaneous tissue down to the layer of the underlying musculature.  The pedicle flap was carefully excised within this deep plane to maintain its blood supply.  The edges of the donor site were undermined.   The donor site was closed in a primary fashion.  The pedicle was then rotated into position and sutured.  Once the tube was sutured into place, adequate blood supply was confirmed with blanching and refill.  The pedicle was then wrapped with xeroform gauze and dressed appropriately with a telfa and gauze bandage to ensure continued blood supply and protect the attached pedicle.

## 2021-11-30 ENCOUNTER — APPOINTMENT (OUTPATIENT)
Dept: PLASTIC SURGERY | Facility: CLINIC | Age: 71
End: 2021-11-30
Payer: MEDICARE

## 2021-11-30 PROCEDURE — 99212 OFFICE O/P EST SF 10 MIN: CPT

## 2021-11-30 NOTE — HISTORY OF PRESENT ILLNESS
[FreeTextEntry1] : 70 yr old woman w right nasal BCC to have Mohs w Dr. Cole June 21 and here for recon options\par \par also w right chin BCC on biopsy 1/25/2021 that [facility] in planning addressing June 8\par \par no prior h/o skin cancer\par nonsmoker\par \par using walker to help in recovery from B/ L hip replacements\par also had CABG and MV repair w [facility]  2/2021\par \par Interval hx (7/1/21). Patient presents today POD#8 s/p right nasal reconstruction with nasolabial flap. Doing well but c/w facial swelling and bruising. Denies any f/c or bleeding. Completed oral antibiotics as prescribed. \par \par Interval hx (11/30/21). Patient presents for f/u 5 months s/p right nasal reconstruction with nasolabial flap. Doing well. \par

## 2021-11-30 NOTE — REASON FOR VISIT
Asked by Dr Oh to check doppler tones prior to stent placement, doppler tones b/t 145-152, pt being taken to OR.   [Follow-Up: _____] : a [unfilled] follow-up visit

## 2021-11-30 NOTE — ASSESSMENT
[FreeTextEntry1] : 70 yo F with right nasal BCC now 5 months s/p right nasal reconstruction with nasolabial flap. Doing well. \par \par - daily Aquaphor\par - dermatologic surveillance \par \par Due to COVID 19, pre-visit patient instructions were explained to the patient and their symptoms were checked upon arrival.  \par Masks were used by the health care providers and staff and the examination room was cleaned after the patient visit was completed.\par \par 11/30/2021\par \par Photos taken with patient permission.\par \par pt concerned w buliness of rigth nasal recon (NL flap), cheek assymetry, and chin scar (which was not done by me--Douglas did chin)\par \par Agree w her observations re the aesthetic results\par \par Plan:\par =-revision of right NL flap via elevation and debuilking\par -revision of right chin scar\par -excisiono f soft tissue LEFT face to improve symmetry\par \par Due to COVID 19, pre-visit patient instructions were explained to the patient and their symptoms were checked upon arrival.  \par Masks were used by the health care providers and staff and the examination room was cleaned after the patient visit was completed.\par \par \par \par \par \par

## 2021-11-30 NOTE — PHYSICAL EXAM
[de-identified] : NAD [de-identified] : right nasal sidewall flap viable and well perfused, nasolabial crease incision healing well, c/d/i, right nasal splint in place now removed, swelling as expected, no erythema, good overall contour

## 2022-01-19 ENCOUNTER — OUTPATIENT (OUTPATIENT)
Dept: OUTPATIENT SERVICES | Facility: HOSPITAL | Age: 72
LOS: 1 days | Discharge: HOME | End: 2022-01-19
Payer: MEDICARE

## 2022-01-19 ENCOUNTER — RESULT REVIEW (OUTPATIENT)
Age: 72
End: 2022-01-19

## 2022-01-19 VITALS
WEIGHT: 214.07 LBS | OXYGEN SATURATION: 99 % | HEART RATE: 86 BPM | HEIGHT: 58 IN | TEMPERATURE: 98 F | SYSTOLIC BLOOD PRESSURE: 110 MMHG | DIASTOLIC BLOOD PRESSURE: 55 MMHG | RESPIRATION RATE: 14 BRPM

## 2022-01-19 DIAGNOSIS — Z01.818 ENCOUNTER FOR OTHER PREPROCEDURAL EXAMINATION: ICD-10-CM

## 2022-01-19 DIAGNOSIS — Z98.890 OTHER SPECIFIED POSTPROCEDURAL STATES: Chronic | ICD-10-CM

## 2022-01-19 DIAGNOSIS — Z95.1 PRESENCE OF AORTOCORONARY BYPASS GRAFT: Chronic | ICD-10-CM

## 2022-01-19 LAB
A1C WITH ESTIMATED AVERAGE GLUCOSE RESULT: 5.5 % — SIGNIFICANT CHANGE UP (ref 4–5.6)
ALBUMIN SERPL ELPH-MCNC: 4.4 G/DL — SIGNIFICANT CHANGE UP (ref 3.5–5.2)
ALP SERPL-CCNC: 66 U/L — SIGNIFICANT CHANGE UP (ref 30–115)
ALT FLD-CCNC: 31 U/L — SIGNIFICANT CHANGE UP (ref 0–41)
ANION GAP SERPL CALC-SCNC: 14 MMOL/L — SIGNIFICANT CHANGE UP (ref 7–14)
APTT BLD: 35.1 SEC — SIGNIFICANT CHANGE UP (ref 27–39.2)
AST SERPL-CCNC: 30 U/L — SIGNIFICANT CHANGE UP (ref 0–41)
BASOPHILS # BLD AUTO: 0.04 K/UL — SIGNIFICANT CHANGE UP (ref 0–0.2)
BASOPHILS NFR BLD AUTO: 0.5 % — SIGNIFICANT CHANGE UP (ref 0–1)
BILIRUB SERPL-MCNC: 0.5 MG/DL — SIGNIFICANT CHANGE UP (ref 0.2–1.2)
BUN SERPL-MCNC: 22 MG/DL — HIGH (ref 10–20)
CALCIUM SERPL-MCNC: 9.8 MG/DL — SIGNIFICANT CHANGE UP (ref 8.5–10.1)
CHLORIDE SERPL-SCNC: 98 MMOL/L — SIGNIFICANT CHANGE UP (ref 98–110)
CO2 SERPL-SCNC: 27 MMOL/L — SIGNIFICANT CHANGE UP (ref 17–32)
CREAT SERPL-MCNC: 1.1 MG/DL — SIGNIFICANT CHANGE UP (ref 0.7–1.5)
EOSINOPHIL # BLD AUTO: 0.07 K/UL — SIGNIFICANT CHANGE UP (ref 0–0.7)
EOSINOPHIL NFR BLD AUTO: 0.9 % — SIGNIFICANT CHANGE UP (ref 0–8)
ESTIMATED AVERAGE GLUCOSE: 111 MG/DL — SIGNIFICANT CHANGE UP (ref 68–114)
GLUCOSE SERPL-MCNC: 73 MG/DL — SIGNIFICANT CHANGE UP (ref 70–99)
HCT VFR BLD CALC: 40.5 % — SIGNIFICANT CHANGE UP (ref 37–47)
HGB BLD-MCNC: 13.1 G/DL — SIGNIFICANT CHANGE UP (ref 12–16)
IMM GRANULOCYTES NFR BLD AUTO: 0.3 % — SIGNIFICANT CHANGE UP (ref 0.1–0.3)
INR BLD: 1.55 RATIO — HIGH (ref 0.65–1.3)
LYMPHOCYTES # BLD AUTO: 1.58 K/UL — SIGNIFICANT CHANGE UP (ref 1.2–3.4)
LYMPHOCYTES # BLD AUTO: 21 % — SIGNIFICANT CHANGE UP (ref 20.5–51.1)
MCHC RBC-ENTMCNC: 32.1 PG — HIGH (ref 27–31)
MCHC RBC-ENTMCNC: 32.3 G/DL — SIGNIFICANT CHANGE UP (ref 32–37)
MCV RBC AUTO: 99.3 FL — HIGH (ref 81–99)
MONOCYTES # BLD AUTO: 0.64 K/UL — HIGH (ref 0.1–0.6)
MONOCYTES NFR BLD AUTO: 8.5 % — SIGNIFICANT CHANGE UP (ref 1.7–9.3)
NEUTROPHILS # BLD AUTO: 5.19 K/UL — SIGNIFICANT CHANGE UP (ref 1.4–6.5)
NEUTROPHILS NFR BLD AUTO: 68.8 % — SIGNIFICANT CHANGE UP (ref 42.2–75.2)
NRBC # BLD: 0 /100 WBCS — SIGNIFICANT CHANGE UP (ref 0–0)
PLATELET # BLD AUTO: 213 K/UL — SIGNIFICANT CHANGE UP (ref 130–400)
POTASSIUM SERPL-MCNC: 4.4 MMOL/L — SIGNIFICANT CHANGE UP (ref 3.5–5)
POTASSIUM SERPL-SCNC: 4.4 MMOL/L — SIGNIFICANT CHANGE UP (ref 3.5–5)
PROT SERPL-MCNC: 6.4 G/DL — SIGNIFICANT CHANGE UP (ref 6–8)
PROTHROM AB SERPL-ACNC: 17.8 SEC — HIGH (ref 9.95–12.87)
RBC # BLD: 4.08 M/UL — LOW (ref 4.2–5.4)
RBC # FLD: 13.1 % — SIGNIFICANT CHANGE UP (ref 11.5–14.5)
SODIUM SERPL-SCNC: 139 MMOL/L — SIGNIFICANT CHANGE UP (ref 135–146)
WBC # BLD: 7.54 K/UL — SIGNIFICANT CHANGE UP (ref 4.8–10.8)
WBC # FLD AUTO: 7.54 K/UL — SIGNIFICANT CHANGE UP (ref 4.8–10.8)

## 2022-01-19 PROCEDURE — 71046 X-RAY EXAM CHEST 2 VIEWS: CPT | Mod: 26

## 2022-01-19 PROCEDURE — 93010 ELECTROCARDIOGRAM REPORT: CPT

## 2022-01-19 NOTE — H&P PST ADULT - HISTORY OF PRESENT ILLNESS
PT PRESENTS TO PAST WITH NO SOB, CP, PALPITATIONS, DYSURIA, UTI OR URI AT PRESENT.   PT ABLE TO WALK UP 1 FLIGHTS OF STEPS WITH NO SOB- VERY SLOW.  PT AMBULATES WITH A WALKER.     AS PER THE PT, THIS IS HIS/HER COMPLETE MEDICAL AND SURGICAL HX, INCLUDING MEDICATIONS PRESCRIBED AND OVER THE COUNTER  pt denies any covid s/s, or tested positive in the past- PT IS AWARE OF DATE AND TIME OF COVID TESTING PRIOR TO SURGERY.   pt advised self quarantine till day of procedure  denies travel outside the [place] in the past 30 days  Anesthesia Alert  NO--Difficult Airway  NO--History of neck surgery or radiation  NO--Limited ROM of neck  NO--History of Malignant hyperthermia  NO--Personal or family history of Pseudocholinesterase deficiency  NO--Prior Anesthesia Complication  YES  --Latex Allergy  NO--Loose teeth  NO--History of Rheumatoid Arthritis  YES --JUAN PABLO  NO BLEEDING RISK  NO--Other_____

## 2022-01-19 NOTE — H&P PST ADULT - REASON FOR ADMISSION
Patient is a    71   year old female presenting to PAST in preparation for  revision of right nasal reconstruction -excision  right chin scar-- excision of left cheek soft tissue   on  2/9/22    under   anesthesia by Dr. Tavera  .  Pt reports- I have a basal cell on my nose. Patient is a    71   year old female presenting to PAST in preparation for  revision of right nasal reconstruction -excision  right chin scar-- excision of left cheek soft tissue   on  2/9/22    under   anesthesia by Dr. Tavera  .  Pt reports- I have a basal cell on my nose. I have no pain associated with the basal cells on my face.

## 2022-01-19 NOTE — H&P PST ADULT - NSICDXPASTMEDICALHX_GEN_ALL_CORE_FT
PAST MEDICAL HISTORY:  CAD (coronary artery disease)     Chronic sciatica     Glaucoma     H/O mitral valve stenosis     H/O sleep apnea on CPAP    Hemothorax s/p CABG X1 AND MITRAL REPAIR POST-OP    Hyperlipidemia, unspecified hyperlipidemia type     Hypertension, unspecified type     Hypothyroidism     Lymphedema, limb LLE    OA (osteoarthritis)     Skin cancer, basal cell     Type 2 diabetes mellitus without complication, without long-term current use of insulin

## 2022-01-24 DIAGNOSIS — C44.311 BASAL CELL CARCINOMA OF SKIN OF NOSE: ICD-10-CM

## 2022-01-24 DIAGNOSIS — M95.0 ACQUIRED DEFORMITY OF NOSE: ICD-10-CM

## 2022-01-27 ENCOUNTER — LABORATORY RESULT (OUTPATIENT)
Age: 72
End: 2022-01-27

## 2022-02-01 ENCOUNTER — APPOINTMENT (OUTPATIENT)
Dept: CARDIOLOGY | Facility: CLINIC | Age: 72
End: 2022-02-01
Payer: MEDICARE

## 2022-02-01 VITALS — HEIGHT: 60 IN | BODY MASS INDEX: 42.41 KG/M2 | WEIGHT: 216 LBS

## 2022-02-01 VITALS — HEART RATE: 68 BPM | SYSTOLIC BLOOD PRESSURE: 118 MMHG | RESPIRATION RATE: 18 BRPM | DIASTOLIC BLOOD PRESSURE: 80 MMHG

## 2022-02-01 PROCEDURE — 93000 ELECTROCARDIOGRAM COMPLETE: CPT

## 2022-02-01 PROCEDURE — 99214 OFFICE O/P EST MOD 30 MIN: CPT

## 2022-02-02 PROBLEM — E03.9 HYPOTHYROIDISM, UNSPECIFIED: Chronic | Status: ACTIVE | Noted: 2022-01-19

## 2022-02-02 PROBLEM — Z86.718 PERSONAL HISTORY OF OTHER VENOUS THROMBOSIS AND EMBOLISM: Chronic | Status: ACTIVE | Noted: 2022-01-19

## 2022-02-04 ENCOUNTER — NON-APPOINTMENT (OUTPATIENT)
Age: 72
End: 2022-02-04

## 2022-02-06 ENCOUNTER — LABORATORY RESULT (OUTPATIENT)
Age: 72
End: 2022-02-06

## 2022-02-08 NOTE — ASU PATIENT PROFILE, ADULT - FALL HARM RISK - HARM RISK INTERVENTIONS

## 2022-02-08 NOTE — ASU PATIENT PROFILE, ADULT - PAIN CHRONIC, PROFILE
Initial Lactation visit. States that infant fed well following birth and has had mostly attempts since then. Previous experience breastfeeding both children; no issues/complaints other than decrease in milk supply upon returning to work.   Recommend unlimited, frequent breast feedings: At least 8 - 12 times every 24 hours. Avoid pacifiers and supplementation with formula unless medically indicated. Explained benefits of holding baby skin on skin to help promote better breastfeeding outcomes as well as positioning. Will revisit as needed.    Herlinda Horton RN, IBCLC    
yes

## 2022-02-08 NOTE — ASU PATIENT PROFILE, ADULT - TEACHING/LEARNING DEVELOPMENTAL CONSIDERATIONS
Detail Level: Detailed
Procedure To Be Performed At Next Visit: Electrodesiccation (Cosmetic)
Introduction Text (Please End With A Colon): the following procedure is deferred discussion of method, risks and potential complications discussed and all patients questions answered
none

## 2022-02-09 ENCOUNTER — OUTPATIENT (OUTPATIENT)
Dept: OUTPATIENT SERVICES | Facility: HOSPITAL | Age: 72
LOS: 1 days | Discharge: HOME | End: 2022-02-09

## 2022-02-09 ENCOUNTER — APPOINTMENT (OUTPATIENT)
Dept: PLASTIC SURGERY | Facility: AMBULATORY SURGERY CENTER | Age: 72
End: 2022-02-09
Payer: MEDICARE

## 2022-02-09 VITALS
DIASTOLIC BLOOD PRESSURE: 53 MMHG | HEART RATE: 58 BPM | OXYGEN SATURATION: 97 % | RESPIRATION RATE: 14 BRPM | SYSTOLIC BLOOD PRESSURE: 117 MMHG

## 2022-02-09 VITALS
HEIGHT: 58 IN | WEIGHT: 214.07 LBS | OXYGEN SATURATION: 97 % | HEART RATE: 59 BPM | TEMPERATURE: 98 F | SYSTOLIC BLOOD PRESSURE: 118 MMHG | RESPIRATION RATE: 20 BRPM | DIASTOLIC BLOOD PRESSURE: 55 MMHG

## 2022-02-09 DIAGNOSIS — Z95.1 PRESENCE OF AORTOCORONARY BYPASS GRAFT: Chronic | ICD-10-CM

## 2022-02-09 DIAGNOSIS — Z98.890 OTHER SPECIFIED POSTPROCEDURAL STATES: Chronic | ICD-10-CM

## 2022-02-09 PROCEDURE — 14060 TIS TRNFR E/N/E/L 10 SQ CM/<: CPT | Mod: 59

## 2022-02-09 PROCEDURE — 14040 TIS TRNFR F/C/C/M/N/A/G/H/F: CPT | Mod: 59

## 2022-02-09 PROCEDURE — 15260 FTH/GFT FR N/E/E/L 20 SQCM/<: CPT

## 2022-02-09 PROCEDURE — 15004 WOUND PREP F/N/HF/G: CPT

## 2022-02-09 RX ORDER — HYDROMORPHONE HYDROCHLORIDE 2 MG/ML
0.5 INJECTION INTRAMUSCULAR; INTRAVENOUS; SUBCUTANEOUS
Refills: 0 | Status: DISCONTINUED | OUTPATIENT
Start: 2022-02-09 | End: 2022-02-09

## 2022-02-09 RX ORDER — METOPROLOL TARTRATE 50 MG
1 TABLET ORAL
Qty: 0 | Refills: 0 | DISCHARGE

## 2022-02-09 RX ORDER — LISINOPRIL/HYDROCHLOROTHIAZIDE 10-12.5 MG
1 TABLET ORAL
Qty: 0 | Refills: 0 | DISCHARGE

## 2022-02-09 RX ORDER — METFORMIN HYDROCHLORIDE 850 MG/1
0 TABLET ORAL
Qty: 0 | Refills: 0 | DISCHARGE

## 2022-02-09 RX ORDER — MELOXICAM 15 MG/1
0 TABLET ORAL
Qty: 0 | Refills: 0 | DISCHARGE

## 2022-02-09 RX ORDER — SODIUM CHLORIDE 9 MG/ML
1000 INJECTION, SOLUTION INTRAVENOUS
Refills: 0 | Status: DISCONTINUED | OUTPATIENT
Start: 2022-02-09 | End: 2022-02-23

## 2022-02-09 RX ORDER — PANTOPRAZOLE SODIUM 20 MG/1
1 TABLET, DELAYED RELEASE ORAL
Qty: 0 | Refills: 0 | DISCHARGE

## 2022-02-09 RX ORDER — OXYBUTYNIN CHLORIDE 5 MG
1 TABLET ORAL
Qty: 0 | Refills: 0 | DISCHARGE

## 2022-02-09 RX ORDER — ACETAMINOPHEN 500 MG
1000 TABLET ORAL ONCE
Refills: 0 | Status: COMPLETED | OUTPATIENT
Start: 2022-02-09 | End: 2022-02-09

## 2022-02-09 RX ORDER — CEPHALEXIN 500 MG
1 CAPSULE ORAL
Qty: 12 | Refills: 0
Start: 2022-02-09 | End: 2022-02-11

## 2022-02-09 RX ORDER — TRAMADOL HYDROCHLORIDE 50 MG/1
1 TABLET ORAL
Qty: 10 | Refills: 0
Start: 2022-02-09

## 2022-02-09 RX ORDER — ASPIRIN/CALCIUM CARB/MAGNESIUM 324 MG
0 TABLET ORAL
Qty: 0 | Refills: 0 | DISCHARGE

## 2022-02-09 RX ORDER — ONDANSETRON 8 MG/1
4 TABLET, FILM COATED ORAL ONCE
Refills: 0 | Status: DISCONTINUED | OUTPATIENT
Start: 2022-02-09 | End: 2022-02-23

## 2022-02-09 RX ORDER — OXYCODONE HYDROCHLORIDE 5 MG/1
5 TABLET ORAL ONCE
Refills: 0 | Status: DISCONTINUED | OUTPATIENT
Start: 2022-02-09 | End: 2022-02-09

## 2022-02-09 RX ORDER — ASPIRIN/CALCIUM CARB/MAGNESIUM 324 MG
1 TABLET ORAL
Qty: 0 | Refills: 0 | DISCHARGE

## 2022-02-09 RX ORDER — HYDROMORPHONE HYDROCHLORIDE 2 MG/ML
1 INJECTION INTRAMUSCULAR; INTRAVENOUS; SUBCUTANEOUS
Refills: 0 | Status: DISCONTINUED | OUTPATIENT
Start: 2022-02-09 | End: 2022-02-09

## 2022-02-09 RX ADMIN — Medication 1000 MILLIGRAM(S): at 08:13

## 2022-02-09 RX ADMIN — SODIUM CHLORIDE 100 MILLILITER(S): 9 INJECTION, SOLUTION INTRAVENOUS at 11:42

## 2022-02-09 NOTE — PRE-ANESTHESIA EVALUATION ADULT - RX
CPAP MACHINE Opioid Pregnancy And Lactation Text: These medications can lead to premature delivery and should be avoided during pregnancy. These medications are also present in breast milk in small amounts.

## 2022-02-09 NOTE — BRIEF OPERATIVE NOTE - OPERATION/FINDINGS
H/o right nasal Mohs and reconstruction  Reconstruction revised today to improve aesthetics  Right chin scar from another provider revised

## 2022-02-09 NOTE — PRE-ANESTHESIA EVALUATION ADULT - NSANTHRISKNONERD_GEN_ALL_CORE
"Called patient to discuss appointment. Patient explained that exam was too painful to look inside. I informed patient that what he was referred for we may be able to see just from the outside. Patient said that \"it\" is on the inside. I explained to patient that we would not schedule him for surgery or any type sedation without meeting him first.    Patient was frustrated that he may have to come down again. I explained that depending on what we see he may not have to.     I apologized that he has had to wait so long. I re-assured him that if we are able we would handle it with one appointment and that we are a speciality so we see this diagnosis all the time.     Patient was frustrated, but in agreement with plan.    Ilana Temple, EMT  Colon and Rectal Surgery      " No risk alerts present

## 2022-02-09 NOTE — CHART NOTE - NSCHARTNOTEFT_GEN_A_CORE
PACU ANESTHESIA ADMISSION NOTE      Procedure: Reconstruction, face, post Mohs micrographic surgery  revision of reconstruction      Post op diagnosis:      ____  Intubated  TV:______       Rate: ______      FiO2: ______    _x___  Patent Airway    __x__  Full return of protective reflexes    __x__  Full recovery from anesthesia / back to baseline     Vitals:   T:  98.2         R:  16                BP:  122/56                Sat: 100                  P: 54      Mental Status:  __x__ Awake   x_____ Alert   _____ Drowsy   _____ Sedated    Nausea/Vomiting:  ____ NO  ______Yes,   See Post - Op Orders          Pain Scale (0-10):  _____    Treatment: ____ None    ____ See Post - Op/PCA Orders    Post - Operative Fluids:   ____ Oral   ____ See Post - Op Orders    Plan: Discharge:   _x___Home       _____Floor     _____Critical Care    _____  Other:_________________    Comments:

## 2022-02-09 NOTE — ASU DISCHARGE PLAN (ADULT/PEDIATRIC) - PATIENT EDUCATION MATERIALS PROVIED
pain management/Pre-printed instructions given for other (specify) 1 Principal Discharge DX:	Stridor   Principal Discharge DX:	Stridor  Secondary Diagnosis:	Respiratory distress  Secondary Diagnosis:	2019 novel coronavirus disease (COVID-19)  Secondary Diagnosis:	Croup

## 2022-02-09 NOTE — ASU DISCHARGE PLAN (ADULT/PEDIATRIC) - NS MD DC FALL RISK RISK
For information on Fall & Injury Prevention, visit: [email or web address] OR  [email or web address] OR  https://www.cdc.gov/steadi/patient.html

## 2022-02-09 NOTE — PRE-ANESTHESIA EVALUATION ADULT - NSRADCARDRESULTSFT_GEN_ALL_CORE
CXR: Elevated left hemidiaphragm stable. No acute pulmonary disease  EKG: Normal sinus rhythm Inferior infarct , age undetermined

## 2022-02-09 NOTE — ASU DISCHARGE PLAN (ADULT/PEDIATRIC) - ASU DC SPECIAL INSTRUCTIONSFT
-Keep dressing clean and dry. Do not remove.  -Take antibiotics for 3 days as prescribed.  -Take Tylenol as needed for pain. If not well controlled, take Tramadol as needed.  -Sleep with the head of the bed elevated using pillows to help prevent swelling.  -Apply ice 2-3x a day for the next 3 days, 10-15 minutes at a time.  -No driving if taking pain medication.  -Bruising, swelling, and numbness are normal and are expected to improve over time.  -Call the office anytime for questions or concerns.  -Follow up in 1 week.

## 2022-02-09 NOTE — PRE-ANESTHESIA EVALUATION ADULT - MALLAMPATI CLASS
[facility]    Progress Note    Sylvain Ano Patient Status:  Inpatient    10/14/1939 MRN [id number]   Location [facility] 4W/SW/SE Attending Laura Hobbs, [address] [facility] Day # 1 PCP Vinny Jason MD       Subjective:   Reggie Law oral liquid 15 g 15 g Oral Q15 Min PRN   Insulin Regular Human (NOVOLIN R) 100 UNIT/ML injection 1-5 Units 1-5 Units Subcutaneous 4 times per day   ondansetron HCl (ZOFRAN) injection 4 mg 4 mg Intravenous Q6H PRN   morphINE sulfate (PF) 2 MG/ML injection 2 significant intrathoracic herniation of the stomach. 5. Stable right gluteal intramuscular lipoma without definite evidence of enhancing components. 6. Sequela of remote granulomatous disease. 7. Hepatomegaly.   8. Status post cholecystectomy with stable IV piggyback every 8 hours, will use morphine as needed for pain. Continue IV fluids.  - GI consulted apprec input.  Agree with holding tube feeds for now   - cont iv abx      Constipation with fecal impaction  Patient unwilling to have suppository at this Class III - visualization of the soft palate and the base of the uvula

## 2022-02-09 NOTE — BRIEF OPERATIVE NOTE - NSICDXBRIEFPROCEDURE_GEN_ALL_CORE_FT
PROCEDURES:  Reconstruction, face, post Mohs micrographic surgery 09-Feb-2022 11:30:08 revision of reconstruction, as well as revision of right chin scar Galina Singh L

## 2022-02-15 DIAGNOSIS — Z85.828 PERSONAL HISTORY OF OTHER MALIGNANT NEOPLASM OF SKIN: ICD-10-CM

## 2022-02-15 DIAGNOSIS — Z79.84 LONG TERM (CURRENT) USE OF ORAL HYPOGLYCEMIC DRUGS: ICD-10-CM

## 2022-02-15 DIAGNOSIS — E03.9 HYPOTHYROIDISM, UNSPECIFIED: ICD-10-CM

## 2022-02-15 DIAGNOSIS — Z87.891 PERSONAL HISTORY OF NICOTINE DEPENDENCE: ICD-10-CM

## 2022-02-15 DIAGNOSIS — E11.9 TYPE 2 DIABETES MELLITUS WITHOUT COMPLICATIONS: ICD-10-CM

## 2022-02-15 DIAGNOSIS — Z79.01 LONG TERM (CURRENT) USE OF ANTICOAGULANTS: ICD-10-CM

## 2022-02-15 DIAGNOSIS — I25.10 ATHEROSCLEROTIC HEART DISEASE OF NATIVE CORONARY ARTERY WITHOUT ANGINA PECTORIS: ICD-10-CM

## 2022-02-15 DIAGNOSIS — I10 ESSENTIAL (PRIMARY) HYPERTENSION: ICD-10-CM

## 2022-02-15 DIAGNOSIS — Z95.1 PRESENCE OF AORTOCORONARY BYPASS GRAFT: ICD-10-CM

## 2022-02-16 ENCOUNTER — APPOINTMENT (OUTPATIENT)
Dept: PLASTIC SURGERY | Facility: CLINIC | Age: 72
End: 2022-02-16
Payer: MEDICARE

## 2022-02-16 PROCEDURE — 99024 POSTOP FOLLOW-UP VISIT: CPT

## 2022-02-16 NOTE — PHYSICAL EXAM
[de-identified] : NAD [de-identified] : right nasal sidewall flap viable and well perfused with diffuse ecchymoses, right nasolabial crease and right chin incisions healing well, c/d/i, swelling as expected, minimal erythema, good overall contour

## 2022-02-16 NOTE — HISTORY OF PRESENT ILLNESS
[FreeTextEntry1] : 70 yr old woman w right nasal BCC to have Mohs w Dr. Cole June 21 and here for recon options\par \par also w right chin BCC on biopsy 1/25/2021 that Douglas in planning addressing June 8\par \par no prior h/o skin cancer\par nonsmoker\par \par using walker to help in recovery from B/ L hip replacements\par also had CABG and MV repair w Imam  2/2021\par \par Interval hx (7/1/21). Patient presents today POD#8 s/p right nasal reconstruction with nasolabial flap. Doing well but c/w facial swelling and bruising. Denies any f/c or bleeding. Completed oral antibiotics as prescribed. \par \par Interval hx (11/30/21). Patient presents for f/u 5 months s/p right nasal reconstruction with nasolabial flap. Doing well. \par \par Interval hx (2/16/22): Pt presents today POD #7 s/p revision of right nasal reconstruction, right cheek scar, and right chin scar. Pt c/o resolving swelling and bruising. Resumed Eliquis. Denies f/c.

## 2022-02-16 NOTE — ASSESSMENT
[FreeTextEntry1] : 70 yo F with right nasal BCC s/p right nasal reconstruction with nasolabial flap 6/23/21\par Now POD #7 s/p revision of right nasal reconstruction, right cheek scar/asymmetry, and right chin scar, doing well\par \par -Sutures removed\par -Steri strips applied\par -To start daily aquaphor once steri strips fall off\par -May shower\par -HOB elevation\par -F/u 3 weeks for incision check and to start scar creams\par \par Due to COVID-19, pre-visit patient instructions were explained to the patient and their symptoms were checked upon arrival. Masks were used by the healthcare provider and staff and the examination room was cleaned after the patient visit concluded\par

## 2022-03-02 ENCOUNTER — APPOINTMENT (OUTPATIENT)
Dept: CARDIOLOGY | Facility: CLINIC | Age: 72
End: 2022-03-02

## 2022-03-11 ENCOUNTER — APPOINTMENT (OUTPATIENT)
Dept: PLASTIC SURGERY | Facility: CLINIC | Age: 72
End: 2022-03-11
Payer: MEDICARE

## 2022-03-11 PROCEDURE — 99024 POSTOP FOLLOW-UP VISIT: CPT

## 2022-03-11 NOTE — ASSESSMENT
[FreeTextEntry1] : 70 yo F with right nasal BCC s/p right nasal reconstruction with nasolabial flap 6/23/21\par Now 1 month s/p revision of right nasal reconstruction, right cheek scar/asymmetry, and right chin scar, doing well\par \par -Scar creams discussed\par -Sun protection and routine dermatological surveillance\par -No restrictions\par -F/u 2-3 months with Dr. Tavera\par \par Due to COVID-19, pre-visit patient instructions were explained to the patient and their symptoms were checked upon arrival. Masks were used by the healthcare provider and staff and the examination room was cleaned after the patient visit concluded\par

## 2022-03-11 NOTE — HISTORY OF PRESENT ILLNESS
[FreeTextEntry1] : 70 yr old woman w right nasal BCC to have Mohs w Dr. Cole June 21 and here for recon options\par \par also w right chin BCC on biopsy 1/25/2021 that Douglas in planning addressing June 8\par \par no prior h/o skin cancer\par nonsmoker\par \par using walker to help in recovery from B/ L hip replacements\par also had CABG and MV repair w Imam  2/2021\par \par Interval hx (7/1/21). Patient presents today POD#8 s/p right nasal reconstruction with nasolabial flap. Doing well but c/w facial swelling and bruising. Denies any f/c or bleeding. Completed oral antibiotics as prescribed. \par \par Interval hx (11/30/21). Patient presents for f/u 5 months s/p right nasal reconstruction with nasolabial flap. Doing well. \par \par Interval hx (2/16/22): Pt presents today POD #7 s/p revision of right nasal reconstruction, right cheek scar, and right chin scar. Pt c/o resolving swelling and bruising. Resumed Eliquis. Denies f/c.\par \par Interval hx (3/11/22): Pt presents today 1 month s/p revision of right nasal reconstruction, right cheek scar, and right chin scar. Happy with results. Offers no complaints.

## 2022-03-11 NOTE — PHYSICAL EXAM
[de-identified] : NAD [de-identified] : right nasal sidewall flap viable and well perfused, right nasolabial crease and right chin incisions healing well, scars soft and flat, good overall contour

## 2022-05-03 ENCOUNTER — OUTPATIENT (OUTPATIENT)
Dept: OUTPATIENT SERVICES | Facility: HOSPITAL | Age: 72
LOS: 1 days | Discharge: HOME | End: 2022-05-03

## 2022-05-03 VITALS — SYSTOLIC BLOOD PRESSURE: 115 MMHG | RESPIRATION RATE: 18 BRPM | DIASTOLIC BLOOD PRESSURE: 79 MMHG | HEART RATE: 62 BPM

## 2022-05-03 VITALS
SYSTOLIC BLOOD PRESSURE: 125 MMHG | WEIGHT: 212.08 LBS | RESPIRATION RATE: 18 BRPM | TEMPERATURE: 97 F | OXYGEN SATURATION: 98 % | HEART RATE: 61 BPM | DIASTOLIC BLOOD PRESSURE: 68 MMHG | HEIGHT: 59 IN

## 2022-05-03 DIAGNOSIS — Z98.890 OTHER SPECIFIED POSTPROCEDURAL STATES: Chronic | ICD-10-CM

## 2022-05-03 DIAGNOSIS — Z95.1 PRESENCE OF AORTOCORONARY BYPASS GRAFT: Chronic | ICD-10-CM

## 2022-05-03 LAB — GLUCOSE BLDC GLUCOMTR-MCNC: 105 MG/DL — HIGH (ref 70–99)

## 2022-05-03 RX ORDER — SEMAGLUTIDE 0.68 MG/ML
0.25 INJECTION, SOLUTION SUBCUTANEOUS
Qty: 0 | Refills: 0 | DISCHARGE

## 2022-05-03 NOTE — ASU PATIENT PROFILE, ADULT - NSICDXPASTSURGICALHX_GEN_ALL_CORE_FT
PAST SURGICAL HISTORY:  History of carpal tunnel surgery B/L    History of hip surgery bilateral hip    S/P CABG (coronary artery bypass graft) CABG X1 and repair of mitral valve 2/9/21

## 2022-05-03 NOTE — ASU PATIENT PROFILE, ADULT - NSICDXPASTMEDICALHX_GEN_ALL_CORE_FT
PAST MEDICAL HISTORY:  CAD (coronary artery disease)     Chronic sciatica     Glaucoma     H/O blood clots in right lung 10/21- as per pt    H/O mitral valve stenosis     H/O sleep apnea on CPAP    Hemothorax s/p CABG X1 AND MITRAL REPAIR POST-OP    Hyperlipidemia, unspecified hyperlipidemia type     Hypertension, unspecified type     Hypothyroidism     Lymphedema, limb LLE    OA (osteoarthritis)     Skin cancer, basal cell     Type 2 diabetes mellitus without complication, without long-term current use of insulin

## 2022-05-03 NOTE — PRE-ANESTHESIA EVALUATION ADULT - NSANTHOSAYNRD_GEN_A_CORE
No. JUAN PABLO screening performed.  STOP BANG Legend: 0-2 = LOW Risk; 3-4 = INTERMEDIATE Risk; 5-8 = HIGH Risk

## 2022-05-05 NOTE — ASU PATIENT PROFILE, ADULT - CAREGIVER PHONE NUMBER
Spoke to patient and relayed results note.  Patient verbalized understanding.    Rx sent to preferred pharmacy.  Patient is aware that she can take 20 mg tablets home supply-2 tablets daily to equal 40 mg daily.  The rx is for 40 mg daily-1 tablet daily.  Pt aware and is agreeable with the plan.    Recheck fasting lab scheduled for 7/7/22.    Shelley Garcia RN  Cardiology Care Coordinator  [facility]  [phone] option 1         [phone]

## 2022-05-17 DIAGNOSIS — H40.10X0 UNSPECIFIED OPEN-ANGLE GLAUCOMA, STAGE UNSPECIFIED: ICD-10-CM

## 2022-05-17 DIAGNOSIS — H26.9 UNSPECIFIED CATARACT: ICD-10-CM

## 2022-06-02 ENCOUNTER — APPOINTMENT (OUTPATIENT)
Dept: PLASTIC SURGERY | Facility: CLINIC | Age: 72
End: 2022-06-02
Payer: MEDICARE

## 2022-06-02 PROCEDURE — 99213 OFFICE O/P EST LOW 20 MIN: CPT

## 2022-06-02 NOTE — ASSESSMENT
[FreeTextEntry1] : 72 yo F with right nasal BCC s/p right nasal reconstruction with nasolabial flap 6/23/21\par Now 1 month s/p revision of right nasal reconstruction, right cheek scar/asymmetry, and right chin scar, doing well\par \par -Scar creams discussed\par -Sun protection and routine dermatological surveillance\par -No restrictions\par -F/u 2-3 months with Dr. Tavera\par \par Due to COVID-19, pre-visit patient instructions were explained to the patient and their symptoms were checked upon arrival. Masks were used by the healthcare provider and staff and the examination room was cleaned after the patient visit concluded\par \par \par 6/2/2022\par pt w several concerns:\par -facial assymetry--requests excision of normal (unoperated) left side\par -rigth upper lip assymetric (lower than left)\par -fullness of right nasal flap (althought better)\par \par agree w her assessment\par \par suggest:\par \par -revision of right nasal flap\par -elevation of right lip via crescent excision of right upper lip near NL fold\par -excision tissue lateral to LEFT NL fold\par \par Photos taken with patient permission.\par \par Regarding the reconstruction after skin cancer  surgery, we discussed scarring, risk of repeat procedure, poor wound healing, need for additional revisionary surgery,asymmetry, dissatisfaction with the outcome, and unplanned surgery in the future.  All questions were answered.  All risks were well understood by the patient.\par \par EVANGELINA revision\par \par

## 2022-06-02 NOTE — PHYSICAL EXAM
[de-identified] : NAD [de-identified] : right nasal sidewall flap viable and well perfused, right nasolabial crease and right chin incisions healing well, scars soft and flat, good overall contour

## 2022-07-05 ENCOUNTER — APPOINTMENT (OUTPATIENT)
Dept: CARDIOLOGY | Facility: CLINIC | Age: 72
End: 2022-07-05

## 2022-07-11 ENCOUNTER — OUTPATIENT (OUTPATIENT)
Dept: OUTPATIENT SERVICES | Facility: HOSPITAL | Age: 72
LOS: 1 days | Discharge: HOME | End: 2022-07-11

## 2022-07-11 VITALS
WEIGHT: 210.1 LBS | DIASTOLIC BLOOD PRESSURE: 60 MMHG | TEMPERATURE: 97 F | RESPIRATION RATE: 17 BRPM | HEIGHT: 60 IN | HEART RATE: 78 BPM | OXYGEN SATURATION: 100 % | SYSTOLIC BLOOD PRESSURE: 100 MMHG

## 2022-07-11 DIAGNOSIS — Z95.1 PRESENCE OF AORTOCORONARY BYPASS GRAFT: Chronic | ICD-10-CM

## 2022-07-11 DIAGNOSIS — C44.311 BASAL CELL CARCINOMA OF SKIN OF NOSE: ICD-10-CM

## 2022-07-11 DIAGNOSIS — Z98.890 OTHER SPECIFIED POSTPROCEDURAL STATES: Chronic | ICD-10-CM

## 2022-07-11 DIAGNOSIS — Z01.818 ENCOUNTER FOR OTHER PREPROCEDURAL EXAMINATION: ICD-10-CM

## 2022-07-11 LAB
ALBUMIN SERPL ELPH-MCNC: 4.8 G/DL — SIGNIFICANT CHANGE UP (ref 3.5–5.2)
ALP SERPL-CCNC: 85 U/L — SIGNIFICANT CHANGE UP (ref 30–115)
ALT FLD-CCNC: 16 U/L — SIGNIFICANT CHANGE UP (ref 0–41)
ANION GAP SERPL CALC-SCNC: 14 MMOL/L — SIGNIFICANT CHANGE UP (ref 7–14)
APTT BLD: 31.3 SEC — SIGNIFICANT CHANGE UP (ref 27–39.2)
AST SERPL-CCNC: 20 U/L — SIGNIFICANT CHANGE UP (ref 0–41)
BASOPHILS # BLD AUTO: 0.04 K/UL — SIGNIFICANT CHANGE UP (ref 0–0.2)
BASOPHILS NFR BLD AUTO: 0.4 % — SIGNIFICANT CHANGE UP (ref 0–1)
BILIRUB SERPL-MCNC: 0.6 MG/DL — SIGNIFICANT CHANGE UP (ref 0.2–1.2)
BUN SERPL-MCNC: 26 MG/DL — HIGH (ref 10–20)
CALCIUM SERPL-MCNC: 9.8 MG/DL — SIGNIFICANT CHANGE UP (ref 8.5–10.1)
CHLORIDE SERPL-SCNC: 100 MMOL/L — SIGNIFICANT CHANGE UP (ref 98–110)
CO2 SERPL-SCNC: 28 MMOL/L — SIGNIFICANT CHANGE UP (ref 17–32)
CREAT SERPL-MCNC: 1.1 MG/DL — SIGNIFICANT CHANGE UP (ref 0.7–1.5)
EGFR: 53 ML/MIN/1.73M2 — LOW
EOSINOPHIL # BLD AUTO: 0.1 K/UL — SIGNIFICANT CHANGE UP (ref 0–0.7)
EOSINOPHIL NFR BLD AUTO: 1 % — SIGNIFICANT CHANGE UP (ref 0–8)
GLUCOSE SERPL-MCNC: 94 MG/DL — SIGNIFICANT CHANGE UP (ref 70–99)
HCT VFR BLD CALC: 42 % — SIGNIFICANT CHANGE UP (ref 37–47)
HGB BLD-MCNC: 13.7 G/DL — SIGNIFICANT CHANGE UP (ref 12–16)
IMM GRANULOCYTES NFR BLD AUTO: 0.3 % — SIGNIFICANT CHANGE UP (ref 0.1–0.3)
INR BLD: 1.51 RATIO — HIGH (ref 0.65–1.3)
LYMPHOCYTES # BLD AUTO: 1.73 K/UL — SIGNIFICANT CHANGE UP (ref 1.2–3.4)
LYMPHOCYTES # BLD AUTO: 17.5 % — LOW (ref 20.5–51.1)
MCHC RBC-ENTMCNC: 32.5 PG — HIGH (ref 27–31)
MCHC RBC-ENTMCNC: 32.6 G/DL — SIGNIFICANT CHANGE UP (ref 32–37)
MCV RBC AUTO: 99.5 FL — HIGH (ref 81–99)
MONOCYTES # BLD AUTO: 0.73 K/UL — HIGH (ref 0.1–0.6)
MONOCYTES NFR BLD AUTO: 7.4 % — SIGNIFICANT CHANGE UP (ref 1.7–9.3)
NEUTROPHILS # BLD AUTO: 7.26 K/UL — HIGH (ref 1.4–6.5)
NEUTROPHILS NFR BLD AUTO: 73.4 % — SIGNIFICANT CHANGE UP (ref 42.2–75.2)
NRBC # BLD: 0 /100 WBCS — SIGNIFICANT CHANGE UP (ref 0–0)
PLATELET # BLD AUTO: 229 K/UL — SIGNIFICANT CHANGE UP (ref 130–400)
POTASSIUM SERPL-MCNC: 3.9 MMOL/L — SIGNIFICANT CHANGE UP (ref 3.5–5)
POTASSIUM SERPL-SCNC: 3.9 MMOL/L — SIGNIFICANT CHANGE UP (ref 3.5–5)
PROT SERPL-MCNC: 6.7 G/DL — SIGNIFICANT CHANGE UP (ref 6–8)
PROTHROM AB SERPL-ACNC: 17.3 SEC — HIGH (ref 9.95–12.87)
RBC # BLD: 4.22 M/UL — SIGNIFICANT CHANGE UP (ref 4.2–5.4)
RBC # FLD: 13.1 % — SIGNIFICANT CHANGE UP (ref 11.5–14.5)
SODIUM SERPL-SCNC: 142 MMOL/L — SIGNIFICANT CHANGE UP (ref 135–146)
WBC # BLD: 9.89 K/UL — SIGNIFICANT CHANGE UP (ref 4.8–10.8)
WBC # FLD AUTO: 9.89 K/UL — SIGNIFICANT CHANGE UP (ref 4.8–10.8)

## 2022-07-11 PROCEDURE — 93010 ELECTROCARDIOGRAM REPORT: CPT

## 2022-07-11 RX ORDER — ROSUVASTATIN CALCIUM 5 MG/1
1 TABLET ORAL
Qty: 0 | Refills: 0 | DISCHARGE

## 2022-07-11 NOTE — H&P PST ADULT - HISTORY OF PRESENT ILLNESS
PT PRESENTS TO PAST WITH NO SOB, CP, PALPITATIONS, DYSURIA, UTI OR URI AT PRESENT.   PT ABLE TO WALK UP 1 FLIGHTS OF STEPS WITH NO SOB- VERY SLOW.  PT AMBULATES WITH A WALKER.     AS PER THE PT, THIS IS HIS/HER COMPLETE MEDICAL AND SURGICAL HX, INCLUDING MEDICATIONS PRESCRIBED AND OVER THE COUNTER    pt denies any covid s/s, or tested positive in the past- PT IS AWARE OF DATE AND TIME OF COVID TESTING PRIOR TO SURGERY.   pt advised self quarantine till day of procedure  denies travel outside the [place] in the past 30 days    Anesthesia Alert  NO--Difficult Airway  NO--History of neck surgery or radiation  NO--Limited ROM of neck  NO--History of Malignant hyperthermia  NO--Personal or family history of Pseudocholinesterase deficiency  NO--Prior Anesthesia Complication  YES  --Latex Allergy  NO--Loose teeth  NO--History of Rheumatoid Arthritis  YES --JUAN PABLO (NO CPAP)  NO BLEEDING RISK

## 2022-07-11 NOTE — H&P PST ADULT - NSICDXPASTMEDICALHX_GEN_ALL_CORE_FT
PAST MEDICAL HISTORY:  CAD (coronary artery disease)     Chronic sciatica     Glaucoma     H/O blood clots in right lung 10/21- as per pt    H/O mitral valve stenosis     H/O sleep apnea on CPAP    Hemothorax s/p CABG X1 AND MITRAL REPAIR POST-OP    Hyperlipidemia, unspecified hyperlipidemia type     Hypertension, unspecified type     Hypothyroidism     Lymphedema, limb LLE    OA (osteoarthritis)     Skin cancer, basal cell     Type 2 diabetes mellitus without complication, without long-term current use of insulin NOW RESOLVED. NO MEDS

## 2022-07-11 NOTE — H&P PST ADULT - REASON FOR ADMISSION
73 Y/O FEMALE HERE FOR PRE-ADMISSION SURGICAL TESTING. PATIENT REPORTS SHE WAS DIAGNOSED WITH BASAL CELL CARCINOMA 10/2021. S/P MOH'S IN JANUARY WITH NASAL RECONSTRUCTION. PATIENT NOW REPORTS" IT LOOKS LIKE I HAD A STROKE. MY RIGHT LIP IS DROOPED AND I DON'T LIKE HOW THE RIGHT SIDE OF MY NOSE LOOKS."  NOW FOR SCHEDULED PROCEDURE.

## 2022-07-15 PROBLEM — E11.9 TYPE 2 DIABETES MELLITUS WITHOUT COMPLICATIONS: Chronic | Status: ACTIVE | Noted: 2018-03-18

## 2022-07-22 LAB
ALBUMIN SERPL ELPH-MCNC: 4.6 G/DL
ALP BLD-CCNC: 89 U/L
ALT SERPL-CCNC: 20 U/L
ANION GAP SERPL CALC-SCNC: 15 MMOL/L
AST SERPL-CCNC: 19 U/L
BILIRUB SERPL-MCNC: 0.8 MG/DL
BUN SERPL-MCNC: 20 MG/DL
CALCIUM SERPL-MCNC: 9.1 MG/DL
CHLORIDE SERPL-SCNC: 98 MMOL/L
CHOLEST SERPL-MCNC: 162 MG/DL
CO2 SERPL-SCNC: 26 MMOL/L
CREAT SERPL-MCNC: 1.1 MG/DL
EGFR: 53 ML/MIN/1.73M2
GLUCOSE SERPL-MCNC: 136 MG/DL
HDLC SERPL-MCNC: 56 MG/DL
LDLC SERPL CALC-MCNC: 86 MG/DL
NONHDLC SERPL-MCNC: 106 MG/DL
POTASSIUM SERPL-SCNC: 4 MMOL/L
PROT SERPL-MCNC: 6.7 G/DL
SODIUM SERPL-SCNC: 139 MMOL/L
TRIGL SERPL-MCNC: 100 MG/DL

## 2022-07-27 ENCOUNTER — APPOINTMENT (OUTPATIENT)
Dept: CARDIOLOGY | Facility: CLINIC | Age: 72
End: 2022-07-27

## 2022-07-27 PROCEDURE — 93306 TTE W/DOPPLER COMPLETE: CPT

## 2022-07-29 ENCOUNTER — APPOINTMENT (OUTPATIENT)
Dept: CARDIOLOGY | Facility: CLINIC | Age: 72
End: 2022-07-29

## 2022-07-29 ENCOUNTER — LABORATORY RESULT (OUTPATIENT)
Age: 72
End: 2022-07-29

## 2022-07-29 NOTE — ASU PATIENT PROFILE, ADULT - FALL HARM RISK - HARM RISK INTERVENTIONS

## 2022-07-29 NOTE — ASU PATIENT PROFILE, ADULT - NSICDXPASTSURGICALHX_GEN_ALL_CORE_FT
PAST SURGICAL HISTORY:  History of carpal tunnel surgery B/L    History of hip surgery bilateral hip    S/P CABG (coronary artery bypass graft) CABG X1 and repair of mitral valve 2/9/21    Status post Mohs surgery RIGHT NARE WITH RECONSTRUCTION 2021

## 2022-08-01 ENCOUNTER — TRANSCRIPTION ENCOUNTER (OUTPATIENT)
Age: 72
End: 2022-08-01

## 2022-08-01 ENCOUNTER — OUTPATIENT (OUTPATIENT)
Dept: OUTPATIENT SERVICES | Facility: HOSPITAL | Age: 72
LOS: 1 days | Discharge: HOME | End: 2022-08-01

## 2022-08-01 ENCOUNTER — APPOINTMENT (OUTPATIENT)
Dept: PLASTIC SURGERY | Facility: AMBULATORY SURGERY CENTER | Age: 72
End: 2022-08-01

## 2022-08-01 ENCOUNTER — RESULT REVIEW (OUTPATIENT)
Age: 72
End: 2022-08-01

## 2022-08-01 VITALS
DIASTOLIC BLOOD PRESSURE: 62 MMHG | WEIGHT: 210.1 LBS | OXYGEN SATURATION: 95 % | HEART RATE: 71 BPM | RESPIRATION RATE: 18 BRPM | TEMPERATURE: 98 F | SYSTOLIC BLOOD PRESSURE: 134 MMHG | HEIGHT: 60 IN

## 2022-08-01 VITALS
SYSTOLIC BLOOD PRESSURE: 124 MMHG | RESPIRATION RATE: 21 BRPM | OXYGEN SATURATION: 95 % | DIASTOLIC BLOOD PRESSURE: 61 MMHG | HEART RATE: 68 BPM

## 2022-08-01 DIAGNOSIS — Z98.890 OTHER SPECIFIED POSTPROCEDURAL STATES: Chronic | ICD-10-CM

## 2022-08-01 DIAGNOSIS — Z95.1 PRESENCE OF AORTOCORONARY BYPASS GRAFT: Chronic | ICD-10-CM

## 2022-08-01 PROCEDURE — 14060 TIS TRNFR E/N/E/L 10 SQ CM/<: CPT

## 2022-08-01 PROCEDURE — 88304 TISSUE EXAM BY PATHOLOGIST: CPT | Mod: 26

## 2022-08-01 PROCEDURE — 14040 TIS TRNFR F/C/C/M/N/A/G/H/F: CPT | Mod: 59

## 2022-08-01 RX ORDER — SODIUM CHLORIDE 9 MG/ML
1000 INJECTION, SOLUTION INTRAVENOUS
Refills: 0 | Status: DISCONTINUED | OUTPATIENT
Start: 2022-08-01 | End: 2022-08-15

## 2022-08-01 RX ORDER — TRAMADOL HYDROCHLORIDE 50 MG/1
1 TABLET ORAL
Qty: 10 | Refills: 0
Start: 2022-08-01

## 2022-08-01 RX ORDER — ONDANSETRON 8 MG/1
4 TABLET, FILM COATED ORAL ONCE
Refills: 0 | Status: DISCONTINUED | OUTPATIENT
Start: 2022-08-01 | End: 2022-08-15

## 2022-08-01 RX ORDER — HYDROMORPHONE HYDROCHLORIDE 2 MG/ML
0.5 INJECTION INTRAMUSCULAR; INTRAVENOUS; SUBCUTANEOUS
Refills: 0 | Status: DISCONTINUED | OUTPATIENT
Start: 2022-08-01 | End: 2022-08-01

## 2022-08-01 NOTE — ASU DISCHARGE PLAN (ADULT/PEDIATRIC) - PATIENT EDUCATION MATERIALS PROVIED
Pain mangement./Provider pre-printed instructions given/Pre-printed instructions given for other (specify)

## 2022-08-01 NOTE — ASU DISCHARGE PLAN (ADULT/PEDIATRIC) - CARE PROVIDER_API CALL
Renato Tavera)  Plastic Surgery; Surgery of the Hand  [address] [place] [address], [address]  [place] [address]  Phone: [phone]  Fax: [phone]  Follow Up Time: 1 week

## 2022-08-01 NOTE — BRIEF OPERATIVE NOTE - OPERATION/FINDINGS
Pt w/ Hx of Mohs Sx s/p previous R sided superiorly based nasolabial flap.  Excision of R cheek scar, thinning of nasolabial flap.  Excision of L cheek skin with elevation of lip for symmetry

## 2022-08-01 NOTE — ASU DISCHARGE PLAN (ADULT/PEDIATRIC) - NS MD DC FALL RISK RISK
For information on Fall & Injury Prevention, visit: [email or web address] OR  [email or web address] OR  https://www.cdc.gov/steadi/patient.html

## 2022-08-01 NOTE — BRIEF OPERATIVE NOTE - NSICDXBRIEFPROCEDURE_GEN_ALL_CORE_FT
PROCEDURES:  Surgical prep recipient site by incisional release, scar contracture, face 01-Aug-2022 13:09:27  Santiago Marsh

## 2022-08-01 NOTE — ASU DISCHARGE PLAN (ADULT/PEDIATRIC) - ASU DC SPECIAL INSTRUCTIONSFT
You underwent surgery on your face.  Over your operative sites you have surgical dressing in place.  Please keep these dressings clean, dry, and in place until your follow up appointment.  Please note that it is normal to have some oozing from the surgical site on to the dressing.  This is expected post operatively. You may sponge bathe but please do not get the operative site wet.     Please take all medications as instructed.

## 2022-08-03 ENCOUNTER — APPOINTMENT (OUTPATIENT)
Dept: PLASTIC SURGERY | Facility: CLINIC | Age: 72
End: 2022-08-03

## 2022-08-03 LAB — SURGICAL PATHOLOGY STUDY: SIGNIFICANT CHANGE UP

## 2022-08-03 NOTE — ASSESSMENT
[FreeTextEntry1] : 72 yo F with right nasal BCC s/p right nasal reconstruction with nasolabial flap 6/23/21\par Now 1 month s/p revision of right nasal reconstruction, right cheek scar/asymmetry, and right chin scar, doing well\par \par Pt now POD# 3 s/p revision to previous facial Mohs recon on 8/1/22. \par Per previous note: \par -revision of right nasal flap\par -elevation of right lip via crescent excision of right upper lip near NL fold\par -excision tissue lateral to LEFT NL fold\par \par pt is doing well\par \par - Bandages changed (Baci/telfa/teg)\par - Extra strength tylenol for pain, ok to take tramadol pRn \par - s/s of  infection reviewed\par - all post op wound care instructions & limitations reviewed\par - Keep head elevated \par - f.u next week for suture removal, discussion of restarting anti-coags & path review\par \par \par \par

## 2022-08-03 NOTE — PHYSICAL EXAM
[de-identified] : NAD [de-identified] : Right nasal sidewall & NL crease incisions are CDI, no seroma/hematoma, R inferior orbit with ecchymosis & swelling viable. L NL crease incision is CDI, no seroma/hematoma, no drainage or evidence of infection. All incisions with sutures in place.

## 2022-08-03 NOTE — HISTORY OF PRESENT ILLNESS
[FreeTextEntry1] : 70 yr old woman w right nasal BCC to have Mohs w Dr. Cole June 21 and here for recon options\par \par also w right chin BCC on biopsy 1/25/2021 that Douglas in planning addressing June 8\par \par no prior h/o skin cancer\par nonsmoker\par \par using walker to help in recovery from B/ L hip replacements\par also had CABG and MV repair w Imam  2/2021\par \par Interval hx (7/1/21). Patient presents today POD#8 s/p right nasal reconstruction with nasolabial flap. Doing well but c/w facial swelling and bruising. Denies any f/c or bleeding. Completed oral antibiotics as prescribed. \par \par Interval hx (11/30/21). Patient presents for f/u 5 months s/p right nasal reconstruction with nasolabial flap. Doing well. \par \par Interval hx (2/16/22): Pt presents today POD #7 s/p revision of right nasal reconstruction, right cheek scar, and right chin scar. Pt c/o resolving swelling and bruising. Resumed Eliquis. Denies f/c.\par \par Interval hx (3/11/22): Pt presents today 1 month s/p revision of right nasal reconstruction, right cheek scar, and right chin scar. Happy with results. Offers no complaints.\par \par Interval hx (8/3/22):  POD# 3 s/p revision to previous facial Mohs recon. Pt here for bandage change. Reports doing fine. denies fever/chills/cp/sob/le pain or swelling. Walking with a cane. \par

## 2022-08-03 NOTE — DATA REVIEWED
[FreeTextEntry1] : \par  Pathology             Final\par \par No Documents Attached\par \par \par \par \par   [facility] Accession Number : [id number]\par Patient:   ADRIAN SAMUELS\par \par \par Accession:                             [id number]\par \par Collected Date/Time:                   8/1/2022 12:00 EDT\par Received Date/Time:                    8/1/2022 13:52 EDT\par \par Surgical Pathology Report - Auth (Verified)\par \par Specimen(s) Submitted\par Right upper lip scar\par \par Final Diagnosis\par Right upper lip scar, revision:\par -  Fragment of sun damaged skin with focal area of fibrosis and focal mild\par chronic nonspecific inflammation.\par -  No evidence of residual carcinoma in this entirely submitted specimen.\par \par Verified by: Shayla Small M.D.\par (Electronic Signature)\par Reported on: 08/03/22 08:14 EDT, [facility],\[place] [address]\par Phone: [phone]   Fax: [phone]\par _________________________________________________________________\par \par \par Clinical Information\par Revision\par \par Perioperative Diagnosis\par Prior Moh's surgery for BCC\par \par Gross Description\par The specimen is received in formalin labeled as "right upper lip". The\par specimen consists of unoriented ellipse of tan skin which is grossly\par unremarkable, measuring 2.5 x 0.7 x 0.4 cm. The margin is inked violet.\par The specimen is submitted entirely. (2 blocks)\par \par Specimen was received and underwent gross examination at [facility], [address] [place] [address].\par \par 08/01/2022 15:42:40 EDT   HH\par \par  \par \par  Ordered by: ISHAAN AMANDA IV       Collected/Examined: [id number] 12:00PM       \par Verification Required       Stage: Final       \par  Performed at: [facility]       Resulted: [id number] 08:14AM       Last Updated: [id number] 08:15AM       Accession: [id number]

## 2022-08-08 DIAGNOSIS — Z85.828 PERSONAL HISTORY OF OTHER MALIGNANT NEOPLASM OF SKIN: ICD-10-CM

## 2022-08-08 DIAGNOSIS — E11.9 TYPE 2 DIABETES MELLITUS WITHOUT COMPLICATIONS: ICD-10-CM

## 2022-08-08 DIAGNOSIS — F17.210 NICOTINE DEPENDENCE, CIGARETTES, UNCOMPLICATED: ICD-10-CM

## 2022-08-08 DIAGNOSIS — G47.33 OBSTRUCTIVE SLEEP APNEA (ADULT) (PEDIATRIC): ICD-10-CM

## 2022-08-08 DIAGNOSIS — I25.10 ATHEROSCLEROTIC HEART DISEASE OF NATIVE CORONARY ARTERY WITHOUT ANGINA PECTORIS: ICD-10-CM

## 2022-08-08 DIAGNOSIS — E78.00 PURE HYPERCHOLESTEROLEMIA, UNSPECIFIED: ICD-10-CM

## 2022-08-08 DIAGNOSIS — I10 ESSENTIAL (PRIMARY) HYPERTENSION: ICD-10-CM

## 2022-08-08 DIAGNOSIS — Z95.1 PRESENCE OF AORTOCORONARY BYPASS GRAFT: ICD-10-CM

## 2022-08-10 ENCOUNTER — APPOINTMENT (OUTPATIENT)
Dept: PLASTIC SURGERY | Facility: CLINIC | Age: 72
End: 2022-08-10

## 2022-08-10 DIAGNOSIS — M95.0 ACQUIRED DEFORMITY OF NOSE: ICD-10-CM

## 2022-08-10 DIAGNOSIS — Z98.890 ACQUIRED DEFORMITY OF NOSE: ICD-10-CM

## 2022-08-10 PROCEDURE — 99024 POSTOP FOLLOW-UP VISIT: CPT

## 2022-08-10 NOTE — PHYSICAL EXAM
[de-identified] : NAD [de-identified] : Right nasal sidewall & NL crease incisions are healing well, CDI, no seroma/hematoma, R inferior orbit with ecchymosis & swelling, resolving,\par L NL crease incision is CDI, no seroma/hematoma, no drainage or evidence of infection

## 2022-08-10 NOTE — DATA REVIEWED
[FreeTextEntry1] :  Pathology             Final\par \par No Documents Attached\par \par \par   [facility] Accession Number : [id number]\par Patient:   ADRIAN SAMUELS\par \par \par Accession:                             [id number]\par \par Collected Date/Time:                   8/1/2022 12:00 EDT\par Received Date/Time:                    8/1/2022 13:52 EDT\par \par Surgical Pathology Report - Auth (Verified)\par \par Specimen(s) Submitted\par Right upper lip scar\par \par Final Diagnosis\par Right upper lip scar, revision:\par -  Fragment of sun damaged skin with focal area of fibrosis and focal mild\par chronic nonspecific inflammation.\par -  No evidence of residual carcinoma in this entirely submitted specimen.\par \par Verified by: Shayla Small M.D.\par (Electronic Signature)\par Reported on: 08/03/22 08:14 EDT, [facility],\[place] [address]\par Phone: [phone]   Fax: [phone]\par _________________________________________________________________\par \par \par Clinical Information\par Revision\par \par Perioperative Diagnosis\par Prior Moh's surgery for BCC\par \par Gross Description\par The specimen is received in formalin labeled as "right upper lip". The\par specimen consists of unoriented ellipse of tan skin which is grossly\par unremarkable, measuring 2.5 x 0.7 x 0.4 cm. The margin is inked violet.\par The specimen is submitted entirely. (2 blocks)\par \par Specimen was received and underwent gross examination at [place] [address].\par \par 08/01/2022 15:42:40 EDT   HH\par \par  \par \par  Ordered by: ISHAAN AMANDA IV       Collected/Examined: [id number] 12:00PM       \par Verification Required       Stage: Final       \par  Performed at: [facility]       Resulted: [id number] 08:14AM       Last Updated: [id number] 08:15AM       Accession: [id number]

## 2022-08-10 NOTE — ASSESSMENT
[FreeTextEntry1] : 71 yo F with right nasal BCC s/p right nasal reconstruction with nasolabial flap 6/23/21 s/p revision of right nasal reconstruction, right cheek scar/asymmetry, and right chin scar, doing well.\par \par Now POD#9 s/p revision to previous right facial Mohs recon and excision of left cheek tissue for symmetry. Doing well.\par \par - all sutures removed\par - daily Aquaphor\par - may start scar creams next week\par - sleep on back with HOB elevated\par - ice pack application discussed for bruising and swelling \par - reassurance given re: bruising and swelling\par - post-op instructions discussed and her questions answered\par - f/u 1 month with Dr. Tavera. \par \par COVID protocol maintained \par \par \par

## 2022-08-10 NOTE — HISTORY OF PRESENT ILLNESS
[FreeTextEntry1] : 70 yr old woman w right nasal BCC to have Mohs w Dr. Cole June 21 and here for recon options\par \par also w right chin BCC on biopsy 1/25/2021 that Douglas in planning addressing June 8\par \par no prior h/o skin cancer\par nonsmoker\par \par using walker to help in recovery from B/ L hip replacements\par also had CABG and MV repair w Imam  2/2021\par \par Interval hx (7/1/21). Patient presents today POD#8 s/p right nasal reconstruction with nasolabial flap. Doing well but c/w facial swelling and bruising. Denies any f/c or bleeding. Completed oral antibiotics as prescribed. \par \par Interval hx (11/30/21). Patient presents for f/u 5 months s/p right nasal reconstruction with nasolabial flap. Doing well. \par \par Interval hx (2/16/22): Pt presents today POD #7 s/p revision of right nasal reconstruction, right cheek scar, and right chin scar. Pt c/o resolving swelling and bruising. Resumed Eliquis. Denies f/c.\par \par Interval hx (3/11/22): Pt presents today 1 month s/p revision of right nasal reconstruction, right cheek scar, and right chin scar. Happy with results. Offers no complaints.\par \par Interval hx (8/3/22):  POD# 3 s/p revision to previous facial Mohs recon. Pt here for bandage change. Reports doing fine. denies fever/chills/cp/sob/le pain or swelling. Walking with a cane. \par \par Interval hx (8/10/22): Pt presents today POD#9 s/p revision of right nasal reconstruction and excision of left cheek tissue for symmetry. Doing well with improving facial bruising and swelling. Denies any f/c or drainage. \par

## 2022-09-06 ENCOUNTER — OUTPATIENT (OUTPATIENT)
Dept: OUTPATIENT SERVICES | Facility: HOSPITAL | Age: 72
LOS: 1 days | Discharge: HOME | End: 2022-09-06

## 2022-09-06 VITALS
OXYGEN SATURATION: 95 % | RESPIRATION RATE: 18 BRPM | TEMPERATURE: 98 F | HEIGHT: 60 IN | DIASTOLIC BLOOD PRESSURE: 75 MMHG | HEART RATE: 60 BPM | WEIGHT: 210.1 LBS | SYSTOLIC BLOOD PRESSURE: 134 MMHG

## 2022-09-06 VITALS — SYSTOLIC BLOOD PRESSURE: 141 MMHG | HEART RATE: 63 BPM | DIASTOLIC BLOOD PRESSURE: 61 MMHG | RESPIRATION RATE: 17 BRPM

## 2022-09-06 DIAGNOSIS — Z98.49 CATARACT EXTRACTION STATUS, UNSPECIFIED EYE: Chronic | ICD-10-CM

## 2022-09-06 DIAGNOSIS — Z98.890 OTHER SPECIFIED POSTPROCEDURAL STATES: Chronic | ICD-10-CM

## 2022-09-06 DIAGNOSIS — Z95.1 PRESENCE OF AORTOCORONARY BYPASS GRAFT: Chronic | ICD-10-CM

## 2022-09-06 LAB — GLUCOSE BLDC GLUCOMTR-MCNC: 116 MG/DL — HIGH (ref 70–99)

## 2022-09-06 NOTE — ASU PATIENT PROFILE, ADULT - NSICDXPASTMEDICALHX_GEN_ALL_CORE_FT
PAST MEDICAL HISTORY:  CAD (coronary artery disease)     Chronic sciatica     Glaucoma     H/O blood clots in right lung 10/21- as per pt    H/O mitral valve stenosis     H/O sleep apnea     Hemothorax s/p CABG X1 AND MITRAL REPAIR POST-OP    Hyperlipidemia, unspecified hyperlipidemia type     Hypertension, unspecified type     Hypothyroidism     Lymphedema, limb LLE    OA (osteoarthritis)     Skin cancer, basal cell     Type 2 diabetes mellitus without complication, without long-term current use of insulin NOW RESOLVED. NO MEDS

## 2022-09-06 NOTE — ASU PATIENT PROFILE, ADULT - FALL HARM RISK - HARM RISK INTERVENTIONS

## 2022-09-06 NOTE — ASU PATIENT PROFILE, ADULT - NSICDXPASTSURGICALHX_GEN_ALL_CORE_FT
PAST SURGICAL HISTORY:  H/O cataract extraction right IOL    History of carpal tunnel surgery B/L    History of hip surgery bilateral hip    S/P CABG (coronary artery bypass graft) CABG X1 and repair of mitral valve 2/9/21    Status post Mohs surgery RIGHT NARE WITH RECONSTRUCTION 2021

## 2022-09-08 DIAGNOSIS — H26.9 UNSPECIFIED CATARACT: ICD-10-CM

## 2022-09-08 DIAGNOSIS — H40.10X0 UNSPECIFIED OPEN-ANGLE GLAUCOMA, STAGE UNSPECIFIED: ICD-10-CM

## 2022-09-15 ENCOUNTER — APPOINTMENT (OUTPATIENT)
Dept: PLASTIC SURGERY | Facility: CLINIC | Age: 72
End: 2022-09-15

## 2022-09-15 PROBLEM — Z86.69 PERSONAL HISTORY OF OTHER DISEASES OF THE NERVOUS SYSTEM AND SENSE ORGANS: Chronic | Status: ACTIVE | Noted: 2021-02-09

## 2022-09-15 PROCEDURE — 99024 POSTOP FOLLOW-UP VISIT: CPT

## 2022-09-15 NOTE — DATA REVIEWED
[FreeTextEntry1] :  Pathology             Final\par \par No Documents Attached\par \par \par   [facility] Accession Number : [id number]\par Patient:   ADRIAN SAMUELS\par \par \par Accession:                             [id number]\par \par Collected Date/Time:                   8/1/2022 12:00 EDT\par Received Date/Time:                    8/1/2022 13:52 EDT\par \par Surgical Pathology Report - Auth (Verified)\par \par Specimen(s) Submitted\par Right upper lip scar\par \par Final Diagnosis\par Right upper lip scar, revision:\par -  Fragment of sun damaged skin with focal area of fibrosis and focal mild\par chronic nonspecific inflammation.\par -  No evidence of residual carcinoma in this entirely submitted specimen.\par \par Verified by: Shayla Small M.D.\par (Electronic Signature)\par Reported on: 08/03/22 08:14 EDT, [facility],\[place] [address]\par Phone: [phone]   Fax: [phone]\par _________________________________________________________________\par \par \par Clinical Information\par Revision\par \par Perioperative Diagnosis\par Prior Moh's surgery for BCC\par \par Gross Description\par The specimen is received in formalin labeled as "right upper lip". The\par specimen consists of unoriented ellipse of tan skin which is grossly\par unremarkable, measuring 2.5 x 0.7 x 0.4 cm. The margin is inked violet.\par The specimen is submitted entirely. (2 blocks)\par \par Specimen was received and underwent gross examination at [place] [address].\par \par 08/01/2022 15:42:40 EDT   HH\par \par  \par \par  Ordered by: ISHAAN AMANDA IV       Collected/Examined: [id number] 12:00PM       \par Verification Required       Stage: Final       \par  Performed at: [facility]       Resulted: [id number] 08:14AM       Last Updated: [id number] 08:15AM       Accession: [id number]

## 2022-09-15 NOTE — HISTORY OF PRESENT ILLNESS
[FreeTextEntry1] : 70 yr old woman w right nasal BCC to have Mohs w Dr. Cole June 21 and here for recon options\par \par also w right chin BCC on biopsy 1/25/2021 that [facility] in planning addressing June 8\par \par no prior h/o skin cancer\par nonsmoker\par \par using walker to help in recovery from B/ L hip replacements\par also had CABG and MV repair w Im[facility]  2/2021\par \par Interval hx (7/1/21). Patient presents today POD#8 s/p right nasal reconstruction with nasolabial flap. Doing well but c/w facial swelling and bruising. Denies any f/c or bleeding. Completed oral antibiotics as prescribed. \par \par Interval hx (11/30/21). Patient presents for f/u 5 months s/p right nasal reconstruction with nasolabial flap. Doing well. \par \par Interval hx (2/16/22): Pt presents today POD #7 s/p revision of right nasal reconstruction, right cheek scar, and right chin scar. Pt c/o resolving swelling and bruising. Resumed Eliquis. Denies f/c.\par \par Interval hx (3/11/22): Pt presents today 1 month s/p revision of right nasal reconstruction, right cheek scar, and right chin scar. Happy with results. Offers no complaints.\par \par Interval hx (8/3/22):  POD# 3 s/p revision to previous facial Mohs recon. Pt here for bandage change. Reports doing fine. denies fever/chills/cp/sob/le pain or swelling. Walking with a cane. \par \par Interval hx (8/10/22): Pt presents today POD#9 s/p revision of right nasal reconstruction and excision of left cheek tissue for symmetry. Doing well with improving facial bruising and swelling. Denies any f/c or drainage. \par \par Interval hx (9/15/22): Pt presents today 5 weeks s/p revision of right nasal reconstruction and excision of left cheek tissue for symmetry. Doing well

## 2022-09-15 NOTE — ASSESSMENT
[FreeTextEntry1] : 71 yo F with right nasal BCC s/p right nasal reconstruction with nasolabial flap 6/23/21 s/p revision of right nasal reconstruction, right cheek scar/asymmetry, and right chin scar, doing well.\par \par Now 8 weeks s/p revision to previous right facial Mohs recon and excision of left cheek tissue for symmetry. Doing well.\par \par - daily Aquaphor\par - may start scar creams next week\par - sleep on back with HOB elevated\par - ice pack application discussed for bruising and swelling \par - reassurance given re: bruising and swelling\par - post-op instructions discussed and her questions answered\par - f/u 1 month with Dr. aTvera. \par \par COVID protocol maintained \par \par \par 9/15/2022\par as above\par healing well--pt still somewhat less than completely happy but discussed surgery was only Aug 2022\par \par reassurance given\par \par f/u 3-4 months\par \par Due to COVID 19, pre-visit patient instructions were explained to the patient and their symptoms were checked upon arrival.  \par Masks were used by the health care providers and staff and the examination room was cleaned after the patient visit was completed.\par \par \par

## 2022-09-15 NOTE — PHYSICAL EXAM
[de-identified] : NAD [de-identified] : Right nasal sidewall & NL crease incisions are healing well, CDI, no seroma/hematoma, R inferior orbit with ecchymosis & swelling, resolving,\par L NL crease incision is CDI, no seroma/hematoma, no drainage or evidence of infection

## 2023-02-09 LAB
ALBUMIN SERPL ELPH-MCNC: 4.5 G/DL
ALP BLD-CCNC: 81 U/L
ALT SERPL-CCNC: 10 U/L
ANION GAP SERPL CALC-SCNC: 13 MMOL/L
AST SERPL-CCNC: 16 U/L
BILIRUB SERPL-MCNC: 0.6 MG/DL
BUN SERPL-MCNC: 26 MG/DL
CALCIUM SERPL-MCNC: 9.5 MG/DL
CHLORIDE SERPL-SCNC: 98 MMOL/L
CHOLEST SERPL-MCNC: 176 MG/DL
CO2 SERPL-SCNC: 30 MMOL/L
CREAT SERPL-MCNC: 1.1 MG/DL
EGFR: 53 ML/MIN/1.73M2
GLUCOSE SERPL-MCNC: 99 MG/DL
HDLC SERPL-MCNC: 59 MG/DL
LDLC SERPL CALC-MCNC: 100 MG/DL
NONHDLC SERPL-MCNC: 117 MG/DL
POTASSIUM SERPL-SCNC: 4.3 MMOL/L
PROT SERPL-MCNC: 6.6 G/DL
SODIUM SERPL-SCNC: 141 MMOL/L
TRIGL SERPL-MCNC: 87 MG/DL

## 2023-02-13 ENCOUNTER — APPOINTMENT (OUTPATIENT)
Dept: CARDIOLOGY | Facility: CLINIC | Age: 73
End: 2023-02-13
Payer: MEDICARE

## 2023-02-13 VITALS — WEIGHT: 224 LBS | BODY MASS INDEX: 43.98 KG/M2 | HEIGHT: 60 IN

## 2023-02-13 VITALS — HEART RATE: 66 BPM | RESPIRATION RATE: 18 BRPM | SYSTOLIC BLOOD PRESSURE: 118 MMHG | DIASTOLIC BLOOD PRESSURE: 80 MMHG

## 2023-02-13 DIAGNOSIS — Z86.711 PERSONAL HISTORY OF PULMONARY EMBOLISM: ICD-10-CM

## 2023-02-13 DIAGNOSIS — Z01.810 ENCOUNTER FOR PREPROCEDURAL CARDIOVASCULAR EXAMINATION: ICD-10-CM

## 2023-02-13 DIAGNOSIS — Z86.718 PERSONAL HISTORY OF OTHER VENOUS THROMBOSIS AND EMBOLISM: ICD-10-CM

## 2023-02-13 PROCEDURE — 93000 ELECTROCARDIOGRAM COMPLETE: CPT

## 2023-02-13 PROCEDURE — 99214 OFFICE O/P EST MOD 30 MIN: CPT | Mod: 25

## 2023-02-13 RX ORDER — APIXABAN 5 MG/1
5 TABLET, FILM COATED ORAL
Qty: 180 | Refills: 0 | Status: ACTIVE | COMMUNITY
Start: 2022-06-27

## 2023-02-13 NOTE — PHYSICAL EXAM
[General Appearance - Well Developed] : well developed [Normal Appearance] : normal appearance [Well Groomed] : well groomed [General Appearance - Well Nourished] : well nourished [No Deformities] : no deformities [General Appearance - In No Acute Distress] : no acute distress [Normal Conjunctiva] : the conjunctiva exhibited no abnormalities [Eyelids - No Xanthelasma] : the eyelids demonstrated no xanthelasmas [Normal Oral Mucosa] : normal oral mucosa [No Oral Pallor] : no oral pallor [No Oral Cyanosis] : no oral cyanosis [Normal Jugular Venous A Waves Present] : normal jugular venous A waves present [Normal Jugular Venous V Waves Present] : normal jugular venous V waves present [No Jugular Venous Guaman A Waves] : no jugular venous guaman A waves [Heart Rate And Rhythm] : heart rate and rhythm were normal [Murmurs] : no murmurs present [Heart Sounds] : normal S1 and S2 [Respiration, Rhythm And Depth] : normal respiratory rhythm and effort [Exaggerated Use Of Accessory Muscles For Inspiration] : no accessory muscle use [Auscultation Breath Sounds / Voice Sounds] : lungs were clear to auscultation bilaterally [Bowel Sounds] : normal bowel sounds [Abdomen Soft] : soft [Abdomen Tenderness] : non-tender [Abdomen Mass (___ Cm)] : no abdominal mass palpated [Abnormal Walk] : normal gait [FreeTextEntry1] : walks with walker [Nail Clubbing] : no clubbing of the fingernails [Cyanosis, Localized] : no localized cyanosis [Petechial Hemorrhages (___cm)] : no petechial hemorrhages [Skin Color & Pigmentation] : normal skin color and pigmentation [] : no rash [No Venous Stasis] : no venous stasis [Skin Lesions] : no skin lesions [No Skin Ulcers] : no skin ulcer [No Xanthoma] : no  xanthoma was observed [Oriented To Time, Place, And Person] : oriented to person, place, and time

## 2023-02-16 ENCOUNTER — APPOINTMENT (OUTPATIENT)
Dept: PLASTIC SURGERY | Facility: CLINIC | Age: 73
End: 2023-02-16
Payer: MEDICARE

## 2023-02-16 PROCEDURE — 99212 OFFICE O/P EST SF 10 MIN: CPT

## 2023-02-16 NOTE — PHYSICAL EXAM
[de-identified] : NAD [de-identified] : Right nasal sidewall & NL crease incisions are healing well, CDI, no seroma/hematoma, R inferior orbit with ecchymosis & swelling, resolving,\par L NL crease incision is CDI, no seroma/hematoma, no drainage or evidence of infection

## 2023-02-16 NOTE — ASSESSMENT
[FreeTextEntry1] : 71 yo F with right nasal BCC s/p right nasal reconstruction with nasolabial flap 6/23/21 s/p revision of right nasal reconstruction, right cheek scar/asymmetry, and right chin scar, doing well.\par \par Now 8 weeks s/p revision to previous right facial Mohs recon and excision of left cheek tissue for symmetry. Doing well.\par \par - daily Aquaphor\par - may start scar creams next week\par - sleep on back with HOB elevated\par - ice pack application discussed for bruising and swelling \par - reassurance given re: bruising and swelling\par - post-op instructions discussed and her questions answered\par - f/u 1 month with Dr. Tavera. \par \par COVID protocol maintained \par \par \par 9/15/2022\par as above\par healing well--pt still somewhat less than completely happy but discussed surgery was only Aug 2022\par \par reassurance given\par \par f/u 3-4 months\par \par Due to COVID 19, pre-visit patient instructions were explained to the patient and their symptoms were checked upon arrival.  \par Masks were used by the health care providers and staff and the examination room was cleaned after the patient visit was completed.\par \par \par 2/16/2023\par we re-reviewed preop wound--full thickness defect right nasal sidewall\par pt w very high aesthetic expectation "I am very vain"\par \par she understands the complex nature of the multi stage recon\par \par f/u 3 months\par \par injected 5mg into right nasal recon flap\par tolerated well\par \par Due to COVID 19, pre-visit patient instructions were explained to the patient and their symptoms were checked upon arrival.  \par Masks were used by the health care providers and staff and the examination room was cleaned after the patient visit was completed.\par \par \par

## 2023-02-16 NOTE — DATA REVIEWED
[FreeTextEntry1] :  Pathology             Final\par \par No Documents Attached\par \par \par   [facility] Accession Number : [id number]\par Patient:   ADRIAN SAMUELS\par \par \par Accession:                             [id number]\par \par Collected Date/Time:                   8/1/2022 12:00 EDT\par Received Date/Time:                    8/1/2022 13:52 EDT\par \par Surgical Pathology Report - Auth (Verified)\par \par Specimen(s) Submitted\par Right upper lip scar\par \par Final Diagnosis\par Right upper lip scar, revision:\par -  Fragment of sun damaged skin with focal area of fibrosis and focal mild\par chronic nonspecific inflammation.\par -  No evidence of residual carcinoma in this entirely submitted specimen.\par \par Verified by: Shayla Small M.D.\par (Electronic Signature)\par Reported on: 08/03/22 08:14 EDT, [facility],\[place] [address]\par Phone: [phone]   Fax: [phone]\par _________________________________________________________________\par \par \par Clinical Information\par Revision\par \par Perioperative Diagnosis\par Prior Moh's surgery for BCC\par \par Gross Description\par The specimen is received in formalin labeled as "right upper lip". The\par specimen consists of unoriented ellipse of tan skin which is grossly\par unremarkable, measuring 2.5 x 0.7 x 0.4 cm. The margin is inked violet.\par The specimen is submitted entirely. (2 blocks)\par \par Specimen was received and underwent gross examination at [place] [address].\par \par 08/01/2022 15:42:40 EDT   HH\par \par  \par \par  Ordered by: ISHAAN AMANDA IV       Collected/Examined: [id number] 12:00PM       \par Verification Required       Stage: Final       \par  Performed at: [facility]       Resulted: [id number] 08:14AM       Last Updated: [id number] 08:15AM       Accession: [id number]

## 2023-05-16 NOTE — PATIENT PROFILE ADULT - NSPROPTRIGHTBILLOFRIGHTS_GEN_A_NUR
patient Render Note In Bullet Format When Appropriate: No Detail Level: Detailed Show Applicator Variable?: Yes Duration Of Freeze Thaw-Cycle (Seconds): 0 Consent: The patient's consent was obtained including but not limited to risks of crusting, scabbing, blistering, scarring, darker or lighter pigmentary change, recurrence, incomplete removal and infection. Post-Care Instructions: I reviewed with the patient in detail post-care instructions. Patient is to wear sunprotection, and avoid picking at any of the treated lesions. Pt may apply Vaseline to crusted or scabbing areas.

## 2023-05-23 ENCOUNTER — APPOINTMENT (OUTPATIENT)
Dept: PLASTIC SURGERY | Facility: CLINIC | Age: 73
End: 2023-05-23
Payer: MEDICARE

## 2023-05-23 PROCEDURE — 99212 OFFICE O/P EST SF 10 MIN: CPT

## 2023-05-23 NOTE — PHYSICAL EXAM
[de-identified] : NAD [de-identified] : Right nasal sidewall & NL crease incisions are healing well, CDI, no seroma/hematoma, R inferior orbit with ecchymosis & swelling, resolving,\par L NL crease incision is CDI, no seroma/hematoma, no drainage or evidence of infection

## 2023-05-23 NOTE — DATA REVIEWED
[FreeTextEntry1] :  Pathology             Final\par \par No Documents Attached\par \par \par   [facility] Accession Number : [id number]\par Patient:   ADRIAN SAMUELS\par \par \par Accession:                             [id number]\par \par Collected Date/Time:                   8/1/2022 12:00 EDT\par Received Date/Time:                    8/1/2022 13:52 EDT\par \par Surgical Pathology Report - Auth (Verified)\par \par Specimen(s) Submitted\par Right upper lip scar\par \par Final Diagnosis\par Right upper lip scar, revision:\par -  Fragment of sun damaged skin with focal area of fibrosis and focal mild\par chronic nonspecific inflammation.\par -  No evidence of residual carcinoma in this entirely submitted specimen.\par \par Verified by: Shayla Small M.D.\par (Electronic Signature)\par Reported on: 08/03/22 08:14 EDT, [facility],\[place] [address]\par Phone: [phone]   Fax: [phone]\par _________________________________________________________________\par \par \par Clinical Information\par Revision\par \par Perioperative Diagnosis\par Prior Moh's surgery for BCC\par \par Gross Description\par The specimen is received in formalin labeled as "right upper lip". The\par specimen consists of unoriented ellipse of tan skin which is grossly\par unremarkable, measuring 2.5 x 0.7 x 0.4 cm. The margin is inked violet.\par The specimen is submitted entirely. (2 blocks)\par \par Specimen was received and underwent gross examination at [place] [address].\par \par 08/01/2022 15:42:40 EDT   HH\par \par  \par \par  Ordered by: ISHAAN AMANDA IV       Collected/Examined: [id number] 12:00PM       \par Verification Required       Stage: Final       \par  Performed at: [facility]       Resulted: [id number] 08:14AM       Last Updated: [id number] 08:15AM       Accession: [id number]

## 2023-05-23 NOTE — ASSESSMENT
[FreeTextEntry1] : 73 yo F with right nasal BCC s/p right nasal reconstruction with nasolabial flap 6/23/21 s/p revision of right nasal reconstruction, right cheek scar/asymmetry, and right chin scar, doing well.\par \par Now 8 weeks s/p revision to previous right facial Mohs recon and excision of left cheek tissue for symmetry. Doing well.\par \par - daily Aquaphor\par - may start scar creams next week\par - sleep on back with HOB elevated\par - ice pack application discussed for bruising and swelling \par - reassurance given re: bruising and swelling\par - post-op instructions discussed and her questions answered\par - f/u 1 month with Dr. Tavera. \par \par COVID protocol maintained \par \par \par 9/15/2022\par as above\par healing well--pt still somewhat less than completely happy but discussed surgery was only Aug 2022\par \par reassurance given\par \par f/u 3-4 months\par \par Due to COVID 19, pre-visit patient instructions were explained to the patient and their symptoms were checked upon arrival.  \par Masks were used by the health care providers and staff and the examination room was cleaned after the patient visit was completed.\par \par \par 2/16/2023\par we re-reviewed preop wound--full thickness defect right nasal sidewall\par pt w very high aesthetic expectation "I am very vain"\par \par she understands the complex nature of the multi stage recon\par \par f/u 3 months\par \par injected 5mg into right nasal recon flap\par tolerated well\par \par Due to COVID 19, pre-visit patient instructions were explained to the patient and their symptoms were checked upon arrival.  \par Masks were used by the health care providers and staff and the examination room was cleaned after the patient visit was completed.\par \par \par 5/23/2023\par as above\par pt happier w nose--prior steroid injection improved fullness on right side (lessened it)\par \par pt interested in addressing right cheek---"I am still very vain"\par \par Discussed the theoretical options--fat grafting or de-epitheliazed submental flap\par \par At present pt will hold off on any additional surgery\par \par all ?s asnwered\par \par f/u prn\par \par

## 2023-07-17 NOTE — DIETITIAN NUTRITION RISK NOTIFICATION - OTHER RISK FACTORS
Morbid obesity (BMI > 40) Libtayo Counseling- I discussed with the patient the risks of Libtayo including but not limited to nausea, vomiting, diarrhea, and bone or muscle pain.  The patient verbalized understanding of the proper use and possible adverse effects of Libtayo.  All of the patient's questions and concerns were addressed.

## 2023-08-13 LAB
ALBUMIN SERPL ELPH-MCNC: 4.5 G/DL
ALP BLD-CCNC: 83 U/L
ALT SERPL-CCNC: 17 U/L
ANION GAP SERPL CALC-SCNC: 13 MMOL/L
AST SERPL-CCNC: 20 U/L
BILIRUB SERPL-MCNC: 0.5 MG/DL
BUN SERPL-MCNC: 29 MG/DL
CALCIUM SERPL-MCNC: 9.4 MG/DL
CHLORIDE SERPL-SCNC: 97 MMOL/L
CHOLEST SERPL-MCNC: 157 MG/DL
CO2 SERPL-SCNC: 30 MMOL/L
CREAT SERPL-MCNC: 1.1 MG/DL
EGFR: 53 ML/MIN/1.73M2
GLUCOSE SERPL-MCNC: 110 MG/DL
HDLC SERPL-MCNC: 48 MG/DL
LDLC SERPL CALC-MCNC: 87 MG/DL
NONHDLC SERPL-MCNC: 109 MG/DL
POTASSIUM SERPL-SCNC: 3.8 MMOL/L
PROT SERPL-MCNC: 6.4 G/DL
SODIUM SERPL-SCNC: 140 MMOL/L
TRIGL SERPL-MCNC: 110 MG/DL

## 2023-08-15 ENCOUNTER — APPOINTMENT (OUTPATIENT)
Dept: CARDIOLOGY | Facility: CLINIC | Age: 73
End: 2023-08-15
Payer: MEDICARE

## 2023-08-15 VITALS — SYSTOLIC BLOOD PRESSURE: 120 MMHG | RESPIRATION RATE: 18 BRPM | DIASTOLIC BLOOD PRESSURE: 80 MMHG | HEART RATE: 60 BPM

## 2023-08-15 VITALS — BODY MASS INDEX: 42.41 KG/M2 | WEIGHT: 216 LBS | HEIGHT: 60 IN

## 2023-08-15 PROCEDURE — 93000 ELECTROCARDIOGRAM COMPLETE: CPT

## 2023-08-15 PROCEDURE — 99214 OFFICE O/P EST MOD 30 MIN: CPT | Mod: 25

## 2023-08-18 ENCOUNTER — APPOINTMENT (OUTPATIENT)
Dept: CARDIOLOGY | Facility: CLINIC | Age: 73
End: 2023-08-18

## 2023-08-18 ENCOUNTER — APPOINTMENT (OUTPATIENT)
Dept: CARDIOLOGY | Facility: CLINIC | Age: 73
End: 2023-08-18
Payer: MEDICARE

## 2023-08-18 PROCEDURE — 93306 TTE W/DOPPLER COMPLETE: CPT

## 2023-11-02 ENCOUNTER — APPOINTMENT (OUTPATIENT)
Dept: OTOLARYNGOLOGY | Facility: CLINIC | Age: 73
End: 2023-11-02
Payer: MEDICARE

## 2023-11-02 VITALS — HEIGHT: 59 IN | BODY MASS INDEX: 42.33 KG/M2 | WEIGHT: 210 LBS

## 2023-11-02 PROCEDURE — 99203 OFFICE O/P NEW LOW 30 MIN: CPT

## 2023-12-26 LAB
ALBUMIN SERPL ELPH-MCNC: 4.2 G/DL
ALP BLD-CCNC: 81 U/L
ALT SERPL-CCNC: 18 U/L
ANION GAP SERPL CALC-SCNC: 10 MMOL/L
AST SERPL-CCNC: 18 U/L
BILIRUB SERPL-MCNC: 0.6 MG/DL
BUN SERPL-MCNC: 25 MG/DL
CALCIUM SERPL-MCNC: 9.1 MG/DL
CHLORIDE SERPL-SCNC: 99 MMOL/L
CHOLEST SERPL-MCNC: 176 MG/DL
CO2 SERPL-SCNC: 29 MMOL/L
CREAT SERPL-MCNC: 1.4 MG/DL
EGFR: 39 ML/MIN/1.73M2
ESTIMATED AVERAGE GLUCOSE: 123 MG/DL
GLUCOSE SERPL-MCNC: 89 MG/DL
HBA1C MFR BLD HPLC: 5.9 %
HDLC SERPL-MCNC: 52 MG/DL
LDLC SERPL CALC-MCNC: 98 MG/DL
NONHDLC SERPL-MCNC: 124 MG/DL
POTASSIUM SERPL-SCNC: 4.3 MMOL/L
PROT SERPL-MCNC: 6 G/DL
SODIUM SERPL-SCNC: 138 MMOL/L
TRIGL SERPL-MCNC: 132 MG/DL

## 2023-12-28 ENCOUNTER — APPOINTMENT (OUTPATIENT)
Dept: CARDIOLOGY | Facility: CLINIC | Age: 73
End: 2023-12-28
Payer: MEDICARE

## 2023-12-28 VITALS — HEIGHT: 59 IN | BODY MASS INDEX: 44.35 KG/M2 | HEART RATE: 62 BPM | WEIGHT: 220 LBS

## 2023-12-28 VITALS — SYSTOLIC BLOOD PRESSURE: 120 MMHG | DIASTOLIC BLOOD PRESSURE: 80 MMHG | HEART RATE: 62 BPM | RESPIRATION RATE: 18 BRPM

## 2023-12-28 DIAGNOSIS — I34.0 NONRHEUMATIC MITRAL (VALVE) INSUFFICIENCY: ICD-10-CM

## 2023-12-28 PROCEDURE — 99214 OFFICE O/P EST MOD 30 MIN: CPT | Mod: 25

## 2023-12-28 PROCEDURE — 93000 ELECTROCARDIOGRAM COMPLETE: CPT

## 2024-02-21 NOTE — PATIENT PROFILE ADULT - NSPROSPHOSPCHAPLAINYN_GEN_A_NUR
Continues on torsemide 20 mg daily.  Encouraged the patient to wear knee-high compression stockings,20-30 mmHG   no

## 2024-02-26 ENCOUNTER — APPOINTMENT (OUTPATIENT)
Dept: OTOLARYNGOLOGY | Facility: CLINIC | Age: 74
End: 2024-02-26

## 2024-03-01 NOTE — DISCHARGE NOTE NURSING/CASE MANAGEMENT/SOCIAL WORK - FLU SEASON?
Increase ozempic to 2 mg  Work on eating and activity  Work on insulin timing and getting in lunch dosing  Follow upin 4 months  Labs in one month  
Yes...

## 2024-04-04 NOTE — PRE-ANESTHESIA EVALUATION ADULT - BP NONINVASIVE DIASTOLIC (MM HG)
[facility] URGENT CARE  Urgent Care Encounter       CHIEF COMPLAINT       Chief Complaint   Patient presents with    Wheezing    Nasal Congestion    Otalgia       Nurses Notes reviewed and I agree except as noted in the HPI.  HISTORY OF PRESENT ILLNESS   Cathy Felix is a 10 m.o. male who presents with concerns of wheezing \"on and off\" for two weeks as well as pulling at bilateral ears and nasal congestion. Parents reports no sick contacts. Parents report no use of medication use for symptom management.     HPI    REVIEW OF SYSTEMS     Review of Systems   Constitutional:  Negative for fever and irritability.   HENT:  Positive for congestion.    Respiratory:  Positive for cough and wheezing. Negative for apnea, choking and stridor.    All other systems reviewed and are negative.      PAST MEDICAL HISTORY   History reviewed. No pertinent past medical history.    SURGICALHISTORY     Patient  has no past surgical history on file.    CURRENT MEDICATIONS       Discharge Medication List as of 4/4/2024  1:47 PM          ALLERGIES     Patient is has No Known Allergies.    Patients   Immunization History   Administered Date(s) Administered    Hep B, ENGERIX-B, RECOMBIVAX-HB, (age Birth - 18y), IM, 0.5mL 2023       FAMILY HISTORY     Patient's family history includes Anemia in his mother; Diabetes in his maternal grandmother; Other (age of onset: 30) in his maternal grandmother.    SOCIAL HISTORY     Patient      PHYSICAL EXAM     ED TRIAGE VITALS   , Temp: 97.4 °F (36.3 °C), Pulse: 137, Resp: 28, SpO2: 100 %,Estimated body mass index is 13.43 kg/m² as calculated from the following:    Height as of 5/12/23: 48.9 cm (19.25\").    Weight as of 5/13/23: 3.21 kg (7 lb 1.2 oz).,No LMP for male patient.    Physical Exam  Vitals and nursing note reviewed.   Constitutional:       General: He is awake, active, playful and smiling. He is not in acute distress.     Appearance: Normal appearance. He is  60

## 2024-04-11 ENCOUNTER — APPOINTMENT (OUTPATIENT)
Dept: OTOLARYNGOLOGY | Facility: CLINIC | Age: 74
End: 2024-04-11
Payer: MEDICARE

## 2024-04-11 DIAGNOSIS — H90.3 SENSORINEURAL HEARING LOSS, BILATERAL: ICD-10-CM

## 2024-04-11 DIAGNOSIS — H92.23 OTORRHAGIA, BILATERAL: ICD-10-CM

## 2024-04-11 DIAGNOSIS — H91.93 UNSPECIFIED HEARING LOSS, BILATERAL: ICD-10-CM

## 2024-04-11 PROCEDURE — 92550 TYMPANOMETRY & REFLEX THRESH: CPT | Mod: 52

## 2024-04-11 PROCEDURE — 99213 OFFICE O/P EST LOW 20 MIN: CPT

## 2024-04-11 PROCEDURE — 92557 COMPREHENSIVE HEARING TEST: CPT

## 2024-04-15 PROBLEM — H92.23 OTORRHAGIA OF BOTH EARS: Status: ACTIVE | Noted: 2024-04-15

## 2024-04-15 PROBLEM — H90.3 SENSORINEURAL HEARING LOSS (SNHL) OF BOTH EARS: Status: ACTIVE | Noted: 2024-04-15

## 2024-04-15 PROBLEM — H91.93 UNSPECIFIED HEARING LOSS, BILATERAL: Noted: 2023-11-03

## 2024-04-15 PROBLEM — H90.3 ASYMMETRIC SNHL (SENSORINEURAL HEARING LOSS): Status: ACTIVE | Noted: 2024-04-15

## 2024-04-15 NOTE — HISTORY OF PRESENT ILLNESS
[FreeTextEntry1] : Patient returns today c/o hearing loss, ear bleeding, ear discomfort. States that she is experiencing difficulty hearing. Had some left ear bleeding about 1 month ago. Last night she felt some dry blood from ear. Uses Q-tips to clean inside her ear.

## 2024-04-15 NOTE — REASON FOR VISIT
[Subsequent Evaluation] : a subsequent evaluation for [FreeTextEntry2] : hearing loss, ear bleeding, ear discomfort

## 2024-04-15 NOTE — ASSESSMENT
[FreeTextEntry1] : Audio reviewed - stable asymmetric SNHL (previously worked up by Dr. Alejo some years ago with negative MRI) Otorrhagia related to vigorous qtip usage, counseled RV prn

## 2024-05-06 ENCOUNTER — APPOINTMENT (OUTPATIENT)
Dept: OTOLARYNGOLOGY | Facility: CLINIC | Age: 74
End: 2024-05-06
Payer: MEDICARE

## 2024-05-06 PROCEDURE — V5299A: CUSTOM

## 2024-05-06 PROCEDURE — V5010 ASSESSMENT FOR HEARING AID: CPT | Mod: NC

## 2024-05-29 ENCOUNTER — INPATIENT (INPATIENT)
Facility: HOSPITAL | Age: 74
LOS: 2 days | Discharge: HOME HEALTH | DRG: 999 | End: 2024-06-01
Attending: SURGERY | Admitting: SURGERY
Payer: MEDICARE

## 2024-05-29 VITALS
OXYGEN SATURATION: 97 % | HEART RATE: 58 BPM | RESPIRATION RATE: 20 BRPM | DIASTOLIC BLOOD PRESSURE: 42 MMHG | WEIGHT: 210.1 LBS | SYSTOLIC BLOOD PRESSURE: 91 MMHG | HEIGHT: 60 IN | TEMPERATURE: 98 F

## 2024-05-29 DIAGNOSIS — Z98.890 OTHER SPECIFIED POSTPROCEDURAL STATES: Chronic | ICD-10-CM

## 2024-05-29 DIAGNOSIS — Z95.1 PRESENCE OF AORTOCORONARY BYPASS GRAFT: Chronic | ICD-10-CM

## 2024-05-29 DIAGNOSIS — Z98.49 CATARACT EXTRACTION STATUS, UNSPECIFIED EYE: Chronic | ICD-10-CM
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
## 2024-05-29 LAB
ALBUMIN SERPL ELPH-MCNC: 4.1 G/DL — SIGNIFICANT CHANGE UP (ref 3.5–5.2)
ALP SERPL-CCNC: 79 U/L — SIGNIFICANT CHANGE UP (ref 30–115)
ALT FLD-CCNC: 17 U/L — SIGNIFICANT CHANGE UP (ref 0–41)
ANION GAP SERPL CALC-SCNC: 10 MMOL/L — SIGNIFICANT CHANGE UP (ref 7–14)
ANION GAP SERPL CALC-SCNC: 11 MMOL/L — SIGNIFICANT CHANGE UP (ref 7–14)
APTT BLD: 34.5 SEC — SIGNIFICANT CHANGE UP (ref 27–39.2)
AST SERPL-CCNC: 27 U/L — SIGNIFICANT CHANGE UP (ref 0–41)
BASOPHILS # BLD AUTO: 0.03 K/UL — SIGNIFICANT CHANGE UP (ref 0–0.2)
BASOPHILS # BLD AUTO: 0.04 K/UL — SIGNIFICANT CHANGE UP (ref 0–0.2)
BASOPHILS NFR BLD AUTO: 0.4 % — SIGNIFICANT CHANGE UP (ref 0–1)
BASOPHILS NFR BLD AUTO: 0.4 % — SIGNIFICANT CHANGE UP (ref 0–1)
BILIRUB SERPL-MCNC: 0.4 MG/DL — SIGNIFICANT CHANGE UP (ref 0.2–1.2)
BUN SERPL-MCNC: 26 MG/DL — HIGH (ref 10–20)
BUN SERPL-MCNC: 28 MG/DL — HIGH (ref 10–20)
CALCIUM SERPL-MCNC: 8.8 MG/DL — SIGNIFICANT CHANGE UP (ref 8.4–10.5)
CALCIUM SERPL-MCNC: 9.1 MG/DL — SIGNIFICANT CHANGE UP (ref 8.4–10.4)
CHLORIDE SERPL-SCNC: 102 MMOL/L — SIGNIFICANT CHANGE UP (ref 98–110)
CHLORIDE SERPL-SCNC: 102 MMOL/L — SIGNIFICANT CHANGE UP (ref 98–110)
CO2 SERPL-SCNC: 26 MMOL/L — SIGNIFICANT CHANGE UP (ref 17–32)
CO2 SERPL-SCNC: 27 MMOL/L — SIGNIFICANT CHANGE UP (ref 17–32)
CREAT SERPL-MCNC: 1.2 MG/DL — SIGNIFICANT CHANGE UP (ref 0.7–1.5)
CREAT SERPL-MCNC: 1.3 MG/DL — SIGNIFICANT CHANGE UP (ref 0.7–1.5)
EGFR: 43 ML/MIN/1.73M2 — LOW
EGFR: 47 ML/MIN/1.73M2 — LOW
EOSINOPHIL # BLD AUTO: 0.24 K/UL — SIGNIFICANT CHANGE UP (ref 0–0.7)
EOSINOPHIL # BLD AUTO: 0.25 K/UL — SIGNIFICANT CHANGE UP (ref 0–0.7)
EOSINOPHIL NFR BLD AUTO: 2.5 % — SIGNIFICANT CHANGE UP (ref 0–8)
EOSINOPHIL NFR BLD AUTO: 3 % — SIGNIFICANT CHANGE UP (ref 0–8)
GLUCOSE SERPL-MCNC: 79 MG/DL — SIGNIFICANT CHANGE UP (ref 70–99)
GLUCOSE SERPL-MCNC: 90 MG/DL — SIGNIFICANT CHANGE UP (ref 70–99)
HCT VFR BLD CALC: 36 % — LOW (ref 37–47)
HCT VFR BLD CALC: 37.7 % — SIGNIFICANT CHANGE UP (ref 37–47)
HGB BLD-MCNC: 11.7 G/DL — LOW (ref 12–16)
HGB BLD-MCNC: 12.3 G/DL — SIGNIFICANT CHANGE UP (ref 12–16)
IMM GRANULOCYTES NFR BLD AUTO: 0.2 % — SIGNIFICANT CHANGE UP (ref 0.1–0.3)
IMM GRANULOCYTES NFR BLD AUTO: 0.4 % — HIGH (ref 0.1–0.3)
INR BLD: 1.56 RATIO — HIGH (ref 0.65–1.3)
LACTATE SERPL-SCNC: 1.1 MMOL/L — SIGNIFICANT CHANGE UP (ref 0.7–2)
LIDOCAIN IGE QN: 35 U/L — SIGNIFICANT CHANGE UP (ref 7–60)
LYMPHOCYTES # BLD AUTO: 2.06 K/UL — SIGNIFICANT CHANGE UP (ref 1.2–3.4)
LYMPHOCYTES # BLD AUTO: 2.06 K/UL — SIGNIFICANT CHANGE UP (ref 1.2–3.4)
LYMPHOCYTES # BLD AUTO: 21.8 % — SIGNIFICANT CHANGE UP (ref 20.5–51.1)
LYMPHOCYTES # BLD AUTO: 24.6 % — SIGNIFICANT CHANGE UP (ref 20.5–51.1)
MAGNESIUM SERPL-MCNC: 2.1 MG/DL — SIGNIFICANT CHANGE UP (ref 1.8–2.4)
MCHC RBC-ENTMCNC: 31.3 PG — HIGH (ref 27–31)
MCHC RBC-ENTMCNC: 31.4 PG — HIGH (ref 27–31)
MCHC RBC-ENTMCNC: 32.5 G/DL — SIGNIFICANT CHANGE UP (ref 32–37)
MCHC RBC-ENTMCNC: 32.6 G/DL — SIGNIFICANT CHANGE UP (ref 32–37)
MCV RBC AUTO: 96.2 FL — SIGNIFICANT CHANGE UP (ref 81–99)
MCV RBC AUTO: 96.3 FL — SIGNIFICANT CHANGE UP (ref 81–99)
MONOCYTES # BLD AUTO: 0.72 K/UL — HIGH (ref 0.1–0.6)
MONOCYTES # BLD AUTO: 0.84 K/UL — HIGH (ref 0.1–0.6)
MONOCYTES NFR BLD AUTO: 8.6 % — SIGNIFICANT CHANGE UP (ref 1.7–9.3)
MONOCYTES NFR BLD AUTO: 8.9 % — SIGNIFICANT CHANGE UP (ref 1.7–9.3)
NEUTROPHILS # BLD AUTO: 5.3 K/UL — SIGNIFICANT CHANGE UP (ref 1.4–6.5)
NEUTROPHILS # BLD AUTO: 6.27 K/UL — SIGNIFICANT CHANGE UP (ref 1.4–6.5)
NEUTROPHILS NFR BLD AUTO: 63 % — SIGNIFICANT CHANGE UP (ref 42.2–75.2)
NEUTROPHILS NFR BLD AUTO: 66.2 % — SIGNIFICANT CHANGE UP (ref 42.2–75.2)
NRBC # BLD: 0 /100 WBCS — SIGNIFICANT CHANGE UP (ref 0–0)
NRBC # BLD: 0 /100 WBCS — SIGNIFICANT CHANGE UP (ref 0–0)
PHOSPHATE SERPL-MCNC: 4.1 MG/DL — SIGNIFICANT CHANGE UP (ref 2.1–4.9)
PLATELET # BLD AUTO: 187 K/UL — SIGNIFICANT CHANGE UP (ref 130–400)
PLATELET # BLD AUTO: 195 K/UL — SIGNIFICANT CHANGE UP (ref 130–400)
PMV BLD: 10.3 FL — SIGNIFICANT CHANGE UP (ref 7.4–10.4)
PMV BLD: 10.4 FL — SIGNIFICANT CHANGE UP (ref 7.4–10.4)
POTASSIUM SERPL-MCNC: 4.1 MMOL/L — SIGNIFICANT CHANGE UP (ref 3.5–5)
POTASSIUM SERPL-MCNC: 4.5 MMOL/L — SIGNIFICANT CHANGE UP (ref 3.5–5)
POTASSIUM SERPL-SCNC: 4.1 MMOL/L — SIGNIFICANT CHANGE UP (ref 3.5–5)
POTASSIUM SERPL-SCNC: 4.5 MMOL/L — SIGNIFICANT CHANGE UP (ref 3.5–5)
PROT SERPL-MCNC: 6.2 G/DL — SIGNIFICANT CHANGE UP (ref 6–8)
PROTHROM AB SERPL-ACNC: 17.9 SEC — HIGH (ref 9.95–12.87)
RBC # BLD: 3.74 M/UL — LOW (ref 4.2–5.4)
RBC # BLD: 3.92 M/UL — LOW (ref 4.2–5.4)
RBC # FLD: 13.7 % — SIGNIFICANT CHANGE UP (ref 11.5–14.5)
RBC # FLD: 13.8 % — SIGNIFICANT CHANGE UP (ref 11.5–14.5)
SODIUM SERPL-SCNC: 139 MMOL/L — SIGNIFICANT CHANGE UP (ref 135–146)
SODIUM SERPL-SCNC: 139 MMOL/L — SIGNIFICANT CHANGE UP (ref 135–146)
WBC # BLD: 8.39 K/UL — SIGNIFICANT CHANGE UP (ref 4.8–10.8)
WBC # BLD: 9.47 K/UL — SIGNIFICANT CHANGE UP (ref 4.8–10.8)
WBC # FLD AUTO: 8.39 K/UL — SIGNIFICANT CHANGE UP (ref 4.8–10.8)
WBC # FLD AUTO: 9.47 K/UL — SIGNIFICANT CHANGE UP (ref 4.8–10.8)

## 2024-05-29 PROCEDURE — 72125 CT NECK SPINE W/O DYE: CPT | Mod: 26,MC

## 2024-05-29 PROCEDURE — 99223 1ST HOSP IP/OBS HIGH 75: CPT

## 2024-05-29 PROCEDURE — 70450 CT HEAD/BRAIN W/O DYE: CPT | Mod: 26,MC

## 2024-05-29 PROCEDURE — 71045 X-RAY EXAM CHEST 1 VIEW: CPT | Mod: 26

## 2024-05-29 PROCEDURE — 70450 CT HEAD/BRAIN W/O DYE: CPT | Mod: 26,77

## 2024-05-29 PROCEDURE — 83735 ASSAY OF MAGNESIUM: CPT

## 2024-05-29 PROCEDURE — 72170 X-RAY EXAM OF PELVIS: CPT | Mod: 26

## 2024-05-29 PROCEDURE — 80048 BASIC METABOLIC PNL TOTAL CA: CPT

## 2024-05-29 PROCEDURE — 99285 EMERGENCY DEPT VISIT HI MDM: CPT

## 2024-05-29 PROCEDURE — 85025 COMPLETE CBC W/AUTO DIFF WBC: CPT

## 2024-05-29 PROCEDURE — 70450 CT HEAD/BRAIN W/O DYE: CPT | Mod: MC

## 2024-05-29 PROCEDURE — 93306 TTE W/DOPPLER COMPLETE: CPT

## 2024-05-29 PROCEDURE — 97162 PT EVAL MOD COMPLEX 30 MIN: CPT | Mod: GP

## 2024-05-29 PROCEDURE — 84100 ASSAY OF PHOSPHORUS: CPT

## 2024-05-29 PROCEDURE — 36415 COLL VENOUS BLD VENIPUNCTURE: CPT

## 2024-05-29 RX ORDER — OLOPATADINE HYDROCHLORIDE 1 MG/ML
1 SOLUTION/ DROPS OPHTHALMIC
Qty: 0 | Refills: 0 | DISCHARGE

## 2024-05-29 RX ORDER — LEVOTHYROXINE SODIUM 125 MCG
1 TABLET ORAL
Qty: 0 | Refills: 0 | DISCHARGE

## 2024-05-29 RX ORDER — BUPROPION HYDROCHLORIDE 150 MG/1
1 TABLET, EXTENDED RELEASE ORAL
Qty: 0 | Refills: 0 | DISCHARGE

## 2024-05-29 RX ORDER — OXYBUTYNIN CHLORIDE 5 MG
1 TABLET ORAL
Qty: 0 | Refills: 0 | DISCHARGE

## 2024-05-29 RX ORDER — AMLODIPINE BESYLATE 2.5 MG/1
5 TABLET ORAL DAILY
Refills: 0 | Status: DISCONTINUED | OUTPATIENT
Start: 2024-05-29 | End: 2024-06-01

## 2024-05-29 RX ORDER — ASPIRIN/CALCIUM CARB/MAGNESIUM 324 MG
1 TABLET ORAL
Qty: 0 | Refills: 0 | DISCHARGE

## 2024-05-29 RX ORDER — ACETAMINOPHEN 500 MG
650 TABLET ORAL EVERY 6 HOURS
Refills: 0 | Status: DISCONTINUED | OUTPATIENT
Start: 2024-05-29 | End: 2024-06-01

## 2024-05-29 RX ORDER — ATORVASTATIN CALCIUM 80 MG/1
80 TABLET, FILM COATED ORAL AT BEDTIME
Refills: 0 | Status: DISCONTINUED | OUTPATIENT
Start: 2024-05-29 | End: 2024-06-01

## 2024-05-29 RX ORDER — LEVOTHYROXINE SODIUM 125 MCG
50 TABLET ORAL DAILY
Refills: 0 | Status: DISCONTINUED | OUTPATIENT
Start: 2024-05-29 | End: 2024-06-01

## 2024-05-29 RX ORDER — BUPROPION HYDROCHLORIDE 150 MG/1
300 TABLET, EXTENDED RELEASE ORAL DAILY
Refills: 0 | Status: DISCONTINUED | OUTPATIENT
Start: 2024-05-29 | End: 2024-06-01

## 2024-05-29 RX ORDER — L.ACIDOPH/B.ANIMALIS/B.LONGUM 15B CELL
1 CAPSULE ORAL
Qty: 0 | Refills: 0 | DISCHARGE

## 2024-05-29 RX ORDER — AMLODIPINE BESYLATE 2.5 MG/1
1 TABLET ORAL
Qty: 0 | Refills: 0 | DISCHARGE

## 2024-05-29 RX ORDER — SODIUM CHLORIDE 9 MG/ML
1000 INJECTION INTRAMUSCULAR; INTRAVENOUS; SUBCUTANEOUS
Refills: 0 | Status: DISCONTINUED | OUTPATIENT
Start: 2024-05-29 | End: 2024-05-30

## 2024-05-29 RX ORDER — ROSUVASTATIN CALCIUM 5 MG/1
1 TABLET ORAL
Qty: 0 | Refills: 0 | DISCHARGE

## 2024-05-29 RX ORDER — LATANOPROST 0.05 MG/ML
1 SOLUTION/ DROPS OPHTHALMIC; TOPICAL
Qty: 0 | Refills: 0 | DISCHARGE

## 2024-05-29 RX ORDER — METOPROLOL TARTRATE 50 MG
50 TABLET ORAL EVERY 12 HOURS
Refills: 0 | Status: DISCONTINUED | OUTPATIENT
Start: 2024-05-29 | End: 2024-06-01

## 2024-05-29 RX ORDER — ESCITALOPRAM OXALATE 10 MG/1
20 TABLET, FILM COATED ORAL DAILY
Refills: 0 | Status: DISCONTINUED | OUTPATIENT
Start: 2024-05-29 | End: 2024-06-01

## 2024-05-29 RX ORDER — FUROSEMIDE 40 MG
1 TABLET ORAL
Qty: 0 | Refills: 0 | DISCHARGE

## 2024-05-29 RX ADMIN — Medication 650 MILLIGRAM(S): at 22:59

## 2024-05-29 RX ADMIN — Medication 50 MILLIGRAM(S): at 18:02

## 2024-05-29 RX ADMIN — SODIUM CHLORIDE 75 MILLILITER(S): 9 INJECTION INTRAMUSCULAR; INTRAVENOUS; SUBCUTANEOUS at 18:00

## 2024-05-29 RX ADMIN — Medication 650 MILLIGRAM(S): at 18:02

## 2024-05-29 RX ADMIN — ATORVASTATIN CALCIUM 80 MILLIGRAM(S): 80 TABLET, FILM COATED ORAL at 22:59

## 2024-05-29 RX ADMIN — Medication 650 MILLIGRAM(S): at 18:32

## 2024-05-29 NOTE — ED PROVIDER NOTE - WR INTERPRETATION 1
CXR negative - No pneumothorax, No opacities, No free air: elevated left diaphragm comparable to prior s/p cabg

## 2024-05-29 NOTE — CONSULT NOTE ADULT - NS ATTEND AMEND GEN_ALL_CORE FT
agree with above. Patient, films and case reviewed with PA. Will f/u with repeat CTH. Recc neurology eval in settig of syncopal work up in patient with age indeterminant right lacunar infarct.

## 2024-05-29 NOTE — ED ADULT NURSE NOTE - OBJECTIVE STATEMENT
Aox3 pt came in for fall backwards last night whilst mopping last night. Placed in c-collar by provider. C/o pain to back of head. Denies LOC. Trauma note called. Placed on cardiac monitor. Denies SOB or chest pain. Changed into hospital gown. Ambulates with cane. Rn to continue to monitor

## 2024-05-29 NOTE — H&P ADULT - ATTENDING COMMENTS
Trauma Attending Note Attestation   Patient was examined and evaluated at the bedside at 5:10 PM. Medications, radiological studies and all other relevant studies reviewed.     74y Female with PMH CAD s/p CABG and MVR on eliquis, HTN, HLD, DM who presents after falling last night at 9:00 PM while mopping her floors at home. Reports she slipped and fell backwards, hitting her head. No headache, chest pain, abdominal pain, back pain, SOB, numbness, paresthesias    Vital Signs Last 24 Hrs  T(C): 36.8 (29 May 2024 14:49), Max: 36.8 (29 May 2024 14:49)  T(F): 98.2 (29 May 2024 14:49), Max: 98.2 (29 May 2024 14:49)  HR: 63 (29 May 2024 18:01) (57 - 63)  BP: 120/73 (29 May 2024 18:01) (91/42 - 132/58)  BP(mean): --  RR: 19 (29 May 2024 18:01) (17 - 20)  SpO2: 98% (29 May 2024 18:01) (97% - 98%)    Parameters below as of 29 May 2024 18:01  Patient On (Oxygen Delivery Method): room air      Primary:  Airway - intact  Breathing - breath sounds bilaterally  Circulation - 2+ throughout  Disability - GCS 15, moving all extremities  Exposure - patient was exposed    I independently performed a medically appropriate exam. I have made revisions to the physical exam in the note above and agree with the exam as written.                          12.3   9.47  )-----------( 195      ( 29 May 2024 17:05 )             37.7     05-29    139  |  102  |  28<H>  ----------------------------<  79  4.5   |  26  |  1.3    Ca 9.1; Mg x ; Phos x       ( 29 May 2024 17:05 )  Alb: 4.1 g/dL / Pro: 6.2 g/dL / AlkPhos: 79 U/L / ALT: 17 U/L / AST: 27 U/L / GGT:x     / Tbili 0.4 mg/dL/ Dbili x     / Indbili x        PT/INR/PTT - ( 29 May 2024 17:05 )   PT: 17.90 sec; INR: 1.56 ratio; PTT 34.5 sec    Lactate, Blood: 1.1 mmol/L (05-29 @ 17:05)      CXR reviewed and interpreted by me - no hemothorax/pneumothorax  CTH reviewed and interpreted by me - R sylvian fissure SAH  Csp reviewed and interpreted by me - no acute traumatic injuries    Assessment/Plan:  74y Female PMH  CAD s/p CABG and MVR on eliquis, HTN, HLD, DM s/p slip and fall while anticoagulated. Found to have traumatic R sylvian fissure subarachnoid hemorrhage.  - R sylvian fissure subarachnoid hemorrhage - neurosurgery consulted, repeat CT scan in 6 hours, hold anticoagulation, Q4H neuro checks  - HTN - continue amlodipine 5mg daily and metoprolol 50mg BID  - HLD - continue atorvastatin 80mg daily  - Anxiety disorder - continue bupropion 300mg daily and lexapro 20mg daily  - Synthroid - continue 50mcg daily    Dedrick Middleton MD  Trauma/Acute Care Surgery/Surgical Critical Care Attending

## 2024-05-29 NOTE — CONSULT NOTE ADULT - SUBJECTIVE AND OBJECTIVE BOX
HPI: Patient is a 74 year-old female PMH CAD s/p CABG, PE who on Eliquis and [id number] who presented to the ED s/p fall at home yesterday. Patient claims she was mopping her floor last night when she fell and hit the back of her head. She claims this morning she had soreness all around her head so thought it best to come to the ED for evaluation. Neurosurgery was consulted after CTH demonstrated trace SAH in R sylvian fissure. Patient was seen and examined at bedside in ED. She is lying in bed, A&Ox3, and following all commands. She admits to mild soreness to the back of her head but otherwise denies headaches, visual changes (double/blurry vision), weakness, paresthesias, nausea, dizziness at this time.         PAST MEDICAL & SURGICAL HISTORY:  Hypertension, unspecified type      Hyperlipidemia, unspecified hyperlipidemia type      Type 2 diabetes mellitus without complication, without long-term current use of insulin  NOW RESOLVED. NO MEDS      H/O sleep apnea      Chronic sciatica      Glaucoma      CAD (coronary artery disease)      H/O mitral valve stenosis      Hemothorax  s/p CABG X1 AND MITRAL REPAIR POST-OP      Lymphedema, limb  LLE      Skin cancer, basal cell      OA (osteoarthritis)      Hypothyroidism      H/O blood clots  in right lung 10/21- as per pt      History of hip surgery  bilateral hip      History of carpal tunnel surgery  B/L      S/P CABG (coronary artery bypass graft)  CABG X1 and repair of mitral valve 2/9/21      Status post Mohs surgery  RIGHT NARE WITH RECONSTRUCTION 2021      H/O cataract extraction  right IOL          Home Medications:  amLODIPine 5 mg oral tablet: 1 tab(s) orally once a day (06 Sep 2022 11:24)  aspirin 81 mg oral delayed release tablet: 1 tab(s) orally once a day (06 Sep 2022 11:24)  buPROPion 300 mg/24 hours (XL) oral tablet, extended release: 1 tab(s) orally every 24 hours (06 Sep 2022 11:24)  Eliquis 5 mg oral tablet: 1 tab(s) orally 2 times a day (06 Sep 2022 11:24)  escitalopram 20 mg oral tablet: 1 tab(s) orally once a day (06 Sep 2022 11:24)  furosemide 20 mg oral tablet: 1 tab(s) orally once a day (06 Sep 2022 11:24)  latanoprost 0.005% ophthalmic solution: 1 drop(s) to each affected eye once a day (in the evening) (06 Sep 2022 11:24)  levothyroxine 50 mcg (0.05 mg) oral tablet: 1 tab(s) orally once a day (06 Sep 2022 11:24)  metoprolol tartrate 50 mg oral tablet: 1 tab(s) orally every 12 hours (06 Sep 2022 11:24)  olopatadine 0.2% ophthalmic solution: 1 drop(s) to each affected eye once a day, As Needed (06 Sep 2022 11:24)  oxybutynin 10 mg/24 hr oral tablet, extended release: 1 tab(s) orally once a day (at bedtime) (06 Sep 2022 11:24)  Probiotic Formula oral capsule: 1 cap(s) orally once a day (06 Sep 2022 11:24)  rosuvastatin 40 mg oral tablet: 1 tab(s) orally once a day (at bedtime) (06 Sep 2022 11:24)      Allergies    [This allergen will not trigger allergy alert] sores in mouth (Blisters)  No Known Drug Allergies    Intolerances        ROS:  [X] A ten-point review of systems is negative except as noted   [  ] Due to altered mental status/intubation, subjective information were not able to be obtained from the patient. History was obtained, to the extent possible, from review of the chart and collateral sources of information    MEDICATIONS  (STANDING):    MEDICATIONS  (PRN):      ICU Vital Signs Last 24 Hrs  T(C): 36.8 (29 May 2024 14:49), Max: 36.8 (29 May 2024 14:49)  T(F): 98.2 (29 May 2024 14:49), Max: 98.2 (29 May 2024 14:49)  HR: 58 (29 May 2024 15:10) (57 - 61)  BP: 117/57 (29 May 2024 15:10) (91/42 - 132/58)  BP(mean): --  ABP: --  ABP(mean): --  RR: 17 (29 May 2024 15:10) (17 - 20)  SpO2: 97% (29 May 2024 15:10) (97% - 97%)    O2 Parameters below as of 29 May 2024 15:10  Patient On (Oxygen Delivery Method): room air            I&O's Detail                  Physical Exam:  General: Lying in bed, following all commands  Head normocephalic, atraumatic  AAOX3. Verbal function intact  Tongue midline, facial motions symmetric  PERRL, EOMI  Pronator Drift: Negative  Finger to Nose intact  Motor: MAEx4  5/5 strength in b/l UE's and LE's  Sensation: intact to touch in all extremities      Imaging:  < from: CT Head No Cont (05.29.24 @ 16:04) >  IMPRESSION:    CT HEAD:  Small subarachnoid hemorrhage in the right sylvian fissure.    Small patchy hypodensity in the rightbase ganglia could represent age   indeterminate infarct.    Extracalvarial hematoma overlying the bilateral parietal calvarium.    CT CERVICAL SPINE:  No acute cervical fracture or facet subluxation.        Assessment/Plan:  74 year-old female PMH CAD s/p CABG, PE who on Eliquis and [id number] s/p fall last night. CTH showing small SAH in R sylvian fissure.   -Repeat CTH non con in 6 hours from initial scan  -q4 hour neuro checks  -Hold AC/AP  -BP management, keep normotensive  -PRN analgesia  -HOB elevated  -F/u trauma reccs  -D/w attending   HPI: Patient is a 74 year-old female PMH CAD s/p CABG, PE who on Eliquis and [id number] who presented to the ED s/p fall at home yesterday. Patient claims she was mopping her floor last night when she fell and hit the back of her head. She claims this morning she had soreness all around her head so thought it best to come to the ED for evaluation. Neurosurgery was consulted after CTH demonstrated trace SAH in R sylvian fissure. Patient was seen and examined at bedside in ED. She is lying in bed, A&Ox3, and following all commands. She admits to mild soreness to the back of her head but otherwise denies headaches, visual changes (double/blurry vision), weakness, paresthesias, nausea, dizziness at this time.         PAST MEDICAL & SURGICAL HISTORY:  Hypertension, unspecified type      Hyperlipidemia, unspecified hyperlipidemia type      Type 2 diabetes mellitus without complication, without long-term current use of insulin  NOW RESOLVED. NO MEDS      H/O sleep apnea      Chronic sciatica      Glaucoma      CAD (coronary artery disease)      H/O mitral valve stenosis      Hemothorax  s/p CABG X1 AND MITRAL REPAIR POST-OP      Lymphedema, limb  LLE      Skin cancer, basal cell      OA (osteoarthritis)      Hypothyroidism      H/O blood clots  in right lung 10/21- as per pt      History of hip surgery  bilateral hip      History of carpal tunnel surgery  B/L      S/P CABG (coronary artery bypass graft)  CABG X1 and repair of mitral valve 2/9/21      Status post Mohs surgery  RIGHT NARE WITH RECONSTRUCTION 2021      H/O cataract extraction  right IOL          Home Medications:  amLODIPine 5 mg oral tablet: 1 tab(s) orally once a day (06 Sep 2022 11:24)  aspirin 81 mg oral delayed release tablet: 1 tab(s) orally once a day (06 Sep 2022 11:24)  buPROPion 300 mg/24 hours (XL) oral tablet, extended release: 1 tab(s) orally every 24 hours (06 Sep 2022 11:24)  Eliquis 5 mg oral tablet: 1 tab(s) orally 2 times a day (06 Sep 2022 11:24)  escitalopram 20 mg oral tablet: 1 tab(s) orally once a day (06 Sep 2022 11:24)  furosemide 20 mg oral tablet: 1 tab(s) orally once a day (06 Sep 2022 11:24)  latanoprost 0.005% ophthalmic solution: 1 drop(s) to each affected eye once a day (in the evening) (06 Sep 2022 11:24)  levothyroxine 50 mcg (0.05 mg) oral tablet: 1 tab(s) orally once a day (06 Sep 2022 11:24)  metoprolol tartrate 50 mg oral tablet: 1 tab(s) orally every 12 hours (06 Sep 2022 11:24)  olopatadine 0.2% ophthalmic solution: 1 drop(s) to each affected eye once a day, As Needed (06 Sep 2022 11:24)  oxybutynin 10 mg/24 hr oral tablet, extended release: 1 tab(s) orally once a day (at bedtime) (06 Sep 2022 11:24)  Probiotic Formula oral capsule: 1 cap(s) orally once a day (06 Sep 2022 11:24)  rosuvastatin 40 mg oral tablet: 1 tab(s) orally once a day (at bedtime) (06 Sep 2022 11:24)      Allergies    [This allergen will not trigger allergy alert] sores in mouth (Blisters)  No Known Drug Allergies    Intolerances        ROS:  [X] A ten-point review of systems is negative except as noted   [  ] Due to altered mental status/intubation, subjective information were not able to be obtained from the patient. History was obtained, to the extent possible, from review of the chart and collateral sources of information    MEDICATIONS  (STANDING):    MEDICATIONS  (PRN):      ICU Vital Signs Last 24 Hrs  T(C): 36.8 (29 May 2024 14:49), Max: 36.8 (29 May 2024 14:49)  T(F): 98.2 (29 May 2024 14:49), Max: 98.2 (29 May 2024 14:49)  HR: 58 (29 May 2024 15:10) (57 - 61)  BP: 117/57 (29 May 2024 15:10) (91/42 - 132/58)  BP(mean): --  ABP: --  ABP(mean): --  RR: 17 (29 May 2024 15:10) (17 - 20)  SpO2: 97% (29 May 2024 15:10) (97% - 97%)    O2 Parameters below as of 29 May 2024 15:10  Patient On (Oxygen Delivery Method): room air            I&O's Detail                  Physical Exam:  General: Lying in bed, following all commands  Head normocephalic, atraumatic  AAOX3. Verbal function intact  Tongue midline, facial motions symmetric  PERRL, EOMI  Pronator Drift: Negative  Finger to Nose intact  Motor: MAEx4  5/5 strength in b/l UE's and LE's  Sensation: intact to touch in all extremities      Imaging:  < from: CT Head No Cont (05.29.24 @ 16:04) >  IMPRESSION:    CT HEAD:  Small subarachnoid hemorrhage in the right sylvian fissure.    Small patchy hypodensity in the rightbase ganglia could represent age   indeterminate infarct.    Extracalvarial hematoma overlying the bilateral parietal calvarium.    CT CERVICAL SPINE:  No acute cervical fracture or facet subluxation.        Assessment/Plan:  74 year-old female PMH CAD s/p CABG, PE who on Eliquis and [id number] s/p fall last night. CTH showing small SAH in R sylvian fissure.   -Repeat CTH non con in 6 hours from initial scan  -q4 hour neuro checks  -Hold AC/AP  -BP management, keep normotensive  -PRN analgesia  -HOB elevated  -F/u trauma reccs  -Neurology evaluation and MRI Brain when able for evaluation of infarct  -D/w attending

## 2024-05-29 NOTE — CONSULT NOTE ADULT - NSCONSULTADDITIONALINFOA_GEN_ALL_CORE
See trauma H&P for my evaluation and management.    Dedrick Middleton MD  Trauma/Acute Care Surgery/Surgical Critical Care Attending

## 2024-05-29 NOTE — CONSULT NOTE ADULT - SUBJECTIVE AND OBJECTIVE BOX
TRAUMA ACTIVATION LEVEL:  CODE / ALERT  / CONSULT  ACTIVATED BY: EMS**  /  ED**  INTUBATED: YES** / NO**    MECHANISM OF INJURY:   [] Blunt     [] MVC	  [x] Fall	  [] Pedestrian Struck	  [] Motorcycle     [] Assault     [] Bicycle collision    [] Sports injury    [] Penetrating    [] Gun Shot Wound      [] Stab Wound    GCS: 15 	E: 4	V: 5	M: 6    HPI:  "Patient is a 74y Female PMH CAD s/p CABG and MVR on Eliquis, glaucoma, HTN, HLD, DM, who presents to the ED s/p fall that occurred around 9PM last night while mopping her floors at home. Patient states she slipped on the wet floor and fell backwards, hitting the back of her head on the floor. Patient denies LOC, nausea, vomiting. Has been ambulating at baseline since. Patient went to PMD office today who sent her to ED via EMS for further evaluation. Denies dizziness, vision changes, fevers, chest pain, SOB, N/V/D, abd pain, extremity pain."      Trauma assessment in ED: ABCs intact , GCS 15 , AAOx3.    Obvious external signs of injury: none    PAST MEDICAL & SURGICAL HISTORY:  Hypertension, unspecified type      Hyperlipidemia, unspecified hyperlipidemia type      Type 2 diabetes mellitus without complication, without long-term current use of insulin  NOW RESOLVED. NO MEDS      H/O sleep apnea      Chronic sciatica      Glaucoma      CAD (coronary artery disease)      H/O mitral valve stenosis      Hemothorax  s/p CABG X1 AND MITRAL REPAIR POST-OP      Lymphedema, limb  LLE      Skin cancer, basal cell      OA (osteoarthritis)      Hypothyroidism      H/O blood clots  in right lung 10/21- as per pt      History of hip surgery  bilateral hip      History of carpal tunnel surgery  B/L      S/P CABG (coronary artery bypass graft)  CABG X1 and repair of mitral valve 2/9/21      Status post Mohs surgery  RIGHT NARE WITH RECONSTRUCTION 2021      H/O cataract extraction  right IOL        Allergies    [This allergen will not trigger allergy alert] sores in mouth (Blisters)  No Known Drug Allergies    Intolerances      Home Medications:  amLODIPine 5 mg oral tablet: 1 tab(s) orally once a day (06 Sep 2022 11:24)  aspirin 81 mg oral delayed release tablet: 1 tab(s) orally once a day (06 Sep 2022 11:24)  buPROPion 300 mg/24 hours (XL) oral tablet, extended release: 1 tab(s) orally every 24 hours (06 Sep 2022 11:24)  Eliquis 5 mg oral tablet: 1 tab(s) orally 2 times a day (06 Sep 2022 11:24)  escitalopram 20 mg oral tablet: 1 tab(s) orally once a day (06 Sep 2022 11:24)  furosemide 20 mg oral tablet: 1 tab(s) orally once a day (06 Sep 2022 11:24)  latanoprost 0.005% ophthalmic solution: 1 drop(s) to each affected eye once a day (in the evening) (06 Sep 2022 11:24)  levothyroxine 50 mcg (0.05 mg) oral tablet: 1 tab(s) orally once a day (06 Sep 2022 11:24)  metoprolol tartrate 50 mg oral tablet: 1 tab(s) orally every 12 hours (06 Sep 2022 11:24)  olopatadine 0.2% ophthalmic solution: 1 drop(s) to each affected eye once a day, As Needed (06 Sep 2022 11:24)  oxybutynin 10 mg/24 hr oral tablet, extended release: 1 tab(s) orally once a day (at bedtime) (06 Sep 2022 11:24)  Probiotic Formula oral capsule: 1 cap(s) orally once a day (06 Sep 2022 11:24)  rosuvastatin 40 mg oral tablet: 1 tab(s) orally once a day (at bedtime) (06 Sep 2022 11:24)      ROS: 10-system review is otherwise negative except HPI above.      Primary Survey:    A - airway intact  B - bilateral breath sounds and good chest rise  C - palpable pulses in all extremities  D - GCS 15 on arrival, LOVING  Exposure obtained    Vital Signs Last 24 Hrs  T(C): 36.8 (29 May 2024 14:49), Max: 36.8 (29 May 2024 14:49)  T(F): 98.2 (29 May 2024 14:49), Max: 98.2 (29 May 2024 14:49)  HR: 58 (29 May 2024 14:49) (58 - 58)  BP: 91/42 (29 May 2024 14:49) (91/42 - 91/42)  BP(mean): --  RR: 20 (29 May 2024 14:49) (20 - 20)  SpO2: 97% (29 May 2024 14:49) (97% - 97%)    Parameters below as of 29 May 2024 14:49  Patient On (Oxygen Delivery Method): room air        Secondary Survey:   General: NAD  HEENT: Normocephalic, atraumatic, EOMI, PEERLA. no scalp lacerations   Neck: Soft, midline trachea. no c-spine tenderness  Chest: No chest wall tenderness, no subcutaneous emphysema   Cardiac: S1, S2, RRR  Respiratory: Bilateral breath sounds, clear and equal bilaterally  Abdomen: Soft, non-distended, non-tender, no rebound, no guarding.  Groin: Normal appearing, pelvis stable   Ext:  Moving b/l upper and lower extremities. Palpable Radial b/l UE, b/l DP palpable in LE.   Back: No T/L/S spine tenderness, No palpable runoff/stepoff/deformity      FAST:     Labs:  CAPILLARY BLOOD GLUCOSE      POCT Blood Glucose.: 102 mg/dL (29 May 2024 14:57)                  LFTs:         Coags:                        RADIOLOGY & ADDITIONAL STUDIES:  ---------------------------------------------------------------------------------------

## 2024-05-29 NOTE — ED PROVIDER NOTE - PROGRESS NOTE DETAILS
Neurosurg consulted upon result of CT head. Came to evaluate patient and reccs [facility] CT at 9PM. CR: Discussed dispo with trauma surg. Will admit to Dr. Middleton to med/surg with Q4 neuro checks and rpt CT at 9PM. Will hold Eliquis and Aspirin. Neurosurg consulted upon result of CT head. Came to evaluate patient and reccs rpt CTH at 9PM and Q4 neuro checks. Will hold Eliquis and Aspirin.

## 2024-05-29 NOTE — ED PROVIDER NOTE - ATTENDING APP SHARED VISIT CONTRIBUTION OF CARE
I personally evaluated the patient. I reviewed the Resident’s or Physician Assistant’s note (as assigned above), and agree with the findings and plan except as documented in my note.  74y Female PMH CAD s/p CABG and MVR on Eliquis, glaucoma, HTN, HLD, DM, who presents to the ED s/p fall that occurred around 9PM last night while mopping her floors at home. Patient states she slipped on the wet floor and fell backwards, hitting the back of her head on the floor. pt walked to her PMD today and they called EMS for pt to come to ED. pt complaints of mild posterior headache area that she fell. trauma alert activated due to fall on Eliquis   CONSTITUTIONAL: well developed; well nourished; well appearing in no acute distress  HEAD: normocephalic; occipital hematoma  EYES: no conjunctival injection, no scleral icterus  ENT: no nasal discharge; airway clear.  NECK: supple; non tender.  CARD: warm and well perfused, not tachycardic  RESP: breathing comfortably on RA, speaking in full sentences w/o distress  EXT: moving all extremities spontaneously, normal ROM. No clubbing, cyanosis or edema  SKIN: warm and dry, no lesions noted  NEURO: alert, oriented, CN II-XII grossly intact, motor and sensory grossly intact, speech nonslurred, gait steady  PSYCH: calm, cooperative, appropriate,   will send ct head and c spine, cxr pelvic x ray and trauma alert and reevaluate

## 2024-05-29 NOTE — H&P ADULT - NSHPLABSRESULTS_GEN_ALL_CORE
Labs:  CAPILLARY BLOOD GLUCOSE      POCT Blood Glucose.: 102 mg/dL (29 May 2024 14:57)                          12.3   9.47  )-----------( 195      ( 29 May 2024 17:05 )             37.7       Auto Neutrophil %: 66.2 % (05-29-24 @ 17:05)  Auto Immature Granulocyte %: 0.2 % (05-29-24 @ 17:05)    < from: CT Head No Cont (05.29.24 @ 16:04) >    IMPRESSION:    CT HEAD:  Small subarachnoid hemorrhage in the right sylvian fissure.    Small patchy hypodensity in the rightbase ganglia could represent age   indeterminate infarct.    Extracalvarial hematoma overlying the bilateral parietal calvarium.    CT CERVICAL SPINE:  No acute cervical fracture or facet subluxation.    < end of copied text >

## 2024-05-29 NOTE — ED PROVIDER NOTE - CLINICAL SUMMARY MEDICAL DECISION MAKING FREE TEXT BOX
74yF HTN DLD DM CAD s/p CABG and MVR on eliquis glaucoma p/w fall last night, followed by taking her morning eliquis prior to ED presentation.  Pt hemodynamically stable and w/o focal neuro deficits or open lacerations.  Labs reassuring.  Imaging w/ small SAH, thought to be traumatic as per NSGY.  D/w trauma and NSGY - will adm to tele for syncope, repeat CTH as per NSGY for SAH, further care as necessary.  As per NSGY, will hold eliquis but no need to reverse with adnexet alpha.  As per trauma, no concern for pulm traumatic injury despite radiology read of CXR as LLL opacity.

## 2024-05-29 NOTE — ED PROVIDER NOTE - CARE PLAN
1 Principal Discharge DX:	SAH (subarachnoid hemorrhage)  Secondary Diagnosis:	Fall   Principal Discharge DX:	SAH (subarachnoid hemorrhage)  Secondary Diagnosis:	Fall  Secondary Diagnosis:	Opacity of lung on imaging study

## 2024-05-29 NOTE — PATIENT PROFILE ADULT - FALL HARM RISK - HARM RISK INTERVENTIONS

## 2024-05-29 NOTE — ED PROVIDER NOTE - PHYSICAL EXAMINATION
VITAL SIGNS: I have reviewed nursing notes and confirm.  CONSTITUTIONAL: In no acute distress.  SKIN: Skin exam is warm and dry.  HEAD: Hematoma to the occipital scalp.  EYES: PERRL, EOM intact; conjunctiva and sclera clear.  ENT: No nasal discharge; airway clear.   NECK: Supple; non tender. No midline tenderness throughout.   CARD: S1, S2; Regular rate and rhythm.   RESP: CTAB. Speaking in full sentences. No chest wall tenderness. No clavicular tenting or deformity.   ABD: Normal bowel sounds; soft; non-distended; non-tender; No rebound or guarding.   EXT: Normal ROM in all 4 extremities. Distal pulses equal in all 4. No pelvic instability.  NEURO: Alert, oriented x3. Grossly unremarkable. No focal deficits.

## 2024-05-29 NOTE — ED PROVIDER NOTE - OBJECTIVE STATEMENT
Patient is a 74y Female PMH CAD s/p CABG and MVR on Eliquis, glaucoma, HTN, HLD, DM, who presents to the ED s/p fall that occurred around 9PM last night while mopping her floors at home. Patient states she slipped on the wet floor and fell backwards, hitting the back of her head on the floor. Patient denies LOC, nausea, vomiting. Has been ambulating at baseline since. Patient went to PMD office today who sent her to ED via EMS for further evaluation. Denies dizziness, vision changes, fevers, chest pain, SOB, N/V/D, abd pain, extremity pain.

## 2024-05-29 NOTE — ED ADULT TRIAGE NOTE - CHIEF COMPLAINT QUOTE
BIBA from urgent care, pt had a fall last night, has a hematoma to the back of her head. pt takes Eliquis. denies LOC BIBA from urgent care, pt had a fall last night around 9 PM, has a hematoma to the back of her head. pt takes Eliquis. denies LOC

## 2024-05-29 NOTE — ED ADULT NURSE NOTE - NSFALLHARMRISKINTERV_ED_ALL_ED

## 2024-05-29 NOTE — H&P ADULT - HISTORY OF PRESENT ILLNESS
TRAUMA ACTIVATION LEVEL:  CODE / ALERT  / CONSULT  ACTIVATED BY: EMS**  /  ED**  INTUBATED: YES** / NO**    MECHANISM OF INJURY:   [] Blunt     [] MVC	  [x] Fall	  [] Pedestrian Struck	  [] Motorcycle     [] Assault     [] Bicycle collision    [] Sports injury    [] Penetrating    [] Gun Shot Wound      [] Stab Wound    GCS: 15 	E: 4	V: 5	M: 6    HPI:  "Patient is a 74y Female PMH CAD s/p CABG and MVR on Eliquis, glaucoma, HTN, HLD, DM, who presents to the ED s/p fall that occurred around 9PM last night while mopping her floors at home. Patient states she slipped on the wet floor and fell backwards, hitting the back of her head on the floor. Patient denies LOC, nausea, vomiting. Has been ambulating at baseline since. Patient went to PMD office today who sent her to ED via EMS for further evaluation. Denies dizziness, vision changes, fevers, chest pain, SOB, N/V/D, abd pain, extremity pain."      Trauma assessment in ED: ABCs intact , GCS 15 , AAOx3.    Obvious external signs of injury: none    PAST MEDICAL & SURGICAL HISTORY:  Hypertension, unspecified type      Hyperlipidemia, unspecified hyperlipidemia type      Type 2 diabetes mellitus without complication, without long-term current use of insulin  NOW RESOLVED. NO MEDS      H/O sleep apnea      Chronic sciatica      Glaucoma      CAD (coronary artery disease)      H/O mitral valve stenosis      Hemothorax  s/p CABG X1 AND MITRAL REPAIR POST-OP      Lymphedema, limb  LLE      Skin cancer, basal cell      OA (osteoarthritis)      Hypothyroidism      H/O blood clots  in right lung 10/21- as per pt      History of hip surgery  bilateral hip      History of carpal tunnel surgery  B/L      S/P CABG (coronary artery bypass graft)  CABG X1 and repair of mitral valve 2/9/21      Status post Mohs surgery  RIGHT NARE WITH RECONSTRUCTION 2021      H/O cataract extraction  right IOL        Allergies    [This allergen will not trigger allergy alert] sores in mouth (Blisters)  No Known Drug Allergies    Intolerances      Home Medications:  amLODIPine 5 mg oral tablet: 1 tab(s) orally once a day (06 Sep 2022 11:24)  aspirin 81 mg oral delayed release tablet: 1 tab(s) orally once a day (06 Sep 2022 11:24)  buPROPion 300 mg/24 hours (XL) oral tablet, extended release: 1 tab(s) orally every 24 hours (06 Sep 2022 11:24)  Eliquis 5 mg oral tablet: 1 tab(s) orally 2 times a day (06 Sep 2022 11:24)  escitalopram 20 mg oral tablet: 1 tab(s) orally once a day (06 Sep 2022 11:24)  furosemide 20 mg oral tablet: 1 tab(s) orally once a day (06 Sep 2022 11:24)  latanoprost 0.005% ophthalmic solution: 1 drop(s) to each affected eye once a day (in the evening) (06 Sep 2022 11:24)  levothyroxine 50 mcg (0.05 mg) oral tablet: 1 tab(s) orally once a day (06 Sep 2022 11:24)  metoprolol tartrate 50 mg oral tablet: 1 tab(s) orally every 12 hours (06 Sep 2022 11:24)  olopatadine 0.2% ophthalmic solution: 1 drop(s) to each affected eye once a day, As Needed (06 Sep 2022 11:24)  oxybutynin 10 mg/24 hr oral tablet, extended release: 1 tab(s) orally once a day (at bedtime) (06 Sep 2022 11:24)  Probiotic Formula oral capsule: 1 cap(s) orally once a day (06 Sep 2022 11:24)  rosuvastatin 40 mg oral tablet: 1 tab(s) orally once a day (at bedtime) (06 Sep 2022 11:24)      ROS: 10-system review is otherwise negative except HPI above.      Primary Survey:    A - airway intact  B - bilateral breath sounds and good chest rise  C - palpable pulses in all extremities  D - GCS 15 on arrival, LOVING  Exposure obtained    Vital Signs Last 24 Hrs  T(C): 36.8 (29 May 2024 14:49), Max: 36.8 (29 May 2024 14:49)  T(F): 98.2 (29 May 2024 14:49), Max: 98.2 (29 May 2024 14:49)  HR: 58 (29 May 2024 15:10) (57 - 61)  BP: 117/57 (29 May 2024 15:10) (91/42 - 132/58)  BP(mean): --  RR: 17 (29 May 2024 15:10) (17 - 20)  SpO2: 97% (29 May 2024 15:10) (97% - 97%)    Parameters below as of 29 May 2024 15:10  Patient On (Oxygen Delivery Method): room air        Secondary Survey:   General: NAD  HEENT: Normocephalic, atraumatic, EOMI, PEERLA. no scalp lacerations   Neck: Soft, midline trachea. no c-spine tenderness  Chest: No chest wall tenderness, no subcutaneous emphysema   Cardiac: RR  Respiratory: Bilateral breath sounds, clear and equal bilaterally  Abdomen: Soft, non-distended, non-tender, no rebound, no guarding.  Groin: Normal appearing, pelvis stable   Ext:  Moving b/l upper and lower extremities. Palpable Radial b/l UE, b/l DP palpable in LE.   Back: No T/L/S spine tenderness, No palpable runoff/stepoff/deformity

## 2024-05-29 NOTE — H&P ADULT - ASSESSMENT
ASSESSMENT:  74F w/ PMH as above who presents s/p syncopal fall last night +HT -LOC +AC who underwent the above trauma workup and was found to have the following acute traumatic injuries:    Injuries identified:   - SAH      PLAN:   - Trauma surgery admission  - Q4H neurochecks  - Repeat CTH 6H from prior  - Hold AP/AC/DVT ppx  -Telemetry monitoring, syncope workup    Disposition pending results of above labs and imaging  Above plan discussed with Trauma attending, Dr. Middleton , patient, patient family, and ED team  --------------------------------------------------------------------------------------  05-29-24 @ 15:33    TRAUMA SENIOR SPECTRA: 0820  TRAUMA TEAM SPECTRA: 7227

## 2024-05-29 NOTE — ED ADULT NURSE NOTE - CHIEF COMPLAINT QUOTE
BIBA from urgent care, pt had a fall last night around 9 PM, has a hematoma to the back of her head. pt takes Eliquis. denies LOC

## 2024-05-30 ENCOUNTER — TRANSCRIPTION ENCOUNTER (OUTPATIENT)
Age: 74
End: 2024-05-30

## 2024-05-30 ENCOUNTER — RESULT REVIEW (OUTPATIENT)
Age: 74
End: 2024-05-30

## 2024-05-30 LAB
ANION GAP SERPL CALC-SCNC: 11 MMOL/L — SIGNIFICANT CHANGE UP (ref 7–14)
BASOPHILS # BLD AUTO: 0.06 K/UL — SIGNIFICANT CHANGE UP (ref 0–0.2)
BASOPHILS NFR BLD AUTO: 0.6 % — SIGNIFICANT CHANGE UP (ref 0–1)
BUN SERPL-MCNC: 19 MG/DL — SIGNIFICANT CHANGE UP (ref 10–20)
CALCIUM SERPL-MCNC: 9 MG/DL — SIGNIFICANT CHANGE UP (ref 8.4–10.4)
CHLORIDE SERPL-SCNC: 104 MMOL/L — SIGNIFICANT CHANGE UP (ref 98–110)
CO2 SERPL-SCNC: 24 MMOL/L — SIGNIFICANT CHANGE UP (ref 17–32)
CREAT SERPL-MCNC: 1.3 MG/DL — SIGNIFICANT CHANGE UP (ref 0.7–1.5)
EGFR: 43 ML/MIN/1.73M2 — LOW
EOSINOPHIL # BLD AUTO: 0.42 K/UL — SIGNIFICANT CHANGE UP (ref 0–0.7)
EOSINOPHIL NFR BLD AUTO: 4.4 % — SIGNIFICANT CHANGE UP (ref 0–8)
GLUCOSE SERPL-MCNC: 113 MG/DL — HIGH (ref 70–99)
HCT VFR BLD CALC: 38.3 % — SIGNIFICANT CHANGE UP (ref 37–47)
HGB BLD-MCNC: 12.5 G/DL — SIGNIFICANT CHANGE UP (ref 12–16)
IMM GRANULOCYTES NFR BLD AUTO: 0.2 % — SIGNIFICANT CHANGE UP (ref 0.1–0.3)
LYMPHOCYTES # BLD AUTO: 2.1 K/UL — SIGNIFICANT CHANGE UP (ref 1.2–3.4)
LYMPHOCYTES # BLD AUTO: 21.8 % — SIGNIFICANT CHANGE UP (ref 20.5–51.1)
MAGNESIUM SERPL-MCNC: 2.2 MG/DL — SIGNIFICANT CHANGE UP (ref 1.8–2.4)
MCHC RBC-ENTMCNC: 31.5 PG — HIGH (ref 27–31)
MCHC RBC-ENTMCNC: 32.6 G/DL — SIGNIFICANT CHANGE UP (ref 32–37)
MCV RBC AUTO: 96.5 FL — SIGNIFICANT CHANGE UP (ref 81–99)
MONOCYTES # BLD AUTO: 0.8 K/UL — HIGH (ref 0.1–0.6)
MONOCYTES NFR BLD AUTO: 8.3 % — SIGNIFICANT CHANGE UP (ref 1.7–9.3)
NEUTROPHILS # BLD AUTO: 6.25 K/UL — SIGNIFICANT CHANGE UP (ref 1.4–6.5)
NEUTROPHILS NFR BLD AUTO: 64.7 % — SIGNIFICANT CHANGE UP (ref 42.2–75.2)
NRBC # BLD: 0 /100 WBCS — SIGNIFICANT CHANGE UP (ref 0–0)
PHOSPHATE SERPL-MCNC: 3.7 MG/DL — SIGNIFICANT CHANGE UP (ref 2.1–4.9)
PLATELET # BLD AUTO: 194 K/UL — SIGNIFICANT CHANGE UP (ref 130–400)
PMV BLD: 10.3 FL — SIGNIFICANT CHANGE UP (ref 7.4–10.4)
POTASSIUM SERPL-MCNC: 4.5 MMOL/L — SIGNIFICANT CHANGE UP (ref 3.5–5)
POTASSIUM SERPL-SCNC: 4.5 MMOL/L — SIGNIFICANT CHANGE UP (ref 3.5–5)
RBC # BLD: 3.97 M/UL — LOW (ref 4.2–5.4)
RBC # FLD: 13.4 % — SIGNIFICANT CHANGE UP (ref 11.5–14.5)
SODIUM SERPL-SCNC: 139 MMOL/L — SIGNIFICANT CHANGE UP (ref 135–146)
WBC # BLD: 9.65 K/UL — SIGNIFICANT CHANGE UP (ref 4.8–10.8)
WBC # FLD AUTO: 9.65 K/UL — SIGNIFICANT CHANGE UP (ref 4.8–10.8)

## 2024-05-30 PROCEDURE — 99233 SBSQ HOSP IP/OBS HIGH 50: CPT

## 2024-05-30 PROCEDURE — 99221 1ST HOSP IP/OBS SF/LOW 40: CPT

## 2024-05-30 PROCEDURE — 93306 TTE W/DOPPLER COMPLETE: CPT | Mod: 26

## 2024-05-30 RX ORDER — METHOCARBAMOL 500 MG/1
500 TABLET, FILM COATED ORAL
Refills: 0 | Status: DISCONTINUED | OUTPATIENT
Start: 2024-05-30 | End: 2024-06-01

## 2024-05-30 RX ORDER — ASPIRIN/CALCIUM CARB/MAGNESIUM 324 MG
81 TABLET ORAL DAILY
Refills: 0 | Status: DISCONTINUED | OUTPATIENT
Start: 2024-05-30 | End: 2024-06-01

## 2024-05-30 RX ORDER — LEVETIRACETAM 250 MG/1
500 TABLET, FILM COATED ORAL
Refills: 0 | Status: DISCONTINUED | OUTPATIENT
Start: 2024-05-30 | End: 2024-06-01

## 2024-05-30 RX ORDER — ENOXAPARIN SODIUM 100 MG/ML
40 INJECTION SUBCUTANEOUS EVERY 24 HOURS
Refills: 0 | Status: DISCONTINUED | OUTPATIENT
Start: 2024-05-30 | End: 2024-06-01

## 2024-05-30 RX ORDER — TRAMADOL HYDROCHLORIDE 50 MG/1
50 TABLET ORAL EVERY 6 HOURS
Refills: 0 | Status: DISCONTINUED | OUTPATIENT
Start: 2024-05-30 | End: 2024-06-01

## 2024-05-30 RX ADMIN — Medication 650 MILLIGRAM(S): at 05:16

## 2024-05-30 RX ADMIN — Medication 50 MILLIGRAM(S): at 17:46

## 2024-05-30 RX ADMIN — METHOCARBAMOL 500 MILLIGRAM(S): 500 TABLET, FILM COATED ORAL at 17:47

## 2024-05-30 RX ADMIN — BUPROPION HYDROCHLORIDE 300 MILLIGRAM(S): 150 TABLET, EXTENDED RELEASE ORAL at 12:34

## 2024-05-30 RX ADMIN — TRAMADOL HYDROCHLORIDE 50 MILLIGRAM(S): 50 TABLET ORAL at 15:44

## 2024-05-30 RX ADMIN — Medication 650 MILLIGRAM(S): at 07:13

## 2024-05-30 RX ADMIN — Medication 650 MILLIGRAM(S): at 17:46

## 2024-05-30 RX ADMIN — Medication 650 MILLIGRAM(S): at 12:10

## 2024-05-30 RX ADMIN — Medication 50 MILLIGRAM(S): at 05:17

## 2024-05-30 RX ADMIN — ENOXAPARIN SODIUM 40 MILLIGRAM(S): 100 INJECTION SUBCUTANEOUS at 12:10

## 2024-05-30 RX ADMIN — Medication 50 MICROGRAM(S): at 05:18

## 2024-05-30 RX ADMIN — TRAMADOL HYDROCHLORIDE 50 MILLIGRAM(S): 50 TABLET ORAL at 16:30

## 2024-05-30 RX ADMIN — Medication 81 MILLIGRAM(S): at 12:10

## 2024-05-30 RX ADMIN — ATORVASTATIN CALCIUM 80 MILLIGRAM(S): 80 TABLET, FILM COATED ORAL at 21:21

## 2024-05-30 RX ADMIN — AMLODIPINE BESYLATE 5 MILLIGRAM(S): 2.5 TABLET ORAL at 05:18

## 2024-05-30 RX ADMIN — LEVETIRACETAM 500 MILLIGRAM(S): 250 TABLET, FILM COATED ORAL at 17:47

## 2024-05-30 RX ADMIN — ESCITALOPRAM OXALATE 20 MILLIGRAM(S): 10 TABLET, FILM COATED ORAL at 12:10

## 2024-05-30 NOTE — DISCHARGE NOTE NURSING/CASE MANAGEMENT/SOCIAL WORK - PATIENT PORTAL LINK FT
You can access the FollowMyHealth Patient Portal offered by [facility] by registering at the following website: [email or web address]. By joining [employer]’s FollowMyHealth portal, you will also be able to view your health information using other applications (apps) compatible with our system.

## 2024-05-30 NOTE — CONSULT NOTE ADULT - ASSESSMENT
ASSESSMENT:  74F w/ PMH as above who presents s/p mechanical fall last night +HT -LOC +AC who is pending the following trauma workup:    Injuries identified:   -   -   -     PLAN:   - Trauma Labs: (CBC, BMP, Coags, T&S, UA, EtOH level)  Additional studies:  EKG  Utox    Trauma Imaging to include the following:  - CXR, Pelvic Xray  - CT Head,  CT C-spine    Disposition pending results of above labs and imaging  Above plan discussed with Trauma attending, Dr. Middleton , patient, patient family, and ED team  --------------------------------------------------------------------------------------  05-29-24 @ 15:33    TRAUMA SENIOR SPECTRA: 5041  TRAUMA TEAM SPECTRA: 8554  
74y Female with PMHx of DM, HLD, HTN, CAD s/o CABG and MVR on Eliquis, glaucoma, who presented to the ED s/p fall that occurred at 9pm on 5/28 while she was mopping her floors, admitted for traumatic SAH, stroke team consulted for age indeterminate left basal ganglia infarct on CTH. NIHSS 0, patient with baseline restriction of horizontal movement of the left eye and baseline left hearing loss. Recommend MRI of brain with and without contrast to determine chronicity of infarct.    Recommendations:  - MR brain without contrast  - continue aspirin   - continue eliquis when cleared by NSGY   - Discussed with attending, Dr. riddle

## 2024-05-30 NOTE — DISCHARGE NOTE NURSING/CASE MANAGEMENT/SOCIAL WORK - NSDCPEFALRISK_GEN_ALL_CORE
For information on Fall & Injury Prevention, visit: [email or web address] OR  [email or web address] OR  https://www.cdc.gov/steadi/patient.html

## 2024-05-30 NOTE — DISCHARGE NOTE PROVIDER - NSDCMRMEDTOKEN_GEN_ALL_CORE_FT
amLODIPine 5 mg oral tablet: 1 tab(s) orally once a day  aspirin 81 mg oral delayed release tablet: 1 tab(s) orally once a day  buPROPion 300 mg/24 hours (XL) oral tablet, extended release: 1 tab(s) orally every 24 hours  Eliquis 5 mg oral tablet: 1 tab(s) orally 2 times a day  escitalopram 20 mg oral tablet: 1 tab(s) orally once a day  furosemide 20 mg oral tablet: 1 tab(s) orally once a day  latanoprost 0.005% ophthalmic solution: 1 drop(s) to each affected eye once a day (in the evening)  levothyroxine 50 mcg (0.05 mg) oral tablet: 1 tab(s) orally once a day  metoprolol tartrate 50 mg oral tablet: 1 tab(s) orally every 12 hours  olopatadine 0.2% ophthalmic solution: 1 drop(s) to each affected eye once a day, As Needed  oxybutynin 10 mg/24 hr oral tablet, extended release: 1 tab(s) orally once a day (at bedtime)  Probiotic Formula oral capsule: 1 cap(s) orally once a day  rosuvastatin 40 mg oral tablet: 1 tab(s) orally once a day (at bedtime)   acetaminophen 325 mg oral tablet: 2 tab(s) orally every 6 hours  amLODIPine 5 mg oral tablet: 1 tab(s) orally once a day  aspirin 81 mg oral delayed release tablet: 1 tab(s) orally once a day  buPROPion 300 mg/24 hours (XL) oral tablet, extended release: 1 tab(s) orally every 24 hours  escitalopram 20 mg oral tablet: 1 tab(s) orally once a day  furosemide 20 mg oral tablet: 1 tab(s) orally once a day  latanoprost 0.005% ophthalmic solution: 1 drop(s) to each affected eye once a day (in the evening)  levETIRAcetam 500 mg oral tablet: 1 tab(s) orally 2 times a day  levothyroxine 50 mcg (0.05 mg) oral tablet: 1 tab(s) orally once a day  metoprolol tartrate 50 mg oral tablet: 1 tab(s) orally every 12 hours  olopatadine 0.2% ophthalmic solution: 1 drop(s) to each affected eye once a day, As Needed  oxybutynin 10 mg/24 hr oral tablet, extended release: 1 tab(s) orally once a day (at bedtime)  Probiotic Formula oral capsule: 1 cap(s) orally once a day  rosuvastatin 40 mg oral tablet: 1 tab(s) orally once a day (at bedtime)

## 2024-05-30 NOTE — CHART NOTE - NSCHARTNOTEFT_GEN_A_CORE
repeat CTH shows stable right sylvian fissure SAH. No further neurosurgical intervention. Pt OK to restart Eliquis in 7 days post fall. OK to cont ASA 81 mg at this time. OK for prophylactic AC. KEppra x7d. Cont syncopal work up per trauma service, though [facility] neurology eval in patient with age indeterminant lacunar infarct on CTH. Patient can f/u 2 weeks after discharge in outpatient neurosurgery office. Please reconsult PRN.

## 2024-05-30 NOTE — DISCHARGE NOTE PROVIDER - NSDCFUSCHEDAPPT_GEN_ALL_CORE_FT
[facility]  OTOLARYNG [address]  Scheduled Appointment: 06/03/2024    Hossein David  [facility]  CARDIOLOGY [address]  Scheduled Appointment: 06/25/2024

## 2024-05-30 NOTE — DISCHARGE NOTE PROVIDER - CARE PROVIDERS DIRECT ADDRESSES
[email or web address]

## 2024-05-30 NOTE — DISCHARGE NOTE PROVIDER - NSDCCPCAREPLAN_GEN_ALL_CORE_FT
PRINCIPAL DISCHARGE DIAGNOSIS  Diagnosis: SAH (subarachnoid hemorrhage)  Assessment and Plan of Treatment: Pt OK to restart Eliquis in 7 days post fall.   OK to cont ASA 81 mg at this time.   Keppra x7days.    Follow up 2 weeks after discharge in outpatient neurosurgery office.  Neurology consulted for age indeterminant lacunar infarct on CTH.      SECONDARY DISCHARGE DIAGNOSES  Diagnosis: Opacity of lung on imaging study  Assessment and Plan of Treatment:      PRINCIPAL DISCHARGE DIAGNOSIS  Diagnosis: SAH (subarachnoid hemorrhage)  Assessment and Plan of Treatment: Pt OK to restart Eliquis 6/5  OK to cont ASA 81 mg at this time.   Keppra x7days.    Follow up 2 weeks after discharge in outpatient neurosurgery office.        SECONDARY DISCHARGE DIAGNOSES  Diagnosis: Lacunar infarction  Assessment and Plan of Treatment: Lacunar infarction is not acute.  Script for MRI of brain provided.  Please follow up with the neurologist in 1-2 weeks.

## 2024-05-30 NOTE — DISCHARGE NOTE PROVIDER - PROVIDER TOKENS
PROVIDER:[TOKEN:[[id number]],FOLLOWUP:[2 weeks]] PROVIDER:[TOKEN:[[id number]],FOLLOWUP:[2 weeks]],PROVIDER:[TOKEN:[[id number]],FOLLOWUP:[1 week]]

## 2024-05-30 NOTE — CONSULT NOTE ADULT - SUBJECTIVE AND OBJECTIVE BOX
Patient is a 74y Female PMH CAD s/p CABG and MVR on Eliquis, glaucoma, HTN, HLD, DM, who presents to the ED s/p fall that occurred around 9PM last night while mopping her floors at home.       1. Mechanical fall leading to head trauma resulting in small SAH in a pt on anticoagulation (eliquis)   - Admit to Trauma  - CTH 1:  a) Small subarachnoid hemorrhage in the right sylvian fissure.  b) Small patchy hypodensity in the right base ganglia could represent age indeterminate infarct.  - CTH 2:pending  - Hold eliquis (her chadvasc is 5 which is high)   - Neuro check q4hr   - Neurosurgery consult    2. HTN - continue amlodipine 5mg daily and metoprolol 50mg BID    3. HLD - continue atorvastatin 80mg daily    4. Anxiety disorder - continue bupropion 300mg daily and lexapro 20mg daily      5. Synthroid - continue 50mcg daily    6. DVT/GI px   - Add SCD  Patient is a 74y Female PMH CAD s/p CABG and MVR on Eliquis, glaucoma, HTN, HLD, DM, who presents to the ED s/p fall that occurred around 9PM last night while mopping her floors at home.       VITALS:   T(C): 36.6 (05-30-24 @ 12:11), Max: 36.7 (05-29-24 @ 22:46)  HR: 59 (05-30-24 @ 12:11) (57 - 65)  BP: 141/70 (05-30-24 @ 12:11) (104/52 - 149/80)  RR: 18 (05-30-24 @ 12:11) (16 - 19)  SpO2: 95% (05-30-24 @ 12:11) (95% - 98%)    GENERAL: Looks comfortable with headache   HEART: Regular rate and rhythm  LUNGS: Unlabored respirations.  Clear to auscultation bilaterally,  ABDOMEN: Soft, nontender, nondistended, +BS  EXTREMITIES: 2+ peripheral pulses bilaterally.  NERVOUS SYSTEM:  A&Ox3,    1. Mechanical fall leading to head trauma resulting in small SAH in a pt on anticoagulation (eliquis)   - Admit to Trauma  -Headache. tylenol q6hr. optimize pain meds   - CTH 1:  a) Small subarachnoid hemorrhage in the right sylvian fissure.  b) Small patchy hypodensity in the right base ganglia could represent age indeterminate infarct.  - CTH 2:  a) Stable focal subarachnoid hemorrhage within the right sylvian fissure without mass effect or midline shift.  b) Age-indeterminate right basal ganglia lacunar infarct.  - MRI brain:pending   - Hold eliquis (her chadvasc is 5 which is high)   - Neuro check q4hr   - Neurosurgery consult    2. HTN - continue amlodipine 5mg daily and metoprolol 50mg BID    3. HLD - continue atorvastatin 80mg daily    4. Anxiety disorder - continue bupropion 300mg daily and lexapro 20mg daily      5. Synthroid - continue 50mcg daily    6. DVT/GI px   - Add SCD

## 2024-05-30 NOTE — PROGRESS NOTE ADULT - ATTENDING COMMENTS
Trauma/Acute Care Surgery Attending Note Attestation    Patient was seen and examined bedside.  I reviewed the resident/PA note and agreed with above assessment and plan with following additions and corrections.    Upon repeat interview with patient, she is not sure how she fell. She only remembers mopping floor and then ending up on the ground. This morning only complaining of pain in the back of her head. Eating without issue. No nausea, vomiting, weakness, numbness, paresthesias.    T(C): 36.6 (05-30-24 @ 12:11), Max: 36.8 (05-29-24 @ 14:49)  HR: 59 (05-30-24 @ 12:11) (57 - 65)  BP: 141/70 (05-30-24 @ 12:11) (91/42 - 149/80)  RR: 18 (05-30-24 @ 12:11) (16 - 20)  SpO2: 95% (05-30-24 @ 12:11) (95% - 98%)      05-29-24 @ 07:01  -  05-30-24 @ 07:00  --------------------------------------------------------  IN:    Oral Fluid: 50 mL    sodium chloride 0.9%: 525 mL  Total IN: 575 mL    OUT:    Voided (mL): 450 mL  Total OUT: 450 mL    Total NET: 125 mL      I independently performed a medically appropriate exam. The exam was notable for soft, not tender, not distended abdomen. Moving all extremities. Awake, alert, no acute distress.                          11.7   8.39  )-----------( 187      ( 29 May 2024 20:46 )             36.0     05-29    139  |  102  |  26<H>  ----------------------------<  90  4.1   |  27  |  1.2    Ca 8.8; Mg 2.1; Phos 4.1      CT Head #2 reviewed and interpreted by me: stable subarachnoid hemorrhage right sylvian fissure; radiology read includes age-indeterminate right basal ganglia lacunar infarct      Assessment/Plan:  74y Female PMH CAD s/p CABG and MVR on eliquis, HTN, HLD, DM s/p slip and fall while anticoagulated. Found to have traumatic R sylvian fissure subarachnoid hemorrhage. Possible syncopal event.  - R sylvian fissure subarachnoid hemorrhage - neurosurgery consulted, repeat CTH stable, ok to restart eliquis 7 days post fall, ok to continue ASA-81, keppra 500mg BID for 7 days, follow up with NSGY outpatient  - Syncope? - echocardiogram ordered, follow up results, currently on telemetry  - Age-indeterminate  basal ganglia lacunar infarct - consult neurology for evaluation  - HTN - continue amlodipine 5mg daily and metoprolol 50mg BID  - HLD - continue atorvastatin 80mg daily  - Anxiety disorder - continue bupropion 300mg daily and lexapro 20mg daily  - Synthroid - continue 50mcg daily  - Start DVT ppx  - Dispo: Pending echocardiogram, plan for discharge home with home care     Dedrick Middleton MD  Trauma/Acute Care Surgery/Surgical Critical Care Attending Trauma/Acute Care Surgery Attending Note Attestation    Patient was seen and examined bedside.  I reviewed the resident/PA note and agreed with above assessment and plan with following additions and corrections.    Upon repeat interview with patient, she is not sure how she fell. She only remembers mopping floor and then ending up on the ground. This morning only complaining of pain in the back of her head. Eating without issue. No nausea, vomiting, weakness, numbness, paresthesias.    T(C): 36.6 (05-30-24 @ 12:11), Max: 36.8 (05-29-24 @ 14:49)  HR: 59 (05-30-24 @ 12:11) (57 - 65)  BP: 141/70 (05-30-24 @ 12:11) (91/42 - 149/80)  RR: 18 (05-30-24 @ 12:11) (16 - 20)  SpO2: 95% (05-30-24 @ 12:11) (95% - 98%)      05-29-24 @ 07:01  -  05-30-24 @ 07:00  --------------------------------------------------------  IN:    Oral Fluid: 50 mL    sodium chloride 0.9%: 525 mL  Total IN: 575 mL    OUT:    Voided (mL): 450 mL  Total OUT: 450 mL    Total NET: 125 mL      I independently performed a medically appropriate exam. The exam was notable for soft, not tender, not distended abdomen. Moving all extremities. Awake, alert, no acute distress.                          11.7   8.39  )-----------( 187      ( 29 May 2024 20:46 )             36.0     05-29    139  |  102  |  26<H>  ----------------------------<  90  4.1   |  27  |  1.2    Ca 8.8; Mg 2.1; Phos 4.1      CT Head #2 reviewed and interpreted by me: stable subarachnoid hemorrhage right sylvian fissure; radiology read includes age-indeterminate right basal ganglia lacunar infarct      Assessment/Plan:  74y Female PMH CAD s/p CABG and MVR on eliquis, HTN, HLD, DM s/p slip and fall while anticoagulated. Found to have traumatic R sylvian fissure subarachnoid hemorrhage. Possible syncopal event.  - R sylvian fissure subarachnoid hemorrhage - neurosurgery consulted, repeat CTH stable, ok to restart eliquis 7 days post fall, ok to continue ASA-81, keppra 500mg BID for 7 days, follow up with NSGY outpatient  - Syncope? - echocardiogram ordered, follow up results, currently on telemetry  - Age-indeterminate  basal ganglia lacunar infarct - consult neurology for evaluation  - HTN - continue amlodipine 5mg daily and metoprolol 50mg BID  - HLD - continue atorvastatin 80mg daily  - Anxiety disorder - continue bupropion 300mg daily and lexapro 20mg daily  - Synthroid - continue 50mcg daily  - Start DVT ppx  - Dispo: Pending echocardiogram and neurology evals, plan for discharge home with home care     Dedrick Middleton MD  Trauma/Acute Care Surgery/Surgical Critical Care Attending

## 2024-05-30 NOTE — DISCHARGE NOTE PROVIDER - HOSPITAL COURSE
74y Female PMH CAD s/p CABG and MVR on Eliquis, glaucoma, HTN, HLD, DM, who presents to the ED s/p fall that occurred around 9PM last night while mopping her floors at home. Patient states she slipped on the wet floor and fell backwards, hitting the back of her head on the floor. Patient denies LOC, nausea, vomiting. Has been ambulating at baseline since. Patient went to PMD office today who sent her to ED via EMS for further evaluation. Denies dizziness, vision changes, fevers, chest pain, SOB, N/V/D, abd pain, extremity pain. The patient was found to have a SAH on trauma work up, evaluated by neurosurgery. Recommended to have repeat head CT that was stable,  cleared for aspirin, dvt prophylaxis and recommended to hold AC for 7 days. CT head demonstrated age indeterminate lacunar infarct- neurology evaluated the patient, recommended MRI brain for further evaluation. The patient had an echocardiogram that demonstrated EF 65%.     The patient will be discharged to home with instructions to follow up with neurosurgery and neurology as above. 74y Female PMH CAD s/p CABG and MVR on Eliquis, glaucoma, HTN, HLD, DM, who presents to the ED s/p fall that occurred around 9PM last night while mopping her floors at home. Patient states she slipped on the wet floor and fell backwards, hitting the back of her head on the floor. Patient denies LOC, nausea, vomiting. Has been ambulating at baseline since. Patient went to PMD office today who sent her to ED via EMS for further evaluation. Denies dizziness, vision changes, fevers, chest pain, SOB, N/V/D, abd pain, extremity pain. The patient was found to have a SAH on trauma work up, evaluated by neurosurgery. Recommended to have repeat head CT that was stable,  cleared for aspirin, dvt prophylaxis and recommended to hold AC for 7 days. CT head demonstrated age indeterminate lacunar infarct- neurology evaluated the patient, recommended MRI brain for further evaluation. The patient had an echocardiogram that demonstrated EF 65%.   The patient was evaluated by neurology and will complete the work up as an outpatient. The patient was seen by physical therapy and will be discharged to home with [facility].

## 2024-05-30 NOTE — DISCHARGE NOTE PROVIDER - NSDCHHCONTACT_GEN_ALL_CORE_FT
As certified below, I, or a nurse practitioner or physician assistant working with me, had a face-to-face encounter that meets the physician face-to-face encounter requirements.
11-Feb-2024 19:28

## 2024-05-30 NOTE — CONSULT NOTE ADULT - SUBJECTIVE AND OBJECTIVE BOX
**STROKE CONSULT NOTE*    HPI: 74y Female with PMHx of DM, HLD, HTN, CAD s/o CABG and MVR on Eliquis, glaucoma, who presented to the ED s/p fall that occurred at 9pm on 5/28 while she was mopping her floors, admitted for traumatic SAH, stroke team consulted for age indeterminate left basal ganglia infarct on CTH.   Patient states that she was mopping the floor and then the next thing she remembers was that she was on the floor. States that she was taking eliquis and aspirin. Prior to fall, denies any unilateral weakness that she has noticed over the last few weeks. The patient denies history of strokes.  States that she was feeling in her usual state of health prior to this occuring.     T(C): 36.6 (05-30-24 @ 12:11), Max: 36.8 (05-29-24 @ 14:49)  HR: 59 (05-30-24 @ 12:11) (57 - 65)  BP: 141/70 (05-30-24 @ 12:11) (91/42 - 149/80)  RR: 18 (05-30-24 @ 12:11) (16 - 20)  SpO2: 95% (05-30-24 @ 12:11) (95% - 98%)    PAST MEDICAL & SURGICAL HISTORY:  Hypertension, unspecified type      Hyperlipidemia, unspecified hyperlipidemia type      Type 2 diabetes mellitus without complication, without long-term current use of insulin  NOW RESOLVED. NO MEDS      H/O sleep apnea      Chronic sciatica      Glaucoma      CAD (coronary artery disease)      H/O mitral valve stenosis      Hemothorax  s/p CABG X1 AND MITRAL REPAIR POST-OP      Lymphedema, limb  LLE      Skin cancer, basal cell      OA (osteoarthritis)      Hypothyroidism      H/O blood clots  in right lung 10/21- as per pt      History of hip surgery  bilateral hip      History of carpal tunnel surgery  B/L      S/P CABG (coronary artery bypass graft)  CABG X1 and repair of mitral valve 2/9/21    Status post Mohs surgery  RIGHT NARE WITH RECONSTRUCTION 2021      H/O cataract extraction  right IOL    FAMILY HISTORY:  Family history of diabetes mellitus (DM) (Mother)    FH: heart disease (Father)      SOCIAL HISTORY:  Denies smoking, drinking, or drug use    ROS:   as per HPI    MEDICATIONS  (STANDING):  acetaminophen     Tablet .. 650 milliGRAM(s) Oral every 6 hours  amLODIPine   Tablet 5 milliGRAM(s) Oral daily  aspirin  chewable 81 milliGRAM(s) Oral daily  atorvastatin 80 milliGRAM(s) Oral at bedtime  buPROPion XL (24-Hour) . 300 milliGRAM(s) Oral daily  enoxaparin Injectable 40 milliGRAM(s) SubCutaneous every 24 hours  escitalopram 20 milliGRAM(s) Oral daily  levETIRAcetam 500 milliGRAM(s) Oral two times a day  levothyroxine 50 MICROGram(s) Oral daily  metoprolol tartrate 50 milliGRAM(s) Oral every 12 hours    MEDICATIONS  (PRN):    Home Medications:  amLODIPine 5 mg oral tablet: 1 tab(s) orally once a day (29 May 2024 17:51)  aspirin 81 mg oral delayed release tablet: 1 tab(s) orally once a day (29 May 2024 17:51)  buPROPion 300 mg/24 hours (XL) oral tablet, extended release: 1 tab(s) orally every 24 hours (29 May 2024 17:51)  Eliquis 5 mg oral tablet: 1 tab(s) orally 2 times a day (29 May 2024 17:51)  escitalopram 20 mg oral tablet: 1 tab(s) orally once a day (29 May 2024 17:51)  furosemide 20 mg oral tablet: 1 tab(s) orally once a day (29 May 2024 17:51)  latanoprost 0.005% ophthalmic solution: 1 drop(s) to each affected eye once a day (in the evening) (29 May 2024 17:51)  levothyroxine 50 mcg (0.05 mg) oral tablet: 1 tab(s) orally once a day (29 May 2024 17:51)  metoprolol tartrate 50 mg oral tablet: 1 tab(s) orally every 12 hours (29 May 2024 17:51)  olopatadine 0.2% ophthalmic solution: 1 drop(s) to each affected eye once a day, As Needed (29 May 2024 17:51)  oxybutynin 10 mg/24 hr oral tablet, extended release: 1 tab(s) orally once a day (at bedtime) (29 May 2024 17:51)  Probiotic Formula oral capsule: 1 cap(s) orally once a day (29 May 2024 17:51)  rosuvastatin 40 mg oral tablet: 1 tab(s) orally once a day (at bedtime) (29 May 2024 17:51)      Allergies    [This allergen will not trigger allergy alert] sores in mouth (Blisters)  No Known Drug Allergies    Intolerances      Vital Signs Last 24 Hrs  T(C): 36.6 (30 May 2024 12:11), Max: 36.8 (29 May 2024 14:49)  T(F): 97.8 (30 May 2024 12:11), Max: 98.2 (29 May 2024 14:49)  HR: 59 (30 May 2024 12:11) (57 - 65)  BP: 141/70 (30 May 2024 12:11) (91/42 - 149/80)  BP(mean): 103 (30 May 2024 04:10) (103 - 103)  RR: 18 (30 May 2024 12:11) (16 - 20)  SpO2: 95% (30 May 2024 12:11) (95% - 98%)    Parameters below as of 30 May 2024 08:10  Patient On (Oxygen Delivery Method): room air        Physical exam:  General: No acute distress, awake and alert    Neurologic:  -Mental status: Awake, alert, oriented to person, place, and time. Speech is fluent with intact naming, repetition, and comprehension, no dysarthria. Recent and remote memory intact. Follows commands. Attention/concentration intact. Fund of knowledge appropriate.  -Cranial nerves:   II: Visual fields are full to confrontation.  III, IV, VI: left eye with restricted horizontal movement, able to look vertically (patient with congential defect), right eye EOMI  V:  Facial sensation V1-V3 equal and intact   VII: Face is symmetric with normal eye closure and smile  VIII: decreased hearing in the left ear (baseline)  Motor: Normal bulk and tone. No pronator drift. Strength bilateral upper extremity 5/5, bilateral lower extremities 5/5.  Sensation: Intact to light touch bilaterally. No neglect or extinction on double simultaneous testing.  Coordination: No dysmetria on finger-to-nose and heel-to-shin bilaterally  Reflexes: Downgoing toes bilaterally   Gait: Narrow gait and steady    NIHSS: 0    Fingerstick Blood Glucose: CAPILLARY BLOOD GLUCOSE      POCT Blood Glucose.: 102 mg/dL (29 May 2024 14:57)    LABS:                        11.7   8.39  )-----------( 187      ( 29 May 2024 20:46 )             36.0     05-29    139  |  102  |  26<H>  ----------------------------<  90  4.1   |  27  |  1.2    Ca    8.8      29 May 2024 20:46  Phos  4.1     05-29  Mg     2.1     05-29    TPro  6.2  /  Alb  4.1  /  TBili  0.4  /  DBili  x   /  AST  27  /  ALT  17  /  AlkPhos  79  05-29    PT/INR - ( 29 May 2024 17:05 )   PT: 17.90 sec;   INR: 1.56 ratio         PTT - ( 29 May 2024 17:05 )  PTT:34.5 sec      Urinalysis Basic - ( 29 May 2024 20:46 )    Color: x / Appearance: x / SG: x / pH: x  Gluc: 90 mg/dL / Ketone: x  / Bili: x / Urobili: x   Blood: x / Protein: x / Nitrite: x   Leuk Esterase: x / RBC: x / WBC x   Sq Epi: x / Non Sq Epi: x / Bacteria: x        RADIOLOGY & ADDITIONAL STUDIES:    HCT:  < from: CT Head No Cont (05.29.24 @ 22:27) >    IMPRESSION:  No significant change since same day head CT.    Stable focal subarachnoid hemorrhage within the right sylvian fissure   without mass effect or midline shift.    Age-indeterminate right basal ganglia lacunar infarct.      -----------------------------------------------------------------------------------------------------------------            **STROKE CONSULT NOTE*    HPI: 74y Female with PMHx of DM, HLD, HTN, CAD s/o CABG and MVR on Eliquis, glaucoma, who presented to the ED s/p fall that occurred at 9pm on 5/28 while she was mopping her floors, admitted for traumatic SAH, stroke team consulted for age indeterminate left basal ganglia infarct on CTH.   Patient states that she was mopping the floor and then the next thing she remembers was that she was on the floor. States that she was taking eliquis and aspirin. Prior to fall, denies any unilateral weakness that she has noticed over the last few weeks. The patient denies history of strokes.  States that she was feeling in her usual state of health prior to this occurring.     T(C): 36.6 (05-30-24 @ 12:11), Max: 36.8 (05-29-24 @ 14:49)  HR: 59 (05-30-24 @ 12:11) (57 - 65)  BP: 141/70 (05-30-24 @ 12:11) (91/42 - 149/80)  RR: 18 (05-30-24 @ 12:11) (16 - 20)  SpO2: 95% (05-30-24 @ 12:11) (95% - 98%)    PAST MEDICAL & SURGICAL HISTORY:  Hypertension, unspecified type      Hyperlipidemia, unspecified hyperlipidemia type      Type 2 diabetes mellitus without complication, without long-term current use of insulin  NOW RESOLVED. NO MEDS      H/O sleep apnea      Chronic sciatica      Glaucoma      CAD (coronary artery disease)      H/O mitral valve stenosis      Hemothorax  s/p CABG X1 AND MITRAL REPAIR POST-OP      Lymphedema, limb  LLE      Skin cancer, basal cell      OA (osteoarthritis)      Hypothyroidism      H/O blood clots  in right lung 10/21- as per pt      History of hip surgery  bilateral hip      History of carpal tunnel surgery  B/L      S/P CABG (coronary artery bypass graft)  CABG X1 and repair of mitral valve 2/9/21    Status post Mohs surgery  RIGHT NARE WITH RECONSTRUCTION 2021      H/O cataract extraction  right IOL    FAMILY HISTORY:  Family history of diabetes mellitus (DM) (Mother)    FH: heart disease (Father)      SOCIAL HISTORY:  Denies smoking, drinking, or drug use    ROS:   as per HPI    MEDICATIONS  (STANDING):  acetaminophen     Tablet .. 650 milliGRAM(s) Oral every 6 hours  amLODIPine   Tablet 5 milliGRAM(s) Oral daily  aspirin  chewable 81 milliGRAM(s) Oral daily  atorvastatin 80 milliGRAM(s) Oral at bedtime  buPROPion XL (24-Hour) . 300 milliGRAM(s) Oral daily  enoxaparin Injectable 40 milliGRAM(s) SubCutaneous every 24 hours  escitalopram 20 milliGRAM(s) Oral daily  levETIRAcetam 500 milliGRAM(s) Oral two times a day  levothyroxine 50 MICROGram(s) Oral daily  metoprolol tartrate 50 milliGRAM(s) Oral every 12 hours    MEDICATIONS  (PRN):    Home Medications:  amLODIPine 5 mg oral tablet: 1 tab(s) orally once a day (29 May 2024 17:51)  aspirin 81 mg oral delayed release tablet: 1 tab(s) orally once a day (29 May 2024 17:51)  buPROPion 300 mg/24 hours (XL) oral tablet, extended release: 1 tab(s) orally every 24 hours (29 May 2024 17:51)  Eliquis 5 mg oral tablet: 1 tab(s) orally 2 times a day (29 May 2024 17:51)  escitalopram 20 mg oral tablet: 1 tab(s) orally once a day (29 May 2024 17:51)  furosemide 20 mg oral tablet: 1 tab(s) orally once a day (29 May 2024 17:51)  latanoprost 0.005% ophthalmic solution: 1 drop(s) to each affected eye once a day (in the evening) (29 May 2024 17:51)  levothyroxine 50 mcg (0.05 mg) oral tablet: 1 tab(s) orally once a day (29 May 2024 17:51)  metoprolol tartrate 50 mg oral tablet: 1 tab(s) orally every 12 hours (29 May 2024 17:51)  olopatadine 0.2% ophthalmic solution: 1 drop(s) to each affected eye once a day, As Needed (29 May 2024 17:51)  oxybutynin 10 mg/24 hr oral tablet, extended release: 1 tab(s) orally once a day (at bedtime) (29 May 2024 17:51)  Probiotic Formula oral capsule: 1 cap(s) orally once a day (29 May 2024 17:51)  rosuvastatin 40 mg oral tablet: 1 tab(s) orally once a day (at bedtime) (29 May 2024 17:51)      Allergies    [This allergen will not trigger allergy alert] sores in mouth (Blisters)  No Known Drug Allergies    Intolerances      Vital Signs Last 24 Hrs  T(C): 36.6 (30 May 2024 12:11), Max: 36.8 (29 May 2024 14:49)  T(F): 97.8 (30 May 2024 12:11), Max: 98.2 (29 May 2024 14:49)  HR: 59 (30 May 2024 12:11) (57 - 65)  BP: 141/70 (30 May 2024 12:11) (91/42 - 149/80)  BP(mean): 103 (30 May 2024 04:10) (103 - 103)  RR: 18 (30 May 2024 12:11) (16 - 20)  SpO2: 95% (30 May 2024 12:11) (95% - 98%)    Parameters below as of 30 May 2024 08:10  Patient On (Oxygen Delivery Method): room air        Physical exam:  General: No acute distress, awake and alert    Neurologic:  -Mental status: Awake, alert, oriented to person, place, and time. Speech is fluent with intact naming, repetition, and comprehension, no dysarthria. Recent and remote memory intact. Follows commands. Attention/concentration intact. Fund of knowledge appropriate.  -Cranial nerves:   II: Visual fields are full to confrontation.  III, IV, VI: left eye with restricted horizontal movement, able to look vertically (patient with congential defect), right eye EOMI  V:  Facial sensation V1-V3 equal and intact   VII: Face is symmetric with normal eye closure and smile  VIII: decreased hearing in the left ear (baseline)  Motor: Normal bulk and tone. No pronator drift. Strength bilateral upper extremity 5/5, bilateral lower extremities 5/5.  Sensation: Intact to light touch bilaterally. No neglect or extinction on double simultaneous testing.  Coordination: No dysmetria on finger-to-nose and heel-to-shin bilaterally  Reflexes: Downgoing toes bilaterally   Gait: Narrow gait and steady    NIHSS: 0    Fingerstick Blood Glucose: CAPILLARY BLOOD GLUCOSE      POCT Blood Glucose.: 102 mg/dL (29 May 2024 14:57)    LABS:                        11.7   8.39  )-----------( 187      ( 29 May 2024 20:46 )             36.0     05-29    139  |  102  |  26<H>  ----------------------------<  90  4.1   |  27  |  1.2    Ca    8.8      29 May 2024 20:46  Phos  4.1     05-29  Mg     2.1     05-29    TPro  6.2  /  Alb  4.1  /  TBili  0.4  /  DBili  x   /  AST  27  /  ALT  17  /  AlkPhos  79  05-29    PT/INR - ( 29 May 2024 17:05 )   PT: 17.90 sec;   INR: 1.56 ratio         PTT - ( 29 May 2024 17:05 )  PTT:34.5 sec      Urinalysis Basic - ( 29 May 2024 20:46 )    Color: x / Appearance: x / SG: x / pH: x  Gluc: 90 mg/dL / Ketone: x  / Bili: x / Urobili: x   Blood: x / Protein: x / Nitrite: x   Leuk Esterase: x / RBC: x / WBC x   Sq Epi: x / Non Sq Epi: x / Bacteria: x        RADIOLOGY & ADDITIONAL STUDIES:    HCT:  < from: CT Head No Cont (05.29.24 @ 22:27) >    IMPRESSION:  No significant change since same day head CT.    Stable focal subarachnoid hemorrhage within the right sylvian fissure   without mass effect or midline shift.    Age-indeterminate right basal ganglia lacunar infarct.      -----------------------------------------------------------------------------------------------------------------

## 2024-05-30 NOTE — DISCHARGE NOTE PROVIDER - CARE PROVIDER_API CALL
Preet Whitaker  Neurosurgery  [address] [place] [address], [address]  [place] [address]  Phone: [phone]  Fax: [phone]  Follow Up Time: 2 weeks   Preet Whitaker  Neurosurgery  [address] [place] [address], [address]  [place] [address]  Phone: [phone]  Fax: [phone]  Follow Up Time: 2 weeks    Ashlee Farmer  Neurology  [address] [place] [id number]  Phone: [phone]  Fax: [phone]  Follow Up Time: 1 week

## 2024-05-30 NOTE — CONSULT NOTE ADULT - NS ATTEND AMEND GEN_ALL_CORE FT
Pt seen, examined and discussed with Neurology ACP. NIHSS 0, no focal deficits. Baseline mRS 2 (ambulates with cane due to chronic imbalance). CT head with traumatic R SAH and age indeterminate R BG infarct. May consider MRI brain for further delineation of timing of infarct. Resume AC when ok with NS. If unable to obtain MRI brain prior to discharge, ok to complete as outpt and f/[place] in [facility]. Pt seen, examined and discussed with Neurology ACP. NIHSS 0. Baseline mRS 2 (ambulates with cane due to chronic imbalance). CT head with traumatic R SAH and age indeterminate R BG infarct. May consider MRI brain for further delineation of timing of infarct. Resume AC when ok with NS. If unable to obtain MRI brain prior to discharge, ok to complete as outpt and f/u in [facility] [place] [facility].

## 2024-05-31 PROCEDURE — 99232 SBSQ HOSP IP/OBS MODERATE 35: CPT

## 2024-05-31 RX ADMIN — Medication 50 MILLIGRAM(S): at 06:21

## 2024-05-31 RX ADMIN — METHOCARBAMOL 500 MILLIGRAM(S): 500 TABLET, FILM COATED ORAL at 17:47

## 2024-05-31 RX ADMIN — LEVETIRACETAM 500 MILLIGRAM(S): 250 TABLET, FILM COATED ORAL at 06:22

## 2024-05-31 RX ADMIN — ATORVASTATIN CALCIUM 80 MILLIGRAM(S): 80 TABLET, FILM COATED ORAL at 22:57

## 2024-05-31 RX ADMIN — Medication 50 MICROGRAM(S): at 06:21

## 2024-05-31 RX ADMIN — Medication 650 MILLIGRAM(S): at 18:00

## 2024-05-31 RX ADMIN — ESCITALOPRAM OXALATE 20 MILLIGRAM(S): 10 TABLET, FILM COATED ORAL at 12:19

## 2024-05-31 RX ADMIN — Medication 50 MILLIGRAM(S): at 17:46

## 2024-05-31 RX ADMIN — ENOXAPARIN SODIUM 40 MILLIGRAM(S): 100 INJECTION SUBCUTANEOUS at 12:19

## 2024-05-31 RX ADMIN — Medication 650 MILLIGRAM(S): at 06:21

## 2024-05-31 RX ADMIN — Medication 650 MILLIGRAM(S): at 12:00

## 2024-05-31 RX ADMIN — Medication 650 MILLIGRAM(S): at 00:10

## 2024-05-31 RX ADMIN — Medication 650 MILLIGRAM(S): at 17:47

## 2024-05-31 RX ADMIN — METHOCARBAMOL 500 MILLIGRAM(S): 500 TABLET, FILM COATED ORAL at 06:22

## 2024-05-31 RX ADMIN — Medication 650 MILLIGRAM(S): at 23:01

## 2024-05-31 RX ADMIN — AMLODIPINE BESYLATE 5 MILLIGRAM(S): 2.5 TABLET ORAL at 06:21

## 2024-05-31 RX ADMIN — Medication 650 MILLIGRAM(S): at 12:19

## 2024-05-31 RX ADMIN — LEVETIRACETAM 500 MILLIGRAM(S): 250 TABLET, FILM COATED ORAL at 17:47

## 2024-05-31 RX ADMIN — BUPROPION HYDROCHLORIDE 300 MILLIGRAM(S): 150 TABLET, EXTENDED RELEASE ORAL at 12:19

## 2024-05-31 RX ADMIN — Medication 81 MILLIGRAM(S): at 12:19

## 2024-05-31 NOTE — PROGRESS NOTE ADULT - ATTENDING COMMENTS
Trauma/Acute Care Surgery Attending Note Attestation    Patient was seen and examined bedside.  I reviewed the resident/PA note and agreed with above assessment and plan with following additions and corrections.    Worked with PT. Reports she feels weak. Lives alone at home. Was able to do stairs with PT but has 13 stairs. Reports she feels winded after walking around the halls or up the stairs. Resting comfortably in chair.    T(C): 36.7 (05-31-24 @ 12:02), Max: 36.7 (05-30-24 @ 19:58)  HR: 70 (05-31-24 @ 12:02) (57 - 95)  BP: 117/75 (05-31-24 @ 12:02) (117/75 - 131/66)  RR: 18 (05-31-24 @ 12:02) (18 - 18)  SpO2: --      05-30-24 @ 07:01  -  05-31-24 @ 07:00  --------------------------------------------------------  IN:  Total IN: 0 mL    OUT:    Voided (mL): 500 mL  Total OUT: 500 mL    Total NET: -500 mL      05-31-24 @ 07:01  -  05-31-24 @ 13:50  --------------------------------------------------------  IN:    Oral Fluid: 450 mL  Total IN: 450 mL    OUT:  Total OUT: 0 mL    Total NET: 450 mL      I independently performed a medically appropriate exam. The exam was notable for soft, not tender, not distended abdomen. No swelling in extremities.                          12.5   9.65  )-----------( 194      ( 30 May 2024 22:31 )             38.3     05-30    139  |  104  |  19  ----------------------------<  113<H>  4.5   |  24  |  1.3    Ca 9.0; Mg 2.2; Phos 3.7    Echocardiogram results reviewed: EF 55-60%, Grade II diastolic dysfunction, moderately enlarged left atrium, mild tricuspid regurgitation    Assessment/Plan:  74y Female PMH CAD s/p CABG and MVR on eliquis, HTN, HLD, DM s/p slip and fall while anticoagulated. Found to have traumatic R sylvian fissure subarachnoid hemorrhage. Possible syncopal event.  - R sylvian fissure subarachnoid hemorrhage - neurosurgery consulted, repeat CTH stable, ok to restart eliquis 7 days post fall, ok to continue ASA-81, keppra 500mg BID for 7 days, follow up with NSGY outpatient  - Age-indeterminate  basal ganglia lacunar infarct - consult neurology for evaluation  - HTN - continue amlodipine 5mg daily and metoprolol 50mg BID  - HLD - continue atorvastatin 80mg daily  - Anxiety disorder - continue bupropion 300mg daily and lexapro 20mg daily  - Synthroid - continue 50mcg daily  - Physical deconditioning - worked with PT  - DVT ppx  - Dispo: plan for home with home care; patient reports she lives home alone and has no assistance today, plan for discharge home tomorrow when she has more support at home    Dedrick Middleton MD  Trauma/Acute Care Surgery/Surgical Critical Care Attending Trauma/Acute Care Surgery Attending Note Attestation    Patient was seen and examined bedside.  I reviewed the resident/PA note and agreed with above assessment and plan with following additions and corrections.    Worked with PT. Reports she feels weak. Lives alone at home. Was able to do stairs with PT but has 13 stairs. Reports she feels winded after walking around the halls or up the stairs. Resting comfortably in chair.    T(C): 36.7 (05-31-24 @ 12:02), Max: 36.7 (05-30-24 @ 19:58)  HR: 70 (05-31-24 @ 12:02) (57 - 95)  BP: 117/75 (05-31-24 @ 12:02) (117/75 - 131/66)  RR: 18 (05-31-24 @ 12:02) (18 - 18)  SpO2: --      05-30-24 @ 07:01  -  05-31-24 @ 07:00  --------------------------------------------------------  IN:  Total IN: 0 mL    OUT:    Voided (mL): 500 mL  Total OUT: 500 mL    Total NET: -500 mL      05-31-24 @ 07:01  -  05-31-24 @ 13:50  --------------------------------------------------------  IN:    Oral Fluid: 450 mL  Total IN: 450 mL    OUT:  Total OUT: 0 mL    Total NET: 450 mL      I independently performed a medically appropriate exam. The exam was notable for soft, not tender, not distended abdomen. No swelling in extremities.                          12.5   9.65  )-----------( 194      ( 30 May 2024 22:31 )             38.3     05-30    139  |  104  |  19  ----------------------------<  113<H>  4.5   |  24  |  1.3    Ca 9.0; Mg 2.2; Phos 3.7    Echocardiogram results reviewed: EF 55-60%, Grade II diastolic dysfunction, moderately enlarged left atrium, mild tricuspid regurgitation    Assessment/Plan:  74y Female PMH CAD s/p CABG and MVR on eliquis, HTN, HLD, DM s/p slip and fall while anticoagulated. Found to have traumatic R sylvian fissure subarachnoid hemorrhage. Possible syncopal event.  - R sylvian fissure subarachnoid hemorrhage - neurosurgery consulted, repeat CTH stable, ok to restart eliquis 7 days post fall, ok to continue ASA-81, keppra 500mg BID for 7 days, follow up with NSGY outpatient  - Age-indeterminate  basal ganglia lacunar infarct - consult neurology for evaluation  - HTN - continue amlodipine 5mg daily and metoprolol 50mg BID  - HLD - continue atorvastatin 80mg daily  - Anxiety disorder - continue bupropion 300mg daily and lexapro 20mg daily  - Synthroid - continue 50mcg daily  - Physical deconditioning - PT evaluation for safe disposition and rehab  - DVT ppx  - Dispo: plan for home with home care; patient reports she lives home alone and has no assistance today, plan for discharge home tomorrow when she has more support at home    Dedrick Middleton MD  Trauma/Acute Care Surgery/Surgical Critical Care Attending Trauma/Acute Care Surgery Attending Note Attestation    Patient was seen and examined bedside.  I reviewed the resident/PA note and agreed with above assessment and plan with following additions and corrections.    Worked with PT. Reports she feels weak. Lives alone at home. Was able to do stairs with PT but has 13 stairs. Reports she feels winded after walking around the halls or up the stairs. Resting comfortably in chair.    T(C): 36.7 (05-31-24 @ 12:02), Max: 36.7 (05-30-24 @ 19:58)  HR: 70 (05-31-24 @ 12:02) (57 - 95)  BP: 117/75 (05-31-24 @ 12:02) (117/75 - 131/66)  RR: 18 (05-31-24 @ 12:02) (18 - 18)  SpO2: --      05-30-24 @ 07:01  -  05-31-24 @ 07:00  --------------------------------------------------------  IN:  Total IN: 0 mL    OUT:    Voided (mL): 500 mL  Total OUT: 500 mL    Total NET: -500 mL      05-31-24 @ 07:01  -  05-31-24 @ 13:50  --------------------------------------------------------  IN:    Oral Fluid: 450 mL  Total IN: 450 mL    OUT:  Total OUT: 0 mL    Total NET: 450 mL      I independently performed a medically appropriate exam. The exam was notable for soft, not tender, not distended abdomen. No swelling in extremities.                          12.5   9.65  )-----------( 194      ( 30 May 2024 22:31 )             38.3     05-30    139  |  104  |  19  ----------------------------<  113<H>  4.5   |  24  |  1.3    Ca 9.0; Mg 2.2; Phos 3.7    Echocardiogram results reviewed: EF 55-60%, Grade II diastolic dysfunction, moderately enlarged left atrium, mild tricuspid regurgitation    Assessment/Plan:  74y Female PMH CAD s/p CABG and MVR on eliquis, HTN, HLD, DM s/p slip and fall while anticoagulated. Found to have traumatic R sylvian fissure subarachnoid hemorrhage. Possible syncopal event.  - R sylvian fissure subarachnoid hemorrhage - neurosurgery consulted, repeat CTH stable, ok to restart eliquis 7 days post fall, ok to continue ASA-81, keppra 500mg BID for 7 days, follow up with NSGY outpatient  - Age-indeterminate  basal ganglia lacunar infarct - outpatient MRI brain and PCP/neurology follow up  - HTN - continue amlodipine 5mg daily and metoprolol 50mg BID  - HLD - continue atorvastatin 80mg daily  - Anxiety disorder - continue bupropion 300mg daily and lexapro 20mg daily  - Hypothyroidism - continue synthroid 50mcg daily  - Physical deconditioning - PT evaluation for safe disposition and rehab  - DVT ppx  - Dispo: plan for home with home care; patient reports she lives home alone and has no assistance today, plan for discharge home tomorrow when she has more support at home    Dedrick Middleton MD  Trauma/Acute Care Surgery/Surgical Critical Care Attending Trauma/Acute Care Surgery Attending Note Attestation    Patient was seen and examined bedside.  I reviewed the resident/PA note and agreed with above assessment and plan with following additions and corrections.    Worked with PT. Reports she feels weak. Lives alone at home. Was able to do stairs with PT but has 13 stairs. Reports she feels winded after walking around the halls or up the stairs. Resting comfortably in chair.    T(C): 36.7 (05-31-24 @ 12:02), Max: 36.7 (05-30-24 @ 19:58)  HR: 70 (05-31-24 @ 12:02) (57 - 95)  BP: 117/75 (05-31-24 @ 12:02) (117/75 - 131/66)  RR: 18 (05-31-24 @ 12:02) (18 - 18)  SpO2: --      05-30-24 @ 07:01  -  05-31-24 @ 07:00  --------------------------------------------------------  IN:  Total IN: 0 mL    OUT:    Voided (mL): 500 mL  Total OUT: 500 mL    Total NET: -500 mL      05-31-24 @ 07:01  -  05-31-24 @ 13:50  --------------------------------------------------------  IN:    Oral Fluid: 450 mL  Total IN: 450 mL    OUT:  Total OUT: 0 mL    Total NET: 450 mL      I independently performed a medically appropriate exam. The exam was notable for soft, not tender, not distended abdomen. No swelling in extremities.                          12.5   9.65  )-----------( 194      ( 30 May 2024 22:31 )             38.3     05-30    139  |  104  |  19  ----------------------------<  113<H>  4.5   |  24  |  1.3    Ca 9.0; Mg 2.2; Phos 3.7    Echocardiogram results reviewed: EF 55-60%, Grade II diastolic dysfunction, moderately enlarged left atrium, mild tricuspid regurgitation    Assessment/Plan:  74y Female PMH CAD s/p CABG and MVR on eliquis, HTN, HLD, DM s/p slip and fall while anticoagulated. Found to have traumatic R sylvian fissure subarachnoid hemorrhage. Possible syncopal event. Patient has BMI 41, which is associated with morbid obesity.  - R sylvian fissure subarachnoid hemorrhage - neurosurgery consulted, repeat CTH stable, ok to restart eliquis 7 days post fall, ok to continue ASA-81, keppra 500mg BID for 7 days, follow up with NSGY outpatient  - Age-indeterminate  basal ganglia lacunar infarct - outpatient MRI brain and PCP/neurology follow up  - HTN - continue amlodipine 5mg daily and metoprolol 50mg BID  - HLD - continue atorvastatin 80mg daily  - Anxiety disorder - continue bupropion 300mg daily and lexapro 20mg daily  - Hypothyroidism - continue synthroid 50mcg daily  - Physical deconditioning - PT evaluation for safe disposition and rehab  - DVT ppx  - Dispo: plan for home with home care; patient reports she lives home alone and has no assistance today, plan for discharge home tomorrow when she has more support at home    Dedrick Middleton MD  Trauma/Acute Care Surgery/Surgical Critical Care Attending

## 2024-05-31 NOTE — PHYSICAL THERAPY INITIAL EVALUATION ADULT - PERTINENT HX OF CURRENT PROBLEM, REHAB EVAL
74y Female with PMHx of DM, HLD, HTN, CAD s/o CABG and MVR on Eliquis, glaucoma, who presented to the ED s/p fall that occurred at 9pm on 5/28 while she was mopping her floors, admitted for traumatic SAH, stroke team consulted for age indeterminate left basal ganglia infarct on CTH. NIHSS 0, patient with baseline restriction of horizontal movement of the left eye and baseline left hearing loss. Recommend MRI of brain with and without contrast to determine chronicity of infarct.

## 2024-05-31 NOTE — PHYSICAL THERAPY INITIAL EVALUATION ADULT - GAIT TRAINING, PT EVAL
Increase amb to 200ft supervision with RW by d/c                               Stairs: 13 steps supervision with 1HR by d/c

## 2024-05-31 NOTE — CHART NOTE - NSCHARTNOTEFT_GEN_A_CORE
The patient is s/p CABG with mitral valve reconstruction in 2021, The wiring and material used for the reconstruction is compatible with the MRI as per CT surgery.    Ladonna Ortega PA-C

## 2024-05-31 NOTE — CDI QUERY NOTE - NSCDIOTHERTXTBX_GEN_ALL_CORE_HH
Clinical documentation and/or evidence of the patient’s presentation, evaluation, and medical management, as evidenced below, may support a diagnosis that is not documented in the medical record.  In order to ensure accurate coding and accuracy of the clinical record, the documentation in this patient’s medical record requires additional clarification.      If you think the supporting documentation and/or clinical evidence supports a more specific diagnosis, please include more specific documentation of a diagnosis associated with these findings in your progress note and/or discharge summary.  Please clarify if BMI 41.0 can be further specified as:    •	BMI 41 associated with morbid obesity  •	BMI 41 is clinically insignificant  •	Other (specify)  Supporting documentation and/or clinical evidence:     Flowsheet:  5/29 BMI 41, 95.3 kg    Thank you,  Loraine Echols RN, BSN  [phone]

## 2024-05-31 NOTE — PHYSICAL THERAPY INITIAL EVALUATION ADULT - GAIT PATTERN USED, PT EVAL
If you are a smoker, it is important for your health to stop smoking. Please be aware that second hand smoke is also harmful.
swing-through gait

## 2024-06-01 VITALS
DIASTOLIC BLOOD PRESSURE: 73 MMHG | HEART RATE: 62 BPM | OXYGEN SATURATION: 95 % | TEMPERATURE: 97 F | SYSTOLIC BLOOD PRESSURE: 131 MMHG | RESPIRATION RATE: 18 BRPM

## 2024-06-01 PROCEDURE — 99232 SBSQ HOSP IP/OBS MODERATE 35: CPT

## 2024-06-01 RX ORDER — LEVETIRACETAM 250 MG/1
1 TABLET, FILM COATED ORAL
Qty: 14 | Refills: 0
Start: 2024-06-01 | End: 2024-06-07

## 2024-06-01 RX ORDER — ACETAMINOPHEN 500 MG
2 TABLET ORAL
Qty: 0 | Refills: 0 | DISCHARGE
Start: 2024-06-01

## 2024-06-01 RX ADMIN — METHOCARBAMOL 500 MILLIGRAM(S): 500 TABLET, FILM COATED ORAL at 05:16

## 2024-06-01 RX ADMIN — LEVETIRACETAM 500 MILLIGRAM(S): 250 TABLET, FILM COATED ORAL at 05:15

## 2024-06-01 RX ADMIN — Medication 50 MICROGRAM(S): at 05:16

## 2024-06-01 RX ADMIN — ESCITALOPRAM OXALATE 20 MILLIGRAM(S): 10 TABLET, FILM COATED ORAL at 12:32

## 2024-06-01 RX ADMIN — BUPROPION HYDROCHLORIDE 300 MILLIGRAM(S): 150 TABLET, EXTENDED RELEASE ORAL at 12:32

## 2024-06-01 RX ADMIN — Medication 650 MILLIGRAM(S): at 05:16

## 2024-06-01 RX ADMIN — Medication 650 MILLIGRAM(S): at 12:32

## 2024-06-01 RX ADMIN — ENOXAPARIN SODIUM 40 MILLIGRAM(S): 100 INJECTION SUBCUTANEOUS at 12:32

## 2024-06-01 RX ADMIN — Medication 650 MILLIGRAM(S): at 05:46

## 2024-06-01 RX ADMIN — Medication 81 MILLIGRAM(S): at 12:32

## 2024-06-01 RX ADMIN — Medication 50 MILLIGRAM(S): at 05:15

## 2024-06-01 RX ADMIN — AMLODIPINE BESYLATE 5 MILLIGRAM(S): 2.5 TABLET ORAL at 05:15

## 2024-06-01 NOTE — PROGRESS NOTE ADULT - ASSESSMENT
74yFemale w/ PMHx of CAD s/p CABG, h/o PE on Eliquis, HTN, HLD, and Hypothyroidism, seen as a TRAUMA ALERT s/p syncopal fall (+HT, ?LOC, +AC). The following injuries were identified:  SAH.      PLAN:   - CTH #2 stable  - Continue q4h neuro checks   - Echo pending (for syncopal w/u)   - Diet/Fluids: Currently NPO w/ IVF @ 74 --> will advance to reg diet given stable CTH #2   - Continue holding AC for now   ------------------------------  Trauma Surgery  [id number]   Date/Time: 05-30-24 @ 09:40
  1. Mechanical fall leading to head trauma resulting in small SAH in a pt on anticoagulation (eliquis)   - Admitted to Trauma  -Headache. tylenol q6hr. optimize pain meds   - CTH 1:  a) Small subarachnoid hemorrhage in the right sylvian fissure.  b) Small patchy hypodensity in the right base ganglia could represent age indeterminate infarct.  - CTH 2:  a) Stable focal subarachnoid hemorrhage within the right sylvian fissure without mass effect or midline shift.  b) Age-indeterminate right basal ganglia lacunar infarct.  - MRI brain:pending   - Hold eliquis (her chadvasc is 5 which is high)   - Neuro and NSGY following     2. HTN - continue amlodipine 5mg daily and metoprolol 50mg BID    3. HLD - continue atorvastatin 80mg daily    4. Anxiety disorder - continue bupropion 300mg daily and lexapro 20mg daily    5. Synthroid - continue 50mcg daily    6. DVT/GI px   - Add SCD     Dispo as per surgery team     Total time spent to complete patient's bedside assessment, review medical chart, discuss medical plan of care with covering medical team was more than 35 minutes  with >50% of time spent face to face with patient, discussion with patient/family and/or coordination of care. My note supersedes them medical resident note in case of discrepancy.      Arline tomlin,    
#Mechanical fall leading to head trauma resulting in small SAH in a pt on anticoagulation (eliquis)   - Admitted to Trauma  -Headache. tylenol q6hr. optimize pain meds   - CTH 1:  a) Small subarachnoid hemorrhage in the right sylvian fissure.  b) Small patchy hypodensity in the right base ganglia could represent age indeterminate infarct.  - CTH 2:  a) Stable focal subarachnoid hemorrhage within the right sylvian fissure without mass effect or midline shift.  b) Age-indeterminate right basal ganglia lacunar infarct.  - MRI brain- Inpatient vs Outpatient.   - (chadvasc is 5 which is high)- Resume Eliquis when cleared by neurosurgery  - Neuro and NSGY following     #HTN - continue amlodipine 5mg daily and metoprolol 50mg BID    # HLD - continue atorvastatin 80mg daily    # Anxiety disorder - continue bupropion 300mg daily and lexapro 20mg daily    # Synthroid - continue 50mcg daily    Dispo as per surgery team   
74yFemale w/ PMHx of CAD s/p CABG, h/o PE on Eliquis, HTN, HLD, and Hypothyroidism, seen as a TRAUMA ALERT s/p syncopal fall (+HT, ?LOC, +AC). The following injuries were identified:  SAH.      PLAN:   -Neurology recommends MRI of the brain- can be done as an outpatient, the patient opts to have it done as an outpatient.  -Needs to work with PT, then can be discharged to home.  - Continue q4h neuro checks   - Echo within normal limits  - Regular diet  - Continue holding AC for now   ------------------------------  Trauma Surgery  [id number] 
ASSESSMENT:  74yFemale w/ PMHx of CAD s/p CABG, h/o PE on Eliquis, HTN, HLD, and Hypothyroidism, seen as a TRAUMA ALERT s/p syncopal fall (+HT, ?LOC, +AC). The following injuries were identified: SAH.      PLAN:   - Neurology recommends MRI of the brain -> F/U outpatient v/s inpatient  - PT: Home PT  - Continue Q4H neuro checks   - Echo within normal limits  - Regular diet  - Continue holding AC for now     [id number]

## 2024-06-01 NOTE — PROGRESS NOTE ADULT - SUBJECTIVE AND OBJECTIVE BOX
GENERAL SURGERY PROGRESS NOTE    Patient: MARTHA SAMUELS , 74y ([birth year])Female   MRN: [id number]  Location: [address]  Visit: 05-29-24 Inpatient  Date: 05-31-24 @ 09:31    Hospital Day #: 3    Events of past 24 hours: Needs physical therapy today then can be discharged to home. The patient will have MRI done as an outpatient.     PAST MEDICAL & SURGICAL HISTORY:  Hypertension, unspecified type  Hyperlipidemia, unspecified hyperlipidemia type  Type 2 diabetes mellitus without complication, without long-term current use of insulin  NOW RESOLVED. NO MEDS  H/O sleep apnea  Chronic sciatica  Glaucoma  CAD (coronary artery disease  H/O mitral valve stenosis  Hemothorax  s/p CABG X1 AND MITRAL REPAIR POST-OP  Lymphedema, limb  LLE  Skin cancer, basal cell  OA (osteoarthritis)  Hypothyroidism  H/O blood clots  in right lung 10/21- as per pt  History of hip surgery  bilateral hip  History of carpal tunnel surgery  B/L  S/P CABG (coronary artery bypass graft)  CABG X1 and repair of mitral valve 2/9/21  Status post Mohs surgery  RIGHT NARE WITH RECONSTRUCTION 2021  H/O cataract extraction  right IOL          Vitals:   T(F): 97.7 (05-31-24 @ 04:35), Max: 98 (05-30-24 @ 19:58)  HR: 57 (05-31-24 @ 04:35)  BP: 131/66 (05-31-24 @ 04:35)  RR: 18 (05-31-24 @ 04:35)  SpO2: 95% (05-30-24 @ 12:11)      Diet, Regular      Fluids:     I & O's:    05-30-24 @ 07:01  -  05-31-24 @ 07:00  --------------------------------------------------------  IN:  Total IN: 0 mL    OUT:    Voided (mL): 500 mL  Total OUT: 500 mL    Total NET: -500 mL    PHYSICAL EXAM:  GENERAL: NAD, well-appearing  CHEST/LUNG: Clear to auscultation bilaterally  HEART: Regular rate and rhythm  ABDOMEN: Soft, Nontender, Nondistended;   EXTREMITIES:  No clubbing, cyanosis, or edema      MEDICATIONS  (STANDING):  acetaminophen     Tablet .. 650 milliGRAM(s) Oral every 6 hours  amLODIPine   Tablet 5 milliGRAM(s) Oral daily  aspirin  chewable 81 milliGRAM(s) Oral daily  atorvastatin 80 milliGRAM(s) Oral at bedtime  buPROPion XL (24-Hour) . 300 milliGRAM(s) Oral daily  enoxaparin Injectable 40 milliGRAM(s) SubCutaneous every 24 hours  escitalopram 20 milliGRAM(s) Oral daily  levETIRAcetam 500 milliGRAM(s) Oral two times a day  levothyroxine 50 MICROGram(s) Oral daily  methocarbamol 500 milliGRAM(s) Oral two times a day  metoprolol tartrate 50 milliGRAM(s) Oral every 12 hours    MEDICATIONS  (PRN):  traMADol 50 milliGRAM(s) Oral every 6 hours PRN Severe Pain (7 - 10)      DVT PROPHYLAXIS: enoxaparin Injectable 40 milliGRAM(s) SubCutaneous every 24 hours    GI PROPHYLAXIS:   ANTICOAGULATION:   ANTIBIOTICS:      LAB/STUDIES:  Labs:  CAPILLARY BLOOD GLUCOSE                        12.5   9.65  )-----------( 194      ( 30 May 2024 22:31 )             38.3       Auto Neutrophil %: 64.7 % (05-30-24 @ 22:31)  Auto Immature Granulocyte %: 0.2 % (05-30-24 @ 22:31)    05-30    139  |  104  |  19  ----------------------------<  113<H>  4.5   |  24  |  1.3      Calcium: 9.0 mg/dL (05-30-24 @ 22:31)      LFTs:             6.2  | 0.4  | 27       ------------------[79      ( 29 May 2024 17:05 )  4.1  | x    | 17          Lipase:35        Lactate, Blood: 1.1 mmol/L (05-29-24 @ 17:05)      Coags:     17.90  ----< 1.56    ( 29 May 2024 17:05 )     34.5        Urinalysis Basic - ( 30 May 2024 22:31 )    Color: x / Appearance: x / SG: x / pH: x  Gluc: 113 mg/dL / Ketone: x  / Bili: x / Urobili: x   Blood: x / Protein: x / Nitrite: x   Leuk Esterase: x / RBC: x / WBC x   Sq Epi: x / Non Sq Epi: x / Bacteria: x    IMAGING:  n/a    ACCESS/ DEVICES:  [ x Peripheral IV          
MARTHA SAMUELS  74y, Female    LOS  3d    INTERVAL EVENTS/HPI  - No acute events overnight  - T(F): , Max: 97.8 (06-01-24 @ 05:40)  - Tolerating medication  - WBC Count: 9.65 (05-30-24 @ 22:31)  WBC Count: 8.39 (05-29-24 @ 20:46)  - Creatinine: 1.3 (05-30-24 @ 22:31)    REVIEW OF SYSTEMS:  CONSTITUTIONAL: No fever or chills  HEENT: No sore throat  RESPIRATORY: No cough, no shortness of breath  CARDIOVASCULAR: No chest pain or palpitations  GASTROINTESTINAL: No abdominal or epigastric pain  GENITOURINARY: No dysuria  NEUROLOGICAL: No headache/dizziness  MSK: No joint pain, erythema, or swelling; no back pain  SKIN: No itching, rashes  All other ROS negative except noted above    Prior hospital charts reviewed [Yes]  Primary team notes reviewed [Yes]  Other consultant notes reviewed [Yes]    ANTIMICROBIALS:       OTHER MEDS: MEDICATIONS  (STANDING):  acetaminophen     Tablet .. 650 every 6 hours  amLODIPine   Tablet 5 daily  aspirin  chewable 81 daily  atorvastatin 80 at bedtime  buPROPion XL (24-Hour) . 300 daily  enoxaparin Injectable 40 every 24 hours  escitalopram 20 daily  levETIRAcetam 500 two times a day  levothyroxine 50 daily  methocarbamol 500 two times a day  metoprolol tartrate 50 every 12 hours  traMADol 50 every 6 hours PRN      Vital Signs Last 24 Hrs  T(F): 97.3 (06-01-24 @ 13:48), Max: 98.2 (05-29-24 @ 14:49)    Vital Signs Last 24 Hrs  HR: 62 (06-01-24 @ 13:48) (62 - 74)  BP: 131/73 (06-01-24 @ 13:48) (116/76 - 131/73)  RR: 18 (06-01-24 @ 13:48)  SpO2: 95% (06-01-24 @ 13:48) (95% - 96%)  Wt(kg): --    EXAM:  GENERAL: In No acute distress.   HEART: Regular rate and rhythm  LUNGS: Unlabored respirations.  Clear to auscultation bilaterally,  ABDOMEN: Soft, nontender, nondistended  EXTREMITIES: 2+ peripheral pulses bilaterally.  NERVOUS SYSTEM:  A&Ox3,      Labs:                        12.5   9.65  )-----------( 194      ( 30 May 2024 22:31 )             38.3     05-30    139  |  104  |  19  ----------------------------<  113<H>  4.5   |  24  |  1.3    Ca    9.0      30 May 2024 22:31  Phos  3.7     05-30  Mg     2.2     05-30        WBC Trend:  WBC Count: 9.65 (05-30-24 @ 22:31)  WBC Count: 8.39 (05-29-24 @ 20:46)  WBC Count: 9.47 (05-29-24 @ 17:05)      Creatine Trend:  Creatinine: 1.3 (05-30)  Creatinine: 1.2 (05-29)  Creatinine: 1.3 (05-29)      Liver Biochemical Testing Trend:  Alanine Aminotransferase (ALT/SGPT): 17 (05-29)  Aspartate Aminotransferase (AST/SGOT): 27 (05-29-24 @ 17:05)  Bilirubin Total: 0.4 (05-29)      Trend LDH      Urinalysis Basic - ( 30 May 2024 22:31 )    Color: x / Appearance: x / SG: x / pH: x  Gluc: 113 mg/dL / Ketone: x  / Bili: x / Urobili: x   Blood: x / Protein: x / Nitrite: x   Leuk Esterase: x / RBC: x / WBC x   Sq Epi: x / Non Sq Epi: x / Bacteria: x        MICROBIOLOGY:    Female  Lactate, Blood: 1.1 (05-29 @ 17:05)        RADIOLOGY & ADDITIONAL TESTS:  I have personally reviewed the relevant images.   CXR  Xray Chest 1 View- PORTABLE-Urgent:   ACC: [id number] EXAM:  XR CHEST PORTABLE URGENT 1V   ORDERED BY: MICHAEL NDIAYE     PROCEDURE DATE:  05/29/2024          INTERPRETATION:  CLINICAL HISTORY / REASON FOR EXAM: Shortness of breath.    COMPARISON: Chest radiograph from January 19, 2022.    TECHNIQUE/POSITIONING: Satisfactory. Single image, AP chest radiograph.    FINDINGS:    SUPPORT DEVICES: None.    CARDIAC/MEDIASTINUM/HILUM: Post sternotomy.    LUNG PARENCHYMA/PLEURA: Left lower lobe opacity. No pneumothorax.    SKELETON/SOFT TISSUES: Unchanged.      IMPRESSION:    Left lower lobe opacity.    --- End of Report ---            HILLARY RAMJIT MD; Attending Radiologist  This document has been electronically signed. May 29 2024  3:33PM (05-29-24 @ 15:37)      CT    < from: CT Head No Cont (05.29.24 @ 22:27) >  IMPRESSION:  No significant change since same day head CT.    Stable focal subarachnoid hemorrhage within the right sylvian fissure   without mass effect or midline shift.    Age-indeterminate right basal ganglia lacunar infarct.    < end of copied text >  < from: Xray Pelvis AP only (05.29.24 @ 15:37) >  IMPRESSION:    No acute displaced fracture.    --- End of Report ---      < end of copied text >      WEIGHT  Weight (kg): 95.3 (05-29-24 @ 22:46)      All available historical records have been reviewed      
GENERAL SURGERY PROGRESS NOTE     MARTHA SAMUELS  74y  Female  Hospital day :4d     OVERNIGHT EVENTS:  - No acute events overnight  - Remained hemodynamically stable    T(F): 97.8 (06-01-24 @ 05:40), Max: 98 (05-31-24 @ 12:02)  HR: 74 (06-01-24 @ 05:40) (62 - 74)  BP: 127/71 (06-01-24 @ 05:40) (116/76 - 128/81)  RR: 18 (06-01-24 @ 05:40) (18 - 18)  SpO2: 95% (06-01-24 @ 05:40) (95% - 95%)    AC/ proph: aspirin  chewable 81 milliGRAM(s) Oral daily  enoxaparin Injectable 40 milliGRAM(s) SubCutaneous every 24 hours    PHYSICAL EXAM:  GENERAL: NAD  CHEST/LUNG: Clear to auscultation bilaterally  HEART: Regular rate and rhythm  ABDOMEN: Soft, Nontender, Nondistended;       LABS  Labs:               12.5   9.65  )-----------( 194      ( 30 May 2024 22:31 )             38.3         05-30    139  |  104  |  19  ----------------------------<  113<H>  4.5   |  24  |  1.3          LFTs:     Lactate, Blood: 1.1 mmol/L (05-29-24 @ 17:05)      Urinalysis Basic - ( 30 May 2024 22:31 )    Color: x / Appearance: x / SG: x / pH: x  Gluc: 113 mg/dL / Ketone: x  / Bili: x / Urobili: x   Blood: x / Protein: x / Nitrite: x   Leuk Esterase: x / RBC: x / WBC x   Sq Epi: x / Non Sq Epi: x / Bacteria: x    
    MARTHA SAMUELS  74y, Female  Allergy: [This allergen will not trigger allergy alert] sores in mouth (Blisters)  No Known Drug Allergies    Hospital Day: 2d    Patient seen and examined earlier today. Feeling well no complaints.     PMH/PSH:  PAST MEDICAL & SURGICAL HISTORY:  Hypertension, unspecified type      Hyperlipidemia, unspecified hyperlipidemia type      Type 2 diabetes mellitus without complication, without long-term current use of insulin  NOW RESOLVED. NO MEDS      H/O sleep apnea      Chronic sciatica      Glaucoma      CAD (coronary artery disease)      H/O mitral valve stenosis      Hemothorax  s/p CABG X1 AND MITRAL REPAIR POST-OP      Lymphedema, limb  LLE      Skin cancer, basal cell      OA (osteoarthritis)      Hypothyroidism      H/O blood clots  in right lung 10/21- as per pt      History of hip surgery  bilateral hip      History of carpal tunnel surgery  B/L      S/P CABG (coronary artery bypass graft)  CABG X1 and repair of mitral valve 2/9/21      Status post Mohs surgery  RIGHT NARE WITH RECONSTRUCTION 2021      H/O cataract extraction  right IOL          LAST 24-Hr EVENTS:    VITALS:  T(F): 98 (05-31-24 @ 12:02), Max: 98 (05-30-24 @ 19:58)  HR: 67 (05-31-24 @ 17:44)  BP: 128/81 (05-31-24 @ 17:44) (117/75 - 131/66)  RR: 18 (05-31-24 @ 12:02)  SpO2: --        TESTS & MEASUREMENTS:  Weight (Kg): 95.3 (05-29-24 @ 22:46)  BMI (kg/m2): 41 (05-29)    05-29-24 @ 07:01  -  05-30-24 @ 07:00  --------------------------------------------------------  IN: 575 mL / OUT: 450 mL / NET: 125 mL    05-30-24 @ 07:01  -  05-31-24 @ 07:00  --------------------------------------------------------  IN: 0 mL / OUT: 500 mL / NET: -500 mL    05-31-24 @ 07:01  -  05-31-24 @ 17:56  --------------------------------------------------------  IN: 450 mL / OUT: 600 mL / NET: -150 mL                            12.5   9.65  )-----------( 194      ( 30 May 2024 22:31 )             38.3       05-30    139  |  104  |  19  ----------------------------<  113<H>  4.5   |  24  |  1.3    Ca    9.0      30 May 2024 22:31  Phos  3.7     05-30  Mg     2.2     05-30              Urinalysis Basic - ( 30 May 2024 22:31 )    Color: x / Appearance: x / SG: x / pH: x  Gluc: 113 mg/dL / Ketone: x  / Bili: x / Urobili: x   Blood: x / Protein: x / Nitrite: x   Leuk Esterase: x / RBC: x / WBC x   Sq Epi: x / Non Sq Epi: x / Bacteria: x                  RADIOLOGY, ECG, & ADDITIONAL TESTS:      RECENT DIAGNOSTIC ORDERS:      MEDICATIONS:  MEDICATIONS  (STANDING):  acetaminophen     Tablet .. 650 milliGRAM(s) Oral every 6 hours  amLODIPine   Tablet 5 milliGRAM(s) Oral daily  aspirin  chewable 81 milliGRAM(s) Oral daily  atorvastatin 80 milliGRAM(s) Oral at bedtime  buPROPion XL (24-Hour) . 300 milliGRAM(s) Oral daily  enoxaparin Injectable 40 milliGRAM(s) SubCutaneous every 24 hours  escitalopram 20 milliGRAM(s) Oral daily  levETIRAcetam 500 milliGRAM(s) Oral two times a day  levothyroxine 50 MICROGram(s) Oral daily  methocarbamol 500 milliGRAM(s) Oral two times a day  metoprolol tartrate 50 milliGRAM(s) Oral every 12 hours    MEDICATIONS  (PRN):  traMADol 50 milliGRAM(s) Oral every 6 hours PRN Severe Pain (7 - 10)      HOME MEDICATIONS:  amLODIPine 5 mg oral tablet (05-29)  aspirin 81 mg oral delayed release tablet (05-29)  buPROPion 300 mg/24 hours (XL) oral tablet, extended release (05-29)  Eliquis 5 mg oral tablet (05-29)  escitalopram 20 mg oral tablet (05-29)  furosemide 20 mg oral tablet (05-29)  latanoprost 0.005% ophthalmic solution (05-29)  levothyroxine 50 mcg (0.05 mg) oral tablet (05-29)  metoprolol tartrate 50 mg oral tablet (05-29)  olopatadine 0.2% ophthalmic solution (05-29)  oxybutynin 10 mg/24 hr oral tablet, extended release (05-29)  Probiotic Formula oral capsule (05-29)  rosuvastatin 40 mg oral tablet (05-29)      PHYSICAL EXAM:  GENERAL: comfortable, no distress  HEART: Regular rate and rhythm  LUNGS: Unlabored respirations.  Clear to auscultation bilaterally,  ABDOMEN: Soft, nontender, nondistended, +BS  EXTREMITIES: 2+ peripheral pulses bilaterally.  NERVOUS SYSTEM:  A&Ox3,    
 TRAUMA SURGERY PROGRESS NOTE    Patient: MARTHA SAMUELS , 74y ([birth year])Female   MRN: [id number]  Location: [address]  Visit: 05-29-24 Inpatient  Date: 05-30-24 @ 09:40    Hospital Day #:  2     INJURIES / DIAGNOSIS / PROCEDURES:  SAH     INTERVAL HX:  Patient doing well. AAOx3.   No complaints.     VITALS:   T(F): 97.7 (05-30-24 @ 04:10), Max: 98.2 (05-29-24 @ 14:49)  HR: 65 (05-30-24 @ 04:10)  BP: 149/80 (05-30-24 @ 04:10)  RR: 16 (05-30-24 @ 08:10)  SpO2: 97% (05-30-24 @ 08:10)        05-29 @ 07:01  -  05-30 @ 07:00  --------------------------------------------------------  OUT:    Voided (mL): 450 mL  Total OUT: 450 mL    PHYSICAL EXAM:  General Appearance: NAD, AAOx3   HEENT: EOMI, sclera non-icteric.  Heart: Regular rate   Lungs: Breathing comfortably  Abdomen:  Soft, nontender, nondistended.   MSK/Extremities: Warm & well-perfused.   Skin: Warm, dry. No jaundice.     LABS:                        11.7   8.39  )-----------( 187       05-29 @ 20:46             36.0     139  |  102  |  26  ----------------------------<  90        05-29 @ 20:46  4.1   |  27  |  1.2      Coagulation Studies - 05-29 @ 17:05  PT: 17.90 sec<H>  PTT: 34.5 sec  INR: 1.56 ratio<H>    Calcium: 8.8 mg/dL (05-29 @ 20:46)  Magnesium: 2.1 mg/dL (05-29 @ 20:46)  Phosphorus: 4.1 mg/dL (05-29 @ 20:46)    Lactate, Blood: 1.1 mmol/L (05-29-24 @ 17:05)    LIVER FUNCTIONS - ( 29 May 2024 17:05 )  Alb: 4.1 g/dL / Pro: 6.2 g/dL / ALK PHOS: 79 U/L / ALT: 17 U/L / AST: 27 U/L / GGT: x             RADIOLOGY & OTHER STUDIES:   < from: CT Head No Cont (05.29.24 @ 22:27) >  IMPRESSION:  No significant change since same day head CT.    Stable focal subarachnoid hemorrhage within the right sylvian fissure   without mass effect or midline shift.    Age-indeterminate right basal ganglia lacunar infarct.    --- End of Report ---    < end of copied text >        ACCESS DEVICES:  [X] Peripheral IV  [ ] Central Venous Line	[ ] R	[ ] L	[ ] IJ	[ ] Fem	[ ] SC	Placed:   [ ] Arterial Line		[ ] R	[ ] L	[ ] Fem	[ ] Rad	[ ] Ax	Placed:   [ ] PICC:					[ ] Mediport  [ ] Urinary Catheter,  Date Placed:   [ ] Chest tube: [ ] Right, [ ] Left  [ ] SOLITARIO/Jose D Drains

## 2024-06-01 NOTE — PROGRESS NOTE ADULT - ATTENDING COMMENTS
Trauma/Acute Care Surgery Attending Note Attestation    Patient was seen and examined bedside.  I reviewed the resident/PA note and agreed with above assessment and plan with following additions and corrections.    No acute events overnight. Last BM yesterday. Tolerating diet. Just complaining of pain at back of head.    T(C): 36.6 (06-01-24 @ 05:40), Max: 36.7 (05-31-24 @ 12:02)  HR: 74 (06-01-24 @ 05:40) (62 - 74)  BP: 127/71 (06-01-24 @ 05:40) (116/76 - 128/81)  RR: 18 (06-01-24 @ 05:40) (18 - 18)  SpO2: 96% (06-01-24 @ 08:45) (95% - 96%)      05-31-24 @ 07:01  -  06-01-24 @ 07:00  --------------------------------------------------------  IN:    Oral Fluid: 910 mL  Total IN: 910 mL    OUT:    Voided (mL): 1625 mL  Total OUT: 1625 mL    Total NET: -715 mL    I independently performed a medically appropriate exam. The exam was notable for improved swelling of scalp hematoma. Moving all extremities. Abdomen soft, not tender, not distended.                          12.5   9.65  )-----------( 194      ( 30 May 2024 22:31 )             38.3     05-30    139  |  104  |  19  ----------------------------<  113<H>  4.5   |  24  |  1.3    Ca 9.0; Mg 2.2; Phos 3.7    Lactate, Blood: 1.1 mmol/L (05-29 @ 17:05)        Assessment/Plan:  74y Female PMH CAD s/p CABG and MVR on eliquis, HTN, HLD, DM s/p slip and fall while anticoagulated. Found to have traumatic R sylvian fissure subarachnoid hemorrhage. Possible syncopal event. Patient has BMI 41, which is associated with morbid obesity.  - R sylvian fissure subarachnoid hemorrhage - neurosurgery consulted, repeat CTH stable, ok to restart eliquis 7 days post fall, ok to continue ASA-81, keppra 500mg BID for 7 days, follow up with NSGY outpatient  - Age-indeterminate  basal ganglia lacunar infarct - outpatient MRI brain and PCP/neurology follow up (if able to get MRI before discharge, will attempt to get MRI)  - HTN - continue amlodipine 5mg daily and metoprolol 50mg BID  - HLD - continue atorvastatin 80mg daily  - Anxiety disorder - continue bupropion 300mg daily and lexapro 20mg daily  - Hypothyroidism - continue synthroid 50mcg daily  - Physical deconditioning - PT evaluation for safe disposition and rehab, recommending home with supervision  - DVT ppx  - Dispo: plan for home with home; plan to discharge home today if unable to get MRI of brain, which can be done as outpatient    Dedrick Middleton MD  Trauma/Acute Care Surgery/Surgical Critical Care Attending

## 2024-06-03 ENCOUNTER — APPOINTMENT (OUTPATIENT)
Dept: OTOLARYNGOLOGY | Facility: CLINIC | Age: 74
End: 2024-06-03
Payer: MEDICARE

## 2024-06-03 PROCEDURE — V5221J: CUSTOM | Mod: GY

## 2024-06-03 PROCEDURE — V5181J: CUSTOM

## 2024-06-07 DIAGNOSIS — Z79.890 HORMONE REPLACEMENT THERAPY: ICD-10-CM
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
## 2024-06-07 DIAGNOSIS — Z98.41 CATARACT EXTRACTION STATUS, RIGHT EYE: ICD-10-CM

## 2024-06-07 DIAGNOSIS — E78.5 HYPERLIPIDEMIA, UNSPECIFIED: ICD-10-CM

## 2024-06-07 DIAGNOSIS — E03.9 HYPOTHYROIDISM, UNSPECIFIED: ICD-10-CM

## 2024-06-07 DIAGNOSIS — I25.10 ATHEROSCLEROTIC HEART DISEASE OF NATIVE CORONARY ARTERY WITHOUT ANGINA PECTORIS: ICD-10-CM

## 2024-06-07 DIAGNOSIS — S06.6X0A TRAUMATIC SUBARACHNOID HEMORRHAGE WITHOUT LOSS OF CONSCIOUSNESS, INITIAL ENCOUNTER: ICD-10-CM

## 2024-06-07 DIAGNOSIS — Z91.040 LATEX ALLERGY STATUS: ICD-10-CM

## 2024-06-07 DIAGNOSIS — F41.9 ANXIETY DISORDER, UNSPECIFIED: ICD-10-CM

## 2024-06-07 DIAGNOSIS — Z79.01 LONG TERM (CURRENT) USE OF ANTICOAGULANTS: ICD-10-CM
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
## 2024-06-07 DIAGNOSIS — I10 ESSENTIAL (PRIMARY) HYPERTENSION: ICD-10-CM

## 2024-06-07 DIAGNOSIS — E66.01 MORBID (SEVERE) OBESITY DUE TO EXCESS CALORIES: ICD-10-CM
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
## 2024-06-07 DIAGNOSIS — Z96.1 PRESENCE OF INTRAOCULAR LENS: ICD-10-CM

## 2024-06-07 DIAGNOSIS — R51.9 HEADACHE, UNSPECIFIED: ICD-10-CM

## 2024-06-07 DIAGNOSIS — Z95.1 PRESENCE OF AORTOCORONARY BYPASS GRAFT: ICD-10-CM

## 2024-06-18 ENCOUNTER — NON-APPOINTMENT (OUTPATIENT)
Age: 74
End: 2024-06-18

## 2024-06-19 ENCOUNTER — APPOINTMENT (OUTPATIENT)
Dept: NEUROLOGY | Facility: CLINIC | Age: 74
End: 2024-06-19
Payer: MEDICARE

## 2024-06-19 VITALS
SYSTOLIC BLOOD PRESSURE: 117 MMHG | HEIGHT: 59 IN | HEART RATE: 53 BPM | DIASTOLIC BLOOD PRESSURE: 75 MMHG | WEIGHT: 210 LBS | BODY MASS INDEX: 42.33 KG/M2

## 2024-06-19 DIAGNOSIS — I63.81 OTHER CEREBRAL INFARCTION DUE TO OCCLUSION OR STENOSIS OF SMALL ARTERY: ICD-10-CM

## 2024-06-19 DIAGNOSIS — S06.6XAA TRAUMATIC SUBARACHNOID HEMORRHAGE WITH LOSS OF CONSCIOUSNESS STATUS UNKNOWN, INITIAL ENCOUNTER: ICD-10-CM

## 2024-06-19 PROCEDURE — G2211 COMPLEX E/M VISIT ADD ON: CPT

## 2024-06-19 PROCEDURE — 99215 OFFICE O/P EST HI 40 MIN: CPT

## 2024-06-19 RX ORDER — LEVETIRACETAM 1000 MG/1
TABLET, FILM COATED ORAL
Refills: 0 | Status: ACTIVE | COMMUNITY

## 2024-06-21 LAB
ALBUMIN SERPL ELPH-MCNC: 4.5 G/DL
ALP BLD-CCNC: 92 U/L
ALT SERPL-CCNC: 26 U/L
ANION GAP SERPL CALC-SCNC: 11 MMOL/L
AST SERPL-CCNC: 22 U/L
BILIRUB SERPL-MCNC: 0.5 MG/DL
BUN SERPL-MCNC: 30 MG/DL
CALCIUM SERPL-MCNC: 9.3 MG/DL
CHLORIDE SERPL-SCNC: 101 MMOL/L
CHOLEST SERPL-MCNC: 172 MG/DL
CO2 SERPL-SCNC: 31 MMOL/L
CREAT SERPL-MCNC: 1.2 MG/DL
EGFR: 47 ML/MIN/1.73M2
ESTIMATED AVERAGE GLUCOSE: 123 MG/DL
GLUCOSE SERPL-MCNC: 112 MG/DL
HBA1C MFR BLD HPLC: 5.9 %
HDLC SERPL-MCNC: 47 MG/DL
LDLC SERPL CALC-MCNC: 104 MG/DL
NONHDLC SERPL-MCNC: 125 MG/DL
POTASSIUM SERPL-SCNC: 4.6 MMOL/L
PROT SERPL-MCNC: 6.2 G/DL
SODIUM SERPL-SCNC: 143 MMOL/L
TRIGL SERPL-MCNC: 106 MG/DL

## 2024-06-21 NOTE — ASSESSMENT
[FreeTextEntry1] : Ms. SAMUELS 74 year old female presents for a HFU to discuss chronic CVA seen on CTH.  Initially presented for SAH s/p trauma, she is doing well since leaving hospital, had one episode of soreness over her neck/back of head which she noted correlated w bruising. She has restarted her Eliquis, is on ASA as well. She was recommended to obtain OP MR head at [place].   Plan: -Confirm lacunar infarct w MR head -Repeat CTH to monitor bleed -F/u scheduled w neurosurgery  -Will check vascular rx factor labs -Avoid NSAIDS, no smoking, avoid alcohol -RTO 3 mos w neurology attending

## 2024-06-21 NOTE — PHYSICAL EXAM
[FreeTextEntry1] : Mental status: Awake, alert and oriented x3.  Recent and remote memory intact.  Naming, repetition and comprehension intact.  Attention/concentration intact.  No dysarthria, no aphasia.  Fund of knowledge appropriate.   Cranial nerves: Pupils equally round and reactive to light, visual fields full, no nystagmus, extraocular muscles limited in left eye (chronic), V1 through V3 intact bilaterally and symmetric, face asymmetric (R facial Chronic 2/2 surgery), hearing intact to finger rub, palate elevation symmetric, tongue was midline.  Motor:  MRC grading 5/5 b/l UE/LE except L hip flexion 4/5 (chronic).   strength 5/5.  Normal tone and bulk.  No abnormal movements.   Sensation: Intact to light touch, temperature, vibration, pp. Inconsistent variations in PP and light touch in LE.   Coordination: No dysmetria on finger-to-nose   Reflexes: 2+ in bilateral UE/LE.  Gait: Narrow and steady.

## 2024-06-21 NOTE — HISTORY OF PRESENT ILLNESS
[FreeTextEntry1] : Ms. SAMUELS 74 year old female presents for a HFU re traumatic SAH S/p fall on Eliquis and ASA. Was dc with plan to f/u w neurosurgery, restart Eliquis 5 mg BID 6/5, on ASA 81 mg, and Keppra x7 days. During CTH eval noted lacunar infarct. MRI brain to be done outpatient. Here today to discuss infarct on CTH.   She has restarted the ASA and Eliquis. Feels she is doing well overall. Denies new neurologic symptoms since discharge from the hospital. She had pain around the neck area a few days ago and noted she also had a bruise in the area (right side of head). Pain has since been improving -no longer has the pain. She says her balance is an issue, but this is chronic since prior to hospital visit, additionally notes her left leg is weaker than the right also chronic following hip replacement in 2020.

## 2024-06-25 ENCOUNTER — APPOINTMENT (OUTPATIENT)
Dept: CARDIOLOGY | Facility: CLINIC | Age: 74
End: 2024-06-25
Payer: MEDICARE

## 2024-06-25 VITALS — DIASTOLIC BLOOD PRESSURE: 70 MMHG | SYSTOLIC BLOOD PRESSURE: 110 MMHG | RESPIRATION RATE: 18 BRPM

## 2024-06-25 VITALS — HEART RATE: 56 BPM | WEIGHT: 218 LBS | HEIGHT: 59 IN | BODY MASS INDEX: 43.95 KG/M2

## 2024-06-25 DIAGNOSIS — I35.0 NONRHEUMATIC AORTIC (VALVE) STENOSIS: ICD-10-CM

## 2024-06-25 DIAGNOSIS — Z95.1 PRESENCE OF AORTOCORONARY BYPASS GRAFT: ICD-10-CM

## 2024-06-25 DIAGNOSIS — Z98.890 OTHER SPECIFIED POSTPROCEDURAL STATES: ICD-10-CM

## 2024-06-25 DIAGNOSIS — I25.10 ATHEROSCLEROTIC HEART DISEASE OF NATIVE CORONARY ARTERY W/OUT ANGINA PECTORIS: ICD-10-CM

## 2024-06-25 DIAGNOSIS — I10 ESSENTIAL (PRIMARY) HYPERTENSION: ICD-10-CM

## 2024-06-25 DIAGNOSIS — E11.9 TYPE 2 DIABETES MELLITUS W/OUT COMPLICATIONS: ICD-10-CM

## 2024-06-25 DIAGNOSIS — I34.0 NONRHEUMATIC MITRAL (VALVE) INSUFFICIENCY: ICD-10-CM

## 2024-06-25 DIAGNOSIS — E78.5 HYPERLIPIDEMIA, UNSPECIFIED: ICD-10-CM

## 2024-06-25 PROCEDURE — 99214 OFFICE O/P EST MOD 30 MIN: CPT

## 2024-06-25 PROCEDURE — 93000 ELECTROCARDIOGRAM COMPLETE: CPT

## 2024-06-25 PROCEDURE — G2211 COMPLEX E/M VISIT ADD ON: CPT

## 2024-06-26 ENCOUNTER — NON-APPOINTMENT (OUTPATIENT)
Age: 74
End: 2024-06-26

## 2024-06-27 ENCOUNTER — EMERGENCY (EMERGENCY)
Facility: HOSPITAL | Age: 74
LOS: 0 days | Discharge: HOME | End: 2024-06-27
Attending: EMERGENCY MEDICINE
Payer: MEDICARE

## 2024-06-27 ENCOUNTER — NON-APPOINTMENT (OUTPATIENT)
Age: 74
End: 2024-06-27

## 2024-06-27 VITALS
OXYGEN SATURATION: 96 % | SYSTOLIC BLOOD PRESSURE: 93 MMHG | TEMPERATURE: 98 F | WEIGHT: 210.1 LBS | HEART RATE: 60 BPM | DIASTOLIC BLOOD PRESSURE: 61 MMHG | HEIGHT: 60 IN | RESPIRATION RATE: 18 BRPM

## 2024-06-27 DIAGNOSIS — Z98.890 OTHER SPECIFIED POSTPROCEDURAL STATES: Chronic | ICD-10-CM

## 2024-06-27 DIAGNOSIS — Z79.01 LONG TERM (CURRENT) USE OF ANTICOAGULANTS: ICD-10-CM

## 2024-06-27 DIAGNOSIS — I11.9 HYPERTENSIVE HEART DISEASE WITHOUT HEART FAILURE: ICD-10-CM

## 2024-06-27 DIAGNOSIS — Z01.89 ENCOUNTER FOR OTHER SPECIFIED SPECIAL EXAMINATIONS: ICD-10-CM

## 2024-06-27 DIAGNOSIS — E11.9 TYPE 2 DIABETES MELLITUS WITHOUT COMPLICATIONS: ICD-10-CM

## 2024-06-27 DIAGNOSIS — H40.9 UNSPECIFIED GLAUCOMA: ICD-10-CM

## 2024-06-27 DIAGNOSIS — Z98.49 CATARACT EXTRACTION STATUS, UNSPECIFIED EYE: Chronic | ICD-10-CM

## 2024-06-27 DIAGNOSIS — Z95.1 PRESENCE OF AORTOCORONARY BYPASS GRAFT: ICD-10-CM

## 2024-06-27 DIAGNOSIS — E78.5 HYPERLIPIDEMIA, UNSPECIFIED: ICD-10-CM

## 2024-06-27 DIAGNOSIS — Z95.1 PRESENCE OF AORTOCORONARY BYPASS GRAFT: Chronic | ICD-10-CM

## 2024-06-27 PROCEDURE — 70450 CT HEAD/BRAIN W/O DYE: CPT | Mod: 26,MC

## 2024-06-27 PROCEDURE — 99284 EMERGENCY DEPT VISIT MOD MDM: CPT | Mod: 25

## 2024-06-27 PROCEDURE — 99284 EMERGENCY DEPT VISIT MOD MDM: CPT

## 2024-06-27 PROCEDURE — 70450 CT HEAD/BRAIN W/O DYE: CPT | Mod: MC

## 2024-06-27 NOTE — ED ADULT NURSE NOTE - OBJECTIVE STATEMENT
Patient in NAD a+ox3 reports was sent by neurologist for repeat CT of the head, pt states was recently in hospital being treated for SAH discharged on 6/4 and restarted Eliquis 6/5- pt denies any medical complaints.

## 2024-06-27 NOTE — ED PROVIDER NOTE - MDM ORDERS SUBMITTED SELECTION
-followed by NO pain management, Dr. Thacker  -s/p JULIETTE in Sept 2020 and one in 2019  -plan for two more injections soon   Imaging Studies

## 2024-06-27 NOTE — ED ADULT TRIAGE NOTE - CHIEF COMPLAINT QUOTE
Patient in NAD a+ox3 reports was sent by neurologist for repeat CT of the head, pt states was recently in hospital being treated for SAH discharged on 6/4 and restarted Eliquis 6/5- pt denies any medical complaints

## 2024-06-27 NOTE — ED ADULT NURSE NOTE - NSFALLRISKINTERV_ED_ALL_ED

## 2024-06-27 NOTE — ED PROVIDER NOTE - PATIENT PORTAL LINK FT
You can access the FollowMyHealth Patient Portal offered by [facility] by registering at the following website: [email or web address] By joining [email or web address]’s FollowMyHealth portal, you will also be able to view your health information using other applications (apps) compatible with our system.

## 2024-06-27 NOTE — ED PROVIDER NOTE - PHYSICAL EXAMINATION
CONSTITUTIONAL: well developed, well nourished, in no acute distress, speaking in full sentences, nontoxic appearing  SKIN: warm, dry, no rash  HEAD: normocephalic, atraumatic  EYES: PERRL, EOMI, no conjunctival erythema, no nystagmus  ENT: patent airway, moist mucous membranes, no tongue deviation  NECK: supple, no masses, full flexion/extension without pain  CV:  regular rate, regular rhythm, 2+ radial pulses bilaterally  RESP: no wheezes, no rales, no rhonchi, normal work of breathing  ABD: soft, nontender, nondistended, no rebound, no guarding  MSK: normal ROM, no cyanosis, no edema  NEURO: alert, oriented, CN 2-12 grossly intact, sensation intact to light touch symmetrically, 5/5 motor strength in all extremities, no pronator drift, (+) facial asymmetry, ambulates with cane  PSYCH: cooperative, appropriate

## 2024-06-27 NOTE — ED PROVIDER NOTE - ATTENDING CONTRIBUTION TO CARE
74-year-old female past med history of a traumatic subarachnoid hemorrhage among other chronic medical problems sent by her neurologist for CT head.  Patient is asymptomatic, CT head is a part of follow-up postdischarge workup for subarachnoid hemorrhage. Patient appears very well, no acute complaints.  Will obtain CT head, DC home if negative as discussed with neurology.  Patient is amenable with the plan.

## 2024-06-27 NOTE — ED PROVIDER NOTE - NSFOLLOWUPINSTRUCTIONS_ED_ALL_ED_FT
PLEASE FOLLOW-UP WITH YOUR NEUROLOGIST    Headache    A headache is pain or discomfort felt around the head or neck area. The specific cause of a headache may not be found as there are many types including tension headaches, migraine headaches, and cluster headaches. Watch your condition for any changes. Things you can do to manage your pain include taking over the counter and prescription medications as instructed by your health care provider, lying down in a dark quiet room, limiting stress, getting regular sleep, and refraining from alcohol and tobacco products.    SEEK IMMEDIATE MEDICAL CARE IF YOU EXPERIENCE THE FOLLOWING SYMPTOMS: fever, vomiting, stiff neck, loss of vision, problems with speech, muscle weakness, loss of balance, trouble walking, pass out, or confusion.

## 2024-06-27 NOTE — ED PROVIDER NOTE - PROGRESS NOTE DETAILS
MS– Spoke with neurology resident Dr. Martha Easley who confirmed that they are requesting a CT head for the patient and if it remains stable they recommend discharge and have her repeat an MRI outpatient.

## 2024-06-27 NOTE — ED PROVIDER NOTE - OBJECTIVE STATEMENT
Patient is a 74-year-old female past medical history of CAD status post CABG, MVR on Eliquis, glaucoma, hypertension, hyperlipidemia, diabetes presenting for CT head.  Patient notes that she had a subarachnoid hemorrhage secondary to fall in May 2024.  She had MRIs and CT scans that confirmed these findings.  After discharge she was doing well and followed up with her primary care doctor Dr. Gardiner and her cardiologist.  Patient was restarted on Eliquis on June 5, 2024.  She has been taking 5 mg twice daily every day and has not missed any doses.  Patient denies any head trauma, falls, other injuries.  She states that her neurologist Dr. Garza called her today and told her to come in to the ED for a CT scan of her head.

## 2024-06-27 NOTE — ED ADULT TRIAGE NOTE - NS ED NURSE BANDS TYPE
"[facility] Hospital Medicine  Progress Note    Patient Name: Jaswant Yang  MRN: [id number]  Patient Class: IP- Inpatient   Admission Date: 7/6/2020  Length of Stay: 4 days  Attending Physician: Shantel Carlson MD  Primary Care Provider: Primary Doctor No        Subjective:     Principal Problem:Dental infection        HPI:  43 y/o AA F with hx of hairy cell leukemia (diagnosed 05/2020), anemia, chronic pain to ED from Dr. Whittaker' office after being found to have a low grade temperature (99.6) with a visible dental infection in the R lower jaw and L upper jaw, in the setting of chemotherapy induced neutropenia (WBC 0.89); mild hypokalemia (3.4) also noted in ED, but labs otherwise largely unremarkable - admission for IV abx; BC x2 pending. Pt reports associated severe stabbing pain in the affected jaw areas, and mild facial edema, worsening over the past 2 days but states no drainage/bleeding. Pt's last chemo cycle ended on 06/26/20 - reports pain improved with IV morphine in ED, states she feels "a little better now" - palliative care consulted per ED physician at patient's request, to assist with symptom management and pain control. Denies CP, edema, palpitations, SOB, wheezing, orthopnea, PND, abdominal pain, N/V/D, dysuria, flank pain, HA, dizziness, fever, cough, chills, falls/trauma, blurred vision, focal deficits. No recent dietary changes, travel, sick contacts or viral illness - pt is typically active and independent with ADLs and ambulation at home. Pt is full code. Surrogate decision makers are son (Dmitri Yang) and Mother (Elsa Yang). Admitted for chemotherapy induced neutropenic fever with a jaw/dental infection.     Overview/Hospital Course:  7/7/20 The patient reports pain is not well controlled. Palliative care was consulted. The patient remained afebrile overnight. CT maxiollofacial CT was negative for a dental abscess but showed multiple dental caries. The patient " "remains neutropenic, Hem/onc following. Continue current management with IV ABX. 7/8/20 The patient remained afebrile overnight. WBCs improved to 1.41K, Hem/onc following. The patients renal function worsened overnight, Cr. Increased from 2 to 4.9 overnight. This is likely 2/2 vanc. IV fluids started and Nephrology consulted. Will monitor renal function. 7/9/20 No acute issues overnight. The patient continues to have good UOP. Creatinine increased to 6.8. Nephrology following, will continue to monitor renal function. ANC is improving, Hem/onc following. 7/10/20 The patient reports "feeling better today". WBCs continue to recover, Hem/onc following. Creatinine continues to worsen, Cr 8.1 today. The patient reports good UOP, Nephrology following.     Interval History: The patient reports "feeling better today". WBCs continue to recover, Hem/onc following. Creatinine continues to worsen, Cr 8.1 today. The patient reports good UOP, Nephrology following.       Review of Systems   Constitutional: Positive for fever ("low-grade"). Negative for activity change, appetite change, chills, diaphoresis, fatigue and unexpected weight change.   HENT: Positive for dental problem and facial swelling (R lower jaw, L upper jaw). Negative for congestion, drooling, rhinorrhea, sinus pressure, sneezing, sore throat, trouble swallowing and voice change.    Eyes: Negative for photophobia, discharge, redness, itching and visual disturbance.   Respiratory: Negative for apnea, cough, choking, chest tightness, shortness of breath, wheezing and stridor.    Cardiovascular: Negative for chest pain, palpitations and leg swelling.   Gastrointestinal: Negative for abdominal distention, abdominal pain, anal bleeding, blood in stool, constipation, diarrhea, nausea and vomiting.   Endocrine: Negative for cold intolerance, heat intolerance, polydipsia, polyphagia and polyuria.   Genitourinary: Negative for decreased urine volume, difficulty urinating, " dysuria, flank pain, frequency, hematuria, pelvic pain, urgency, vaginal bleeding and vaginal discharge.   Musculoskeletal: Positive for back pain (chronic). Negative for arthralgias, gait problem, joint swelling, myalgias, neck pain and neck stiffness.   Skin: Negative for color change, pallor, rash and wound.   Neurological: Positive for weakness (generalized). Negative for dizziness, seizures, syncope, facial asymmetry, speech difficulty, light-headedness, numbness and headaches.   Psychiatric/Behavioral: Negative for agitation, confusion, hallucinations and suicidal ideas.   All other systems reviewed and are negative.    Objective:     Vital Signs (Most Recent):  Temp: 97.5 °F (36.4 °C) (07/10/20 1634)  Pulse: 84 (07/10/20 1634)  Resp: 18 (07/10/20 1634)  BP: 136/75 (07/10/20 1634)  SpO2: 100 % (07/10/20 1634) Vital Signs (24h Range):  Temp:  [97.5 °F (36.4 °C)-98.8 °F (37.1 °C)] 97.5 °F (36.4 °C)  Pulse:  [83-84] 84  Resp:  [15-18] 18  SpO2:  [97 %-100 %] 100 %  BP: (124-136)/(75-85) 136/75     Weight: 124.3 kg (274 lb)  Body mass index is 38.22 kg/m².    Intake/Output Summary (Last 24 hours) at 7/10/2020 1734  Last data filed at 7/10/2020 1000  Gross per 24 hour   Intake 3595 ml   Output 925 ml   Net 2670 ml      Physical Exam  Vitals signs and nursing note reviewed.   Constitutional:       General: She is not in acute distress.     Appearance: Normal appearance. She is well-developed. She is obese. She is toxic-appearing.   HENT:      Head: Normocephalic and atraumatic.      Nose: Nose normal.      Mouth/Throat:      Mouth: Mucous membranes are moist.      Comments: R lower jaw, L lower jaw redness, swelling of gums  Eyes:      Conjunctiva/sclera: Conjunctivae normal.      Pupils: Pupils are equal, round, and reactive to light.   Neck:      Musculoskeletal: Normal range of motion and neck supple.   Cardiovascular:      Rate and Rhythm: Normal rate and regular rhythm.      Pulses: Normal pulses.      Heart  sounds: Normal heart sounds. No murmur.   Pulmonary:      Effort: Pulmonary effort is normal. No respiratory distress.      Breath sounds: Normal breath sounds. No wheezing.   Abdominal:      General: Abdomen is flat. Bowel sounds are normal. There is no distension.      Palpations: Abdomen is soft.      Tenderness: There is no abdominal tenderness. There is no guarding or rebound.   Musculoskeletal: Normal range of motion.         General: No tenderness, deformity or signs of injury.   Skin:     General: Skin is warm and dry.      Capillary Refill: Capillary refill takes less than 2 seconds.      Coloration: Skin is not jaundiced.      Findings: No erythema.   Neurological:      General: No focal deficit present.      Mental Status: She is alert and oriented to person, place, and time.      Deep Tendon Reflexes: Reflexes are normal and symmetric.   Psychiatric:         Mood and Affect: Mood normal.         Behavior: Behavior normal.         Thought Content: Thought content normal.         Judgment: Judgment normal.         Significant Labs: All pertinent labs within the past 24 hours have been reviewed.    Significant Imaging:   Imaging Results    None             Assessment/Plan:      * Dental infection  Continue IV vanc/zosyn for now  Follow fever curve  BC x2 pending  Dental soft diet for now  Morphine prn for pain   7/7/20 The patient reports pain is not well controlled. Palliative care was consulted. The patient remained afebrile overnight. CT maxiollofacial CT was negative for a dental abscess but showed multiple dental caries. The patient remains neutropenic, Hem/onc following. Continue current management with IV ABX.   7/8/20 Will need dental follow up after discharge.   7/9/20 Afebrile overnight, Continue ABX, will need dental follow up after discharge.       Hypocalcemia        ADAMARIS (acute kidney injury)  The patients renal function worsened overnight, Cr. Increased from 2 to 4.9 overnight. This is likely  "2/2 vanc. IV fluids started and Nephrology consulted. Will monitor renal function.   7/9/20 The patient continues to have good UOP. Creatinine increased to 6.8. Nephrology following, will continue to monitor renal function.   7/10/20 Creatinine continues to worsen, Cr 8.1 today. The patient reports good UOP, Nephrology following.       Acute neoplasm-related pain  - Continue PRN Analgesia - Palliative care consulted       Hypokalemia  Replace K+  Recheck in AM  Replete as needed  7/8/20 Resolved     Neutropenic fever  Consult hematology/oncology  Maintain neutropenic precautions  Tylenol prn fever  UA with reflex culture pending  IV zosyn   Cultures pending       Chemotherapy-induced neutropenia  Maintain neutropenic precautions  Labs in AM  Continue supplemental thiamine, Folbic, MVI  7/9/20 Improving, Hem/onc following  7/10/20 The patient reports "feeling better today". WBCs continue to recover, Hem/onc following.     Hairy cell leukemia of spleen  Consult hematology/oncology   Consult palliative care for symptom/pain management  7/10/20 The patient reports "feeling better today". WBCs continue to recover, Hem/onc following.       VTE Risk Mitigation (From admission, onward)         Ordered     Place sequential compression device  Until discontinued      07/06/20 9468                      Edgar Ashraf NP  Department of Hospital Medicine   [facility]  " Name band;

## 2024-06-27 NOTE — ED PROVIDER NOTE - CLINICAL SUMMARY MEDICAL DECISION MAKING FREE TEXT BOX
74-year-old female sent for CT head after recent diagnosis of traumatic SAH.  Patient is asymptomatic, resumed her anticoagulant use.  CT head is negative/SAH has resolved.  Results discussed with the patient, she is ready for discharge home, advised to follow-up with her neurologist as previously scheduled.

## 2024-06-27 NOTE — ED ADULT NURSE NOTE - CAS EDP DISCH TYPE
Abdomen , soft, nontender, nondistended , no guarding or rigidity , no masses palpable , normal bowel sounds , Liver and Spleen , no hepatomegaly present , no hepatosplenomegaly , liver nontender , spleen not palpable Home

## 2024-06-27 NOTE — ED PROVIDER NOTE - CARE PROVIDER_API CALL
Ashlee Farmer  Neurology  [address] [place] [id number]  Phone: [phone]  Fax: [phone]  Follow Up Time: 7-10 Days

## 2024-06-28 NOTE — ASU PREOP CHECKLIST - NSSDAENDDT_GEN_ALL_CORE
Mayte texted and needs provider review. Pt is anibal if Dr. Yan has samples of Ozempic as pt's insurance co-pay is $200.  Pt also would like to know if should try to fill out exemption form for Tresiba FlexTouch. Please assist.      Medication: Semaglutide, 1 MG/DOSE, (Ozempic, 1 MG/DOSE,) 4 MG/3ML Solution Pen-injector passed protocol.   Last office visit date: 12/12/23  Next appointment scheduled?: Yes   Number of refills given: 0   01-Aug-2022 01-Aug-2022 11:22

## 2024-08-05 ENCOUNTER — APPOINTMENT (OUTPATIENT)
Dept: ORTHOPEDIC SURGERY | Facility: CLINIC | Age: 74
End: 2024-08-05

## 2024-08-05 ENCOUNTER — APPOINTMENT (OUTPATIENT)
Dept: OTOLARYNGOLOGY | Facility: CLINIC | Age: 74
End: 2024-08-05

## 2024-08-05 PROCEDURE — V5299A: CUSTOM

## 2024-09-16 ENCOUNTER — APPOINTMENT (OUTPATIENT)
Dept: NEUROSURGERY | Facility: CLINIC | Age: 74
End: 2024-09-16
Payer: MEDICARE

## 2024-09-16 VITALS — BODY MASS INDEX: 43.95 KG/M2 | WEIGHT: 218 LBS | HEIGHT: 59 IN

## 2024-09-16 DIAGNOSIS — S06.6XAA TRAUMATIC SUBARACHNOID HEMORRHAGE WITH LOSS OF CONSCIOUSNESS STATUS UNKNOWN, INITIAL ENCOUNTER: ICD-10-CM

## 2024-09-16 PROCEDURE — 99204 OFFICE O/P NEW MOD 45 MIN: CPT

## 2024-09-16 NOTE — ASSESSMENT
[FreeTextEntry1] : This is a 74-year-old female who presents for neurosurgical assessment status post subarachnoid hemorrhage after trauma.  Imaging reviewed at this time is 1 month post accident with the patient back on anticoagulation which is a normal study with resolution of the prior hemorrhage.  Patient is at her baseline level of health and is without any complaints at this time.  She is aware there is no indication for additional neurosurgical input.  She can return to the office as needed and is to contact us with any concerns in the interim.  PAPI Barnes MD

## 2024-09-16 NOTE — HISTORY OF PRESENT ILLNESS
[de-identified] : This is a 74-year-old female who presents for a neurosurgical evaluation.  As review, in May 2024 she sustained an accident at home where she struck her head on a chair ultimately breaking the chair with development of a subarachnoid hemorrhage.  Serial imaging at that time revealed a stable bleed therefore patient was cleared for discharge to home.  She followed with neurology as outpatient who resumed her Eliquis which is given for her prior chronic CVA.  Appropriately imaging was ordered post resumption of anticoagulants which revealed questionable artifact which relayed on a concern for additional hemorrhage therefore patient was advised to return to the ER for prompt evaluation, care.   CT Head completed 6/27/2024 at [facility] was a normal study with appreciation of resolution of the prior SAH.  She is asymptomatic at this time denies any headache, weakness, fever, chills, nausea, vomiting, change in gait or ambulation.  She reports that she is at her baseline level of health.  PHYSICAL EXAM:   Constitutional: Well appearing, no distress HEENT: Normocephalic Atraumatic Psychiatric: Alert and oriented to all spheres, normal mood Pulmonary: No respiratory distress  Neurologic:  CN II-XII grossly intact Palpation: no pain to palpation  Strength: Full strength in all major muscle groups Sensation: Full sensation to light touch in all extremities Reflexes:   2+ biceps  2+ triceps   No Lomax's  No clonus  ROM limited in rotation

## 2024-09-16 NOTE — HISTORY OF PRESENT ILLNESS
[de-identified] : This is a 74-year-old female who presents for a neurosurgical evaluation.  As review, in May 2024 she sustained an accident at home where she struck her head on a chair ultimately breaking the chair with development of a subarachnoid hemorrhage.  Serial imaging at that time revealed a stable bleed therefore patient was cleared for discharge to home.  She followed with neurology as outpatient who resumed her Eliquis which is given for her prior chronic CVA.  Appropriately imaging was ordered post resumption of anticoagulants which revealed questionable artifact which relayed on a concern for additional hemorrhage therefore patient was advised to return to the ER for prompt evaluation, care.   CT Head completed 6/27/2024 at [facility] was a normal study with appreciation of resolution of the prior SAH.  She is asymptomatic at this time denies any headache, weakness, fever, chills, nausea, vomiting, change in gait or ambulation.  She reports that she is at her baseline level of health.  PHYSICAL EXAM:   Constitutional: Well appearing, no distress HEENT: Normocephalic Atraumatic Psychiatric: Alert and oriented to all spheres, normal mood Pulmonary: No respiratory distress  Neurologic:  CN II-XII grossly intact Palpation: no pain to palpation  Strength: Full strength in all major muscle groups Sensation: Full sensation to light touch in all extremities Reflexes:   2+ biceps  2+ triceps   No Lomax's  No clonus  ROM limited in rotation

## 2024-09-26 NOTE — DISCHARGE NOTE PROVIDER - INSTRUCTIONS
Diabetic/Heart healthy
PROVIDER:[TOKEN:[[id number]],FOLLOWUP:[1-3 Days]],PROVIDER:[TOKEN:[[id number]],FOLLOWUP:[1-3 Days]]

## 2024-10-02 ENCOUNTER — APPOINTMENT (OUTPATIENT)
Dept: NEUROLOGY | Facility: CLINIC | Age: 74
End: 2024-10-02
Payer: MEDICARE

## 2024-10-02 VITALS
HEIGHT: 59 IN | TEMPERATURE: 98 F | BODY MASS INDEX: 42.33 KG/M2 | HEART RATE: 65 BPM | WEIGHT: 210 LBS | SYSTOLIC BLOOD PRESSURE: 136 MMHG | DIASTOLIC BLOOD PRESSURE: 74 MMHG

## 2024-10-02 DIAGNOSIS — S06.6XAA TRAUMATIC SUBARACHNOID HEMORRHAGE WITH LOSS OF CONSCIOUSNESS STATUS UNKNOWN, INITIAL ENCOUNTER: ICD-10-CM

## 2024-10-02 DIAGNOSIS — R90.89 OTHER ABNORMAL FINDINGS ON DIAGNOSTIC IMAGING OF CENTRAL NERVOUS SYSTEM: ICD-10-CM

## 2024-10-02 DIAGNOSIS — M54.2 CERVICALGIA: ICD-10-CM

## 2024-10-02 DIAGNOSIS — I63.81 OTHER CEREBRAL INFARCTION DUE TO OCCLUSION OR STENOSIS OF SMALL ARTERY: ICD-10-CM

## 2024-10-02 PROCEDURE — 99214 OFFICE O/P EST MOD 30 MIN: CPT

## 2024-10-02 RX ORDER — KRILL/OM-3/DHA/EPA/PHOSPHO/AST 1000-230MG
81 CAPSULE ORAL
Refills: 0 | Status: ACTIVE | COMMUNITY

## 2024-10-02 RX ORDER — METHOCARBAMOL 500 MG/1
500 TABLET, FILM COATED ORAL
Qty: 8 | Refills: 0 | Status: ACTIVE | COMMUNITY
Start: 2024-10-02 | End: 1900-01-01

## 2024-10-02 NOTE — PHYSICAL EXAM
[FreeTextEntry1] : Mental status: Awake, alert and oriented x3. Recent and remote memory intact. Naming, repetition and comprehension intact. Attention/concentration intact. No dysarthria, no aphasia. Fund of knowledge appropriate.  Cranial nerves: Pupils equally round and reactive to light, visual fields full, no nystagmus, extraocular muscles limited in left eye (chronic), V1 through V3 intact bilaterally and symmetric, face asymmetric (R facial Chronic 2/2 surgery), hearing intact to finger rub, palate elevation symmetric, tongue was midline.  Motor: MRC grading 5/5 b/l UE/LE.  strength 5/5. Normal tone and bulk. No abnormal movements.  Sensation: Intact to light touch, temperature, vibration, pp.   Coordination: No dysmetria on finger-to-nose  Reflexes: 2+ in bilateral UE/LE.  Gait: Steady gait, utilizes a cane.

## 2024-10-02 NOTE — HISTORY OF PRESENT ILLNESS
[FreeTextEntry1] : Ms. SAMUELS 74 year old female presents for a follow up visit. She initially presented for traumatic SAH, where they found chronic infarcts on her CTH. Did MR head outpatient with multiple areas of artifact on SWI/Flair imaging and meningioma (full report attached). Recommended to return to ER to rule out bleed. No bleed on repeat head CT in hospital. Currently, endorses she is doing well. Occasional vertigo symptoms. No falls recently. Also expresses pain at the right posterior part of her head. She gets the pain mainly at night. she notes it at the neck, more noticeable when turning head side to side, more pain when turning head to the right. no ha, no weakness or numbness/tingling. CT Cervical spine in May 2024 showed, Mildly reversed cervical lordosis with trace anterolisthesis of C3 on C4. Moderate-severe multilevel endplate degenerative changes as evidenced by marginal osteophyte formation, uncovertebral spurring, loss of normal disc space height as well as facet joint space compartment narrowing. No canal stenosis noted on CT.  Also endorses that she gets some pains in her arms also. No changes in speech /vision.

## 2024-10-02 NOTE — ASSESSMENT
[FreeTextEntry1] : Ms. SAMUELS 74 year old female presents for a follow up visit today.  Reviewed MR head results with patient today, advised will need to follow up with mri head w/wo to further characterize findings.  Endorses neck pain, reviewed CT cervical spine done in August 2024 notable for severe degenerative changes. Will also eval w MRI C spine non contrast and recommended PT.   Plan: -MR head w/w/o  -MR cervical spine w/o  -PT referral  -Trial PRN Methocarbamol 500 mg at bedtime  -Return w neurology attending

## 2024-10-02 NOTE — DISCHARGE NOTE NURSING/CASE MANAGEMENT/SOCIAL WORK - NSDPACMPNYOTHER_GEN_ALL_CORE
[Alert] : alert [No Acute Distress] : no acute distress [Normocephalic] : normocephalic [Conjunctivae with no discharge] : conjunctivae with no discharge [PERRL] : PERRL [EOMI Bilateral] : EOMI bilateral [Auricles Well Formed] : auricles well formed [Clear Tympanic membranes with present light reflex and bony landmarks] : clear tympanic membranes with present light reflex and bony landmarks [No Discharge] : no discharge [Nares Patent] : nares patent [Pink Nasal Mucosa] : pink nasal mucosa Sister [Palate Intact] : palate intact [Nonerythematous Oropharynx] : nonerythematous oropharynx [Supple, full passive range of motion] : supple, full passive range of motion [No Palpable Masses] : no palpable masses [Symmetric Chest Rise] : symmetric chest rise [Clear to Auscultation Bilaterally] : clear to auscultation bilaterally [Regular Rate and Rhythm] : regular rate and rhythm [Normal S1, S2 present] : normal S1, S2 present [No Murmurs] : no murmurs [+2 Femoral Pulses] : +2 femoral pulses [Soft] : soft [NonTender] : non tender [Non Distended] : non distended [Normoactive Bowel Sounds] : normoactive bowel sounds [No Hepatomegaly] : no hepatomegaly [No Splenomegaly] : no splenomegaly [Testicles Descended Bilaterally] : testicles descended bilaterally [Patent] : patent [No fissures] : no fissures [No Abnormal Lymph Nodes Palpated] : no abnormal lymph nodes palpated [No Gait Asymmetry] : no gait asymmetry [No pain or deformities with palpation of bone, muscles, joints] : no pain or deformities with palpation of bone, muscles, joints [Normal Muscle Tone] : normal muscle tone [Straight] : straight [+2 Patella DTR] : +2 patella DTR [Cranial Nerves Grossly Intact] : cranial nerves grossly intact [No Rash or Lesions] : no rash or lesions

## 2024-10-23 ENCOUNTER — NON-APPOINTMENT (OUTPATIENT)
Age: 74
End: 2024-10-23

## 2024-11-07 ENCOUNTER — RESULT REVIEW (OUTPATIENT)
Age: 74
End: 2024-11-07

## 2024-11-07 ENCOUNTER — OUTPATIENT (OUTPATIENT)
Dept: OUTPATIENT SERVICES | Facility: HOSPITAL | Age: 74
LOS: 1 days | End: 2024-11-07
Payer: MEDICARE

## 2024-11-07 DIAGNOSIS — Z98.890 OTHER SPECIFIED POSTPROCEDURAL STATES: Chronic | ICD-10-CM

## 2024-11-07 DIAGNOSIS — I63.81 OTHER CEREBRAL INFARCTION DUE TO OCCLUSION OR STENOSIS OF SMALL ARTERY: ICD-10-CM

## 2024-11-07 DIAGNOSIS — Z12.31 ENCOUNTER FOR SCREENING MAMMOGRAM FOR MALIGNANT NEOPLASM OF BREAST: ICD-10-CM

## 2024-11-07 DIAGNOSIS — Z98.49 CATARACT EXTRACTION STATUS, UNSPECIFIED EYE: Chronic | ICD-10-CM

## 2024-11-07 PROCEDURE — 70553 MRI BRAIN STEM W/O & W/DYE: CPT | Mod: 26

## 2024-11-07 PROCEDURE — 70553 MRI BRAIN STEM W/O & W/DYE: CPT

## 2024-11-07 PROCEDURE — A9579: CPT

## 2024-11-08 DIAGNOSIS — I63.81 OTHER CEREBRAL INFARCTION DUE TO OCCLUSION OR STENOSIS OF SMALL ARTERY: ICD-10-CM

## 2024-11-13 ENCOUNTER — NON-APPOINTMENT (OUTPATIENT)
Age: 74
End: 2024-11-13

## 2024-12-07 NOTE — ASU PREOP CHECKLIST - WARM FLUIDS/WARM BLANKETS
Occupational Therapy    Occupational Therapy    Evaluation    Patient Name: Josh Kessler  MRN: [id number]  Today's Date: 12/7/2024  Time Calculation  Start Time: 0930  Stop Time: 1000  Time Calculation (min): 30 min  608/608-A    Assessment  IP OT Assessment  OT Assessment: OVERALL SLIGHT DECLINE FROM BASELINE FUNCTION WITH C./O FEELING BETTER BUT STILL LIGHHEADED WHEN UP AND MOVING - REQUIRED CGA WITH TRANSFERS AND MINIMAL ASSIST WITH LE SELF CARE SKILLS - SUGGEST ONGOING THERAPY POST HOSPITAL STAY TO ADVANCE WITH GOALS AND ENSURE HOME SET UP IS  SAFE. SOCIAL WORK MAY BE HELPFUL TO EXPLORE SUPPORT SERVICE OPTIONS.  Prognosis: Good  Evaluation/Treatment Tolerance: Patient limited by fatigue  Medical Staff Made Aware: Yes  End of Session Communication: Bedside nurse  End of Session Patient Position: Bed, 2 rail up    Plan:  Treatment Interventions: ADL retraining, Functional transfer training, UE strengthening/ROM, Endurance training  OT Frequency: 3 times per week  OT Discharge Recommendations: Low intensity level of continued care    Subjective     Current Problem:  1. 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 2024 WITH SLIGHT R HAND RESIDUAL ( THUMB), HLD- HAS A BRACE THAT HE WEARS OCCASIONALLY WITH RLE  Family/Caregiver Present: No  Co-Treatment: PT  Co-Treatment Reason: MAXIMIZE SAFETY WITH MOBILITY  Prior to Session Communication: Bedside nurse  Patient Position Received: Bed, 2 rail up    Precautions:  Medical Precautions: No known precautions/limitation  Prosthesis/Orthosis Used: Ankle/Foot orthosis    Vital Signs:       Pain:       Objective     Cognition:  Overall Cognitive Status: Within Functional Limits             Home Living:  Type of Home: House  Lives With: Alone (WIFE  PASSED AWAY ABOUT 1 YEAR AGO)  Home Adaptive Equipment: Walker rolling or standard (ROLLATOR)  Home Layout: One level, Laundry in basement  Home Access: Stairs to enter with rails  Entrance Stairs-Number of Steps: 4  Bathroom Shower/Tub: Walk-in shower  Bathroom Toilet: Handicapped height  Bathroom Equipment: Grab bars in shower, Built-in shower seat, Hand-held shower hose, Grab bars around toilet     Prior Function:  Level of [place]: Independent with ADLs and functional transfers, Independent with homemaking with ambulation  Receives Help From: Family (PER PATIENT HAS A COUSIN THAT CAN LEND A HAND)  Homemaking Assistance: Independent  Ambulatory Assistance: Independent  Hand Dominance: Left    IADL History:  Current License: Yes  Mode of Transportation: Car  Occupation: Retired    ADL:  Eating Assistance: Independent  Grooming Assistance: Independent  Bathing Assistance: Minimal  UE Dressing Assistance: Independent  LE Dressing Assistance: Minimal  Toileting Assistance with Device: Not performed    Activity Tolerance:  Endurance: Tolerates less than 10 min exercise, no significant change in vital signs  Activity Tolerance Comments:  (DECREASED FROM BASELINE)    Bed Mobility/Transfers:   Bed Mobility  Bed Mobility: Yes  Bed Mobility 1  Bed Mobility 1: Side lying left to sit  Level of Assistance 1: Distant supervision  Transfers  Transfer: Yes  Transfer 1  Transfer From 1: Bed to  Transfer to 1: Stand  Technique 1: Sit to stand  Transfer Device 1: Walker  Transfer Level of Assistance 1: Contact guard    Ambulation/Gait Training:  Functional Mobility  Functional Mobility Performed: Yes  Functional Mobility 1  Surface 1: Level tile  Device 1: Rolling walker  Assistance 1: Contact guard  Comments 1:  (NOTED CHALLENGE WITH R FOOT DURING AMBULATION - LONGSTANDING WITH CP AND PER PATIENT LIMITED HELP WITH USE OF THE BRACE)    Sitting Balance:  Static Sitting Balance  Static Sitting-Balance Support: No upper extremity  supported, Feet unsupported  Static Sitting-Comment/Number of Minutes: 5  Dynamic Sitting Balance  Dynamic Sitting-Balance Support: No upper extremity supported, Feet unsupported  Dynamic Sitting-Level of Assistance: Contact guard  Dynamic Sitting-Balance: Reaching across midline  Dynamic Sitting-Comments: CHALLENGED TO CHAR/ DOFF SHOES    Standing Balance:       Vision:     and Vision - Complex Assessment  Ocular Range of Motion: Within Functional Limits  Head Position: WFL  Tracking: WFL    Sensation:  Light Touch: No apparent deficits    Strength:  Strength Comments: EDGAR LUGO 4-/5    Perception:  Inattention/Neglect: Appears intact    Coordination:  Movements are Fluid and Coordinated: Yes     Hand Function:  Hand Function  Gross Grasp: Functional  Coordination: Functional    Extremities: RUE   RUE : Within Functional Limits and LUE   LUE: Within Functional Limits    Outcome Measures: [facility] Daily Activity  Putting on and taking off regular lower body clothing: A little  Bathing (including washing, rinsing, drying): A little  Putting on and taking off regular upper body clothing: None  Toileting, which includes using toilet, bedpan or urinal: A little  Taking care of personal grooming such as brushing teeth: A little  Eating Meals: None  Daily Activity - Total Score: 20                       EDUCATION:     Education Documentation  No documentation found.  Education Comments  No comments found.        Goals:   Encounter Problems       Encounter Problems (Active)       ADLs       Patient will perform UB and LB bathing  with modified independent level of assistance and grab bars and shower chair. (Progressing)       Start:  12/07/24    Expected End:  12/21/24            Patient with complete lower body dressing with modified independent level of assistance donning and doffing all LE clothes  with PRN adaptive equipment while edge of bed  and standing (Progressing)       Start:  12/07/24    Expected End:   12/21/24            Patient will complete toileting including hygiene clothing management/hygiene with modified independent level of assistance and raised toilet seat and grab bars. (Progressing)       Start:  12/07/24    Expected End:  12/21/24               BALANCE       Pt will maintain dynamic standing balance during ADL task with modified independent level of assistance in order to demonstrate decreased risk of falling and improved postural control. (Progressing)       Start:  12/07/24    Expected End:  12/21/24               MOBILITY       Patient will perform Functional mobility mod  Household distances/Community Distances with modified independent level of assistance and least restrictive device in order to improve safety and functional mobility. (Progressing)       Start:  12/07/24    Expected End:  12/21/24               TRANSFERS       Patient will complete functional transfer IN/OUT OF BED / ON/OFF A TOILET AND IN/OUT OF A SHOWER  with least restrictive device with modified independent level of assistance. (Progressing)       Start:  12/07/24    Expected End:  12/21/24                       no

## 2025-01-28 ENCOUNTER — APPOINTMENT (OUTPATIENT)
Dept: CARDIOLOGY | Facility: CLINIC | Age: 75
End: 2025-01-28
Payer: MEDICARE

## 2025-01-28 ENCOUNTER — NON-APPOINTMENT (OUTPATIENT)
Age: 75
End: 2025-01-28

## 2025-01-28 VITALS — SYSTOLIC BLOOD PRESSURE: 110 MMHG | RESPIRATION RATE: 18 BRPM | DIASTOLIC BLOOD PRESSURE: 70 MMHG | HEART RATE: 68 BPM

## 2025-01-28 VITALS — HEIGHT: 59 IN | BODY MASS INDEX: 43.55 KG/M2 | WEIGHT: 216 LBS

## 2025-01-28 DIAGNOSIS — E11.9 TYPE 2 DIABETES MELLITUS W/OUT COMPLICATIONS: ICD-10-CM

## 2025-01-28 DIAGNOSIS — I34.0 NONRHEUMATIC MITRAL (VALVE) INSUFFICIENCY: ICD-10-CM

## 2025-01-28 DIAGNOSIS — Z98.890 OTHER SPECIFIED POSTPROCEDURAL STATES: ICD-10-CM

## 2025-01-28 DIAGNOSIS — I25.10 ATHEROSCLEROTIC HEART DISEASE OF NATIVE CORONARY ARTERY W/OUT ANGINA PECTORIS: ICD-10-CM

## 2025-01-28 DIAGNOSIS — I10 ESSENTIAL (PRIMARY) HYPERTENSION: ICD-10-CM

## 2025-01-28 DIAGNOSIS — I35.0 NONRHEUMATIC AORTIC (VALVE) STENOSIS: ICD-10-CM

## 2025-01-28 DIAGNOSIS — Z95.1 PRESENCE OF AORTOCORONARY BYPASS GRAFT: ICD-10-CM

## 2025-01-28 DIAGNOSIS — E78.5 HYPERLIPIDEMIA, UNSPECIFIED: ICD-10-CM

## 2025-01-28 PROCEDURE — 93000 ELECTROCARDIOGRAM COMPLETE: CPT

## 2025-01-28 PROCEDURE — G2211 COMPLEX E/M VISIT ADD ON: CPT

## 2025-01-28 PROCEDURE — 99214 OFFICE O/P EST MOD 30 MIN: CPT

## 2025-01-29 ENCOUNTER — NON-APPOINTMENT (OUTPATIENT)
Age: 75
End: 2025-01-29

## 2025-02-20 NOTE — PATIENT PROFILE ADULT - NSPROPOAURINARYCATHETER_GEN_A_NUR
"- 2/20 colonoscopy: Proximal ascending colon-across from the ileocecal valve there is a 2.5 cm hard friable polypoid mass appears malignant status post multiple biopsies   - follow up biopsies  - CT CAP ordered, follow up  - f/u CEA  - no acute surgical intervention warranted at this time. F/u with colorectal as outpt  - pt DNR/DNI. Did discuss if pt interested in pursuing surgical intervention and/or chemotherapy. Per pt, would like to wait for path and staging work up to discuss his \"chances\" prior to making a decision on pursuing treatment    Rest of care per primary  " no

## 2025-03-21 NOTE — BRIEF OPERATIVE NOTE - CONDITION POST OP
"Discharge Summary - Surgery-General   Name: Marcellus Fregoso 70 y.o. male I MRN: [id number]  [facility] Date of Admission: 3/20/2025   Date of Service: 3/21/2025 I Hospital Day: 0    Admission Date: 3/20/2025 1004  Discharge Date: 03/21/25  Admitting Diagnosis: Incisional hernia [K43.2]  Discharge Diagnosis: Incisional Hernia  Medical Problems       Resolved Problems  Date Reviewed: 3/21/2025   None         HPI: As per Dr. Mcfarland \"Marcellus Fregoso is a 69 y.o. male who presents for follow-up status post CT scan to evaluate hernia further. He still notices some discomfort at the hernia site. No change in bowel or bladder habits however.\"    Procedures Performed:   OPEN. REPAIR INCISION ABDOMINAL HERNIA WITH MESH       Summary of Hospital Course:   Patient is a 69 year old M who presented electively to [facility] on 3/20 to undergo surgery. Patient underwent open repair of abdominal incisional hernia with mesh with Dr. Mcfarland on 3/20. Postoperatively patient was having a difficult time with pain control and family felt more comfortable with admission overnight to the floor as well as having PT/OT evaluate him. Patient was admitted to medical surgical floor under the general surgery service. Postoperatively patient was started on a diet. Patient had an abdominal binder in place. Patient was started on multimodal pain regimen. POD1 patients pain was well controlled and patient was tolerating a diet. Patient was evaluated by PT/OT who deemed him level 3. Case management was consulted to set up home health PT/OT services.     By date of discharge on 3/21, patient was deemed appropriate for discharge. Patient was tolerating a diet. Patients pain was well controlled. Patient was voiding spontaneously. Patient was ambulatory. Patient was discharged into the care of his family on 3/21/2025. Patient was instructed to follow up as scheduled.    For further information regarding this hospitalization please " refer to the medical records.    Significant Findings, Care, Treatment and Services Provided: See above    Complications: None    Condition at Discharge: good       Discharge instructions/Information to patient and family:   See After Visit Summary (AVS) for information provided to patient and family.      Provisions for Follow-Up Care:  See after visit summary for information related to follow-up care and any pertinent home health orders.      PCP: Ehsan Calix MD    Disposition: Home    Planned Readmission: No     Discharge Medications:  See after visit summary for reconciled discharge medications provided to patient and family.      Shahnaz Chandra PA-C    stable

## 2025-06-24 ENCOUNTER — APPOINTMENT (OUTPATIENT)
Dept: CARDIOLOGY | Facility: CLINIC | Age: 75
End: 2025-06-24

## 2025-07-14 ENCOUNTER — RESULT CHARGE (OUTPATIENT)
Age: 75
End: 2025-07-14

## 2025-07-14 ENCOUNTER — APPOINTMENT (OUTPATIENT)
Dept: CARDIOLOGY | Facility: CLINIC | Age: 75
End: 2025-07-14

## 2025-07-30 NOTE — PROGRESS NOTE ADULT - SUBJECTIVE AND OBJECTIVE BOX
July 30, 2025     BERTIN Diop  [address] [place] [address] [place] [address]    Patient: Leticia Wall   YOB: 1954   Date of Visit: 7/30/2025     Dear BERTIN Diop:       Pt injected Repatha dose this past Friday, 7/25/25.  Pt called today and stated that starting Sunday, she developed joint and muscle pain and that it was hard for her to move her hips.  Pt describes this pain as similar to the pains she experienced with statins in the past and that the pains are still worsening.  The pt states that she does not plan on taking any further Repatha doses and would like Leqvio denial to be appealed since she has adverse effects to the Repatha but tolerated Leqvio with no side effects in the past.     Recommended that pt go to urgent care for her symptoms if needed. Appeal for Leqvio submitted today.       If you have questions, please do not hesitate to call me. I look forward to following Leticia along with you.         Sincerely,        Rodrigue Coreas, [facility]              OPERATIVE PROCEDURE(s):     CABG & MV Repair           POD #   14                    70yFemale  SURGEON(s): FIONA Rojas  SUBJECTIVE ASSESSMENT:  Patient has no complaints at this time.    Vital Signs Last 24 Hrs  T(F): 98.3 (26 Feb 2021 00:00), Max: 98.9 (25 Feb 2021 16:00)  HR: 71 (26 Feb 2021 06:00) (60 - 80)  BP: 124/59 (26 Feb 2021 06:00) (103/51 - 156/69)  BP(mean): 85 (26 Feb 2021 06:00) (73 - 99)  RR: 22 (26 Feb 2021 06:00) (10 - 31)  SpO2: 98% (26 Feb 2021 06:00) (95% - 100%)    I&O's Detail    24 Feb 2021 07:01  -  25 Feb 2021 07:00  --------------------------------------------------------  IN:    IV PiggyBack: 100 mL    Oral Fluid: 720 mL  Total IN: 820 mL    OUT:    Incontinent per Collection Bag (mL): 1400 mL    Voided (mL): 400 mL  Total OUT: 1800 mL    Net:   I&O's Detail    23 Feb 2021 07:01  -  24 Feb 2021 07:00  --------------------------------------------------------  Total NET: -326 mL    24 Feb 2021 07:01  -  25 Feb 2021 07:00  --------------------------------------------------------  Total NET: -980 mL        CAPILLARY BLOOD GLUCOSE  94 (25 Feb 2021 21:00)    POCT Blood Glucose.: 94 mg/dL (25 Feb 2021 21:20)  POCT Blood Glucose.: 121 mg/dL (25 Feb 2021 16:14)  POCT Blood Glucose.: 119 mg/dL (25 Feb 2021 13:13)    Physical Exam:  General: NAD; A&Ox3  Cardiac: S1/S2, RRR, no murmur, no rubs  Lungs: unlabored respirations, CTA b/l, no wheeze, no rales, no crackles  Abdomen: Soft/NT/ND; positive bowel sounds x 4  Sternum: Intact, no click, incision healing well with no drainage  Incisions: Incisions clean/dry/intact  Extremities: No edema b/l lower extremities; good capillary refill; no cyanosis; palpable 1+ pedal pulses b/l    Central Venous Catheter: Yes[]  No[] , If Yes indication:           Day #  Spencer Catheter: Yes  [] , No  [] , If yes indication:                      Day #  NGT: Yes [] No [] ,    If Yes Placement:                                     Day #  EPICARDIAL WIRES:  [] YES [] NO                                              Day #  BOWEL MOVEMENT:  [] YES [] NO, If No, Timing since last BM:      Day #  CHEST TUBE(Left/Right):  [] YES [] NO, If yes -  AIR LEAKS:  [] YES [] NO        LABS:                        10.5<L>  14.63<H> )-----------( 306      ( 26 Feb 2021 05:10 )             35.4<L>                        10.1<L>  16.91<H> )-----------( 353      ( 25 Feb 2021 06:12 )             33.9<L>    02-24    141  |  101  |  26<H>  ----------------------------<  101<H>  4.7   |  32  |  0.9    Ca    8.8      24 Feb 2021 03:02  Mg     1.8     02-26  TPro  4.9<L> [6.0 - 8.0]  /  Alb  3.3<L> [3.5 - 5.2]  /  TBili  1.7<H> [0.2 - 1.2]  /  DBili  x   /  AST  16 [0 - 41]  /  ALT  23 [0 - 41]  /  AlkPhos  70 [30 - 115]  02-24  PT/INR - ( 25 Feb 2021 05:15 )   PT: ;   INR: 1.12 ratio      RADIOLOGY & ADDITIONAL TESTS:  CXR:< from: Xray Chest 1 View- PORTABLE-Routine (Xray Chest 1 View- PORTABLE-Routine in AM.) (02.25.21 @ 06:01) >  Findings:  Support devices: None.  Cardiac/mediastinum/hilum: Obscured, enlarged cardiac silhouette. Post median sternotomy and valve replacement.  Lung parenchyma/Pleura: Unchanged bilateral pleural effusions and bilateral opacities. No pneumothorax. Unchanged paramediastinal collection, better appreciated on prior CT chest.  Skeleton/soft tissues: Unchanged.  Impression:  Unchanged bilateral pleural effusions and bilateral opacities.  Unchanged paramediastinal collection, better appreciated on prior CT chest.     EKG:e< from: 12 Lead ECG (02.23.21 @ 08:58) >  Ventricular Rate 69 BPM  Atrial Rate 69 BPM  P-R Interval 146 ms  QRS Duration 106 ms  Q-T Interval 456 ms  QTC Calculation(Bazett) 488 ms  P Axis 26 degrees  R Axis -12 degrees  T Axis 81 degrees  Diagnosis Line Sinus rhythm withoccasional Premature ventricular complexes  Possible Left atrial enlargement  Left ventricular hypertrophy  T wave abnormality, consider anterolateral ischemia  Prolonged QT  Abnormal ECG    MEDICATIONS  (STANDING):  albumin human  5% IVPB 500 milliLiter(s) IV Intermittent once  aMIOdarone    Tablet 200 milliGRAM(s) Oral daily  aMIOdarone    Tablet   Oral   amLODIPine   Tablet 5 milliGRAM(s) Oral daily  aspirin 325 milliGRAM(s) Oral daily  atorvastatin 40 milliGRAM(s) Oral at bedtime  buMETAnide 1 milliGRAM(s) Oral daily  chlorhexidine 4% Liquid 1 Application(s) Topical <User Schedule>  dextrose 40% Gel 15 Gram(s) Oral once  dextrose 5%. 1000 milliLiter(s) (50 mL/Hr) IV Continuous <Continuous>  dextrose 5%. 1000 milliLiter(s) (100 mL/Hr) IV Continuous <Continuous>  dextrose 50% Injectable 12.5 Gram(s) IV Push once  dextrose 50% Injectable 25 Gram(s) IV Push once  dextrose 50% Injectable 25 Gram(s) IV Push once  escitalopram 20 milliGRAM(s) Oral daily  glucagon  Injectable 1 milliGRAM(s) IntraMuscular once  guaiFENesin  milliGRAM(s) Oral every 12 hours  heparin   Injectable 5000 Unit(s) SubCutaneous every 12 hours  insulin lispro (ADMELOG) corrective regimen sliding scale   SubCutaneous three times a day before meals  ipratropium    for Nebulization 500 MICROGram(s) Nebulizer every 6 hours  lisinopril 5 milliGRAM(s) Oral daily  magnesium sulfate  IVPB 1 Gram(s) IV Intermittent every 12 hours  metFORMIN 500 milliGRAM(s) Oral two times a day with meals  metoprolol tartrate 50 milliGRAM(s) Oral every 12 hours  pantoprazole    Tablet 40 milliGRAM(s) Oral before breakfast  polyethylene glycol 3350 17 Gram(s) Oral daily  potassium chloride    Tablet ER 20 milliEquivalent(s) Oral daily  simethicone 80 milliGRAM(s) Chew three times a day    MEDICATIONS  (PRN):  hydrALAZINE Injectable 10 milliGRAM(s) IV Push every 4 hours PRN SBP > 155    HEPARIN:  [] YES [] NO  Dose: XX UNITS/HR UNITS Q8H  LOVENOX:[] YES [] NO  Dose: XX mg Q24H  COUMADIN: []  YES [] NO  Dose: XX mg  Q24H  SCD's: YES b/l  GI Prophylaxis: Protonix [], Pepcid [], None [], (Contra-indication:.....)    Post-Op Aspirin: Yes [],  No [], If No, then contra-indication:  Post-Op Statin: Yes [], No[], If No, then contra-indication:  Post-Op Beta-Blockers: Yes [], No [], If No, then contra-indication:    Allergies:  No Known Allergies    Ambulation/Activity Status: Ambulates several times daily without assistance.    Assessment/Plan:  70y Female status-post CABG & MV Repair  - Case and plan discussed with CTU Intensivist and CT Surgeon - Dr. Rojas  - Continue CTU supportive care    - Continue DVT/GI prophylaxis  - Incentive Spirometry 10 times an hour  - Continue to advance physical activity as tolerated and continue PT/OT as directed  1. CAD: Continue ASA, statin, BB  2. HTN:   3. A. Fib:   4. COPD/Hypoxia:   5. DM/Glucose Control:     Social Service Disposition:     OPERATIVE PROCEDURE(s):     CABG & MV Repair           POD #   14                    70yFemale  SURGEON(s): FIONA Rojas  SUBJECTIVE ASSESSMENT:  Patient has no complaints at this time.    Vital Signs Last 24 Hrs  T(F): 98.3 (26 Feb 2021 00:00), Max: 98.9 (25 Feb 2021 16:00)  HR: 71 (26 Feb 2021 06:00) (60 - 80)  BP: 124/59 (26 Feb 2021 06:00) (103/51 - 156/69)  BP(mean): 85 (26 Feb 2021 06:00) (73 - 99)  RR: 22 (26 Feb 2021 06:00) (10 - 31)  SpO2: 98% (26 Feb 2021 06:00) (95% - 100%)    I&O's Detail    24 Feb 2021 07:01  -  25 Feb 2021 07:00  --------------------------------------------------------  IN:    IV PiggyBack: 100 mL    Oral Fluid: 720 mL  Total IN: 820 mL    OUT:    Incontinent per Collection Bag (mL): 1400 mL    Voided (mL): 400 mL  Total OUT: 1800 mL    Net:   I&O's Detail    23 Feb 2021 07:01  -  24 Feb 2021 07:00  --------------------------------------------------------  Total NET: -326 mL    24 Feb 2021 07:01  -  25 Feb 2021 07:00  --------------------------------------------------------  Total NET: -980 mL    CAPILLARY BLOOD GLUCOSE  94 (25 Feb 2021 21:00)    POCT Blood Glucose.: 94 mg/dL (25 Feb 2021 21:20)  POCT Blood Glucose.: 121 mg/dL (25 Feb 2021 16:14)  POCT Blood Glucose.: 119 mg/dL (25 Feb 2021 13:13)    Physical Exam:  General: NAD; A&Ox3  Cardiac: S1/S2, RRR, no murmur, no rubs  Lungs: unlabored respirations, CTA b/l, no wheeze, no rales, no crackles  Abdomen: Soft/NT/ND; positive bowel sounds x 4  Sternum: Intact, no click, incision healing well with no drainage  Incisions: Incisions clean/dry/intact  Extremities: mild edema b/l lower extremities; good capillary refill; no cyanosis; palpable 1+ pedal pulses b/l    CHEST TUBE(Right):  [X] YES [] NO, If yes -  AIR LEAKS:  [] YES [X] NO  (d/c today)      LABS:                        10.5<L>  14.63<H> )-----------( 306      ( 26 Feb 2021 05:10 )             35.4<L>                        10.1<L>  16.91<H> )-----------( 353      ( 25 Feb 2021 06:12 )             33.9<L>    02-24    141  |  101  |  26<H>  ----------------------------<  101<H>  4.7   |  32  |  0.9    Ca    8.8      24 Feb 2021 03:02  Mg     1.8     02-26  TPro  4.9<L> [6.0 - 8.0]  /  Alb  3.3<L> [3.5 - 5.2]  /  TBili  1.7<H> [0.2 - 1.2]  /  DBili  x   /  AST  16 [0 - 41]  /  ALT  23 [0 - 41]  /  AlkPhos  70 [30 - 115]  02-24  PT/INR - ( 25 Feb 2021 05:15 )   PT: ;   INR: 1.12 ratio      RADIOLOGY & ADDITIONAL TESTS:  CXR:< from: Xray Chest 1 View- PORTABLE-Routine (Xray Chest 1 View- PORTABLE-Routine in AM.) (02.25.21 @ 06:01) >  Findings:  Support devices: None.  Cardiac/mediastinum/hilum: Obscured, enlarged cardiac silhouette. Post median sternotomy and valve replacement.  Lung parenchyma/Pleura: Unchanged bilateral pleural effusions and bilateral opacities. No pneumothorax. Unchanged paramediastinal collection, better appreciated on prior CT chest.  Skeleton/soft tissues: Unchanged.  Impression:  Unchanged bilateral pleural effusions and bilateral opacities.  Unchanged paramediastinal collection, better appreciated on prior CT chest.     EKG:e< from: 12 Lead ECG (02.23.21 @ 08:58) >  Ventricular Rate 69 BPM  Atrial Rate 69 BPM  P-R Interval 146 ms  QRS Duration 106 ms  Q-T Interval 456 ms  QTC Calculation(Bazett) 488 ms  P Axis 26 degrees  R Axis -12 degrees  T Axis 81 degrees  Diagnosis Line Sinus rhythm withoccasional Premature ventricular complexes  Possible Left atrial enlargement  Left ventricular hypertrophy  T wave abnormality, consider anterolateral ischemia  Prolonged QT  Abnormal ECG    MEDICATIONS  (STANDING):  albumin human  5% IVPB 500 milliLiter(s) IV Intermittent once  aMIOdarone    Tablet 200 milliGRAM(s) Oral daily  aMIOdarone    Tablet   Oral   amLODIPine   Tablet 5 milliGRAM(s) Oral daily  aspirin 325 milliGRAM(s) Oral daily  atorvastatin 40 milliGRAM(s) Oral at bedtime  buMETAnide 1 milliGRAM(s) Oral daily  chlorhexidine 4% Liquid 1 Application(s) Topical <User Schedule>  dextrose 40% Gel 15 Gram(s) Oral once  dextrose 5%. 1000 milliLiter(s) (50 mL/Hr) IV Continuous <Continuous>  dextrose 5%. 1000 milliLiter(s) (100 mL/Hr) IV Continuous <Continuous>  dextrose 50% Injectable 12.5 Gram(s) IV Push once  dextrose 50% Injectable 25 Gram(s) IV Push once  dextrose 50% Injectable 25 Gram(s) IV Push once  escitalopram 20 milliGRAM(s) Oral daily  glucagon  Injectable 1 milliGRAM(s) IntraMuscular once  guaiFENesin  milliGRAM(s) Oral every 12 hours  heparin   Injectable 5000 Unit(s) SubCutaneous every 12 hours  insulin lispro (ADMELOG) corrective regimen sliding scale   SubCutaneous three times a day before meals  ipratropium    for Nebulization 500 MICROGram(s) Nebulizer every 6 hours  lisinopril 5 milliGRAM(s) Oral daily  magnesium sulfate  IVPB 1 Gram(s) IV Intermittent every 12 hours  metFORMIN 500 milliGRAM(s) Oral two times a day with meals  metoprolol tartrate 50 milliGRAM(s) Oral every 12 hours  pantoprazole    Tablet 40 milliGRAM(s) Oral before breakfast  polyethylene glycol 3350 17 Gram(s) Oral daily  potassium chloride    Tablet ER 20 milliEquivalent(s) Oral daily  simethicone 80 milliGRAM(s) Chew three times a day    MEDICATIONS  (PRN):  hydrALAZINE Injectable 10 milliGRAM(s) IV Push every 4 hours PRN SBP > 155    HEPARIN:  [X] YES [] NO  Dose: 5000 UNITS/HR UNITS Q12H  SCD's: YES b/l  GI Prophylaxis: Protonix [X], Pepcid [], None [], (Contra-indication:.....)    Post-Op Aspirin: Yes [X],  No [], If No, then contra-indication:  Post-Op Statin: Yes [X], No[], If No, then contra-indication:  Post-Op Beta-Blockers: Yes [X], No [], If No, then contra-indication:    Allergies:  No Known Allergies    Ambulation/Activity Status: Ambulates several times daily without assistance.    Assessment/Plan:  70y Female status-post CABG & MV Repair  - Case and plan discussed with CTU Intensivist and CT Surgeon - Dr. Rojas  - Continue CTU supportive care    - Continue DVT/GI prophylaxis  - Incentive Spirometry 10 times an hour  - Continue to advance physical activity as tolerated and continue PT/OT as directed  s/p CABG & MV repair  -CAD: Continue ASA, statin, BB  -remove right pigtail drain     Social Service Disposition:     OPERATIVE PROCEDURE(s):     CABG & MV Repair           POD #   14                    70yFemale  SURGEON(s): FIONA Rojas  SUBJECTIVE ASSESSMENT:  Patient has no complaints at this time.    Vital Signs Last 24 Hrs  T(F): 98.3 (26 Feb 2021 00:00), Max: 98.9 (25 Feb 2021 16:00)  HR: 71 (26 Feb 2021 06:00) (60 - 80)  BP: 124/59 (26 Feb 2021 06:00) (103/51 - 156/69)  BP(mean): 85 (26 Feb 2021 06:00) (73 - 99)  RR: 22 (26 Feb 2021 06:00) (10 - 31)  SpO2: 98% (26 Feb 2021 06:00) (95% - 100%)    I&O's Detail    24 Feb 2021 07:01  -  25 Feb 2021 07:00  --------------------------------------------------------  IN:    IV PiggyBack: 100 mL    Oral Fluid: 720 mL  Total IN: 820 mL    OUT:    Incontinent per Collection Bag (mL): 1400 mL    Voided (mL): 400 mL  Total OUT: 1800 mL    Net:   I&O's Detail    23 Feb 2021 07:01  -  24 Feb 2021 07:00  --------------------------------------------------------  Total NET: -326 mL    24 Feb 2021 07:01  -  25 Feb 2021 07:00  --------------------------------------------------------  Total NET: -980 mL    CAPILLARY BLOOD GLUCOSE  94 (25 Feb 2021 21:00)    POCT Blood Glucose.: 94 mg/dL (25 Feb 2021 21:20)  POCT Blood Glucose.: 121 mg/dL (25 Feb 2021 16:14)  POCT Blood Glucose.: 119 mg/dL (25 Feb 2021 13:13)    Physical Exam:  General: NAD; A&Ox3  Cardiac: S1/S2, RRR, no murmur, no rubs  Lungs: unlabored respirations, CTA b/l, no wheeze, no rales, no crackles  Abdomen: Soft/NT/ND; positive bowel sounds x 4  Sternum: Intact, no click, incision healing well with no drainage  Incisions: Incisions clean/dry/intact  Extremities: 2+ edema b/l lower extremities; good capillary refill; no cyanosis; palpable 1+ pedal pulses b/l    CHEST TUBE(Right):  [X] YES [] NO, If yes -  AIR LEAKS:  [] YES [X] NO  (d/c today)      LABS:                        10.5<L>  14.63<H> )-----------( 306      ( 26 Feb 2021 05:10 )             35.4<L>                        10.1<L>  16.91<H> )-----------( 353      ( 25 Feb 2021 06:12 )             33.9<L>    02-24    141  |  101  |  26<H>  ----------------------------<  101<H>  4.7   |  32  |  0.9    Ca    8.8      24 Feb 2021 03:02  Mg     1.8     02-26  TPro  4.9<L> [6.0 - 8.0]  /  Alb  3.3<L> [3.5 - 5.2]  /  TBili  1.7<H> [0.2 - 1.2]  /  DBili  x   /  AST  16 [0 - 41]  /  ALT  23 [0 - 41]  /  AlkPhos  70 [30 - 115]  02-24  PT/INR - ( 25 Feb 2021 05:15 )   PT: ;   INR: 1.12 ratio      RADIOLOGY & ADDITIONAL TESTS:  CXR:< from: Xray Chest 1 View- PORTABLE-Routine (Xray Chest 1 View- PORTABLE-Routine in AM.) (02.25.21 @ 06:01) >  Findings:  Support devices: None.  Cardiac/mediastinum/hilum: Obscured, enlarged cardiac silhouette. Post median sternotomy and valve replacement.  Lung parenchyma/Pleura: Unchanged bilateral pleural effusions and bilateral opacities. No pneumothorax. Unchanged paramediastinal collection, better appreciated on prior CT chest.  Skeleton/soft tissues: Unchanged.  Impression:  Unchanged bilateral pleural effusions and bilateral opacities.  Unchanged paramediastinal collection, better appreciated on prior CT chest.     EKG:e< from: 12 Lead ECG (02.23.21 @ 08:58) >  Ventricular Rate 69 BPM  Atrial Rate 69 BPM  P-R Interval 146 ms  QRS Duration 106 ms  Q-T Interval 456 ms  QTC Calculation(Bazett) 488 ms  P Axis 26 degrees  R Axis -12 degrees  T Axis 81 degrees  Diagnosis Line Sinus rhythm withoccasional Premature ventricular complexes  Possible Left atrial enlargement  Left ventricular hypertrophy  T wave abnormality, consider anterolateral ischemia  Prolonged QT  Abnormal ECG    MEDICATIONS  (STANDING):  albumin human  5% IVPB 500 milliLiter(s) IV Intermittent once  aMIOdarone    Tablet 200 milliGRAM(s) Oral daily  aMIOdarone    Tablet   Oral   amLODIPine   Tablet 5 milliGRAM(s) Oral daily  aspirin 325 milliGRAM(s) Oral daily  atorvastatin 40 milliGRAM(s) Oral at bedtime  buMETAnide 1 milliGRAM(s) Oral daily  chlorhexidine 4% Liquid 1 Application(s) Topical <User Schedule>  dextrose 40% Gel 15 Gram(s) Oral once  dextrose 5%. 1000 milliLiter(s) (50 mL/Hr) IV Continuous <Continuous>  dextrose 5%. 1000 milliLiter(s) (100 mL/Hr) IV Continuous <Continuous>  dextrose 50% Injectable 12.5 Gram(s) IV Push once  dextrose 50% Injectable 25 Gram(s) IV Push once  dextrose 50% Injectable 25 Gram(s) IV Push once  escitalopram 20 milliGRAM(s) Oral daily  glucagon  Injectable 1 milliGRAM(s) IntraMuscular once  guaiFENesin  milliGRAM(s) Oral every 12 hours  heparin   Injectable 5000 Unit(s) SubCutaneous every 12 hours  insulin lispro (ADMELOG) corrective regimen sliding scale   SubCutaneous three times a day before meals  ipratropium    for Nebulization 500 MICROGram(s) Nebulizer every 6 hours  lisinopril 5 milliGRAM(s) Oral daily  magnesium sulfate  IVPB 1 Gram(s) IV Intermittent every 12 hours  metFORMIN 500 milliGRAM(s) Oral two times a day with meals  metoprolol tartrate 50 milliGRAM(s) Oral every 12 hours  pantoprazole    Tablet 40 milliGRAM(s) Oral before breakfast  polyethylene glycol 3350 17 Gram(s) Oral daily  potassium chloride    Tablet ER 20 milliEquivalent(s) Oral daily  simethicone 80 milliGRAM(s) Chew three times a day    MEDICATIONS  (PRN):  hydrALAZINE Injectable 10 milliGRAM(s) IV Push every 4 hours PRN SBP > 155    HEPARIN:  [X] YES [] NO  Dose: 5000 UNITS/HR UNITS Q12H  SCD's: YES b/l  GI Prophylaxis: Protonix [X], Pepcid [], None [], (Contra-indication:.....)    Post-Op Aspirin: Yes [X],  No [], If No, then contra-indication:  Post-Op Statin: Yes [X], No[], If No, then contra-indication:  Post-Op Beta-Blockers: Yes [X], No [], If No, then contra-indication:    Allergies:  No Known Allergies    Ambulation/Activity Status: Ambulates several times daily without assistance.    Assessment/Plan:  70y Female status-post CABG & MV Repair POD#14  - Case and plan discussed with CTU Intensivist and CT Surgeon - Dr. Rojas  - Continue CTU supportive care    - Continue DVT/GI prophylaxis  - Incentive Spirometry 10 times an hour  - Continue to advance physical activity as tolerated and continue PT/OT as directed  1. CAD: Continue ASA, statin, BB, pain control as needed  2. HTN: cont norvasc, lopressor  3. A. Fib: cont lopressor, amio, mag 1gmb id  4. COPD/Hypoxia: cont nebs, mucinex, wean o2 as tolerated, encourage incentive spirometry  5. DM/Glucose Control: insulin sliding scale  6. right hemothorax- s/p pigtail drain, remove today  7. pancreatitis- trend amylase and lipase- trending down, gen surgery following, ID following    Social Service Disposition:  [facility]   OPERATIVE PROCEDURE(s):     CABG & MV Repair           POD #   14                    70yFemale  SURGEON(s): FIONA Rojas  SUBJECTIVE ASSESSMENT:  Patient has no complaints at this time.    Vital Signs Last 24 Hrs  T(F): 98.3 (26 Feb 2021 00:00), Max: 98.9 (25 Feb 2021 16:00)  HR: 71 (26 Feb 2021 06:00) (60 - 80)  BP: 124/59 (26 Feb 2021 06:00) (103/51 - 156/69)  BP(mean): 85 (26 Feb 2021 06:00) (73 - 99)  RR: 22 (26 Feb 2021 06:00) (10 - 31)  SpO2: 98% (26 Feb 2021 06:00) (95% - 100%)    I&O's Detail    24 Feb 2021 07:01  -  25 Feb 2021 07:00  --------------------------------------------------------  IN:    IV PiggyBack: 100 mL    Oral Fluid: 720 mL  Total IN: 820 mL    OUT:    Incontinent per Collection Bag (mL): 1400 mL    Voided (mL): 400 mL  Total OUT: 1800 mL    Net:   I&O's Detail    23 Feb 2021 07:01  -  24 Feb 2021 07:00  --------------------------------------------------------  Total NET: -326 mL    24 Feb 2021 07:01  -  25 Feb 2021 07:00  --------------------------------------------------------  Total NET: -980 mL    CAPILLARY BLOOD GLUCOSE  94 (25 Feb 2021 21:00)    POCT Blood Glucose.: 94 mg/dL (25 Feb 2021 21:20)  POCT Blood Glucose.: 121 mg/dL (25 Feb 2021 16:14)  POCT Blood Glucose.: 119 mg/dL (25 Feb 2021 13:13)    Physical Exam:  General: NAD; A&Ox3  Cardiac: S1/S2, RRR, no murmur, no rubs  Lungs: unlabored respirations, CTA b/l, no wheeze, no rales, no crackles  Abdomen: Soft/NT/ND; positive bowel sounds x 4  Sternum: Intact, no click, incision healing well with no drainage  Incisions: Incisions clean/dry/intact  Extremities: 2+ edema b/l lower extremities; good capillary refill; no cyanosis; palpable 1+ pedal pulses b/l    CHEST TUBE(Right):  [X] YES [] NO, If yes -  AIR LEAKS:  [] YES [X] NO  (d/c today)      LABS:                        10.5<L>  14.63<H> )-----------( 306      ( 26 Feb 2021 05:10 )             35.4<L>                        10.1<L>  16.91<H> )-----------( 353      ( 25 Feb 2021 06:12 )             33.9<L>    02-24    141  |  101  |  26<H>  ----------------------------<  101<H>  4.7   |  32  |  0.9    Ca    8.8      24 Feb 2021 03:02  Mg     1.8     02-26  TPro  4.9<L> [6.0 - 8.0]  /  Alb  3.3<L> [3.5 - 5.2]  /  TBili  1.7<H> [0.2 - 1.2]  /  DBili  x   /  AST  16 [0 - 41]  /  ALT  23 [0 - 41]  /  AlkPhos  70 [30 - 115]  02-24  PT/INR - ( 25 Feb 2021 05:15 )   PT: ;   INR: 1.12 ratio      RADIOLOGY & ADDITIONAL TESTS:  CXR:< from: Xray Chest 1 View- PORTABLE-Routine (Xray Chest 1 View- PORTABLE-Routine in AM.) (02.25.21 @ 06:01) >  Findings:  Support devices: None.  Cardiac/mediastinum/hilum: Obscured, enlarged cardiac silhouette. Post median sternotomy and valve replacement.  Lung parenchyma/Pleura: Unchanged bilateral pleural effusions and bilateral opacities. No pneumothorax. Unchanged paramediastinal collection, better appreciated on prior CT chest.  Skeleton/soft tissues: Unchanged.  Impression:  Unchanged bilateral pleural effusions and bilateral opacities.  Unchanged paramediastinal collection, better appreciated on prior CT chest.     EKG:e< from: 12 Lead ECG (02.23.21 @ 08:58) >  Ventricular Rate 69 BPM  Atrial Rate 69 BPM  P-R Interval 146 ms  QRS Duration 106 ms  Q-T Interval 456 ms  QTC Calculation(Bazett) 488 ms  P Axis 26 degrees  R Axis -12 degrees  T Axis 81 degrees  Diagnosis Line Sinus rhythm withoccasional Premature ventricular complexes  Possible Left atrial enlargement  Left ventricular hypertrophy  T wave abnormality, consider anterolateral ischemia  Prolonged QT  Abnormal ECG    MEDICATIONS  (STANDING):  albumin human  5% IVPB 500 milliLiter(s) IV Intermittent once  aMIOdarone    Tablet 200 milliGRAM(s) Oral daily  aMIOdarone    Tablet   Oral   amLODIPine   Tablet 5 milliGRAM(s) Oral daily  aspirin 325 milliGRAM(s) Oral daily  atorvastatin 40 milliGRAM(s) Oral at bedtime  buMETAnide 1 milliGRAM(s) Oral daily  chlorhexidine 4% Liquid 1 Application(s) Topical <User Schedule>  dextrose 40% Gel 15 Gram(s) Oral once  dextrose 5%. 1000 milliLiter(s) (50 mL/Hr) IV Continuous <Continuous>  dextrose 5%. 1000 milliLiter(s) (100 mL/Hr) IV Continuous <Continuous>  dextrose 50% Injectable 12.5 Gram(s) IV Push once  dextrose 50% Injectable 25 Gram(s) IV Push once  dextrose 50% Injectable 25 Gram(s) IV Push once  escitalopram 20 milliGRAM(s) Oral daily  glucagon  Injectable 1 milliGRAM(s) IntraMuscular once  guaiFENesin  milliGRAM(s) Oral every 12 hours  heparin   Injectable 5000 Unit(s) SubCutaneous every 12 hours  insulin lispro (ADMELOG) corrective regimen sliding scale   SubCutaneous three times a day before meals  ipratropium    for Nebulization 500 MICROGram(s) Nebulizer every 6 hours  lisinopril 5 milliGRAM(s) Oral daily  magnesium sulfate  IVPB 1 Gram(s) IV Intermittent every 12 hours  metFORMIN 500 milliGRAM(s) Oral two times a day with meals  metoprolol tartrate 50 milliGRAM(s) Oral every 12 hours  pantoprazole    Tablet 40 milliGRAM(s) Oral before breakfast  polyethylene glycol 3350 17 Gram(s) Oral daily  potassium chloride    Tablet ER 20 milliEquivalent(s) Oral daily  simethicone 80 milliGRAM(s) Chew three times a day    MEDICATIONS  (PRN):  hydrALAZINE Injectable 10 milliGRAM(s) IV Push every 4 hours PRN SBP > 155    HEPARIN:  [X] YES [] NO  Dose: 5000 UNITS/HR UNITS Q12H  SCD's: YES b/l  GI Prophylaxis: Protonix [X], Pepcid [], None [], (Contra-indication:.....)    Post-Op Aspirin: Yes [X],  No [], If No, then contra-indication:  Post-Op Statin: Yes [X], No[], If No, then contra-indication:  Post-Op Beta-Blockers: Yes [X], No [], If No, then contra-indication:    Allergies:  No Known Allergies    Ambulation/Activity Status: Ambulates several times daily with assistance.    Assessment/Plan:  70y Female status-post CABG & MV Repair POD#14  - Case and plan discussed with CTU Intensivist and CT Surgeon - Dr. Rojas  - Continue CTU supportive care    - Continue DVT/GI prophylaxis  - Incentive Spirometry 10 times an hour  - Continue to advance physical activity as tolerated and continue PT/OT as directed  1. CAD: Continue ASA, statin, BB, pain control as needed  2. HTN: cont norvasc, lopressor  3. A. Fib: cont lopressor, amio, mag 1gmb id  4. COPD/Hypoxia: cont nebs, mucinex, wean o2 as tolerated, encourage incentive spirometry  5. DM/Glucose Control: insulin sliding scale  6. right hemothorax- s/p pigtail drain, remove today  7. pancreatitis- trend amylase and lipase- trending down, gen surgery following, ID following    Social Service Disposition:  [facility] in progress

## 2025-08-06 ENCOUNTER — APPOINTMENT (OUTPATIENT)
Dept: CARDIOLOGY | Facility: CLINIC | Age: 75
End: 2025-08-06